# Patient Record
Sex: FEMALE | Race: WHITE | NOT HISPANIC OR LATINO | Employment: UNEMPLOYED | URBAN - METROPOLITAN AREA
[De-identification: names, ages, dates, MRNs, and addresses within clinical notes are randomized per-mention and may not be internally consistent; named-entity substitution may affect disease eponyms.]

---

## 2017-01-03 ENCOUNTER — HOSPITAL ENCOUNTER (OUTPATIENT)
Dept: REHABILITATION | Facility: CLINIC | Age: 57
End: 2017-01-03
Attending: FAMILY MEDICINE
Payer: MEDICAID

## 2017-01-03 PROCEDURE — 40000247 ZZH STATISTIC VISIT ADULT DAY PROGRAM

## 2017-01-03 PROCEDURE — S5102 ADULT DAY CARE PER DIEM: HCPCS

## 2017-01-04 ENCOUNTER — HOSPITAL ENCOUNTER (OUTPATIENT)
Dept: REHABILITATION | Facility: CLINIC | Age: 57
End: 2017-01-04
Attending: FAMILY MEDICINE
Payer: MEDICAID

## 2017-01-04 PROCEDURE — S5102 ADULT DAY CARE PER DIEM: HCPCS

## 2017-01-04 PROCEDURE — 40000247 ZZH STATISTIC VISIT ADULT DAY PROGRAM

## 2017-01-04 NOTE — PROGRESS NOTES
MONTHLY PROGRESS NOTE AND PARTICIPATION REPORT   [facility]    Karolyn Das, 1960  Attendance: Please see Epic for attendance record.    Communication:   Does communicate   Hearing:   No impairment  Vision:   Glasses  Orientation/Cognition:   Partial disorientation  Short term memory loss  Behavior:   No behavioral concerns  Self-Preservation Skills:   Not capable of self-preservation  Eating:   Assist with set up  Ambulation Walking:   Non-ambulatory  Transferring:   Aide of one person/two  Wheelchair:   Independent  occationally needs help  Toileting:   Needs assistance  Maintenance Program:     [facility]  Living Arrangements:   Lives in a group home  Spiritual Needs: Needs are being met through support group  Medication Assistance:   Medication not taken during program hours  Participation Report:   Aerobics/Exercise  Support Group  Cognitive Stimulation  Creative Arts/Crafts  Games  Socialization  Current Events/News  Education/Health Topic  Yoga, Book Club, Coloring, and Stress Managment  Level of Participation:   To the best of their ability

## 2017-01-05 ENCOUNTER — HOSPITAL ENCOUNTER (OUTPATIENT)
Dept: REHABILITATION | Facility: CLINIC | Age: 57
End: 2017-01-05
Attending: FAMILY MEDICINE
Payer: MEDICAID

## 2017-01-05 PROCEDURE — 40000247 ZZH STATISTIC VISIT ADULT DAY PROGRAM

## 2017-01-05 PROCEDURE — S5102 ADULT DAY CARE PER DIEM: HCPCS

## 2017-01-05 NOTE — PROGRESS NOTES
MSAC RN Monthly Progress Note  Previous documentation reviewed.  No concerns reported by [facility] staff or member.

## 2017-01-10 ENCOUNTER — HOSPITAL ENCOUNTER (OUTPATIENT)
Dept: REHABILITATION | Facility: CLINIC | Age: 57
End: 2017-01-10
Attending: FAMILY MEDICINE
Payer: MEDICAID

## 2017-01-10 PROCEDURE — 40000247 ZZH STATISTIC VISIT ADULT DAY PROGRAM

## 2017-01-10 PROCEDURE — S5102 ADULT DAY CARE PER DIEM: HCPCS

## 2017-01-17 ENCOUNTER — HOSPITAL ENCOUNTER (OUTPATIENT)
Dept: REHABILITATION | Facility: CLINIC | Age: 57
End: 2017-01-17
Attending: FAMILY MEDICINE
Payer: MEDICAID

## 2017-01-17 PROCEDURE — S5102 ADULT DAY CARE PER DIEM: HCPCS

## 2017-01-17 PROCEDURE — 40000247 ZZH STATISTIC VISIT ADULT DAY PROGRAM

## 2017-01-18 ENCOUNTER — HOSPITAL ENCOUNTER (OUTPATIENT)
Dept: REHABILITATION | Facility: CLINIC | Age: 57
End: 2017-01-18
Attending: FAMILY MEDICINE
Payer: MEDICAID

## 2017-01-18 PROCEDURE — S5102 ADULT DAY CARE PER DIEM: HCPCS

## 2017-01-18 PROCEDURE — 40000247 ZZH STATISTIC VISIT ADULT DAY PROGRAM

## 2017-01-19 ENCOUNTER — HOSPITAL ENCOUNTER (OUTPATIENT)
Dept: REHABILITATION | Facility: CLINIC | Age: 57
End: 2017-01-19
Attending: FAMILY MEDICINE
Payer: MEDICAID

## 2017-01-19 PROCEDURE — S5102 ADULT DAY CARE PER DIEM: HCPCS

## 2017-01-19 PROCEDURE — 40000247 ZZH STATISTIC VISIT ADULT DAY PROGRAM

## 2017-01-20 ENCOUNTER — COMMUNICATION - HEALTHEAST (OUTPATIENT)
Dept: FAMILY MEDICINE | Facility: CLINIC | Age: 57
End: 2017-01-20

## 2017-01-20 ENCOUNTER — OFFICE VISIT - HEALTHEAST (OUTPATIENT)
Dept: FAMILY MEDICINE | Facility: CLINIC | Age: 57
End: 2017-01-20

## 2017-01-20 DIAGNOSIS — Z79.899 HIGH RISK MEDICATION USE: ICD-10-CM

## 2017-01-20 DIAGNOSIS — E03.9 HYPOTHYROIDISM: ICD-10-CM

## 2017-01-20 DIAGNOSIS — D69.6 THROMBOCYTOPENIA, UNSPECIFIED (H): ICD-10-CM

## 2017-01-20 DIAGNOSIS — J20.9 ACUTE BRONCHITIS: ICD-10-CM

## 2017-01-20 ASSESSMENT — MIFFLIN-ST. JEOR: SCORE: 1250.81

## 2017-01-23 ENCOUNTER — COMMUNICATION - HEALTHEAST (OUTPATIENT)
Dept: FAMILY MEDICINE | Facility: CLINIC | Age: 57
End: 2017-01-23

## 2017-01-25 ENCOUNTER — HOSPITAL ENCOUNTER (OUTPATIENT)
Dept: REHABILITATION | Facility: CLINIC | Age: 57
End: 2017-01-25
Attending: FAMILY MEDICINE
Payer: MEDICAID

## 2017-01-25 PROCEDURE — S5102 ADULT DAY CARE PER DIEM: HCPCS

## 2017-01-25 PROCEDURE — 40000247 ZZH STATISTIC VISIT ADULT DAY PROGRAM

## 2017-01-26 ENCOUNTER — HOSPITAL ENCOUNTER (OUTPATIENT)
Dept: REHABILITATION | Facility: CLINIC | Age: 57
End: 2017-01-26
Attending: FAMILY MEDICINE
Payer: MEDICAID

## 2017-01-26 PROCEDURE — 40000247 ZZH STATISTIC VISIT ADULT DAY PROGRAM

## 2017-01-26 PROCEDURE — S5102 ADULT DAY CARE PER DIEM: HCPCS

## 2017-01-31 ENCOUNTER — HOSPITAL ENCOUNTER (OUTPATIENT)
Dept: REHABILITATION | Facility: CLINIC | Age: 57
End: 2017-01-31
Attending: FAMILY MEDICINE
Payer: MEDICAID

## 2017-01-31 PROCEDURE — S5102 ADULT DAY CARE PER DIEM: HCPCS

## 2017-01-31 PROCEDURE — 40000247 ZZH STATISTIC VISIT ADULT DAY PROGRAM

## 2017-02-01 ENCOUNTER — HOSPITAL ENCOUNTER (OUTPATIENT)
Dept: REHABILITATION | Facility: CLINIC | Age: 57
End: 2017-02-01
Attending: FAMILY MEDICINE
Payer: MEDICAID

## 2017-02-01 PROCEDURE — S5102 ADULT DAY CARE PER DIEM: HCPCS

## 2017-02-01 PROCEDURE — 40000247 ZZH STATISTIC VISIT ADULT DAY PROGRAM

## 2017-02-01 NOTE — PROGRESS NOTES
MONTHLY PROGRESS NOTE AND PARTICIPATION REPORT   [facility]    Karolyn Das, 1960  Attendance: Please see Epic for attendance record.    Communication:   Does communicate   Hearing:   No impairment  Vision:   Glasses  Orientation/Cognition:   Partial disorientation  Short term memory loss  Behavior:   No behavioral concerns  Self-Preservation Skills:   Not capable of self-preservation  Eating:   Assist with set up  Ambulation Walking:   Non-ambulatory  Transferring:   Aide of one person/two  Wheelchair:   Independent  occationally needs help   Toileting:   Needs assistance  Maintenance Program:     [facility]  Living Arrangements:   Lives in a group home  Spiritual Needs: Needs are being met through support group  Medication Assistance:   Medication not taken during program hours  Participation Report:   Aerobics/Exercise  Support Group  Cognitive Stimulation  Creative Arts/Crafts  Games  Socialization  Current Events/News  Education/Health Topic  super bowl activity, chocolate analysis, weird national holiday celebrations, academy awards party  Level of Participation:   To the best of their ability

## 2017-02-02 ENCOUNTER — HOSPITAL ENCOUNTER (OUTPATIENT)
Dept: REHABILITATION | Facility: CLINIC | Age: 57
End: 2017-02-02
Attending: FAMILY MEDICINE
Payer: MEDICAID

## 2017-02-02 PROCEDURE — 40000247 ZZH STATISTIC VISIT ADULT DAY PROGRAM

## 2017-02-02 PROCEDURE — S5102 ADULT DAY CARE PER DIEM: HCPCS

## 2017-02-07 ENCOUNTER — HOSPITAL ENCOUNTER (OUTPATIENT)
Dept: REHABILITATION | Facility: CLINIC | Age: 57
End: 2017-02-07
Attending: FAMILY MEDICINE
Payer: MEDICAID

## 2017-02-07 PROCEDURE — 40000247 ZZH STATISTIC VISIT ADULT DAY PROGRAM

## 2017-02-07 PROCEDURE — S5102 ADULT DAY CARE PER DIEM: HCPCS

## 2017-02-07 NOTE — PROGRESS NOTES
MSAC RN Monthly Progress Note  Previous documentation reviewed.  No concerns reported by [facility] staff or member.

## 2017-02-07 NOTE — PROGRESS NOTES
I called patient's  to confirm that she is moving to [facility]isted [facility] facility in [place] sometime before March 15th.   stated that a staff person from [facility] will call  [facility]  prior to the move to provide assisted living staff direct contact information and arrange transportation.  Patient plans to continue with three days per week of day program attendance.

## 2017-02-08 ENCOUNTER — HOSPITAL ENCOUNTER (OUTPATIENT)
Dept: REHABILITATION | Facility: CLINIC | Age: 57
End: 2017-02-08
Attending: FAMILY MEDICINE
Payer: MEDICAID

## 2017-02-08 ENCOUNTER — COMMUNICATION - HEALTHEAST (OUTPATIENT)
Dept: FAMILY MEDICINE | Facility: CLINIC | Age: 57
End: 2017-02-08

## 2017-02-08 DIAGNOSIS — M81.0 OSTEOPOROSIS: ICD-10-CM

## 2017-02-08 PROCEDURE — 40000247 ZZH STATISTIC VISIT ADULT DAY PROGRAM

## 2017-02-08 PROCEDURE — S5102 ADULT DAY CARE PER DIEM: HCPCS

## 2017-02-09 ENCOUNTER — HOSPITAL ENCOUNTER (OUTPATIENT)
Dept: REHABILITATION | Facility: CLINIC | Age: 57
End: 2017-02-09
Attending: FAMILY MEDICINE
Payer: MEDICAID

## 2017-02-09 PROCEDURE — 40000247 ZZH STATISTIC VISIT ADULT DAY PROGRAM

## 2017-02-09 PROCEDURE — S5102 ADULT DAY CARE PER DIEM: HCPCS

## 2017-02-11 ENCOUNTER — RECORDS - HEALTHEAST (OUTPATIENT)
Dept: ADMINISTRATIVE | Facility: OTHER | Age: 57
End: 2017-02-11

## 2017-02-14 ENCOUNTER — HOSPITAL ENCOUNTER (OUTPATIENT)
Dept: REHABILITATION | Facility: CLINIC | Age: 57
End: 2017-02-14
Attending: FAMILY MEDICINE
Payer: MEDICAID

## 2017-02-14 PROCEDURE — 40000247 ZZH STATISTIC VISIT ADULT DAY PROGRAM

## 2017-02-14 PROCEDURE — S5102 ADULT DAY CARE PER DIEM: HCPCS

## 2017-02-15 ENCOUNTER — HOSPITAL ENCOUNTER (OUTPATIENT)
Dept: REHABILITATION | Facility: CLINIC | Age: 57
End: 2017-02-15
Attending: FAMILY MEDICINE
Payer: MEDICAID

## 2017-02-15 PROCEDURE — 40000247 ZZH STATISTIC VISIT ADULT DAY PROGRAM

## 2017-02-15 PROCEDURE — S5102 ADULT DAY CARE PER DIEM: HCPCS

## 2017-02-16 ENCOUNTER — HOSPITAL ENCOUNTER (OUTPATIENT)
Dept: REHABILITATION | Facility: CLINIC | Age: 57
End: 2017-02-16
Attending: FAMILY MEDICINE
Payer: MEDICAID

## 2017-02-16 PROCEDURE — S5102 ADULT DAY CARE PER DIEM: HCPCS

## 2017-02-16 PROCEDURE — 40000247 ZZH STATISTIC VISIT ADULT DAY PROGRAM

## 2017-02-21 ENCOUNTER — HOSPITAL ENCOUNTER (OUTPATIENT)
Dept: REHABILITATION | Facility: CLINIC | Age: 57
End: 2017-02-21
Attending: FAMILY MEDICINE
Payer: MEDICAID

## 2017-02-21 PROCEDURE — S5102 ADULT DAY CARE PER DIEM: HCPCS

## 2017-02-21 PROCEDURE — 40000247 ZZH STATISTIC VISIT ADULT DAY PROGRAM

## 2017-02-22 ENCOUNTER — RECORDS - HEALTHEAST (OUTPATIENT)
Dept: ADMINISTRATIVE | Facility: OTHER | Age: 57
End: 2017-02-22

## 2017-02-22 ENCOUNTER — HOSPITAL ENCOUNTER (OUTPATIENT)
Dept: REHABILITATION | Facility: CLINIC | Age: 57
End: 2017-02-22
Attending: FAMILY MEDICINE
Payer: MEDICAID

## 2017-02-22 PROCEDURE — 40000247 ZZH STATISTIC VISIT ADULT DAY PROGRAM

## 2017-02-22 PROCEDURE — S5102 ADULT DAY CARE PER DIEM: HCPCS

## 2017-02-23 ENCOUNTER — HOSPITAL ENCOUNTER (OUTPATIENT)
Dept: REHABILITATION | Facility: CLINIC | Age: 57
End: 2017-02-23
Attending: FAMILY MEDICINE
Payer: MEDICAID

## 2017-02-23 PROCEDURE — S5102 ADULT DAY CARE PER DIEM: HCPCS

## 2017-02-23 PROCEDURE — 40000247 ZZH STATISTIC VISIT ADULT DAY PROGRAM

## 2017-02-24 ENCOUNTER — HOSPITAL ENCOUNTER (OUTPATIENT)
Dept: MAMMOGRAPHY | Facility: HOSPITAL | Age: 57
Discharge: HOME | End: 2017-02-24
Attending: FAMILY MEDICINE

## 2017-02-24 DIAGNOSIS — Z12.31 VISIT FOR SCREENING MAMMOGRAM: ICD-10-CM

## 2017-02-28 ENCOUNTER — HOSPITAL ENCOUNTER (OUTPATIENT)
Dept: REHABILITATION | Facility: CLINIC | Age: 57
End: 2017-02-28
Attending: FAMILY MEDICINE
Payer: MEDICAID

## 2017-02-28 PROCEDURE — 40000247 ZZH STATISTIC VISIT ADULT DAY PROGRAM

## 2017-02-28 PROCEDURE — S5102 ADULT DAY CARE PER DIEM: HCPCS

## 2017-03-02 ENCOUNTER — COMMUNICATION - HEALTHEAST (OUTPATIENT)
Dept: FAMILY MEDICINE | Facility: CLINIC | Age: 57
End: 2017-03-02

## 2017-03-02 ENCOUNTER — HOSPITAL ENCOUNTER (OUTPATIENT)
Dept: REHABILITATION | Facility: CLINIC | Age: 57
End: 2017-03-02
Attending: FAMILY MEDICINE
Payer: MEDICAID

## 2017-03-02 PROCEDURE — S5102 ADULT DAY CARE PER DIEM: HCPCS

## 2017-03-02 PROCEDURE — 40000247 ZZH STATISTIC VISIT ADULT DAY PROGRAM

## 2017-03-07 ENCOUNTER — HOSPITAL ENCOUNTER (OUTPATIENT)
Dept: REHABILITATION | Facility: CLINIC | Age: 57
End: 2017-03-07
Attending: FAMILY MEDICINE
Payer: MEDICAID

## 2017-03-07 ENCOUNTER — COMMUNICATION - HEALTHEAST (OUTPATIENT)
Dept: FAMILY MEDICINE | Facility: CLINIC | Age: 57
End: 2017-03-07

## 2017-03-07 DIAGNOSIS — E56.0 DEFICIENCY OF VITAMIN E: ICD-10-CM

## 2017-03-07 DIAGNOSIS — E03.9 HYPOTHYROIDISM: ICD-10-CM

## 2017-03-07 DIAGNOSIS — F06.30 MOOD DISORDER IN CONDITIONS CLASSIFIED ELSEWHERE: ICD-10-CM

## 2017-03-07 PROCEDURE — S5102 ADULT DAY CARE PER DIEM: HCPCS

## 2017-03-07 PROCEDURE — 40000247 ZZH STATISTIC VISIT ADULT DAY PROGRAM

## 2017-03-08 ENCOUNTER — HOSPITAL ENCOUNTER (OUTPATIENT)
Dept: REHABILITATION | Facility: CLINIC | Age: 57
End: 2017-03-08
Attending: FAMILY MEDICINE
Payer: MEDICAID

## 2017-03-08 PROCEDURE — 40000247 ZZH STATISTIC VISIT ADULT DAY PROGRAM

## 2017-03-08 PROCEDURE — S5102 ADULT DAY CARE PER DIEM: HCPCS

## 2017-03-09 ENCOUNTER — HOSPITAL ENCOUNTER (OUTPATIENT)
Dept: REHABILITATION | Facility: CLINIC | Age: 57
End: 2017-03-09
Attending: FAMILY MEDICINE
Payer: MEDICAID

## 2017-03-09 PROCEDURE — S5102 ADULT DAY CARE PER DIEM: HCPCS

## 2017-03-09 PROCEDURE — 40000247 ZZH STATISTIC VISIT ADULT DAY PROGRAM

## 2017-03-10 ENCOUNTER — AMBULATORY - HEALTHEAST (OUTPATIENT)
Dept: FAMILY MEDICINE | Facility: CLINIC | Age: 57
End: 2017-03-10

## 2017-03-10 ENCOUNTER — COMMUNICATION - HEALTHEAST (OUTPATIENT)
Dept: FAMILY MEDICINE | Facility: CLINIC | Age: 57
End: 2017-03-10

## 2017-03-10 DIAGNOSIS — E03.9 HYPOTHYROIDISM: ICD-10-CM

## 2017-03-10 DIAGNOSIS — E56.0 DEFICIENCY OF VITAMIN E: ICD-10-CM

## 2017-03-10 DIAGNOSIS — F32.A DEPRESSION: ICD-10-CM

## 2017-03-13 ENCOUNTER — COMMUNICATION - HEALTHEAST (OUTPATIENT)
Dept: FAMILY MEDICINE | Facility: CLINIC | Age: 57
End: 2017-03-13

## 2017-03-13 DIAGNOSIS — E55.9 VITAMIN D DEFICIENCY: ICD-10-CM

## 2017-03-14 ENCOUNTER — HOSPITAL ENCOUNTER (OUTPATIENT)
Dept: REHABILITATION | Facility: CLINIC | Age: 57
End: 2017-03-14
Attending: FAMILY MEDICINE
Payer: MEDICAID

## 2017-03-14 PROCEDURE — S5102 ADULT DAY CARE PER DIEM: HCPCS

## 2017-03-14 PROCEDURE — 40000247 ZZH STATISTIC VISIT ADULT DAY PROGRAM

## 2017-03-15 ENCOUNTER — HOSPITAL ENCOUNTER (OUTPATIENT)
Dept: REHABILITATION | Facility: CLINIC | Age: 57
End: 2017-03-15
Attending: FAMILY MEDICINE
Payer: MEDICAID

## 2017-03-15 PROCEDURE — 40000247 ZZH STATISTIC VISIT ADULT DAY PROGRAM

## 2017-03-15 PROCEDURE — S5102 ADULT DAY CARE PER DIEM: HCPCS

## 2017-03-16 ENCOUNTER — HOSPITAL ENCOUNTER (OUTPATIENT)
Dept: REHABILITATION | Facility: CLINIC | Age: 57
End: 2017-03-16
Attending: FAMILY MEDICINE
Payer: MEDICAID

## 2017-03-16 PROCEDURE — 40000247 ZZH STATISTIC VISIT ADULT DAY PROGRAM

## 2017-03-16 PROCEDURE — S5102 ADULT DAY CARE PER DIEM: HCPCS

## 2017-03-17 ENCOUNTER — COMMUNICATION - HEALTHEAST (OUTPATIENT)
Dept: SCHEDULING | Facility: CLINIC | Age: 57
End: 2017-03-17

## 2017-03-17 DIAGNOSIS — G40.909 EPILEPSY (H): ICD-10-CM

## 2017-03-17 DIAGNOSIS — G35 GENERALIZED MULTIPLE SCLEROSIS (H): ICD-10-CM

## 2017-03-18 NOTE — PROGRESS NOTES
[facility] Social History    Full Name: Karolyn Das        MRN: [id number]    Date of Birth: [birth year]  Nickname: Mirtha      Sex: F     Home Phone: [phone]      Cell Phone: None  Address: [facility]; [address] [place] [address]//Zip: [place] [address]  County: [place]      E-mail: None        Transportation: [employer]  Language: English         needed? No       Ethnicity: American  Race: White      Country of Origin: [place]       Quaker: Adventism  Marital Status:                   Spouse/Significant Other: Ramone   ______________________________________________________________________________________     [place]? No    Branch of Service: NA      Education Level: Unknown  Job History: [employer]       Organizations/Clubs: None  Whom do you live with? Group Home  Current living arrangement:  Home / Assisted Living / Group Home / Other: Group Home  Number of Children: 2  List: Rohit Serna  Number of Siblings: 3     List:   Other Important People/Pets: Best friend (lives in [place])  What else should we know about you? None    Primary Care Provider: Vicente Elizabeth        Neurologist: Unknown  Emergency Contacts:  1. Kareem Lynnenorberto      Relationship: Son    Phone: [phone]  2. Jo Das         Relationship: Mother-in-Law     Phone: [phone]        Updated on: 1/25/17             By: TAYLOR

## 2017-03-20 ENCOUNTER — RECORDS - HEALTHEAST (OUTPATIENT)
Dept: ADMINISTRATIVE | Facility: OTHER | Age: 57
End: 2017-03-20

## 2017-03-20 ENCOUNTER — AMBULATORY - HEALTHEAST (OUTPATIENT)
Dept: FAMILY MEDICINE | Facility: CLINIC | Age: 57
End: 2017-03-20

## 2017-03-20 DIAGNOSIS — G40.909 SEIZURE DISORDER (H): ICD-10-CM

## 2017-03-21 ENCOUNTER — HOSPITAL ENCOUNTER (OUTPATIENT)
Dept: REHABILITATION | Facility: CLINIC | Age: 57
End: 2017-03-21
Attending: FAMILY MEDICINE
Payer: MEDICAID

## 2017-03-21 PROCEDURE — 40000247 ZZH STATISTIC VISIT ADULT DAY PROGRAM

## 2017-03-21 PROCEDURE — S5102 ADULT DAY CARE PER DIEM: HCPCS

## 2017-03-22 ENCOUNTER — HOSPITAL ENCOUNTER (OUTPATIENT)
Dept: REHABILITATION | Facility: CLINIC | Age: 57
End: 2017-03-22
Attending: FAMILY MEDICINE
Payer: MEDICAID

## 2017-03-22 ENCOUNTER — RECORDS - HEALTHEAST (OUTPATIENT)
Dept: ADMINISTRATIVE | Facility: OTHER | Age: 57
End: 2017-03-22

## 2017-03-22 PROCEDURE — S5102 ADULT DAY CARE PER DIEM: HCPCS

## 2017-03-22 PROCEDURE — 40000247 ZZH STATISTIC VISIT ADULT DAY PROGRAM

## 2017-03-23 ENCOUNTER — HOSPITAL ENCOUNTER (OUTPATIENT)
Dept: REHABILITATION | Facility: CLINIC | Age: 57
End: 2017-03-23
Attending: FAMILY MEDICINE
Payer: MEDICAID

## 2017-03-23 ENCOUNTER — RECORDS - HEALTHEAST (OUTPATIENT)
Dept: ADMINISTRATIVE | Facility: OTHER | Age: 57
End: 2017-03-23

## 2017-03-23 PROCEDURE — S5102 ADULT DAY CARE PER DIEM: HCPCS

## 2017-03-23 PROCEDURE — 40000247 ZZH STATISTIC VISIT ADULT DAY PROGRAM

## 2017-03-23 NOTE — PROGRESS NOTES
MSAC RN Monthly Progress Note  Previous documentation reviewed.  No concerns reported by [facility] staff or member.

## 2017-03-24 ENCOUNTER — RECORDS - HEALTHEAST (OUTPATIENT)
Dept: ADMINISTRATIVE | Facility: OTHER | Age: 57
End: 2017-03-24

## 2017-03-24 ENCOUNTER — COMMUNICATION - HEALTHEAST (OUTPATIENT)
Dept: FAMILY MEDICINE | Facility: CLINIC | Age: 57
End: 2017-03-24

## 2017-03-24 DIAGNOSIS — K59.00 CONSTIPATION: ICD-10-CM

## 2017-03-28 ENCOUNTER — HOSPITAL ENCOUNTER (OUTPATIENT)
Dept: REHABILITATION | Facility: CLINIC | Age: 57
End: 2017-03-28
Attending: FAMILY MEDICINE
Payer: MEDICAID

## 2017-03-28 PROCEDURE — 40000247 ZZH STATISTIC VISIT ADULT DAY PROGRAM

## 2017-03-28 PROCEDURE — S5102 ADULT DAY CARE PER DIEM: HCPCS

## 2017-03-29 ENCOUNTER — COMMUNICATION - HEALTHEAST (OUTPATIENT)
Dept: FAMILY MEDICINE | Facility: CLINIC | Age: 57
End: 2017-03-29

## 2017-03-29 ENCOUNTER — HOSPITAL ENCOUNTER (OUTPATIENT)
Dept: REHABILITATION | Facility: CLINIC | Age: 57
End: 2017-03-29
Attending: FAMILY MEDICINE
Payer: MEDICAID

## 2017-03-29 DIAGNOSIS — K59.00 CONSTIPATION: ICD-10-CM

## 2017-03-29 PROCEDURE — 40000247 ZZH STATISTIC VISIT ADULT DAY PROGRAM

## 2017-03-29 PROCEDURE — S5102 ADULT DAY CARE PER DIEM: HCPCS

## 2017-03-30 ENCOUNTER — COMMUNICATION - HEALTHEAST (OUTPATIENT)
Dept: FAMILY MEDICINE | Facility: CLINIC | Age: 57
End: 2017-03-30

## 2017-03-30 ENCOUNTER — HOSPITAL ENCOUNTER (OUTPATIENT)
Dept: REHABILITATION | Facility: CLINIC | Age: 57
End: 2017-03-30
Attending: FAMILY MEDICINE
Payer: MEDICAID

## 2017-03-30 DIAGNOSIS — M81.0 OSTEOPOROSIS: ICD-10-CM

## 2017-03-30 DIAGNOSIS — K59.00 CONSTIPATION: ICD-10-CM

## 2017-03-30 DIAGNOSIS — Z88.9 MULTIPLE ALLERGIES: ICD-10-CM

## 2017-03-30 PROCEDURE — S5102 ADULT DAY CARE PER DIEM: HCPCS

## 2017-03-30 PROCEDURE — 40000247 ZZH STATISTIC VISIT ADULT DAY PROGRAM

## 2017-03-31 ENCOUNTER — COMMUNICATION - HEALTHEAST (OUTPATIENT)
Dept: FAMILY MEDICINE | Facility: CLINIC | Age: 57
End: 2017-03-31

## 2017-03-31 DIAGNOSIS — Z00.00 HEALTHCARE MAINTENANCE: ICD-10-CM

## 2017-03-31 DIAGNOSIS — K59.00 CONSTIPATION: ICD-10-CM

## 2017-03-31 DIAGNOSIS — E56.0 DEFICIENCY OF VITAMIN E: ICD-10-CM

## 2017-04-03 ENCOUNTER — COMMUNICATION - HEALTHEAST (OUTPATIENT)
Dept: FAMILY MEDICINE | Facility: CLINIC | Age: 57
End: 2017-04-03

## 2017-04-03 RX ORDER — VITAMIN E 268 MG
CAPSULE ORAL
Qty: 100 CAPSULE | Refills: 10 | Status: SHIPPED | OUTPATIENT
Start: 2017-04-03 | End: 2023-07-06

## 2017-04-03 RX ORDER — IBUPROFEN 600 MG/1
TABLET, FILM COATED ORAL
Qty: 30 TABLET | Refills: 10 | Status: SHIPPED | OUTPATIENT
Start: 2017-04-03 | End: 2023-07-06

## 2017-04-05 ENCOUNTER — COMMUNICATION - HEALTHEAST (OUTPATIENT)
Dept: FAMILY MEDICINE | Facility: CLINIC | Age: 57
End: 2017-04-05

## 2017-04-05 ENCOUNTER — HOSPITAL ENCOUNTER (OUTPATIENT)
Dept: REHABILITATION | Facility: CLINIC | Age: 57
End: 2017-04-05
Attending: FAMILY MEDICINE
Payer: MEDICAID

## 2017-04-05 DIAGNOSIS — E55.9 VITAMIN D DEFICIENCY: ICD-10-CM

## 2017-04-05 PROCEDURE — S5102 ADULT DAY CARE PER DIEM: HCPCS

## 2017-04-05 PROCEDURE — 40000247 ZZH STATISTIC VISIT ADULT DAY PROGRAM

## 2017-04-07 ENCOUNTER — COMMUNICATION - HEALTHEAST (OUTPATIENT)
Dept: FAMILY MEDICINE | Facility: CLINIC | Age: 57
End: 2017-04-07

## 2017-04-07 DIAGNOSIS — M81.0 OSTEOPOROSIS: ICD-10-CM

## 2017-04-11 ENCOUNTER — RECORDS - HEALTHEAST (OUTPATIENT)
Dept: LAB | Facility: CLINIC | Age: 57
End: 2017-04-11

## 2017-04-12 ENCOUNTER — HOSPITAL ENCOUNTER (OUTPATIENT)
Dept: REHABILITATION | Facility: CLINIC | Age: 57
End: 2017-04-12
Attending: FAMILY MEDICINE
Payer: MEDICAID

## 2017-04-12 LAB
CHOLEST SERPL-MCNC: 162 MG/DL
FASTING STATUS PATIENT QL REPORTED: ABNORMAL
HDLC SERPL-MCNC: 48 MG/DL
LDLC SERPL CALC-MCNC: 97 MG/DL
TRIGL SERPL-MCNC: 84 MG/DL

## 2017-04-12 PROCEDURE — 40000247 ZZH STATISTIC VISIT ADULT DAY PROGRAM

## 2017-04-12 PROCEDURE — S5102 ADULT DAY CARE PER DIEM: HCPCS

## 2017-04-13 ENCOUNTER — HOSPITAL ENCOUNTER (OUTPATIENT)
Dept: REHABILITATION | Facility: CLINIC | Age: 57
End: 2017-04-13
Attending: FAMILY MEDICINE
Payer: MEDICAID

## 2017-04-13 PROCEDURE — 40000247 ZZH STATISTIC VISIT ADULT DAY PROGRAM

## 2017-04-13 PROCEDURE — S5102 ADULT DAY CARE PER DIEM: HCPCS

## 2017-04-13 NOTE — PROGRESS NOTES
MONTHLY PROGRESS NOTE AND PARTICIPATION REPORT   [facility]    Karolyn Das, 1960  Attendance: Please see Epic for attendance record.    Communication:   Does communicate   Hearing:   No impairment  Vision:   Glasses  Orientation/Cognition:   Partial disorientation  Short term memory loss  Behavior:   occationally requires redirection   Self-Preservation Skills:   Not capable of self-preservation  Eating:   Assist with set up  Ambulation Walking:   Non-ambulatory  Transferring:   Aide of one person/two  Wheelchair:   Independent  occationally asks are assistance  Toileting:   Needs assistance  Maintenance Program:     [facility]  Living Arrangements:   Lives in assisted living facility  Spiritual Needs: Needs are being met through support group  Medication Assistance:   Medication not taken during program hours  Participation Report:   Aerobics/Exercise  Support Group  Cognitive Stimulation  Creative Arts/Crafts  Games  Speakers/Entertainment  Socialization  Current Events/News  Education/Health Topic  yoga/relaxation, easter activity, [employer] activities   Level of Participation:   To the best of their ability

## 2017-04-14 ENCOUNTER — COMMUNICATION - HEALTHEAST (OUTPATIENT)
Dept: FAMILY MEDICINE | Facility: CLINIC | Age: 57
End: 2017-04-14

## 2017-04-14 DIAGNOSIS — E03.9 HYPOTHYROIDISM: ICD-10-CM

## 2017-04-18 ENCOUNTER — HOSPITAL ENCOUNTER (OUTPATIENT)
Dept: REHABILITATION | Facility: CLINIC | Age: 57
End: 2017-04-18
Attending: FAMILY MEDICINE
Payer: MEDICAID

## 2017-04-18 PROCEDURE — 40000247 ZZH STATISTIC VISIT ADULT DAY PROGRAM

## 2017-04-18 PROCEDURE — S5102 ADULT DAY CARE PER DIEM: HCPCS

## 2017-04-19 ENCOUNTER — HOSPITAL ENCOUNTER (OUTPATIENT)
Dept: REHABILITATION | Facility: CLINIC | Age: 57
End: 2017-04-19
Attending: FAMILY MEDICINE
Payer: MEDICAID

## 2017-04-19 PROCEDURE — 40000247 ZZH STATISTIC VISIT ADULT DAY PROGRAM

## 2017-04-19 PROCEDURE — S5102 ADULT DAY CARE PER DIEM: HCPCS

## 2017-04-20 ENCOUNTER — HOSPITAL ENCOUNTER (OUTPATIENT)
Dept: REHABILITATION | Facility: CLINIC | Age: 57
End: 2017-04-20
Attending: FAMILY MEDICINE
Payer: MEDICAID

## 2017-04-20 PROCEDURE — 40000247 ZZH STATISTIC VISIT ADULT DAY PROGRAM

## 2017-04-20 PROCEDURE — S5102 ADULT DAY CARE PER DIEM: HCPCS

## 2017-04-21 ENCOUNTER — RECORDS - HEALTHEAST (OUTPATIENT)
Dept: ADMINISTRATIVE | Facility: OTHER | Age: 57
End: 2017-04-21

## 2017-04-25 ENCOUNTER — HOSPITAL ENCOUNTER (OUTPATIENT)
Dept: REHABILITATION | Facility: CLINIC | Age: 57
End: 2017-04-25
Attending: FAMILY MEDICINE
Payer: MEDICAID

## 2017-04-25 PROCEDURE — 40000247 ZZH STATISTIC VISIT ADULT DAY PROGRAM

## 2017-04-25 PROCEDURE — S5102 ADULT DAY CARE PER DIEM: HCPCS

## 2017-04-26 ENCOUNTER — HOSPITAL ENCOUNTER (OUTPATIENT)
Dept: REHABILITATION | Facility: CLINIC | Age: 57
End: 2017-04-26
Attending: FAMILY MEDICINE
Payer: MEDICAID

## 2017-04-26 PROCEDURE — 40000247 ZZH STATISTIC VISIT ADULT DAY PROGRAM

## 2017-04-26 PROCEDURE — S5102 ADULT DAY CARE PER DIEM: HCPCS

## 2017-04-27 ENCOUNTER — HOSPITAL ENCOUNTER (OUTPATIENT)
Dept: REHABILITATION | Facility: CLINIC | Age: 57
End: 2017-04-27
Attending: FAMILY MEDICINE
Payer: MEDICAID

## 2017-04-27 PROCEDURE — S5102 ADULT DAY CARE PER DIEM: HCPCS

## 2017-04-27 PROCEDURE — 40000247 ZZH STATISTIC VISIT ADULT DAY PROGRAM

## 2017-04-27 NOTE — PROGRESS NOTES
INDIVIDUAL PLAN OF CARE   [facility]    Member Name: Karolyn Das; YOB: 1960  MRN: [id number]  4/27/2017    Goals developed in collaboration with: member  Staff responsible for plan: [facility] staff  1. Long Term Goal (concrete, measurable, and time specific outcomes): Member will improve or maintain cognitive functioning while maintaining dignity and respect.  Target Date: ***  Bi-Annual Review:  Goal reviewed on *** by ***  Goal status: {Goal status:899793}  Bi-Annual Review:  Goal reviewed on *** by ***  Goal status: {Goal status:273712}  Bi-Annual Review:  Goal reviewed on *** by ***  Goal status: {Goal status:213898}  Bi-Annual Review:  Goal reviewed on *** by ***  Goal status: {Goal status:553507}    2. Short Term Goal: (concrete, measurable, and time specific outcomes): Member will participate in prescribed maintenance program 1x/week.  Member will actively participate in aerobic exercises each day of program attendance.  Target Date: ***  Bi-Annual Review:  Goal reviewed on *** by ***  Goal status: {Goal status:483175}  Bi-Annual Review:  Goal reviewed on *** by ***  Goal status: {Goal status:049399}  Bi-Annual Review:  Goal reviewed on *** by ***  Goal status: {Goal status:432045}  Bi-Annual Review:  Goal reviewed on *** by ***  Goal status: {Goal status:054645}    3. Short Term Goal: (concrete, measurable, and time specific outcomes): Member will process issues of loss and change related to MS function and explores new opportunities for community engagement.    Target Date: ***  Bi-Annual Review:  Goal reviewed on *** by ***  Goal status: {Goal status:808268}  Bi-Annual Review:  Goal reviewed on *** by ***  Goal status: {Goal status:043325}  Bi-Annual Review:  Goal reviewed on *** by ***  Goal status: {Goal status:450685}  Bi-Annual Review:  Goal reviewed on *** by ***  Goal status: {Goal status:385261}    VULNERABLE ADULT ASSESSMENT  [place]  [facility]     Assessment of Susceptibility to Abuse, Including Self Abuse, Neglect (Identification of characteristics, which make the individual susceptible to abuse, and how these characteristics cause the individual to be susceptible to abuse.)   Based on completed member needs assessment and interview, cognitive and/or physical limitations impair ability to meet basic needs and/or protect self from maltreatment.  INDIVIDUAL ABUSE PREVENTION PLAN-MEASURES TAKEN TO MINIMIZE RISK OF ABUSE   ADL:   Assist with toileting  Ambulation/Transfers/Wheelchairs:  Assist with all transfers and/or ambulation   Behavior:   Monitor client's agitation level and redirect when appropriate  Communication:  Encourage verbalization of needs/concerns  Cognitive Issues:   Give simple step-by-step direction  Diet:   Monitor fluid intake  Exercise:   Encourage participation in wheelchair aerobics  Hearing:   {HEARIN}  Isolation:   Encourage socialization due to isolation in home environment  Medical Monitoring:   Monitor physical and emotional comfort  Mental Health:   Motivate client to join in activities that are beneficial to client  Encourage client to express feelings  Sensory:   Provide and encourage participation in activities for stimulation  Provide variety of sensory groups to maintain current cognitive level  Vision:   Ensure client is wearing glasses and clean prn  Other: ***  Referral Indicated: {REFERRAL:808402}  Developed/Reviewed with Member: Yes  The Program Abuse Prevention Plan identifies the specific actions that minimize abuse to the [facility] participant.  Yes  Plan Reviewed:   Date VULNERABLE ADULT ASSESSMENT/INDIVIDUAL ABUSE PREVENTTION PLAN Remains Appropriate (Y=Yes, N=No)  If NO, see revised VULNERABLE ADULT ASSESSMENT/INDIVIDUAL ABUSE PREVENTTION PLAN document  Staff Name

## 2017-05-03 ENCOUNTER — HOSPITAL ENCOUNTER (OUTPATIENT)
Dept: REHABILITATION | Facility: CLINIC | Age: 57
End: 2017-05-03
Attending: FAMILY MEDICINE
Payer: MEDICAID

## 2017-05-03 PROCEDURE — S5102 ADULT DAY CARE PER DIEM: HCPCS

## 2017-05-03 PROCEDURE — 40000247 ZZH STATISTIC VISIT ADULT DAY PROGRAM

## 2017-05-03 NOTE — PROGRESS NOTES
MONTHLY PROGRESS NOTE AND PARTICIPATION REPORT   [facility]    Karolyn Das, 1960  Attendance: Please see Epic for attendance record.    Communication:   Does communicate   Hearing:   No impairment  Vision:   Glasses  Orientation/Cognition:   Partial disorientation  Short term memory loss  Behavior:   occationally requires redirection  Self-Preservation Skills:   Not capable of self-preservation  Eating:   Assist with set up  Ambulation Walking:   Non-ambulatory  Transferring:   Aide of one person/two  Wheelchair:   Independent  occationally requires assistance   Toileting:   Needs assistance  Maintenance Program:     [facility]  Living Arrangements:   Lives in assisted living facility  Spiritual Needs: Needs are being met through support group  Medication Assistance:   Medication not taken during program hours  Participation Report:   Aerobics/Exercise  Support Group  Cognitive Stimulation  Creative Arts/Crafts  Games  Gardening  Speakers/Entertainment  Socialization  Current Events/News  Education/Health Topic  Kids Movie activities, [employer] opener activities, book club, painting, garden chill, relaxation with Lucero, meditation, prayer time, communion with Amy  Level of Participation:   To the best of their ability

## 2017-05-04 ENCOUNTER — HOSPITAL ENCOUNTER (OUTPATIENT)
Dept: REHABILITATION | Facility: CLINIC | Age: 57
End: 2017-05-04
Attending: FAMILY MEDICINE
Payer: MEDICAID

## 2017-05-04 PROCEDURE — 40000247 ZZH STATISTIC VISIT ADULT DAY PROGRAM

## 2017-05-04 PROCEDURE — S5102 ADULT DAY CARE PER DIEM: HCPCS

## 2017-05-04 NOTE — PROGRESS NOTES
[facility] Note: Patient arrived yesterday with a rash on her neck and chest.  Her checks also appeared a little swollen.  Called the nurse from her group home and they reported that they did not see the rash when she left the house yesterday morning.  They were going to have her looked at.  She arrived again today with the rash still present, but not as prominent as yesterday.  Called the group home nurse again and they stated that she was not seen by their nurse practitioner, but was given some medication to address the rash. Will continue to monitor.

## 2017-05-09 ENCOUNTER — HOSPITAL ENCOUNTER (OUTPATIENT)
Dept: REHABILITATION | Facility: CLINIC | Age: 57
End: 2017-05-09
Attending: FAMILY MEDICINE
Payer: MEDICAID

## 2017-05-09 PROCEDURE — S5102 ADULT DAY CARE PER DIEM: HCPCS

## 2017-05-09 PROCEDURE — 40000247 ZZH STATISTIC VISIT ADULT DAY PROGRAM

## 2017-05-10 ENCOUNTER — HOSPITAL ENCOUNTER (OUTPATIENT)
Dept: REHABILITATION | Facility: CLINIC | Age: 57
End: 2017-05-10
Attending: FAMILY MEDICINE
Payer: MEDICAID

## 2017-05-10 PROCEDURE — 40000247 ZZH STATISTIC VISIT ADULT DAY PROGRAM

## 2017-05-10 PROCEDURE — S5102 ADULT DAY CARE PER DIEM: HCPCS

## 2017-05-11 ENCOUNTER — HOSPITAL ENCOUNTER (OUTPATIENT)
Dept: REHABILITATION | Facility: CLINIC | Age: 57
End: 2017-05-11
Attending: FAMILY MEDICINE
Payer: MEDICAID

## 2017-05-11 ENCOUNTER — COMMUNICATION - HEALTHEAST (OUTPATIENT)
Dept: FAMILY MEDICINE | Facility: CLINIC | Age: 57
End: 2017-05-11

## 2017-05-11 DIAGNOSIS — F32.A DEPRESSION: ICD-10-CM

## 2017-05-11 PROCEDURE — S5102 ADULT DAY CARE PER DIEM: HCPCS

## 2017-05-11 PROCEDURE — 40000247 ZZH STATISTIC VISIT ADULT DAY PROGRAM

## 2017-05-16 ENCOUNTER — HOSPITAL ENCOUNTER (OUTPATIENT)
Dept: REHABILITATION | Facility: CLINIC | Age: 57
End: 2017-05-16
Attending: FAMILY MEDICINE
Payer: MEDICAID

## 2017-05-16 PROCEDURE — S5102 ADULT DAY CARE PER DIEM: HCPCS

## 2017-05-16 PROCEDURE — 40000247 ZZH STATISTIC VISIT ADULT DAY PROGRAM

## 2017-05-16 NOTE — PROGRESS NOTES
AC staff reported rash continues now to members inner bend of bilateral elbows. Writer spoke with member who states the rash she had last week on chest/face has cleared with use of prescription cream. When asked if it was applied this AM member states that she is currently out and a new tube has been ordered. She is unclear what the cream is. Rash is bright red, raised and itchy. Writer suspects a yeast-type infection and encouraged good hygiene to include thorough rinsing/drying. Unsure if this rash is related to last weeks rash which member thought was caused by something she ate. Member verbalized understanding and asked writer to call group home to relay instructions as well. Writer called group home and left message asking for call back to verify that member is being monitored/treated by medical professional for this rash and that a course of treatment is being followed.

## 2017-05-16 NOTE — PROGRESS NOTES
Dionisio, from [facility] returned writers call and states MD is aware of members rash and a new prescription has been filled today for a topical cream to help with irritation and itching. Dionisio states it is a suspected reaction to a new/different laundry soap. [facility] is now using members own soap which is more mild. They will continue to monitor.

## 2017-05-17 NOTE — PROGRESS NOTES
[facility] Note: Patient has expressed concern regarding her finances.  She stated her son no longer wants to be responsible for her finances.  Left message with  to see if they could assist her in finding someone to help her with this task.

## 2017-05-22 ENCOUNTER — COMMUNICATION - HEALTHEAST (OUTPATIENT)
Dept: FAMILY MEDICINE | Facility: CLINIC | Age: 57
End: 2017-05-22

## 2017-05-22 RX ORDER — CLONAZEPAM 0.5 MG/1
TABLET ORAL
Qty: 56 TABLET | Refills: 2 | Status: ON HOLD | OUTPATIENT
Start: 2017-05-22 | End: 2023-07-08

## 2017-05-23 ENCOUNTER — HOSPITAL ENCOUNTER (OUTPATIENT)
Dept: REHABILITATION | Facility: CLINIC | Age: 57
End: 2017-05-23
Attending: FAMILY MEDICINE
Payer: MEDICAID

## 2017-05-23 PROCEDURE — S5102 ADULT DAY CARE PER DIEM: HCPCS

## 2017-05-23 PROCEDURE — 40000247 ZZH STATISTIC VISIT ADULT DAY PROGRAM

## 2017-05-24 ENCOUNTER — HOSPITAL ENCOUNTER (OUTPATIENT)
Dept: REHABILITATION | Facility: CLINIC | Age: 57
End: 2017-05-24
Attending: FAMILY MEDICINE
Payer: MEDICAID

## 2017-05-24 PROCEDURE — S5102 ADULT DAY CARE PER DIEM: HCPCS

## 2017-05-24 PROCEDURE — 40000247 ZZH STATISTIC VISIT ADULT DAY PROGRAM

## 2017-05-25 ENCOUNTER — COMMUNICATION - HEALTHEAST (OUTPATIENT)
Dept: FAMILY MEDICINE | Facility: CLINIC | Age: 57
End: 2017-05-25

## 2017-05-25 ENCOUNTER — HOSPITAL ENCOUNTER (OUTPATIENT)
Dept: REHABILITATION | Facility: CLINIC | Age: 57
End: 2017-05-25
Attending: FAMILY MEDICINE
Payer: MEDICAID

## 2017-05-25 PROCEDURE — 40000247 ZZH STATISTIC VISIT ADULT DAY PROGRAM

## 2017-05-25 PROCEDURE — S5102 ADULT DAY CARE PER DIEM: HCPCS

## 2017-05-30 ENCOUNTER — HOSPITAL ENCOUNTER (OUTPATIENT)
Dept: REHABILITATION | Facility: CLINIC | Age: 57
End: 2017-05-30
Attending: FAMILY MEDICINE
Payer: MEDICAID

## 2017-05-30 PROCEDURE — S5102 ADULT DAY CARE PER DIEM: HCPCS

## 2017-05-30 PROCEDURE — 40000247 ZZH STATISTIC VISIT ADULT DAY PROGRAM

## 2017-05-31 ENCOUNTER — HOSPITAL ENCOUNTER (OUTPATIENT)
Dept: REHABILITATION | Facility: CLINIC | Age: 57
End: 2017-05-31
Attending: FAMILY MEDICINE
Payer: MEDICAID

## 2017-05-31 PROCEDURE — 40000247 ZZH STATISTIC VISIT ADULT DAY PROGRAM

## 2017-05-31 PROCEDURE — S5102 ADULT DAY CARE PER DIEM: HCPCS

## 2017-06-01 ENCOUNTER — HOSPITAL ENCOUNTER (OUTPATIENT)
Dept: REHABILITATION | Facility: CLINIC | Age: 57
End: 2017-06-01
Attending: FAMILY MEDICINE
Payer: MEDICAID

## 2017-06-01 PROCEDURE — 40000247 ZZH STATISTIC VISIT ADULT DAY PROGRAM

## 2017-06-01 PROCEDURE — S5102 ADULT DAY CARE PER DIEM: HCPCS

## 2017-06-06 ENCOUNTER — HOSPITAL ENCOUNTER (OUTPATIENT)
Dept: REHABILITATION | Facility: CLINIC | Age: 57
End: 2017-06-06
Attending: FAMILY MEDICINE
Payer: MEDICAID

## 2017-06-06 PROCEDURE — 40000247 ZZH STATISTIC VISIT ADULT DAY PROGRAM

## 2017-06-06 PROCEDURE — S5102 ADULT DAY CARE PER DIEM: HCPCS

## 2017-06-07 ENCOUNTER — HOSPITAL ENCOUNTER (OUTPATIENT)
Dept: REHABILITATION | Facility: CLINIC | Age: 57
End: 2017-06-07
Attending: FAMILY MEDICINE
Payer: MEDICAID

## 2017-06-07 PROCEDURE — S5102 ADULT DAY CARE PER DIEM: HCPCS

## 2017-06-07 PROCEDURE — 40000247 ZZH STATISTIC VISIT ADULT DAY PROGRAM

## 2017-06-07 NOTE — PROGRESS NOTES
MSAC RN Monthly Progress Note  Previous documentation reviewed.  No concerns reported by [facility] staff or member.

## 2017-06-08 ENCOUNTER — HOSPITAL ENCOUNTER (OUTPATIENT)
Dept: REHABILITATION | Facility: CLINIC | Age: 57
End: 2017-06-08
Attending: FAMILY MEDICINE
Payer: MEDICAID

## 2017-06-08 PROCEDURE — S5102 ADULT DAY CARE PER DIEM: HCPCS

## 2017-06-08 PROCEDURE — 40000247 ZZH STATISTIC VISIT ADULT DAY PROGRAM

## 2017-06-12 NOTE — ADDENDUM NOTE
Encounter addended by: Kristan Etienne on: 6/12/2017  2:56 PM<BR>     Actions taken: Charge Capture section accepted

## 2017-06-14 ENCOUNTER — HOSPITAL ENCOUNTER (OUTPATIENT)
Dept: REHABILITATION | Facility: CLINIC | Age: 57
End: 2017-06-14
Attending: FAMILY MEDICINE
Payer: MEDICAID

## 2017-06-14 PROCEDURE — S5102 ADULT DAY CARE PER DIEM: HCPCS

## 2017-06-14 PROCEDURE — 40000247 ZZH STATISTIC VISIT ADULT DAY PROGRAM

## 2017-06-15 ENCOUNTER — HOSPITAL ENCOUNTER (OUTPATIENT)
Dept: REHABILITATION | Facility: CLINIC | Age: 57
End: 2017-06-15
Attending: FAMILY MEDICINE
Payer: MEDICAID

## 2017-06-15 PROCEDURE — 40000247 ZZH STATISTIC VISIT ADULT DAY PROGRAM

## 2017-06-15 PROCEDURE — S5102 ADULT DAY CARE PER DIEM: HCPCS

## 2017-06-20 ENCOUNTER — HOSPITAL ENCOUNTER (OUTPATIENT)
Dept: REHABILITATION | Facility: CLINIC | Age: 57
End: 2017-06-20
Attending: FAMILY MEDICINE
Payer: MEDICAID

## 2017-06-20 PROCEDURE — S5102 ADULT DAY CARE PER DIEM: HCPCS

## 2017-06-20 PROCEDURE — 40000247 ZZH STATISTIC VISIT ADULT DAY PROGRAM

## 2017-06-21 ENCOUNTER — HOSPITAL ENCOUNTER (OUTPATIENT)
Dept: REHABILITATION | Facility: CLINIC | Age: 57
End: 2017-06-21
Attending: FAMILY MEDICINE
Payer: MEDICAID

## 2017-06-21 PROCEDURE — 40000247 ZZH STATISTIC VISIT ADULT DAY PROGRAM

## 2017-06-21 PROCEDURE — S5102 ADULT DAY CARE PER DIEM: HCPCS

## 2017-06-22 ENCOUNTER — HOSPITAL ENCOUNTER (OUTPATIENT)
Dept: REHABILITATION | Facility: CLINIC | Age: 57
End: 2017-06-22
Attending: FAMILY MEDICINE
Payer: MEDICAID

## 2017-06-22 PROCEDURE — S5102 ADULT DAY CARE PER DIEM: HCPCS

## 2017-06-22 PROCEDURE — 40000247 ZZH STATISTIC VISIT ADULT DAY PROGRAM

## 2017-06-28 ENCOUNTER — HOSPITAL ENCOUNTER (OUTPATIENT)
Dept: REHABILITATION | Facility: CLINIC | Age: 57
End: 2017-06-28
Attending: FAMILY MEDICINE
Payer: MEDICAID

## 2017-06-28 PROCEDURE — 40000247 ZZH STATISTIC VISIT ADULT DAY PROGRAM

## 2017-06-28 PROCEDURE — S5102 ADULT DAY CARE PER DIEM: HCPCS

## 2017-06-29 ENCOUNTER — HOSPITAL ENCOUNTER (OUTPATIENT)
Dept: REHABILITATION | Facility: CLINIC | Age: 57
End: 2017-06-29
Attending: FAMILY MEDICINE
Payer: MEDICAID

## 2017-06-29 PROCEDURE — 40000247 ZZH STATISTIC VISIT ADULT DAY PROGRAM

## 2017-06-29 PROCEDURE — S5102 ADULT DAY CARE PER DIEM: HCPCS

## 2017-07-03 ENCOUNTER — HOSPITAL ENCOUNTER (OUTPATIENT)
Dept: REHABILITATION | Facility: CLINIC | Age: 57
End: 2017-07-03
Attending: FAMILY MEDICINE
Payer: MEDICAID

## 2017-07-03 PROCEDURE — S5102 ADULT DAY CARE PER DIEM: HCPCS

## 2017-07-03 PROCEDURE — 40000247 ZZH STATISTIC VISIT ADULT DAY PROGRAM

## 2017-07-05 ENCOUNTER — RECORDS - HEALTHEAST (OUTPATIENT)
Dept: ADMINISTRATIVE | Facility: OTHER | Age: 57
End: 2017-07-05

## 2017-07-05 PROCEDURE — 40000247 ZZH STATISTIC VISIT ADULT DAY PROGRAM

## 2017-07-05 PROCEDURE — S5102 ADULT DAY CARE PER DIEM: HCPCS

## 2017-07-06 ENCOUNTER — HOSPITAL ENCOUNTER (OUTPATIENT)
Dept: REHABILITATION | Facility: CLINIC | Age: 57
End: 2017-07-06
Attending: FAMILY MEDICINE
Payer: MEDICAID

## 2017-07-06 PROCEDURE — S5102 ADULT DAY CARE PER DIEM: HCPCS

## 2017-07-06 PROCEDURE — 40000247 ZZH STATISTIC VISIT ADULT DAY PROGRAM

## 2017-07-06 NOTE — PROGRESS NOTES
MSAC RN Monthly Progress Note  Previous documentation reviewed.  No concerns reported by [facility] staff or member.

## 2017-07-07 ENCOUNTER — COMMUNICATION - HEALTHEAST (OUTPATIENT)
Dept: FAMILY MEDICINE | Facility: CLINIC | Age: 57
End: 2017-07-07

## 2017-07-07 DIAGNOSIS — K59.00 CONSTIPATION: ICD-10-CM

## 2017-07-10 ENCOUNTER — HOSPITAL ENCOUNTER (OUTPATIENT)
Dept: REHABILITATION | Facility: CLINIC | Age: 57
End: 2017-07-10
Attending: FAMILY MEDICINE
Payer: MEDICAID

## 2017-07-10 ENCOUNTER — RECORDS - HEALTHEAST (OUTPATIENT)
Dept: ADMINISTRATIVE | Facility: OTHER | Age: 57
End: 2017-07-10

## 2017-07-10 PROCEDURE — 40000247 ZZH STATISTIC VISIT ADULT DAY PROGRAM

## 2017-07-10 PROCEDURE — S5102 ADULT DAY CARE PER DIEM: HCPCS

## 2017-07-11 ENCOUNTER — HOSPITAL ENCOUNTER (OUTPATIENT)
Dept: REHABILITATION | Facility: CLINIC | Age: 57
End: 2017-07-11
Attending: FAMILY MEDICINE
Payer: MEDICAID

## 2017-07-11 PROCEDURE — S5102 ADULT DAY CARE PER DIEM: HCPCS

## 2017-07-11 PROCEDURE — 40000247 ZZH STATISTIC VISIT ADULT DAY PROGRAM

## 2017-07-13 ENCOUNTER — HOSPITAL ENCOUNTER (OUTPATIENT)
Dept: REHABILITATION | Facility: CLINIC | Age: 57
End: 2017-07-13
Attending: FAMILY MEDICINE
Payer: MEDICAID

## 2017-07-13 PROCEDURE — S5102 ADULT DAY CARE PER DIEM: HCPCS

## 2017-07-13 PROCEDURE — 40000247 ZZH STATISTIC VISIT ADULT DAY PROGRAM

## 2017-07-17 ENCOUNTER — HOSPITAL ENCOUNTER (OUTPATIENT)
Dept: REHABILITATION | Facility: CLINIC | Age: 57
End: 2017-07-17
Attending: FAMILY MEDICINE
Payer: MEDICAID

## 2017-07-17 PROCEDURE — S5102 ADULT DAY CARE PER DIEM: HCPCS

## 2017-07-17 PROCEDURE — 40000247 ZZH STATISTIC VISIT ADULT DAY PROGRAM

## 2017-07-18 ENCOUNTER — HOSPITAL ENCOUNTER (OUTPATIENT)
Dept: REHABILITATION | Facility: CLINIC | Age: 57
End: 2017-07-18
Attending: FAMILY MEDICINE
Payer: MEDICAID

## 2017-07-18 PROCEDURE — 40000247 ZZH STATISTIC VISIT ADULT DAY PROGRAM

## 2017-07-18 PROCEDURE — S5102 ADULT DAY CARE PER DIEM: HCPCS

## 2017-07-24 ENCOUNTER — HOSPITAL ENCOUNTER (OUTPATIENT)
Dept: REHABILITATION | Facility: CLINIC | Age: 57
End: 2017-07-24
Attending: FAMILY MEDICINE
Payer: MEDICAID

## 2017-07-24 PROCEDURE — S5102 ADULT DAY CARE PER DIEM: HCPCS

## 2017-07-24 PROCEDURE — 40000247 ZZH STATISTIC VISIT ADULT DAY PROGRAM

## 2017-07-25 ENCOUNTER — HOSPITAL ENCOUNTER (OUTPATIENT)
Dept: REHABILITATION | Facility: CLINIC | Age: 57
End: 2017-07-25
Attending: FAMILY MEDICINE
Payer: MEDICAID

## 2017-07-25 PROCEDURE — 40000247 ZZH STATISTIC VISIT ADULT DAY PROGRAM

## 2017-07-25 PROCEDURE — S5102 ADULT DAY CARE PER DIEM: HCPCS

## 2017-07-26 NOTE — PROGRESS NOTES
MONTHLY PROGRESS NOTE AND PARTICIPATION REPORT   [facility]    Karolyn Das, 1960  Attendance: Please see Epic for attendance record.    Communication:   Does communicate   Hearing:   No impairment  Vision:   Glasses  Orientation/Cognition:   Partial disorientation  Short term memory loss  Behavior:   occationally requires redirection   Self-Preservation Skills:   Not capable of self-preservation  Eating:   Assist with set up  Ambulation Walking:   Non-ambulatory  Transferring:   Aide of one person/two  Wheelchair:   Independent  occationally requires assistance  Toileting:   Needs assistance  Maintenance Program:     [facility]  Living Arrangements:   Lives in assisted living facility  Spiritual Needs: Needs are being met through support group  Medication Assistance:   Medication not taken during program hours  Participation Report:   Aerobics/Exercise  Support Group  Cognitive Stimulation  Creative Arts/Crafts  Games  Gardening  Speakers/Entertainment  Socialization  Current Events/News  Education/Health Topic  Meditation, Prayer Time, Communion, relaxation with Lucero, Pathways Laughter Yoga and Drumming, 4th of July activities, hawaiian party  Level of Participation:   To the best of their ability

## 2017-07-27 ENCOUNTER — COMMUNICATION - HEALTHEAST (OUTPATIENT)
Dept: FAMILY MEDICINE | Facility: CLINIC | Age: 57
End: 2017-07-27

## 2017-07-27 ENCOUNTER — HOSPITAL ENCOUNTER (OUTPATIENT)
Dept: REHABILITATION | Facility: CLINIC | Age: 57
End: 2017-07-27
Attending: FAMILY MEDICINE
Payer: MEDICAID

## 2017-07-27 DIAGNOSIS — K59.00 CONSTIPATION: ICD-10-CM

## 2017-07-27 PROCEDURE — S5102 ADULT DAY CARE PER DIEM: HCPCS

## 2017-07-27 PROCEDURE — 40000247 ZZH STATISTIC VISIT ADULT DAY PROGRAM

## 2017-07-28 ENCOUNTER — COMMUNICATION - HEALTHEAST (OUTPATIENT)
Dept: FAMILY MEDICINE | Facility: CLINIC | Age: 57
End: 2017-07-28

## 2017-07-28 DIAGNOSIS — Z00.00 HEALTH CARE MAINTENANCE: ICD-10-CM

## 2017-07-28 DIAGNOSIS — K59.00 CONSTIPATION: ICD-10-CM

## 2017-07-31 ENCOUNTER — HOSPITAL ENCOUNTER (OUTPATIENT)
Dept: REHABILITATION | Facility: CLINIC | Age: 57
End: 2017-07-31
Attending: FAMILY MEDICINE
Payer: MEDICAID

## 2017-07-31 PROCEDURE — 40000247 ZZH STATISTIC VISIT ADULT DAY PROGRAM

## 2017-07-31 PROCEDURE — S5102 ADULT DAY CARE PER DIEM: HCPCS

## 2017-08-31 NOTE — PROGRESS NOTES
MONTHLY PROGRESS NOTE AND PARTICIPATION REPORT   [facility]    Karolyn Das, 1960  Attendance: Member has not attended the Day Program within the past month

## 2017-08-31 NOTE — ADDENDUM NOTE
Encounter addended by: Dayan Hanley on: 8/31/2017  9:07 AM<BR>     Actions taken: Episode edited, Sign clinical note

## 2017-09-06 ENCOUNTER — RECORDS - HEALTHEAST (OUTPATIENT)
Dept: LAB | Facility: CLINIC | Age: 57
End: 2017-09-06

## 2017-09-06 LAB
CHOLEST SERPL-MCNC: 157 MG/DL
FASTING STATUS PATIENT QL REPORTED: ABNORMAL
HDLC SERPL-MCNC: 47 MG/DL
LDLC SERPL CALC-MCNC: 100 MG/DL
TRIGL SERPL-MCNC: 50 MG/DL

## 2017-09-14 ENCOUNTER — HOSPITAL ENCOUNTER (OUTPATIENT)
Dept: REHABILITATION | Facility: CLINIC | Age: 57
End: 2017-09-14
Attending: FAMILY MEDICINE
Payer: MEDICAID

## 2017-09-14 PROCEDURE — S5102 ADULT DAY CARE PER DIEM: HCPCS

## 2017-09-14 PROCEDURE — 40000247 ZZH STATISTIC VISIT ADULT DAY PROGRAM

## 2017-09-18 ENCOUNTER — HOSPITAL ENCOUNTER (OUTPATIENT)
Dept: REHABILITATION | Facility: CLINIC | Age: 57
End: 2017-09-18
Attending: FAMILY MEDICINE
Payer: MEDICAID

## 2017-09-18 PROCEDURE — 40000247 ZZH STATISTIC VISIT ADULT DAY PROGRAM

## 2017-09-18 PROCEDURE — S5102 ADULT DAY CARE PER DIEM: HCPCS

## 2017-09-19 ENCOUNTER — HOSPITAL ENCOUNTER (OUTPATIENT)
Dept: REHABILITATION | Facility: CLINIC | Age: 57
End: 2017-09-19
Attending: FAMILY MEDICINE
Payer: MEDICAID

## 2017-09-19 PROCEDURE — 40000247 ZZH STATISTIC VISIT ADULT DAY PROGRAM

## 2017-09-19 PROCEDURE — S5102 ADULT DAY CARE PER DIEM: HCPCS

## 2017-09-21 ENCOUNTER — HOSPITAL ENCOUNTER (OUTPATIENT)
Dept: REHABILITATION | Facility: CLINIC | Age: 57
End: 2017-09-21
Attending: FAMILY MEDICINE
Payer: MEDICAID

## 2017-09-21 PROCEDURE — S5102 ADULT DAY CARE PER DIEM: HCPCS

## 2017-09-21 PROCEDURE — 40000247 ZZH STATISTIC VISIT ADULT DAY PROGRAM

## 2017-09-25 ENCOUNTER — HOSPITAL ENCOUNTER (OUTPATIENT)
Dept: REHABILITATION | Facility: CLINIC | Age: 57
End: 2017-09-25
Attending: FAMILY MEDICINE
Payer: MEDICAID

## 2017-09-25 PROCEDURE — 40000247 ZZH STATISTIC VISIT ADULT DAY PROGRAM

## 2017-09-25 PROCEDURE — S5102 ADULT DAY CARE PER DIEM: HCPCS

## 2017-09-25 NOTE — ADDENDUM NOTE
Encounter addended by: Dayan Hanley on: 9/25/2017  8:36 AM<BR>     Actions taken: Episode edited, Sign clinical note

## 2017-09-25 NOTE — PROGRESS NOTES
MONTHLY PROGRESS NOTE AND PARTICIPATION REPORT   [facility]    Karolyn Das, 1960  Attendance: Please see Epic for attendance record.    Communication:   Does communicate   Hearing:   No impairment  Vision:   Glasses  Orientation/Cognition:   Partial disorientation  Short term memory loss  Behavior:   occationally requires redirection  Self-Preservation Skills:   Not capable of self-preservation  Eating:   Assist with set up  Ambulation Walking:   Non-ambulatory  Transferring:   Aide of one person/two  Wheelchair:   Independent  occationally requires assistance   Toileting:   Needs assistance  Maintenance Program:     [facility]  Living Arrangements:   Lives in assisted living facility  Spiritual Needs: Needs are being met through support group  Medication Assistance:   Medication not taken during program hours  Participation Report:   Aerobics/Exercise  Support Group  Cognitive Stimulation  Creative Arts/Crafts  Games  Gardening  Speakers/Entertainment  Socialization  Current Events/News  Education/Health Topic  Meditation, Prayer Time, Communion, Relaxation Yoga with Lucero, Pathways: Gratitude in the Moment, Back to the 50's Party, Book Club, Rock Painting, Apple Week Activities  Level of Participation:   To the best of their ability

## 2017-09-26 ENCOUNTER — HOSPITAL ENCOUNTER (OUTPATIENT)
Dept: REHABILITATION | Facility: CLINIC | Age: 57
End: 2017-09-26
Attending: FAMILY MEDICINE
Payer: MEDICAID

## 2017-09-26 PROCEDURE — 40000247 ZZH STATISTIC VISIT ADULT DAY PROGRAM

## 2017-09-26 PROCEDURE — S5102 ADULT DAY CARE PER DIEM: HCPCS

## 2017-09-28 ENCOUNTER — HOSPITAL ENCOUNTER (OUTPATIENT)
Dept: REHABILITATION | Facility: CLINIC | Age: 57
End: 2017-09-28
Attending: FAMILY MEDICINE
Payer: MEDICAID

## 2017-09-28 PROCEDURE — S5102 ADULT DAY CARE PER DIEM: HCPCS

## 2017-09-28 PROCEDURE — 40000247 ZZH STATISTIC VISIT ADULT DAY PROGRAM

## 2017-09-28 NOTE — PROGRESS NOTES
MSAC RN Monthly Progress Note  Previous documentation reviewed.  No concerns reported by [facility] staff or member.

## 2017-10-02 ENCOUNTER — HOSPITAL ENCOUNTER (OUTPATIENT)
Dept: REHABILITATION | Facility: CLINIC | Age: 57
End: 2017-10-02
Attending: FAMILY MEDICINE
Payer: MEDICAID

## 2017-10-02 PROCEDURE — 40000247 ZZH STATISTIC VISIT ADULT DAY PROGRAM

## 2017-10-02 PROCEDURE — S5102 ADULT DAY CARE PER DIEM: HCPCS

## 2017-10-02 NOTE — PROGRESS NOTES
MSAC RN Monthly Progress Note  Previous documentation reviewed.  No concerns reported by [facility] staff or member.

## 2017-10-03 ENCOUNTER — HOSPITAL ENCOUNTER (OUTPATIENT)
Dept: REHABILITATION | Facility: CLINIC | Age: 57
End: 2017-10-03
Attending: FAMILY MEDICINE
Payer: MEDICAID

## 2017-10-03 PROCEDURE — 40000247 ZZH STATISTIC VISIT ADULT DAY PROGRAM

## 2017-10-03 PROCEDURE — S5102 ADULT DAY CARE PER DIEM: HCPCS

## 2017-10-05 ENCOUNTER — HOSPITAL ENCOUNTER (OUTPATIENT)
Dept: REHABILITATION | Facility: CLINIC | Age: 57
End: 2017-10-05
Attending: FAMILY MEDICINE
Payer: MEDICAID

## 2017-10-05 PROCEDURE — S5102 ADULT DAY CARE PER DIEM: HCPCS

## 2017-10-05 PROCEDURE — 40000247 ZZH STATISTIC VISIT ADULT DAY PROGRAM

## 2017-10-09 ENCOUNTER — HOSPITAL ENCOUNTER (OUTPATIENT)
Dept: REHABILITATION | Facility: CLINIC | Age: 57
End: 2017-10-09
Attending: FAMILY MEDICINE
Payer: MEDICAID

## 2017-10-09 PROCEDURE — 40000247 ZZH STATISTIC VISIT ADULT DAY PROGRAM

## 2017-10-09 PROCEDURE — S5102 ADULT DAY CARE PER DIEM: HCPCS

## 2017-10-09 NOTE — PROGRESS NOTES
MONTHLY PROGRESS NOTE AND PARTICIPATION REPORT   [facility]    Karolyn Das, 1960  Attendance: Please see Epic for attendance record.    Communication:   Does communicate   Hearing:   No impairment  Vision:   Glasses  Orientation/Cognition:   Partial disorientation  Short term memory loss  Behavior:   occasionally requires redirection   Self-Preservation Skills:   Not capable of self-preservation  Eating:   Assist with set up  Ambulation Walking:   Non-ambulatory  Transferring:   Aide of one person/two  Wheelchair:   Independent  occasionally requires assistance  Toileting:   Needs assistance  Maintenance Program:     [facility]  Living Arrangements:   Lives in assisted living facility  Spiritual Needs: Needs are being met through support group  Medication Assistance:   Medication not taken during program hours  Participation Report:   Aerobics/Exercise  Support Group  Cognitive Stimulation  Fire Drill  Creative Arts/Crafts  Games  Gardening  Speakers/Entertainment  Socialization  Current Events/News  Education/Health Topic  Meditation, Prayer Time, Communion, Relaxation with Lucero, Pathways: Gratitude in the Moment and Musical Meditation, Octoberfest Activities, World Series Activities  Level of Participation:   To the best of their ability

## 2017-10-09 NOTE — ADDENDUM NOTE
Encounter addended by: Dayan Hanley on: 10/9/2017 10:04 AM<BR>     Actions taken: Episode edited, Sign clinical note

## 2017-10-10 ENCOUNTER — COMMUNICATION - HEALTHEAST (OUTPATIENT)
Dept: FAMILY MEDICINE | Facility: CLINIC | Age: 57
End: 2017-10-10

## 2017-10-10 ENCOUNTER — HOSPITAL ENCOUNTER (OUTPATIENT)
Dept: REHABILITATION | Facility: CLINIC | Age: 57
End: 2017-10-10
Attending: FAMILY MEDICINE
Payer: MEDICAID

## 2017-10-10 DIAGNOSIS — F32.A DEPRESSION: ICD-10-CM

## 2017-10-10 PROCEDURE — 40000247 ZZH STATISTIC VISIT ADULT DAY PROGRAM

## 2017-10-10 PROCEDURE — S5102 ADULT DAY CARE PER DIEM: HCPCS

## 2017-10-12 ENCOUNTER — HOSPITAL ENCOUNTER (OUTPATIENT)
Dept: REHABILITATION | Facility: CLINIC | Age: 57
End: 2017-10-12
Attending: FAMILY MEDICINE
Payer: MEDICAID

## 2017-10-12 PROCEDURE — 40000247 ZZH STATISTIC VISIT ADULT DAY PROGRAM

## 2017-10-12 PROCEDURE — S5102 ADULT DAY CARE PER DIEM: HCPCS

## 2017-10-16 ENCOUNTER — HOSPITAL ENCOUNTER (OUTPATIENT)
Dept: REHABILITATION | Facility: CLINIC | Age: 57
End: 2017-10-16
Attending: FAMILY MEDICINE
Payer: MEDICAID

## 2017-10-16 PROCEDURE — 40000247 ZZH STATISTIC VISIT ADULT DAY PROGRAM

## 2017-10-16 PROCEDURE — S5102 ADULT DAY CARE PER DIEM: HCPCS

## 2017-10-17 ENCOUNTER — HOSPITAL ENCOUNTER (OUTPATIENT)
Dept: REHABILITATION | Facility: CLINIC | Age: 57
End: 2017-10-17
Attending: FAMILY MEDICINE
Payer: MEDICAID

## 2017-10-17 PROCEDURE — S5102 ADULT DAY CARE PER DIEM: HCPCS

## 2017-10-17 PROCEDURE — 40000247 ZZH STATISTIC VISIT ADULT DAY PROGRAM

## 2017-10-19 ENCOUNTER — HOSPITAL ENCOUNTER (OUTPATIENT)
Dept: REHABILITATION | Facility: CLINIC | Age: 57
End: 2017-10-19
Attending: FAMILY MEDICINE
Payer: MEDICAID

## 2017-10-19 PROCEDURE — S5102 ADULT DAY CARE PER DIEM: HCPCS

## 2017-10-19 PROCEDURE — 40000247 ZZH STATISTIC VISIT ADULT DAY PROGRAM

## 2017-10-23 ENCOUNTER — HOSPITAL ENCOUNTER (OUTPATIENT)
Dept: REHABILITATION | Facility: CLINIC | Age: 57
End: 2017-10-23
Attending: FAMILY MEDICINE
Payer: MEDICAID

## 2017-10-23 PROCEDURE — S5102 ADULT DAY CARE PER DIEM: HCPCS

## 2017-10-23 PROCEDURE — 40000247 ZZH STATISTIC VISIT ADULT DAY PROGRAM

## 2017-10-24 ENCOUNTER — HOSPITAL ENCOUNTER (OUTPATIENT)
Dept: REHABILITATION | Facility: CLINIC | Age: 57
End: 2017-10-24
Attending: FAMILY MEDICINE
Payer: MEDICAID

## 2017-10-24 PROCEDURE — S5102 ADULT DAY CARE PER DIEM: HCPCS

## 2017-10-24 PROCEDURE — 40000247 ZZH STATISTIC VISIT ADULT DAY PROGRAM

## 2017-10-26 ENCOUNTER — HOSPITAL ENCOUNTER (OUTPATIENT)
Dept: REHABILITATION | Facility: CLINIC | Age: 57
End: 2017-10-26
Attending: FAMILY MEDICINE
Payer: MEDICAID

## 2017-10-26 PROCEDURE — S5102 ADULT DAY CARE PER DIEM: HCPCS

## 2017-10-26 PROCEDURE — 40000247 ZZH STATISTIC VISIT ADULT DAY PROGRAM

## 2017-10-30 ENCOUNTER — HOSPITAL ENCOUNTER (OUTPATIENT)
Dept: REHABILITATION | Facility: CLINIC | Age: 57
End: 2017-10-30
Attending: FAMILY MEDICINE
Payer: MEDICAID

## 2017-10-30 PROCEDURE — 40000247 ZZH STATISTIC VISIT ADULT DAY PROGRAM

## 2017-10-30 PROCEDURE — S5102 ADULT DAY CARE PER DIEM: HCPCS

## 2017-10-31 ENCOUNTER — HOSPITAL ENCOUNTER (OUTPATIENT)
Dept: REHABILITATION | Facility: CLINIC | Age: 57
End: 2017-10-31
Attending: FAMILY MEDICINE
Payer: MEDICAID

## 2017-10-31 PROCEDURE — 40000247 ZZH STATISTIC VISIT ADULT DAY PROGRAM

## 2017-10-31 PROCEDURE — S5102 ADULT DAY CARE PER DIEM: HCPCS

## 2017-11-02 ENCOUNTER — HOSPITAL ENCOUNTER (OUTPATIENT)
Dept: REHABILITATION | Facility: CLINIC | Age: 57
End: 2017-11-02
Attending: FAMILY MEDICINE
Payer: MEDICAID

## 2017-11-02 PROCEDURE — 40000247 ZZH STATISTIC VISIT ADULT DAY PROGRAM

## 2017-11-02 PROCEDURE — S5102 ADULT DAY CARE PER DIEM: HCPCS

## 2017-11-06 ENCOUNTER — HOSPITAL ENCOUNTER (OUTPATIENT)
Dept: REHABILITATION | Facility: CLINIC | Age: 57
End: 2017-11-06
Attending: FAMILY MEDICINE
Payer: MEDICAID

## 2017-11-06 PROCEDURE — 40000247 ZZH STATISTIC VISIT ADULT DAY PROGRAM

## 2017-11-06 PROCEDURE — S5102 ADULT DAY CARE PER DIEM: HCPCS

## 2017-11-06 NOTE — PROGRESS NOTES
INDIVIDUAL PLAN OF CARE   [facility]    Member Name: Karolyn Das; YOB: 1960  MRN: [id number]  11/6/2017    Goals developed in collaboration with: member  Staff responsible for plan: [facility] staff  1. Long Term Goal (concrete, measurable, and time specific outcomes): Member will participate in peer interactions.  Target Date: ***  Bi-Annual Review:  Goal reviewed on *** by ***  Goal status: {Goal status:855666}  Bi-Annual Review:  Goal reviewed on *** by ***  Goal status: {Goal status:940656}  Bi-Annual Review:  Goal reviewed on *** by ***  Goal status: {Goal status:257741}  Bi-Annual Review:  Goal reviewed on *** by ***  Goal status: {Goal status:896613}    2. Short Term Goal: (concrete, measurable, and time specific outcomes): Member will utilize [facility] support group to explore new avenues for initiating new relationships.   Target Date: ***  Bi-Annual Review:  Goal reviewed on *** by ***  Goal status: {Goal status:480337}  Bi-Annual Review:  Goal reviewed on *** by ***  Goal status: {Goal status:491187}  Bi-Annual Review:  Goal reviewed on *** by ***  Goal status: {Goal status:727891}  Bi-Annual Review:  Goal reviewed on *** by ***  Goal status: {Goal status:872359}    3. Short Term Goal: (concrete, measurable, and time specific outcomes): Member will explore community resources related to primary relationships in support group.  Target Date: ***  Bi-Annual Review:  Goal reviewed on *** by ***  Goal status: {Goal status:602319}  Bi-Annual Review:  Goal reviewed on *** by ***  Goal status: {Goal status:262938}  Bi-Annual Review:  Goal reviewed on *** by ***  Goal status: {Goal status:982874}  Bi-Annual Review:  Goal reviewed on *** by ***  Goal status: {Goal status:576503}    VULNERABLE ADULT ASSESSMENT  [facility]     Assessment of Susceptibility to Abuse, Including Self Abuse, Neglect (Identification of characteristics, which  make the individual susceptible to abuse, and how these characteristics cause the individual to be susceptible to abuse.)   Based on completed member needs assessment and interview, cognitive and/or physical limitations impair ability to meet basic needs and/or protect self from maltreatment.  [facility] staff has no previous knowledge of abuse to self or others.   INDIVIDUAL ABUSE PREVENTION PLAN-MEASURES TAKEN TO MINIMIZE RISK OF ABUSE   ADL:   Assist with toileting  Ambulation/Transfers/Wheelchairs:  Provide assistance prn for propelling wheelchair   Behavior:   Redirect when patient becomes anxious due to cognitive loss and confusion.  Communication:  Encourage verbalization of needs/concerns  Cognitive Issues:   Give simple step-by-step direction  Diet:   No concerns at this time.  Exercise:   Encourage participation in wheelchair aerobics  Hearing:   No concerns at this time.  Isolation:   Encourage socialization due to isolation in home environment  Medical Monitoring:   Monitor physical and emotional comfort  Mental Health:   Encourage client to express feelings  Sensory:   Provide and encourage participation in activities for stimulation  Vision:   Ensure client is wearing glasses and clean prn  Other: ***  Referral Indicated: None at this time.  Developed/Reviewed with Member: Yes  The Program Abuse Prevention Plan identifies the specific actions that minimize abuse to the [facility] participant. Yes.  Plan Reviewed:   Date VULNERABLE ADULT ASSESSMENT/INDIVIDUAL ABUSE PREVENTTION PLAN Remains Appropriate (Y=Yes, N=No)  If NO, see revised VULNERABLE ADULT ASSESSMENT/INDIVIDUAL ABUSE PREVENTTION PLAN document  Staff Name                            VULNERABLE ADULT ASSESSMENT  [facility]     Assessment of Susceptibility to Abuse, Including Self Abuse, Neglect (Identification of characteristics, which make the individual susceptible to abuse, and how these  "characteristics cause the individual to be susceptible to abuse.)     INDIVIDUAL ABUSE PREVENTION PLAN-MEASURES TAKEN TO MINIMIZE RISK OF ABUSE   ADL:     Ambulation/Transfers/Wheelchairs:     Behavior:   {BEHAVIOR:171152}  Communication:  {COMMUNICATION:244277}  Cognitive Issues:   {COGNITION:361441}  Diet:   {DIET:551627}  Exercise:   {EXERCISE:802008}  Hearing:   {HEARIN}  Isolation:   {ISOLATION:092093}  Medical Monitoring:   {MEDICAL MONITORIN}  Mental Health:   {MENTAL HEALTH:441805}  Sensory:   {SENSORY:970945}  Vision:   {VISION:482488}  Other: ***  Referral Indicated: {REFERRAL:243834}  Developed/Reviewed with Member: {Yes/No:639591::\"Yes\"}  The Program Abuse Prevention Plan identifies the specific actions that minimize abuse to the [facility] participant.  {Yes/No:407607::\"Yes\"}  Plan Reviewed:   Date VULNERABLE ADULT ASSESSMENT/INDIVIDUAL ABUSE PREVENTTION PLAN Remains Appropriate (Y=Yes, N=No)  If NO, see revised VULNERABLE ADULT ASSESSMENT/INDIVIDUAL ABUSE PREVENTTION PLAN document  Staff Name                              "

## 2017-11-07 ENCOUNTER — HOSPITAL ENCOUNTER (OUTPATIENT)
Dept: REHABILITATION | Facility: CLINIC | Age: 57
End: 2017-11-07
Attending: FAMILY MEDICINE
Payer: MEDICAID

## 2017-11-07 PROCEDURE — S5102 ADULT DAY CARE PER DIEM: HCPCS

## 2017-11-07 PROCEDURE — 40000247 ZZH STATISTIC VISIT ADULT DAY PROGRAM

## 2017-11-09 ENCOUNTER — HOSPITAL ENCOUNTER (OUTPATIENT)
Dept: REHABILITATION | Facility: CLINIC | Age: 57
End: 2017-11-09
Attending: FAMILY MEDICINE
Payer: MEDICAID

## 2017-11-09 PROCEDURE — 40000247 ZZH STATISTIC VISIT ADULT DAY PROGRAM

## 2017-11-09 PROCEDURE — S5102 ADULT DAY CARE PER DIEM: HCPCS

## 2017-11-13 ENCOUNTER — HOSPITAL ENCOUNTER (OUTPATIENT)
Dept: REHABILITATION | Facility: CLINIC | Age: 57
End: 2017-11-13
Attending: FAMILY MEDICINE
Payer: MEDICAID

## 2017-11-13 PROCEDURE — 40000247 ZZH STATISTIC VISIT ADULT DAY PROGRAM

## 2017-11-13 PROCEDURE — S5102 ADULT DAY CARE PER DIEM: HCPCS

## 2017-11-13 NOTE — PROGRESS NOTES
MSAC RN Monthly Progress Note  Previous documentation reviewed. No concerns reported by [facility] staff or member.

## 2017-11-14 ENCOUNTER — HOSPITAL ENCOUNTER (OUTPATIENT)
Dept: REHABILITATION | Facility: CLINIC | Age: 57
End: 2017-11-14
Attending: FAMILY MEDICINE
Payer: MEDICAID

## 2017-11-14 PROCEDURE — 40000247 ZZH STATISTIC VISIT ADULT DAY PROGRAM

## 2017-11-14 PROCEDURE — S5102 ADULT DAY CARE PER DIEM: HCPCS

## 2017-11-16 ENCOUNTER — HOSPITAL ENCOUNTER (OUTPATIENT)
Dept: REHABILITATION | Facility: CLINIC | Age: 57
End: 2017-11-16
Attending: FAMILY MEDICINE
Payer: MEDICAID

## 2017-11-16 PROCEDURE — 40000247 ZZH STATISTIC VISIT ADULT DAY PROGRAM

## 2017-11-16 PROCEDURE — S5102 ADULT DAY CARE PER DIEM: HCPCS

## 2017-11-20 ENCOUNTER — HOSPITAL ENCOUNTER (OUTPATIENT)
Dept: REHABILITATION | Facility: CLINIC | Age: 57
End: 2017-11-20
Attending: FAMILY MEDICINE
Payer: MEDICAID

## 2017-11-20 PROCEDURE — S5102 ADULT DAY CARE PER DIEM: HCPCS

## 2017-11-20 PROCEDURE — 40000247 ZZH STATISTIC VISIT ADULT DAY PROGRAM

## 2017-11-21 ENCOUNTER — HOSPITAL ENCOUNTER (OUTPATIENT)
Dept: REHABILITATION | Facility: CLINIC | Age: 57
End: 2017-11-21
Attending: FAMILY MEDICINE
Payer: MEDICAID

## 2017-11-21 PROCEDURE — S5102 ADULT DAY CARE PER DIEM: HCPCS

## 2017-11-21 PROCEDURE — 40000247 ZZH STATISTIC VISIT ADULT DAY PROGRAM

## 2017-11-21 NOTE — PROGRESS NOTES
MONTHLY PROGRESS NOTE AND PARTICIPATION REPORT   [facility]    Karolyn Das, 1960  Attendance: Please see Epic for attendance record.    Communication:   Does communicate   Hearing:   No impairment  Vision:   Glasses  Orientation/Cognition:   Partial disorientation  Short term memory loss  Behavior:   Occasionally requires redirections  Self-Preservation Skills:   Not capable of self-preservation  Eating:   Assist with set up  Ambulation Walking:   Non-ambulatory  Transferring:   Aide of one person/two  Wheelchair:   Independent  Toileting:   Needs assistance  Maintenance Program:     [facility] [place]  Living Arrangements:   Lives in assisted living facility  Spiritual Needs: Needs are being met through support group  Medication Assistance:   Medication not taken during program hours  Participation Report:   Aerobics/Exercise  Support Group  Cognitive Stimulation  Fire Drill  Creative Arts/Crafts  Games  Speakers/Entertainment  Socialization  Current Events/News  Education/Health Topic  Meditation, Prayer Time, Communion, Relaxation with Lucero, Gratitude in the Monement, Music Meditation, Animal Ambassadors, Thanksgiving Pies, Exercise week  Level of Participation:   To the best of their ability

## 2017-11-27 ENCOUNTER — HOSPITAL ENCOUNTER (OUTPATIENT)
Dept: REHABILITATION | Facility: CLINIC | Age: 57
End: 2017-11-27
Attending: FAMILY MEDICINE
Payer: MEDICAID

## 2017-11-27 PROCEDURE — 40000247 ZZH STATISTIC VISIT ADULT DAY PROGRAM

## 2017-11-27 PROCEDURE — S5102 ADULT DAY CARE PER DIEM: HCPCS

## 2017-11-28 ENCOUNTER — HOSPITAL ENCOUNTER (OUTPATIENT)
Dept: REHABILITATION | Facility: CLINIC | Age: 57
End: 2017-11-28
Attending: FAMILY MEDICINE
Payer: MEDICAID

## 2017-11-28 PROCEDURE — S5102 ADULT DAY CARE PER DIEM: HCPCS

## 2017-11-28 PROCEDURE — 40000247 ZZH STATISTIC VISIT ADULT DAY PROGRAM

## 2017-11-29 NOTE — PROGRESS NOTES
[facility] Note:  Patient had a care conference on this date.  In attendance was her , Miri, her friends, Jane and Rani, her sister, Christiana - via phone call and staff at the [facility] (staff  and myself).  Patient had concerns regarding guardianship.  Jane, her family friend would be interested in becoming her legal guardian.  In order to have a legal guardian appointed, she will need to have a neuro-psyche evaluation completed.   Miri is going to schedule this.  Patient would also like to follow-up with a neurologist.  Rani and Karolyn are going to schedule the neurology appointment.  Discussion was also had regarding her current relationship with her .  Various options were discussed for Karolyn which include staying , becoming legally  or get a divorce.  We discussed her husbands role as next of kin and responsibility with finances and paperwork. Per those present, Karolyn has discussed getting a divorce in the past.  She has decided to proceed with a divorce at this time. Miri and Rani are going to assist Karolyn in this process.  We also briefly discussed Karolyn getting a Health Care Directive.

## 2017-11-30 ENCOUNTER — HOSPITAL ENCOUNTER (OUTPATIENT)
Dept: REHABILITATION | Facility: CLINIC | Age: 57
End: 2017-11-30
Attending: FAMILY MEDICINE
Payer: MEDICAID

## 2017-11-30 PROCEDURE — 40000247 ZZH STATISTIC VISIT ADULT DAY PROGRAM

## 2017-11-30 PROCEDURE — S5102 ADULT DAY CARE PER DIEM: HCPCS

## 2017-12-04 ENCOUNTER — HOSPITAL ENCOUNTER (OUTPATIENT)
Dept: REHABILITATION | Facility: CLINIC | Age: 57
End: 2017-12-04
Attending: FAMILY MEDICINE
Payer: MEDICAID

## 2017-12-04 PROCEDURE — 40000247 ZZH STATISTIC VISIT ADULT DAY PROGRAM

## 2017-12-04 PROCEDURE — S5102 ADULT DAY CARE PER DIEM: HCPCS

## 2017-12-04 NOTE — PROGRESS NOTES
MSAC RN Monthly Progress Note  Previous documentation reviewed.  No concerns reported by [facility] staff or member.

## 2017-12-07 ENCOUNTER — HOSPITAL ENCOUNTER (OUTPATIENT)
Dept: REHABILITATION | Facility: CLINIC | Age: 57
End: 2017-12-07
Attending: FAMILY MEDICINE
Payer: MEDICAID

## 2017-12-07 PROCEDURE — S5102 ADULT DAY CARE PER DIEM: HCPCS

## 2017-12-07 PROCEDURE — 40000247 ZZH STATISTIC VISIT ADULT DAY PROGRAM

## 2017-12-08 ENCOUNTER — COMMUNICATION - HEALTHEAST (OUTPATIENT)
Dept: FAMILY MEDICINE | Facility: CLINIC | Age: 57
End: 2017-12-08

## 2017-12-08 DIAGNOSIS — K59.00 CONSTIPATION: ICD-10-CM

## 2017-12-11 ENCOUNTER — HOSPITAL ENCOUNTER (OUTPATIENT)
Dept: REHABILITATION | Facility: CLINIC | Age: 57
End: 2017-12-11
Attending: FAMILY MEDICINE
Payer: MEDICAID

## 2017-12-11 PROCEDURE — 40000247 ZZH STATISTIC VISIT ADULT DAY PROGRAM

## 2017-12-11 PROCEDURE — S5102 ADULT DAY CARE PER DIEM: HCPCS

## 2017-12-12 ENCOUNTER — HOSPITAL ENCOUNTER (OUTPATIENT)
Dept: REHABILITATION | Facility: CLINIC | Age: 57
End: 2017-12-12
Attending: FAMILY MEDICINE
Payer: MEDICAID

## 2017-12-12 PROCEDURE — S5102 ADULT DAY CARE PER DIEM: HCPCS

## 2017-12-12 PROCEDURE — 40000247 ZZH STATISTIC VISIT ADULT DAY PROGRAM

## 2017-12-14 ENCOUNTER — HOSPITAL ENCOUNTER (OUTPATIENT)
Dept: REHABILITATION | Facility: CLINIC | Age: 57
End: 2017-12-14
Attending: FAMILY MEDICINE
Payer: MEDICAID

## 2017-12-14 PROCEDURE — 40000247 ZZH STATISTIC VISIT ADULT DAY PROGRAM

## 2017-12-14 PROCEDURE — S5102 ADULT DAY CARE PER DIEM: HCPCS

## 2017-12-18 ENCOUNTER — HOSPITAL ENCOUNTER (OUTPATIENT)
Dept: REHABILITATION | Facility: CLINIC | Age: 57
End: 2017-12-18
Attending: FAMILY MEDICINE
Payer: MEDICAID

## 2017-12-18 PROCEDURE — 40000247 ZZH STATISTIC VISIT ADULT DAY PROGRAM

## 2017-12-18 PROCEDURE — S5102 ADULT DAY CARE PER DIEM: HCPCS

## 2017-12-19 ENCOUNTER — HOSPITAL ENCOUNTER (OUTPATIENT)
Dept: REHABILITATION | Facility: CLINIC | Age: 57
End: 2017-12-19
Attending: FAMILY MEDICINE
Payer: MEDICAID

## 2017-12-19 PROCEDURE — 40000247 ZZH STATISTIC VISIT ADULT DAY PROGRAM

## 2017-12-19 PROCEDURE — S5102 ADULT DAY CARE PER DIEM: HCPCS

## 2017-12-20 ENCOUNTER — RECORDS - HEALTHEAST (OUTPATIENT)
Dept: ADMINISTRATIVE | Facility: OTHER | Age: 57
End: 2017-12-20

## 2017-12-27 NOTE — PROGRESS NOTES
MONTHLY PROGRESS NOTE AND PARTICIPATION REPORT   [facility]    Karolyn Das, 1960  Attendance: Please see Epic for attendance record.    Communication:   Does communicate   Hearing:   No impairment  Vision:   Glasses  Orientation/Cognition:   Partial disorientation  Short term memory loss  Behavior:   Occasionally requires redirections  Self-Preservation Skills:   Not capable of self-preservation  Eating:   Assist with set up  Ambulation Walking:   Non-ambulatory  Transferring:   Aide of one person/two  Wheelchair:   Independent  Toileting:   Needs assistance  Maintenance Program:     [facility]  Living Arrangements:   Lives in assisted living facility  Spiritual Needs: Needs are being met through support group  Medication Assistance:   Medication not taken during program hours  Participation Report:   Aerobics/Exercise  Support Group  Cognitive Stimulation  Creative Arts/Crafts  Games  Speakers/Entertainment  Socialization  Current Events/News  Education/Health Topic  Hol[employer] Boutique, [employer], Ugly Sweater Week, White Elephant Game, Meditation with Lucero, Communion,   Level of Participation:   To the best of their ability

## 2017-12-28 ENCOUNTER — HOSPITAL ENCOUNTER (OUTPATIENT)
Dept: REHABILITATION | Facility: CLINIC | Age: 57
End: 2017-12-28
Attending: FAMILY MEDICINE
Payer: MEDICAID

## 2017-12-28 PROCEDURE — 40000247 ZZH STATISTIC VISIT ADULT DAY PROGRAM

## 2017-12-28 PROCEDURE — S5102 ADULT DAY CARE PER DIEM: HCPCS

## 2018-01-02 ENCOUNTER — HOSPITAL ENCOUNTER (OUTPATIENT)
Dept: REHABILITATION | Facility: CLINIC | Age: 58
End: 2018-01-02
Attending: FAMILY MEDICINE
Payer: MEDICAID

## 2018-01-02 PROCEDURE — 40000247 ZZH STATISTIC VISIT ADULT DAY PROGRAM

## 2018-01-02 PROCEDURE — S5102 ADULT DAY CARE PER DIEM: HCPCS

## 2018-01-02 NOTE — PROGRESS NOTES
MSAC RN Monthly Progress Note  Previous documentation reviewed.  No concerns reported by [facility] staff or member.

## 2018-01-04 ENCOUNTER — HOSPITAL ENCOUNTER (OUTPATIENT)
Dept: REHABILITATION | Facility: CLINIC | Age: 58
End: 2018-01-04
Attending: FAMILY MEDICINE
Payer: MEDICAID

## 2018-01-04 PROCEDURE — 40000247 ZZH STATISTIC VISIT ADULT DAY PROGRAM

## 2018-01-04 PROCEDURE — S5102 ADULT DAY CARE PER DIEM: HCPCS

## 2018-01-09 ENCOUNTER — HOSPITAL ENCOUNTER (OUTPATIENT)
Dept: REHABILITATION | Facility: CLINIC | Age: 58
End: 2018-01-09
Attending: FAMILY MEDICINE
Payer: MEDICAID

## 2018-01-09 PROCEDURE — S5102 ADULT DAY CARE PER DIEM: HCPCS

## 2018-01-09 PROCEDURE — 40000247 ZZH STATISTIC VISIT ADULT DAY PROGRAM

## 2018-01-09 NOTE — PROGRESS NOTES
Patient and I met with patient's best friend Jane for a care conference, including sister Tamiko on speaker phone from [place].  The purpose of the meeting was to move forward with guardianship paperwork in light of neurologist Dr. Gongora's recommendation that patient obtain a guardian due to significant MS-related cognitive decline in the last year.  Additionally, the soon-to-be guardian, Jane, wanted to include me in a discussion relating to patient's new advance directive and new healthcare agent and alternate. We all talked through the [employer] long form.  Final signatures will be obtained today and copies will be dispersed to persons identified by patient and Jane.  We will receive a copy soon.  A copy will also be on file with her primary care physician, Dr. Vicente Elizabeth.

## 2018-01-11 ENCOUNTER — HOSPITAL ENCOUNTER (OUTPATIENT)
Dept: REHABILITATION | Facility: CLINIC | Age: 58
End: 2018-01-11
Attending: FAMILY MEDICINE
Payer: MEDICAID

## 2018-01-11 PROCEDURE — S5102 ADULT DAY CARE PER DIEM: HCPCS

## 2018-01-11 PROCEDURE — 40000247 ZZH STATISTIC VISIT ADULT DAY PROGRAM

## 2018-01-15 ENCOUNTER — HOSPITAL ENCOUNTER (OUTPATIENT)
Dept: REHABILITATION | Facility: CLINIC | Age: 58
End: 2018-01-15
Attending: FAMILY MEDICINE
Payer: MEDICAID

## 2018-01-15 PROCEDURE — 40000247 ZZH STATISTIC VISIT ADULT DAY PROGRAM

## 2018-01-15 PROCEDURE — S5102 ADULT DAY CARE PER DIEM: HCPCS

## 2018-01-15 NOTE — PROGRESS NOTES
MONTHLY PROGRESS NOTE AND PARTICIPATION REPORT   [facility]    Karolyn Das, 1960  Attendance: Please see Epic for attendance record.    Communication:   Does communicate   Hearing:   No impairment  Vision:   Glasses  Orientation/Cognition:   Partial disorientation  Short term memory loss  Behavior:   Occasionally requires redirection  Self-Preservation Skills:   Not capable of self-preservation  Eating:   Assist with set up  Ambulation Walking:   Non-ambulatory  Transferring:   Aide of one person/two  Wheelchair:   Independent  Toileting:   Needs assistance  Maintenance Program:     [facility]  Living Arrangements:   Lives in assisted living facility  Spiritual Needs: Needs are being met through support group  Medication Assistance:   Medication not taken during program hours  Participation Report:   Aerobics/Exercise  Support Group  Cognitive Stimulation  Creative Arts/Crafts  Games  Speakers/Entertainment  Socialization  Current Events/News  Education/Health Topic  Pathways classes: yoga, cello, guided meditation. Prayer time with Amy, Communion with Amy, Explore the [place] week, 2018 goals week, superbowl week  Level of Participation:   To the best of their ability

## 2018-01-16 ENCOUNTER — HOSPITAL ENCOUNTER (OUTPATIENT)
Dept: REHABILITATION | Facility: CLINIC | Age: 58
End: 2018-01-16
Attending: FAMILY MEDICINE
Payer: MEDICAID

## 2018-01-16 PROCEDURE — 40000247 ZZH STATISTIC VISIT ADULT DAY PROGRAM

## 2018-01-16 PROCEDURE — S5102 ADULT DAY CARE PER DIEM: HCPCS

## 2018-01-18 ENCOUNTER — HOSPITAL ENCOUNTER (OUTPATIENT)
Dept: REHABILITATION | Facility: CLINIC | Age: 58
End: 2018-01-18
Attending: FAMILY MEDICINE
Payer: MEDICAID

## 2018-01-18 PROCEDURE — 40000247 ZZH STATISTIC VISIT ADULT DAY PROGRAM

## 2018-01-18 PROCEDURE — S5102 ADULT DAY CARE PER DIEM: HCPCS

## 2018-01-22 ENCOUNTER — HOSPITAL ENCOUNTER (OUTPATIENT)
Dept: REHABILITATION | Facility: CLINIC | Age: 58
End: 2018-01-22
Attending: FAMILY MEDICINE
Payer: MEDICAID

## 2018-01-22 PROCEDURE — 40000247 ZZH STATISTIC VISIT ADULT DAY PROGRAM

## 2018-01-22 PROCEDURE — S5102 ADULT DAY CARE PER DIEM: HCPCS

## 2018-01-25 ENCOUNTER — HOSPITAL ENCOUNTER (OUTPATIENT)
Dept: REHABILITATION | Facility: CLINIC | Age: 58
End: 2018-01-25
Attending: FAMILY MEDICINE
Payer: MEDICAID

## 2018-01-25 PROCEDURE — 40000247 ZZH STATISTIC VISIT ADULT DAY PROGRAM

## 2018-01-25 PROCEDURE — S5102 ADULT DAY CARE PER DIEM: HCPCS

## 2018-01-29 ENCOUNTER — HOSPITAL ENCOUNTER (OUTPATIENT)
Dept: REHABILITATION | Facility: CLINIC | Age: 58
End: 2018-01-29
Attending: FAMILY MEDICINE
Payer: MEDICAID

## 2018-01-29 ENCOUNTER — COMMUNICATION - HEALTHEAST (OUTPATIENT)
Dept: FAMILY MEDICINE | Facility: CLINIC | Age: 58
End: 2018-01-29

## 2018-01-29 ENCOUNTER — DOCUMENTATION ONLY (OUTPATIENT)
Dept: OTHER | Facility: CLINIC | Age: 58
End: 2018-01-29

## 2018-01-29 DIAGNOSIS — Z71.89 ADVANCED DIRECTIVES, COUNSELING/DISCUSSION: Chronic | ICD-10-CM

## 2018-01-29 DIAGNOSIS — E03.9 HYPOTHYROIDISM: ICD-10-CM

## 2018-01-29 PROCEDURE — 40000247 ZZH STATISTIC VISIT ADULT DAY PROGRAM

## 2018-01-29 PROCEDURE — S5102 ADULT DAY CARE PER DIEM: HCPCS

## 2018-01-30 ENCOUNTER — HOSPITAL ENCOUNTER (OUTPATIENT)
Dept: REHABILITATION | Facility: CLINIC | Age: 58
End: 2018-01-30
Attending: FAMILY MEDICINE
Payer: MEDICAID

## 2018-01-30 PROCEDURE — S5102 ADULT DAY CARE PER DIEM: HCPCS

## 2018-01-30 PROCEDURE — 40000247 ZZH STATISTIC VISIT ADULT DAY PROGRAM

## 2018-01-31 ENCOUNTER — HOSPITAL ENCOUNTER (OUTPATIENT)
Dept: REHABILITATION | Facility: CLINIC | Age: 58
End: 2018-01-31
Attending: FAMILY MEDICINE
Payer: MEDICAID

## 2018-01-31 PROCEDURE — S5102 ADULT DAY CARE PER DIEM: HCPCS

## 2018-01-31 PROCEDURE — 40000247 ZZH STATISTIC VISIT ADULT DAY PROGRAM

## 2018-02-01 RX ORDER — LEVOTHYROXINE SODIUM 100 UG/1
TABLET ORAL
Qty: 30 TABLET | Refills: 10 | Status: SHIPPED | OUTPATIENT
Start: 2018-02-01 | End: 2023-07-06 | Stop reason: DRUGHIGH

## 2018-02-05 ENCOUNTER — HOSPITAL ENCOUNTER (OUTPATIENT)
Dept: REHABILITATION | Facility: CLINIC | Age: 58
End: 2018-02-05
Attending: FAMILY MEDICINE
Payer: MEDICAID

## 2018-02-05 PROCEDURE — S5102 ADULT DAY CARE PER DIEM: HCPCS

## 2018-02-05 PROCEDURE — 40000247 ZZH STATISTIC VISIT ADULT DAY PROGRAM

## 2018-02-06 ENCOUNTER — HOSPITAL ENCOUNTER (OUTPATIENT)
Dept: REHABILITATION | Facility: CLINIC | Age: 58
End: 2018-02-06
Attending: FAMILY MEDICINE
Payer: MEDICAID

## 2018-02-06 PROCEDURE — S5102 ADULT DAY CARE PER DIEM: HCPCS

## 2018-02-06 PROCEDURE — 40000247 ZZH STATISTIC VISIT ADULT DAY PROGRAM

## 2018-02-06 NOTE — PROGRESS NOTES
MSAC RN Monthly Progress Note  Previous documentation reviewed.  No concerns reported by [facility] staff or member.

## 2018-02-08 ENCOUNTER — HOSPITAL ENCOUNTER (OUTPATIENT)
Dept: REHABILITATION | Facility: CLINIC | Age: 58
End: 2018-02-08
Attending: FAMILY MEDICINE
Payer: MEDICAID

## 2018-02-08 PROCEDURE — 40000247 ZZH STATISTIC VISIT ADULT DAY PROGRAM

## 2018-02-08 PROCEDURE — S5102 ADULT DAY CARE PER DIEM: HCPCS

## 2018-02-12 ENCOUNTER — HOSPITAL ENCOUNTER (OUTPATIENT)
Dept: REHABILITATION | Facility: CLINIC | Age: 58
End: 2018-02-12
Attending: FAMILY MEDICINE
Payer: MEDICAID

## 2018-02-12 PROCEDURE — S5102 ADULT DAY CARE PER DIEM: HCPCS

## 2018-02-12 PROCEDURE — 40000247 ZZH STATISTIC VISIT ADULT DAY PROGRAM

## 2018-02-13 ENCOUNTER — HOSPITAL ENCOUNTER (OUTPATIENT)
Dept: REHABILITATION | Facility: CLINIC | Age: 58
End: 2018-02-13
Attending: FAMILY MEDICINE
Payer: MEDICAID

## 2018-02-13 PROCEDURE — S5102 ADULT DAY CARE PER DIEM: HCPCS

## 2018-02-13 PROCEDURE — 40000247 ZZH STATISTIC VISIT ADULT DAY PROGRAM

## 2018-02-15 ENCOUNTER — HOSPITAL ENCOUNTER (OUTPATIENT)
Dept: REHABILITATION | Facility: CLINIC | Age: 58
End: 2018-02-15
Attending: FAMILY MEDICINE
Payer: MEDICAID

## 2018-02-15 PROCEDURE — S5102 ADULT DAY CARE PER DIEM: HCPCS

## 2018-02-15 PROCEDURE — 40000247 ZZH STATISTIC VISIT ADULT DAY PROGRAM

## 2018-02-19 ENCOUNTER — HOSPITAL ENCOUNTER (OUTPATIENT)
Dept: REHABILITATION | Facility: CLINIC | Age: 58
End: 2018-02-19
Attending: FAMILY MEDICINE
Payer: MEDICAID

## 2018-02-19 PROCEDURE — 40000247 ZZH STATISTIC VISIT ADULT DAY PROGRAM

## 2018-02-19 PROCEDURE — S5102 ADULT DAY CARE PER DIEM: HCPCS

## 2018-02-20 ENCOUNTER — HOSPITAL ENCOUNTER (OUTPATIENT)
Dept: REHABILITATION | Facility: CLINIC | Age: 58
End: 2018-02-20
Attending: FAMILY MEDICINE
Payer: MEDICAID

## 2018-02-20 PROCEDURE — 40000247 ZZH STATISTIC VISIT ADULT DAY PROGRAM

## 2018-02-20 PROCEDURE — S5102 ADULT DAY CARE PER DIEM: HCPCS

## 2018-02-20 NOTE — PROGRESS NOTES
MONTHLY PROGRESS NOTE AND PARTICIPATION REPORT   [facility]    Karolyn Das, 1960  Attendance: Please see Epic for attendance record.    Communication:   Does communicate   Hearing:   No impairment  Vision:   Glasses  Orientation/Cognition:   Partial disorientation  Short term memory loss  Behavior:   Requires redirection  Self-Preservation Skills:   Not capable of self-preservation  Eating:   Assist with set up  Ambulation Walking:   Non-ambulatory  Transferring:   Aide of one person/two  Wheelchair:   Independent  Toileting:   Needs assistance  Maintenance Program:     [facility]  Living Arrangements:   Lives in assisted living facility  Spiritual Needs: Needs are being met through support group  Medication Assistance:   Medication not taken during program hours  Participation Report:   Aerobics/Exercise  Support Group  Cognitive Stimulation  Creative Arts/Crafts  Games  Speakers/Entertainment  Socialization  Current Events/News  Education/Health Topic  Pathways classes:guided meditation, seated yoga, cello. Town haley discussion, olympic education, Cazares's day bingo, Spelling Bee, Meditation with Amy, Prayer time with Amy, communion wiith Amy, yoga relaxation with Lucero, assistive technology with Lucero.  Level of Participation:   To the best of their ability

## 2018-02-22 ENCOUNTER — HOSPITAL ENCOUNTER (OUTPATIENT)
Dept: REHABILITATION | Facility: CLINIC | Age: 58
End: 2018-02-22
Attending: FAMILY MEDICINE
Payer: MEDICAID

## 2018-02-22 PROCEDURE — 40000247 ZZH STATISTIC VISIT ADULT DAY PROGRAM

## 2018-02-22 PROCEDURE — S5102 ADULT DAY CARE PER DIEM: HCPCS

## 2018-02-26 ENCOUNTER — HOSPITAL ENCOUNTER (OUTPATIENT)
Dept: REHABILITATION | Facility: CLINIC | Age: 58
End: 2018-02-26
Attending: FAMILY MEDICINE
Payer: MEDICAID

## 2018-02-26 PROCEDURE — 40000247 ZZH STATISTIC VISIT ADULT DAY PROGRAM

## 2018-02-26 PROCEDURE — S5102 ADULT DAY CARE PER DIEM: HCPCS

## 2018-02-27 ENCOUNTER — HOSPITAL ENCOUNTER (OUTPATIENT)
Dept: REHABILITATION | Facility: CLINIC | Age: 58
End: 2018-02-27
Attending: FAMILY MEDICINE
Payer: MEDICAID

## 2018-02-27 PROCEDURE — S5102 ADULT DAY CARE PER DIEM: HCPCS

## 2018-02-27 PROCEDURE — 40000247 ZZH STATISTIC VISIT ADULT DAY PROGRAM

## 2018-03-01 ENCOUNTER — HOSPITAL ENCOUNTER (OUTPATIENT)
Dept: REHABILITATION | Facility: CLINIC | Age: 58
End: 2018-03-01
Attending: FAMILY MEDICINE
Payer: MEDICAID

## 2018-03-01 PROCEDURE — 40000247 ZZH STATISTIC VISIT ADULT DAY PROGRAM

## 2018-03-01 PROCEDURE — S5102 ADULT DAY CARE PER DIEM: HCPCS

## 2018-03-06 ENCOUNTER — RECORDS - HEALTHEAST (OUTPATIENT)
Dept: LAB | Facility: CLINIC | Age: 58
End: 2018-03-06

## 2018-03-06 ENCOUNTER — HOSPITAL ENCOUNTER (OUTPATIENT)
Dept: REHABILITATION | Facility: CLINIC | Age: 58
End: 2018-03-06
Attending: FAMILY MEDICINE
Payer: MEDICAID

## 2018-03-06 ENCOUNTER — COMMUNICATION - HEALTHEAST (OUTPATIENT)
Dept: FAMILY MEDICINE | Facility: CLINIC | Age: 58
End: 2018-03-06

## 2018-03-06 PROCEDURE — S5102 ADULT DAY CARE PER DIEM: HCPCS

## 2018-03-06 PROCEDURE — 40000247 ZZH STATISTIC VISIT ADULT DAY PROGRAM

## 2018-03-06 NOTE — PROGRESS NOTES
MSAC RN Monthly Progress Note  Previous documentation reviewed.  No concerns reported by [facility] staff or member.

## 2018-03-07 LAB
ANION GAP SERPL CALCULATED.3IONS-SCNC: 8 MMOL/L (ref 5–18)
BASOPHILS # BLD AUTO: 0.1 THOU/UL (ref 0–0.2)
BASOPHILS NFR BLD AUTO: 1 % (ref 0–2)
BUN SERPL-MCNC: 12 MG/DL (ref 8–22)
CALCIUM SERPL-MCNC: 9.2 MG/DL (ref 8.5–10.5)
CHLORIDE BLD-SCNC: 105 MMOL/L (ref 98–107)
CO2 SERPL-SCNC: 28 MMOL/L (ref 22–31)
CREAT SERPL-MCNC: 0.73 MG/DL (ref 0.6–1.1)
EOSINOPHIL # BLD AUTO: 0.2 THOU/UL (ref 0–0.4)
EOSINOPHIL NFR BLD AUTO: 2 % (ref 0–6)
ERYTHROCYTE [DISTWIDTH] IN BLOOD BY AUTOMATED COUNT: 13.5 % (ref 11–14.5)
GFR SERPL CREATININE-BSD FRML MDRD: >60 ML/MIN/1.73M2
GLUCOSE BLD-MCNC: 63 MG/DL (ref 70–125)
HCT VFR BLD AUTO: 42.9 % (ref 35–47)
HGB BLD-MCNC: 13.7 G/DL (ref 12–16)
LYMPHOCYTES # BLD AUTO: 1.5 THOU/UL (ref 0.8–4.4)
LYMPHOCYTES NFR BLD AUTO: 14 % (ref 20–40)
MCH RBC QN AUTO: 30.1 PG (ref 27–34)
MCHC RBC AUTO-ENTMCNC: 31.9 G/DL (ref 32–36)
MCV RBC AUTO: 94 FL (ref 80–100)
MONOCYTES # BLD AUTO: 0.7 THOU/UL (ref 0–0.9)
MONOCYTES NFR BLD AUTO: 7 % (ref 2–10)
NEUTROPHILS # BLD AUTO: 7.9 THOU/UL (ref 2–7.7)
NEUTROPHILS NFR BLD AUTO: 77 % (ref 50–70)
PLATELET # BLD AUTO: 180 THOU/UL (ref 140–440)
PMV BLD AUTO: 12.4 FL (ref 8.5–12.5)
POTASSIUM BLD-SCNC: 4.3 MMOL/L (ref 3.5–5)
RBC # BLD AUTO: 4.55 MILL/UL (ref 3.8–5.4)
SODIUM SERPL-SCNC: 141 MMOL/L (ref 136–145)
WBC: 10.4 THOU/UL (ref 4–11)

## 2018-03-08 ENCOUNTER — HOSPITAL ENCOUNTER (OUTPATIENT)
Dept: REHABILITATION | Facility: CLINIC | Age: 58
End: 2018-03-08
Attending: FAMILY MEDICINE
Payer: MEDICAID

## 2018-03-08 PROCEDURE — 40000247 ZZH STATISTIC VISIT ADULT DAY PROGRAM

## 2018-03-08 PROCEDURE — S5102 ADULT DAY CARE PER DIEM: HCPCS

## 2018-03-12 ENCOUNTER — HOSPITAL ENCOUNTER (OUTPATIENT)
Dept: REHABILITATION | Facility: CLINIC | Age: 58
End: 2018-03-12
Attending: FAMILY MEDICINE
Payer: MEDICAID

## 2018-03-12 PROCEDURE — 40000247 ZZH STATISTIC VISIT ADULT DAY PROGRAM

## 2018-03-12 PROCEDURE — S5102 ADULT DAY CARE PER DIEM: HCPCS

## 2018-03-13 ENCOUNTER — HOSPITAL ENCOUNTER (OUTPATIENT)
Dept: REHABILITATION | Facility: CLINIC | Age: 58
End: 2018-03-13
Attending: FAMILY MEDICINE
Payer: MEDICAID

## 2018-03-13 PROCEDURE — 40000247 ZZH STATISTIC VISIT ADULT DAY PROGRAM

## 2018-03-13 PROCEDURE — S5102 ADULT DAY CARE PER DIEM: HCPCS

## 2018-03-15 ENCOUNTER — HOSPITAL ENCOUNTER (OUTPATIENT)
Dept: REHABILITATION | Facility: CLINIC | Age: 58
End: 2018-03-15
Attending: FAMILY MEDICINE
Payer: MEDICAID

## 2018-03-15 ENCOUNTER — COMMUNICATION - HEALTHEAST (OUTPATIENT)
Dept: FAMILY MEDICINE | Facility: CLINIC | Age: 58
End: 2018-03-15

## 2018-03-15 DIAGNOSIS — M81.0 OSTEOPOROSIS: ICD-10-CM

## 2018-03-15 PROCEDURE — S5102 ADULT DAY CARE PER DIEM: HCPCS

## 2018-03-15 PROCEDURE — 40000247 ZZH STATISTIC VISIT ADULT DAY PROGRAM

## 2018-03-19 ENCOUNTER — HOSPITAL ENCOUNTER (OUTPATIENT)
Dept: REHABILITATION | Facility: CLINIC | Age: 58
End: 2018-03-19
Attending: FAMILY MEDICINE
Payer: MEDICAID

## 2018-03-19 PROCEDURE — 40000247 ZZH STATISTIC VISIT ADULT DAY PROGRAM

## 2018-03-19 PROCEDURE — S5102 ADULT DAY CARE PER DIEM: HCPCS

## 2018-03-20 ENCOUNTER — HOSPITAL ENCOUNTER (OUTPATIENT)
Dept: REHABILITATION | Facility: CLINIC | Age: 58
End: 2018-03-20
Attending: FAMILY MEDICINE
Payer: MEDICAID

## 2018-03-20 PROCEDURE — S5102 ADULT DAY CARE PER DIEM: HCPCS

## 2018-03-20 PROCEDURE — 40000247 ZZH STATISTIC VISIT ADULT DAY PROGRAM

## 2018-03-22 NOTE — PROGRESS NOTES
Patient was absent this week due to severely cracked molar requiring a dental visit.  She cannot get follow-up care for several weeks.  In the meantime, she will bring over-the-counter oral pain medication with her as a PRN med reminder.      It is also recommended by her dentist that she eat soft foods during this time.

## 2018-03-26 ENCOUNTER — HOSPITAL ENCOUNTER (OUTPATIENT)
Dept: REHABILITATION | Facility: CLINIC | Age: 58
End: 2018-03-26
Attending: FAMILY MEDICINE
Payer: MEDICAID

## 2018-03-26 PROCEDURE — S5102 ADULT DAY CARE PER DIEM: HCPCS

## 2018-03-26 PROCEDURE — 40000247 ZZH STATISTIC VISIT ADULT DAY PROGRAM

## 2018-03-26 NOTE — PROGRESS NOTES
MONTHLY PROGRESS NOTE AND PARTICIPATION REPORT   [facility]    Karolyn Das, 1960  Attendance: Please see Epic for attendance record.    Communication:   Does communicate   Hearing:   No impairment  Vision:   Glasses  Orientation/Cognition:   Partial disorientation  Short term memory loss  Behavior:   Requires redirection  Self-Preservation Skills:   Not capable of self-preservation  Eating:   Assist with set up  Ambulation Walking:   Non-ambulatory  Transferring:   Aide of one person/two  Wheelchair:   Needs help  Toileting:   Needs assistance  Maintenance Program:     [facility]  Living Arrangements:   Lives in assisted living facility  Spiritual Needs: Needs are being met through support group  Medication Assistance:   Medication not taken during program hours  Participation Report:   Aerobics/Exercise  Support Group  Cognitive Stimulation  Fire Drill  Creative Arts/Crafts  Games  Speakers/Entertainment  Socialization  Current Events/News  Education/Health Topic  Meditation, prayer, and communion with Amy. Yoga relaxation with Lucero geoSt. Ade andrewsrick's day trivia, class week (poetry, book club, noodle balloon game), EGGstravaganza.  Level of Participation:   To the best of their ability

## 2018-03-27 ENCOUNTER — HOSPITAL ENCOUNTER (OUTPATIENT)
Dept: REHABILITATION | Facility: CLINIC | Age: 58
End: 2018-03-27
Attending: FAMILY MEDICINE
Payer: MEDICAID

## 2018-03-27 ENCOUNTER — COMMUNICATION - HEALTHEAST (OUTPATIENT)
Dept: FAMILY MEDICINE | Facility: CLINIC | Age: 58
End: 2018-03-27

## 2018-03-27 PROCEDURE — 40000247 ZZH STATISTIC VISIT ADULT DAY PROGRAM

## 2018-03-27 PROCEDURE — S5102 ADULT DAY CARE PER DIEM: HCPCS

## 2018-03-28 ENCOUNTER — HOSPITAL ENCOUNTER (OUTPATIENT)
Dept: MAMMOGRAPHY | Facility: CLINIC | Age: 58
Discharge: HOME | End: 2018-03-28

## 2018-03-28 DIAGNOSIS — Z12.31 VISIT FOR SCREENING MAMMOGRAM: ICD-10-CM

## 2018-03-29 ENCOUNTER — HOSPITAL ENCOUNTER (OUTPATIENT)
Dept: REHABILITATION | Facility: CLINIC | Age: 58
End: 2018-03-29
Attending: FAMILY MEDICINE
Payer: MEDICAID

## 2018-03-29 PROCEDURE — S5102 ADULT DAY CARE PER DIEM: HCPCS

## 2018-03-29 PROCEDURE — 40000247 ZZH STATISTIC VISIT ADULT DAY PROGRAM

## 2018-04-02 ENCOUNTER — HOSPITAL ENCOUNTER (OUTPATIENT)
Dept: REHABILITATION | Facility: CLINIC | Age: 58
End: 2018-04-02
Attending: FAMILY MEDICINE
Payer: MEDICAID

## 2018-04-02 PROCEDURE — 40000247 ZZH STATISTIC VISIT ADULT DAY PROGRAM

## 2018-04-02 PROCEDURE — S5102 ADULT DAY CARE PER DIEM: HCPCS

## 2018-04-03 ENCOUNTER — HOSPITAL ENCOUNTER (OUTPATIENT)
Dept: REHABILITATION | Facility: CLINIC | Age: 58
End: 2018-04-03
Attending: FAMILY MEDICINE
Payer: MEDICAID

## 2018-04-03 PROCEDURE — 40000247 ZZH STATISTIC VISIT ADULT DAY PROGRAM

## 2018-04-03 PROCEDURE — S5102 ADULT DAY CARE PER DIEM: HCPCS

## 2018-04-05 ENCOUNTER — HOSPITAL ENCOUNTER (OUTPATIENT)
Dept: REHABILITATION | Facility: CLINIC | Age: 58
End: 2018-04-05
Attending: FAMILY MEDICINE
Payer: MEDICAID

## 2018-04-05 PROCEDURE — 40000247 ZZH STATISTIC VISIT ADULT DAY PROGRAM

## 2018-04-05 PROCEDURE — S5102 ADULT DAY CARE PER DIEM: HCPCS

## 2018-04-09 ENCOUNTER — HOSPITAL ENCOUNTER (OUTPATIENT)
Dept: REHABILITATION | Facility: CLINIC | Age: 58
End: 2018-04-09
Attending: FAMILY MEDICINE
Payer: MEDICAID

## 2018-04-09 PROCEDURE — S5102 ADULT DAY CARE PER DIEM: HCPCS

## 2018-04-09 PROCEDURE — 40000247 ZZH STATISTIC VISIT ADULT DAY PROGRAM

## 2018-04-10 ENCOUNTER — HOSPITAL ENCOUNTER (OUTPATIENT)
Dept: REHABILITATION | Facility: CLINIC | Age: 58
End: 2018-04-10
Attending: FAMILY MEDICINE
Payer: MEDICAID

## 2018-04-10 PROCEDURE — S5102 ADULT DAY CARE PER DIEM: HCPCS

## 2018-04-10 PROCEDURE — 40000247 ZZH STATISTIC VISIT ADULT DAY PROGRAM

## 2018-04-17 ENCOUNTER — HOSPITAL ENCOUNTER (OUTPATIENT)
Dept: REHABILITATION | Facility: CLINIC | Age: 58
End: 2018-04-17
Attending: FAMILY MEDICINE
Payer: MEDICAID

## 2018-04-17 PROCEDURE — 40000247 ZZH STATISTIC VISIT ADULT DAY PROGRAM

## 2018-04-17 PROCEDURE — S5102 ADULT DAY CARE PER DIEM: HCPCS

## 2018-04-19 ENCOUNTER — HOSPITAL ENCOUNTER (OUTPATIENT)
Dept: REHABILITATION | Facility: CLINIC | Age: 58
End: 2018-04-19
Attending: FAMILY MEDICINE
Payer: MEDICAID

## 2018-04-19 ENCOUNTER — COMMUNICATION - HEALTHEAST (OUTPATIENT)
Dept: FAMILY MEDICINE | Facility: CLINIC | Age: 58
End: 2018-04-19

## 2018-04-19 DIAGNOSIS — M81.0 OSTEOPOROSIS: ICD-10-CM

## 2018-04-19 PROCEDURE — S5102 ADULT DAY CARE PER DIEM: HCPCS

## 2018-04-19 PROCEDURE — 40000247 ZZH STATISTIC VISIT ADULT DAY PROGRAM

## 2018-04-19 NOTE — PROGRESS NOTES
AC RN Monthly Progress Note  Previous documentation reviewed. No concerns reported by AC staff or member.

## 2018-04-24 NOTE — PROGRESS NOTES
MONTHLY PROGRESS NOTE AND PARTICIPATION REPORT   [facility]    Karolyn Das, 1960  Attendance: Please see Epic for attendance record.    Communication:   Does communicate   Hearing:   No impairment  Vision:   Glasses  Orientation/Cognition:   Partial disorientation  Short term memory loss  Behavior:   Requires redirection  Self-Preservation Skills:   Not capable of self-preservation  Eating:   Assist with set up  Ambulation Walking:   Non-ambulatory  Transferring:   Aide of one person/two  Wheelchair:   Needs help  Can propel indepentdently, but only for short distances  Toileting:   Needs assistance  Maintenance Program:     [facility]  Living Arrangements:   Lives in assisted living facility  Spiritual Needs: Needs are being met through support group  Medication Assistance:   Medication not taken during program hours  Participation Report:   Aerobics/Exercise  Support Group  Cognitive Stimulation  Creative Arts/Crafts  Games  Speakers/Entertainment  Socialization  Current Events/News  Education/Health Topic  Meditation, prayer time, and communion with Amy. Yoga relaxation wtih Lucero. Baseball week, flower power week, OT appreciation month.   Level of Participation:   To the best of their ability

## 2018-04-26 ENCOUNTER — HOSPITAL ENCOUNTER (OUTPATIENT)
Dept: REHABILITATION | Facility: CLINIC | Age: 58
End: 2018-04-26
Attending: FAMILY MEDICINE
Payer: MEDICAID

## 2018-04-26 PROCEDURE — 40000247 ZZH STATISTIC VISIT ADULT DAY PROGRAM

## 2018-04-26 PROCEDURE — S5102 ADULT DAY CARE PER DIEM: HCPCS

## 2018-05-01 ENCOUNTER — HOSPITAL ENCOUNTER (OUTPATIENT)
Dept: REHABILITATION | Facility: CLINIC | Age: 58
End: 2018-05-01
Attending: FAMILY MEDICINE
Payer: MEDICAID

## 2018-05-01 PROCEDURE — S5102 ADULT DAY CARE PER DIEM: HCPCS

## 2018-05-01 PROCEDURE — 40000247 ZZH STATISTIC VISIT ADULT DAY PROGRAM

## 2018-05-03 ENCOUNTER — HOSPITAL ENCOUNTER (OUTPATIENT)
Dept: REHABILITATION | Facility: CLINIC | Age: 58
End: 2018-05-03
Attending: FAMILY MEDICINE
Payer: MEDICAID

## 2018-05-03 PROCEDURE — S5102 ADULT DAY CARE PER DIEM: HCPCS

## 2018-05-03 PROCEDURE — 40000247 ZZH STATISTIC VISIT ADULT DAY PROGRAM

## 2018-05-07 ENCOUNTER — HOSPITAL ENCOUNTER (OUTPATIENT)
Dept: REHABILITATION | Facility: CLINIC | Age: 58
End: 2018-05-07
Attending: FAMILY MEDICINE
Payer: MEDICAID

## 2018-05-07 PROCEDURE — S5102 ADULT DAY CARE PER DIEM: HCPCS

## 2018-05-07 PROCEDURE — 40000247 ZZH STATISTIC VISIT ADULT DAY PROGRAM

## 2018-05-09 ENCOUNTER — HOSPITAL ENCOUNTER (OUTPATIENT)
Dept: REHABILITATION | Facility: CLINIC | Age: 58
End: 2018-05-09
Attending: FAMILY MEDICINE
Payer: MEDICAID

## 2018-05-09 PROCEDURE — S5102 ADULT DAY CARE PER DIEM: HCPCS

## 2018-05-09 PROCEDURE — 40000247 ZZH STATISTIC VISIT ADULT DAY PROGRAM

## 2018-05-10 ENCOUNTER — HOSPITAL ENCOUNTER (OUTPATIENT)
Dept: REHABILITATION | Facility: CLINIC | Age: 58
End: 2018-05-10
Attending: FAMILY MEDICINE
Payer: MEDICAID

## 2018-05-10 PROCEDURE — S5102 ADULT DAY CARE PER DIEM: HCPCS

## 2018-05-10 PROCEDURE — 40000247 ZZH STATISTIC VISIT ADULT DAY PROGRAM

## 2018-05-14 ENCOUNTER — HOSPITAL ENCOUNTER (OUTPATIENT)
Dept: REHABILITATION | Facility: CLINIC | Age: 58
End: 2018-05-14
Attending: FAMILY MEDICINE
Payer: MEDICAID

## 2018-05-14 PROCEDURE — 40000247 ZZH STATISTIC VISIT ADULT DAY PROGRAM

## 2018-05-14 PROCEDURE — S5102 ADULT DAY CARE PER DIEM: HCPCS

## 2018-05-15 ENCOUNTER — HOSPITAL ENCOUNTER (OUTPATIENT)
Dept: REHABILITATION | Facility: CLINIC | Age: 58
End: 2018-05-15
Attending: FAMILY MEDICINE
Payer: MEDICAID

## 2018-05-15 PROCEDURE — 40000247 ZZH STATISTIC VISIT ADULT DAY PROGRAM

## 2018-05-15 PROCEDURE — S5102 ADULT DAY CARE PER DIEM: HCPCS

## 2018-05-16 ENCOUNTER — COMMUNICATION - HEALTHEAST (OUTPATIENT)
Dept: FAMILY MEDICINE | Facility: CLINIC | Age: 58
End: 2018-05-16

## 2018-05-16 ENCOUNTER — HOSPITAL ENCOUNTER (OUTPATIENT)
Dept: REHABILITATION | Facility: CLINIC | Age: 58
End: 2018-05-16
Attending: FAMILY MEDICINE
Payer: MEDICAID

## 2018-05-16 DIAGNOSIS — Z00.00 HEALTH CARE MAINTENANCE: ICD-10-CM

## 2018-05-16 DIAGNOSIS — K59.00 CONSTIPATION: ICD-10-CM

## 2018-05-16 PROCEDURE — 40000247 ZZH STATISTIC VISIT ADULT DAY PROGRAM

## 2018-05-16 PROCEDURE — S5102 ADULT DAY CARE PER DIEM: HCPCS

## 2018-05-17 RX ORDER — CALCIUM POLYCARBOPHIL 625 MG
1 TABLET ORAL DAILY
Qty: 90 TABLET | Refills: 9 | Status: SHIPPED | OUTPATIENT
Start: 2018-05-17

## 2018-05-21 ENCOUNTER — HOSPITAL ENCOUNTER (OUTPATIENT)
Dept: REHABILITATION | Facility: CLINIC | Age: 58
End: 2018-05-21
Attending: FAMILY MEDICINE
Payer: MEDICAID

## 2018-05-21 PROCEDURE — 40000247 ZZH STATISTIC VISIT ADULT DAY PROGRAM

## 2018-05-21 PROCEDURE — S5102 ADULT DAY CARE PER DIEM: HCPCS

## 2018-05-23 ENCOUNTER — HOSPITAL ENCOUNTER (OUTPATIENT)
Dept: REHABILITATION | Facility: CLINIC | Age: 58
End: 2018-05-23
Attending: FAMILY MEDICINE
Payer: MEDICAID

## 2018-05-23 PROCEDURE — 40000247 ZZH STATISTIC VISIT ADULT DAY PROGRAM

## 2018-05-23 PROCEDURE — S5102 ADULT DAY CARE PER DIEM: HCPCS

## 2018-05-23 NOTE — PROGRESS NOTES
MONTHLY PROGRESS NOTE AND PARTICIPATION REPORT   [facility]    Karolyn Das, 1960  Attendance: Please see Epic for attendance record.    Communication:   Does communicate   Hearing:   No impairment  Vision:   Glasses  Orientation/Cognition:   Partial disorientation  Short term memory loss  Behavior:   Requires redirection  Self-Preservation Skills:   Not capable of self-preservation  Eating:   Assist with set up  Ambulation Walking:   Non-ambulatory  Transferring:   Aide of one person/two  Wheelchair:   Needs help  Can propel chair independently for short distances  Toileting:   Needs assistance  Maintenance Program:     [facility]  Living Arrangements:   Lives in assisted living facility  Spiritual Needs: Needs are being met through support group  Medication Assistance:   Medication not taken during program hours  Participation Report:   Aerobics/Exercise  Support Group  Cognitive Stimulation  Creative Arts/Crafts  Games  Gardening  Speakers/Entertainment  Socialization  Current Events/News  Education/Health Topic  Meditation, prayer, and communion with [employer], [employer], Outdoor fun week, town haley meetings, tabel top games, track and field week, yoga relaxation with Lucero.  Level of Participation:   To the best of their ability

## 2018-05-24 ENCOUNTER — HOSPITAL ENCOUNTER (OUTPATIENT)
Dept: REHABILITATION | Facility: CLINIC | Age: 58
End: 2018-05-24
Attending: FAMILY MEDICINE
Payer: MEDICAID

## 2018-05-24 PROCEDURE — S5102 ADULT DAY CARE PER DIEM: HCPCS

## 2018-05-24 PROCEDURE — 40000247 ZZH STATISTIC VISIT ADULT DAY PROGRAM

## 2018-05-29 ENCOUNTER — HOSPITAL ENCOUNTER (OUTPATIENT)
Dept: REHABILITATION | Facility: CLINIC | Age: 58
End: 2018-05-29
Attending: FAMILY MEDICINE
Payer: MEDICAID

## 2018-05-29 PROCEDURE — S5102 ADULT DAY CARE PER DIEM: HCPCS

## 2018-05-29 PROCEDURE — 40000247 ZZH STATISTIC VISIT ADULT DAY PROGRAM

## 2018-05-31 ENCOUNTER — HOSPITAL ENCOUNTER (OUTPATIENT)
Dept: REHABILITATION | Facility: CLINIC | Age: 58
End: 2018-05-31
Attending: FAMILY MEDICINE
Payer: MEDICAID

## 2018-05-31 PROCEDURE — S5102 ADULT DAY CARE PER DIEM: HCPCS

## 2018-05-31 PROCEDURE — 40000247 ZZH STATISTIC VISIT ADULT DAY PROGRAM

## 2018-05-31 NOTE — PROGRESS NOTES
VULNERABLE ADULT ASSESSMENT  [facility]     Assessment of Susceptibility to Abuse, Including Self Abuse, Neglect (Identification of characteristics, which make the individual susceptible to abuse, and how these characteristics cause the individual to be susceptible to abuse.)   Based on completed member needs assessment and interview, cognitive and/or physical limitations impair ability to meet basic needs and/or protect self from maltreatment.  [facility] staff has no previous knowledge of abuse to self or others.   INDIVIDUAL ABUSE PREVENTION PLAN-MEASURES TAKEN TO MINIMIZE RISK OF ABUSE   ADL:   Assist with toileting  Ambulation/Transfers/Wheelchairs:  Provide transfer belt and assist with transfers and ambulation   Behavior:   Monitor client's agitation level and redirect when appropriate  Communication:  Encourage verbalization of needs/concerns  Cognitive Issues:   Monitor clients agitation level and redirect when appropriate  Provider reminders due to confusion, forgetfulness  Give simple step-by-step direction  Diet:   No concerns at this time.  Exercise:   Encourage participation in wheelchair aerobics  Hearing:   No concerns at this time.  Isolation:   Encourage socialization due to isolation in home environment  Medical Monitoring:   Monitor physical and emotional comfort  Mental Health:   Encourage client to express feelings  Sensory:   Provide and encourage participation in activities for stimulation  Vision:   Ensure client is wearing glasses and clean prn  Other: ***  Referral Indicated: None at this time.  Developed/Reviewed with Member: Yes  The Program Abuse Prevention Plan identifies the specific actions that minimize abuse to the [facility] participant.  Yes

## 2018-05-31 NOTE — PROGRESS NOTES
"INDIVIDUAL PLAN OF CARE   [facility]    Member Name: Karolyn Das; YOB: 1960  MRN: [id number]  5/31/2018    Goals developed in collaboration with: member  Staff responsible for plan: Amy Richards  1. Long Term Goal (concrete, measurable, and time specific outcomes): Member will improve or maintain cognitive functioning while maintaining dignity and respect.  Target Date: 10/31/18  Bi-Annual Review:    2. Short Term Goal: (concrete, measurable, and time specific outcomes): Member will identify present emotional state and develop strategies to shift focus to a more positive outlook.   Target Date: 10/31/18  Bi-Annual Review:    3. Short Term Goal: (concrete, measurable, and time specific outcomes): Member will actively participate in aerobic exercises each day of program attendance.  Target Date: 10/31/18  Bi-Annual Review:    SELF-PRESERVATION FORM  [facility]    Member Name: Karolyn Das; YOB: 1960  MRN: [id number]  5/31/2018    A. The person is ambulatory or mobile. No  B. The person has the combined physical and mental capacity to:  1. Recognize a danger, signal or alarm requiring evacuation from the center: Yes  2. Initiate and complete the evacuation without requiring more than sporadic assistance from another person, such as help in opening a door or getting into a wheelchair: No  3. Select an alternative means of escape or take another appropriate action if the primary escape route is blocked: No  4. Remain at a designated location outside the center until further instruction is given: No    \"Capable of taking appropriate action for self-preservation under emergency conditions\" is the designation applied in parts 0712.8268 to 2986.9603 to an adult who meets the criteria in items A and B.    FAC Self-Preservation Status: Not capable of self-preservation    "

## 2018-06-04 ENCOUNTER — HOSPITAL ENCOUNTER (OUTPATIENT)
Dept: REHABILITATION | Facility: CLINIC | Age: 58
End: 2018-06-04
Attending: FAMILY MEDICINE
Payer: MEDICAID

## 2018-06-04 PROCEDURE — S5102 ADULT DAY CARE PER DIEM: HCPCS

## 2018-06-04 PROCEDURE — 40000247 ZZH STATISTIC VISIT ADULT DAY PROGRAM

## 2018-06-05 ENCOUNTER — HOSPITAL ENCOUNTER (OUTPATIENT)
Dept: REHABILITATION | Facility: CLINIC | Age: 58
End: 2018-06-05
Attending: FAMILY MEDICINE
Payer: MEDICAID

## 2018-06-05 PROCEDURE — 40000247 ZZH STATISTIC VISIT ADULT DAY PROGRAM

## 2018-06-05 PROCEDURE — S5102 ADULT DAY CARE PER DIEM: HCPCS

## 2018-06-06 ENCOUNTER — HOSPITAL ENCOUNTER (OUTPATIENT)
Dept: REHABILITATION | Facility: CLINIC | Age: 58
End: 2018-06-06
Attending: FAMILY MEDICINE
Payer: MEDICAID

## 2018-06-06 PROCEDURE — 40000247 ZZH STATISTIC VISIT ADULT DAY PROGRAM

## 2018-06-06 PROCEDURE — S5102 ADULT DAY CARE PER DIEM: HCPCS

## 2018-06-07 ENCOUNTER — HOSPITAL ENCOUNTER (OUTPATIENT)
Dept: REHABILITATION | Facility: CLINIC | Age: 58
End: 2018-06-07
Attending: FAMILY MEDICINE
Payer: MEDICAID

## 2018-06-07 PROCEDURE — 40000247 ZZH STATISTIC VISIT ADULT DAY PROGRAM

## 2018-06-07 PROCEDURE — S5102 ADULT DAY CARE PER DIEM: HCPCS

## 2018-06-11 ENCOUNTER — HOSPITAL ENCOUNTER (OUTPATIENT)
Dept: REHABILITATION | Facility: CLINIC | Age: 58
End: 2018-06-11
Attending: FAMILY MEDICINE
Payer: MEDICAID

## 2018-06-11 PROCEDURE — S5102 ADULT DAY CARE PER DIEM: HCPCS

## 2018-06-11 PROCEDURE — 40000247 ZZH STATISTIC VISIT ADULT DAY PROGRAM

## 2018-06-12 ENCOUNTER — HOSPITAL ENCOUNTER (OUTPATIENT)
Dept: REHABILITATION | Facility: CLINIC | Age: 58
End: 2018-06-12
Attending: FAMILY MEDICINE
Payer: MEDICAID

## 2018-06-12 PROCEDURE — 40000247 ZZH STATISTIC VISIT ADULT DAY PROGRAM

## 2018-06-12 PROCEDURE — S5102 ADULT DAY CARE PER DIEM: HCPCS

## 2018-06-13 ENCOUNTER — COMMUNICATION - HEALTHEAST (OUTPATIENT)
Dept: FAMILY MEDICINE | Facility: CLINIC | Age: 58
End: 2018-06-13

## 2018-06-13 DIAGNOSIS — M81.0 OSTEOPOROSIS: ICD-10-CM

## 2018-06-21 ENCOUNTER — HOSPITAL ENCOUNTER (OUTPATIENT)
Dept: REHABILITATION | Facility: CLINIC | Age: 58
End: 2018-06-21
Attending: FAMILY MEDICINE
Payer: MEDICAID

## 2018-06-21 PROCEDURE — 40000247 ZZH STATISTIC VISIT ADULT DAY PROGRAM

## 2018-06-21 PROCEDURE — S5102 ADULT DAY CARE PER DIEM: HCPCS

## 2018-06-25 ENCOUNTER — HOSPITAL ENCOUNTER (OUTPATIENT)
Dept: REHABILITATION | Facility: CLINIC | Age: 58
End: 2018-06-25
Attending: FAMILY MEDICINE
Payer: MEDICAID

## 2018-06-25 PROCEDURE — S5102 ADULT DAY CARE PER DIEM: HCPCS

## 2018-06-25 PROCEDURE — 40000247 ZZH STATISTIC VISIT ADULT DAY PROGRAM

## 2018-06-26 ENCOUNTER — HOSPITAL ENCOUNTER (OUTPATIENT)
Dept: REHABILITATION | Facility: CLINIC | Age: 58
End: 2018-06-26
Attending: FAMILY MEDICINE
Payer: MEDICAID

## 2018-06-26 PROCEDURE — S5102 ADULT DAY CARE PER DIEM: HCPCS

## 2018-06-26 PROCEDURE — 40000247 ZZH STATISTIC VISIT ADULT DAY PROGRAM

## 2018-06-26 NOTE — PROGRESS NOTES
MONTHLY PROGRESS NOTE AND PARTICIPATION REPORT   [facility]    Karolyn Das, 1960  Attendance: Please see Epic for attendance record.    Communication:   Does communicate   Hearing:   No impairment  Vision:   Glasses  Orientation/Cognition:   Partial disorientation  Short term memory loss  Behavior:   Requires redirection  Self-Preservation Skills:   Not capable of self-preservation  Eating:   Assist with set up  Ambulation Walking:   Non-ambulatory  Transferring:   Aide of one person/two  Wheelchair:   Needs help  Can propel chair independently for short distances  Toileting:   Needs assistance  Maintenance Program:     [facility]  Living Arrangements:   Lives in assisted living facility  Spiritual Needs: Needs are being met through support group  Medication Assistance:   Medication not taken during program hours  Participation Report:   Aerobics/Exercise  Support Group  Cognitive Stimulation  Creative Arts/Crafts  Games  Gardening  Speakers/Entertainment  Socialization  Current Events/News  Education/Health Topic  Meditation, prayer, and communion with Amy. Back to the 70's week, water conservation week, bowling, book club, leather stamping, garden party, MS minor visit.   Level of Participation:   To the best of their ability

## 2018-06-28 ENCOUNTER — HOSPITAL ENCOUNTER (OUTPATIENT)
Dept: REHABILITATION | Facility: CLINIC | Age: 58
End: 2018-06-28
Attending: FAMILY MEDICINE
Payer: MEDICAID

## 2018-06-28 PROCEDURE — 40000247 ZZH STATISTIC VISIT ADULT DAY PROGRAM

## 2018-06-28 PROCEDURE — S5102 ADULT DAY CARE PER DIEM: HCPCS

## 2018-07-02 ENCOUNTER — HOSPITAL ENCOUNTER (OUTPATIENT)
Dept: REHABILITATION | Facility: CLINIC | Age: 58
End: 2018-07-02
Attending: FAMILY MEDICINE
Payer: MEDICAID

## 2018-07-02 PROCEDURE — S5102 ADULT DAY CARE PER DIEM: HCPCS

## 2018-07-02 PROCEDURE — 40000247 ZZH STATISTIC VISIT ADULT DAY PROGRAM

## 2018-07-03 ENCOUNTER — HOSPITAL ENCOUNTER (OUTPATIENT)
Dept: REHABILITATION | Facility: CLINIC | Age: 58
End: 2018-07-03
Attending: FAMILY MEDICINE
Payer: MEDICAID

## 2018-07-03 PROCEDURE — 40000247 ZZH STATISTIC VISIT ADULT DAY PROGRAM

## 2018-07-03 PROCEDURE — S5102 ADULT DAY CARE PER DIEM: HCPCS

## 2018-07-05 ENCOUNTER — HOSPITAL ENCOUNTER (OUTPATIENT)
Dept: REHABILITATION | Facility: CLINIC | Age: 58
End: 2018-07-05
Attending: FAMILY MEDICINE
Payer: MEDICAID

## 2018-07-05 ENCOUNTER — RECORDS - HEALTHEAST (OUTPATIENT)
Dept: ADMINISTRATIVE | Facility: OTHER | Age: 58
End: 2018-07-05

## 2018-07-05 PROCEDURE — 40000247 ZZH STATISTIC VISIT ADULT DAY PROGRAM

## 2018-07-05 PROCEDURE — S5102 ADULT DAY CARE PER DIEM: HCPCS

## 2018-07-09 ENCOUNTER — HOSPITAL ENCOUNTER (OUTPATIENT)
Dept: REHABILITATION | Facility: CLINIC | Age: 58
End: 2018-07-09
Attending: FAMILY MEDICINE
Payer: MEDICAID

## 2018-07-09 PROCEDURE — S5102 ADULT DAY CARE PER DIEM: HCPCS

## 2018-07-09 PROCEDURE — 40000247 ZZH STATISTIC VISIT ADULT DAY PROGRAM

## 2018-07-10 ENCOUNTER — HOSPITAL ENCOUNTER (OUTPATIENT)
Dept: REHABILITATION | Facility: CLINIC | Age: 58
End: 2018-07-10
Attending: FAMILY MEDICINE
Payer: MEDICAID

## 2018-07-10 PROCEDURE — 40000247 ZZH STATISTIC VISIT ADULT DAY PROGRAM

## 2018-07-10 PROCEDURE — S5102 ADULT DAY CARE PER DIEM: HCPCS

## 2018-07-11 ENCOUNTER — COMMUNICATION - HEALTHEAST (OUTPATIENT)
Dept: FAMILY MEDICINE | Facility: CLINIC | Age: 58
End: 2018-07-11

## 2018-07-11 DIAGNOSIS — K59.00 CONSTIPATION: ICD-10-CM

## 2018-07-11 DIAGNOSIS — M81.0 OSTEOPOROSIS: ICD-10-CM

## 2018-07-12 ENCOUNTER — HOSPITAL ENCOUNTER (OUTPATIENT)
Dept: REHABILITATION | Facility: CLINIC | Age: 58
End: 2018-07-12
Attending: FAMILY MEDICINE
Payer: MEDICAID

## 2018-07-12 PROCEDURE — 40000247 ZZH STATISTIC VISIT ADULT DAY PROGRAM

## 2018-07-12 PROCEDURE — S5102 ADULT DAY CARE PER DIEM: HCPCS

## 2018-07-16 ENCOUNTER — HOSPITAL ENCOUNTER (OUTPATIENT)
Dept: REHABILITATION | Facility: CLINIC | Age: 58
End: 2018-07-16
Attending: FAMILY MEDICINE
Payer: MEDICAID

## 2018-07-16 PROCEDURE — S5102 ADULT DAY CARE PER DIEM: HCPCS

## 2018-07-16 PROCEDURE — 40000247 ZZH STATISTIC VISIT ADULT DAY PROGRAM

## 2018-07-17 ENCOUNTER — HOSPITAL ENCOUNTER (OUTPATIENT)
Dept: REHABILITATION | Facility: CLINIC | Age: 58
End: 2018-07-17
Attending: FAMILY MEDICINE
Payer: MEDICAID

## 2018-07-17 PROCEDURE — S5102 ADULT DAY CARE PER DIEM: HCPCS

## 2018-07-17 PROCEDURE — 40000247 ZZH STATISTIC VISIT ADULT DAY PROGRAM

## 2018-07-19 ENCOUNTER — HOSPITAL ENCOUNTER (OUTPATIENT)
Dept: REHABILITATION | Facility: CLINIC | Age: 58
End: 2018-07-19
Attending: FAMILY MEDICINE
Payer: MEDICAID

## 2018-07-19 PROCEDURE — 40000247 ZZH STATISTIC VISIT ADULT DAY PROGRAM

## 2018-07-19 PROCEDURE — S5102 ADULT DAY CARE PER DIEM: HCPCS

## 2018-07-23 ENCOUNTER — HOSPITAL ENCOUNTER (OUTPATIENT)
Dept: REHABILITATION | Facility: CLINIC | Age: 58
End: 2018-07-23
Attending: FAMILY MEDICINE
Payer: MEDICAID

## 2018-07-23 PROCEDURE — 40000247 ZZH STATISTIC VISIT ADULT DAY PROGRAM

## 2018-07-23 PROCEDURE — S5102 ADULT DAY CARE PER DIEM: HCPCS

## 2018-07-24 ENCOUNTER — HOSPITAL ENCOUNTER (OUTPATIENT)
Dept: REHABILITATION | Facility: CLINIC | Age: 58
End: 2018-07-24
Attending: FAMILY MEDICINE
Payer: MEDICAID

## 2018-07-24 PROCEDURE — 40000247 ZZH STATISTIC VISIT ADULT DAY PROGRAM

## 2018-07-24 PROCEDURE — S5102 ADULT DAY CARE PER DIEM: HCPCS

## 2018-07-24 NOTE — PROGRESS NOTES
MONTHLY PROGRESS NOTE AND PARTICIPATION REPORT   [facility]    Karolyn Das, 1960  Attendance: Please see Epic for attendance record.    Communication:   Does communicate   Hearing:   No impairment  Vision:   Glasses  Orientation/Cognition:   Partial disorientation  Short term memory loss  Behavior:   Requires redirection  Self-Preservation Skills:   Not capable of self-preservation  Eating:   Assist with set up  Ambulation Walking:   Non-ambulatory  Transferring:   Aide of one person/two  Wheelchair:   Needs help  can propel chair independently for short distances  Toileting:   Needs assistance  Maintenance Program:     [facility]  Living Arrangements:   Lives in assisted living facility  Spiritual Needs: Needs are being met through support group  Medication Assistance:   Medication not taken during program hours  Participation Report:   Aerobics/Exercise  Support Group  Cognitive Stimulation  Fire Drill  Creative Arts/Crafts  Games  Gardening  Speakers/Entertainment  Socialization  Current Events/News  Education/Health Topic  Meditation, prayer, and communion with Amy. Shell the beautiful week, special olympics week, virtual vacation week, roadside attractions week, [employer] project, grateful class, yoga relaxation with Lucero.  Level of Participation:   To the best of their ability

## 2018-07-25 ENCOUNTER — COMMUNICATION - HEALTHEAST (OUTPATIENT)
Dept: FAMILY MEDICINE | Facility: CLINIC | Age: 58
End: 2018-07-25

## 2018-07-25 DIAGNOSIS — Z00.00 HEALTHCARE MAINTENANCE: ICD-10-CM

## 2018-07-26 ENCOUNTER — HOSPITAL ENCOUNTER (OUTPATIENT)
Dept: REHABILITATION | Facility: CLINIC | Age: 58
End: 2018-07-26
Attending: FAMILY MEDICINE
Payer: MEDICAID

## 2018-07-26 PROCEDURE — 40000247 ZZH STATISTIC VISIT ADULT DAY PROGRAM

## 2018-07-26 PROCEDURE — S5102 ADULT DAY CARE PER DIEM: HCPCS

## 2018-07-30 ENCOUNTER — HOSPITAL ENCOUNTER (OUTPATIENT)
Dept: REHABILITATION | Facility: CLINIC | Age: 58
End: 2018-07-30
Attending: FAMILY MEDICINE
Payer: MEDICAID

## 2018-07-30 PROCEDURE — 40000247 ZZH STATISTIC VISIT ADULT DAY PROGRAM

## 2018-07-30 PROCEDURE — S5102 ADULT DAY CARE PER DIEM: HCPCS

## 2018-07-30 NOTE — PROGRESS NOTES
[facility] Social History    Full Name: Karolyn Das      MRN: [id number]    Date of Birth: [birth year]  Nickname: Mirtha      Sex: F     Home Phone: [phone]/ [phone]      Cell Phone: None  Address: [address]//Zip: [place] [address]  County: [place]      E-mail: None        Transportation: [place]  Language: English         needed? No       Ethnicity: American  Race: White      Country of Origin: [place]       Synagogue: Pentecostal  Marital Status:                   Spouse/Significant Other: POA:  Jane Juan  ______________________________________________________________________________________     ? No    Branch of Service: NA      Education Level: Unknown  Job History: [employer]       Organizations/Clubs: None  Whom do you live with? Assisted Living  Current living arrangement:   Assisted Living  Number of Children: 2  List: Rohit Serna  Number of Siblings: 3     List:   Other Important People/Pets: Best friend (lives in [place])  What else should we know about you? None    Primary Care Provider: Vicente Elizabeth        Neurologist: Dr. Kebede  Emergency Contacts:  1. Kareem Lynnenorberto      Relationship: Son    Phone: [phone]  2. Jo Das         Relationship: Mother-in-Law     Phone: [phone]        Updated on: 1/25/17             By: TAYLOR  Updated on: 7/30/18             By: DHS

## 2018-07-31 ENCOUNTER — HOSPITAL ENCOUNTER (OUTPATIENT)
Dept: REHABILITATION | Facility: CLINIC | Age: 58
End: 2018-07-31
Attending: FAMILY MEDICINE
Payer: MEDICAID

## 2018-07-31 PROCEDURE — 40000247 ZZH STATISTIC VISIT ADULT DAY PROGRAM

## 2018-07-31 PROCEDURE — S5102 ADULT DAY CARE PER DIEM: HCPCS

## 2018-08-02 ENCOUNTER — HOSPITAL ENCOUNTER (OUTPATIENT)
Dept: REHABILITATION | Facility: CLINIC | Age: 58
End: 2018-08-02
Attending: FAMILY MEDICINE
Payer: MEDICAID

## 2018-08-02 PROCEDURE — S5102 ADULT DAY CARE PER DIEM: HCPCS

## 2018-08-02 PROCEDURE — 40000247 ZZH STATISTIC VISIT ADULT DAY PROGRAM

## 2018-08-06 ENCOUNTER — HOSPITAL ENCOUNTER (OUTPATIENT)
Dept: REHABILITATION | Facility: CLINIC | Age: 58
End: 2018-08-06
Attending: FAMILY MEDICINE
Payer: MEDICAID

## 2018-08-06 PROCEDURE — 40000247 ZZH STATISTIC VISIT ADULT DAY PROGRAM

## 2018-08-06 PROCEDURE — S5102 ADULT DAY CARE PER DIEM: HCPCS

## 2018-08-07 ENCOUNTER — HOSPITAL ENCOUNTER (OUTPATIENT)
Dept: REHABILITATION | Facility: CLINIC | Age: 58
End: 2018-08-07
Attending: FAMILY MEDICINE
Payer: MEDICAID

## 2018-08-07 PROCEDURE — 40000247 ZZH STATISTIC VISIT ADULT DAY PROGRAM

## 2018-08-07 PROCEDURE — S5102 ADULT DAY CARE PER DIEM: HCPCS

## 2018-08-08 ENCOUNTER — RECORDS - HEALTHEAST (OUTPATIENT)
Dept: ADMINISTRATIVE | Facility: OTHER | Age: 58
End: 2018-08-08

## 2018-08-09 ENCOUNTER — COMMUNICATION - HEALTHEAST (OUTPATIENT)
Dept: FAMILY MEDICINE | Facility: CLINIC | Age: 58
End: 2018-08-09

## 2018-08-09 ENCOUNTER — HOSPITAL ENCOUNTER (OUTPATIENT)
Dept: REHABILITATION | Facility: CLINIC | Age: 58
End: 2018-08-09
Attending: FAMILY MEDICINE
Payer: MEDICAID

## 2018-08-09 DIAGNOSIS — Z00.00 HEALTHCARE MAINTENANCE: ICD-10-CM

## 2018-08-09 DIAGNOSIS — K59.00 CONSTIPATION: ICD-10-CM

## 2018-08-09 PROCEDURE — 40000247 ZZH STATISTIC VISIT ADULT DAY PROGRAM

## 2018-08-09 PROCEDURE — S5102 ADULT DAY CARE PER DIEM: HCPCS

## 2018-08-09 RX ORDER — MULTIVITAMIN WITH FOLIC ACID 400 MCG
TABLET ORAL
Qty: 100 TABLET | Refills: 10 | Status: SHIPPED | OUTPATIENT
Start: 2018-08-09 | End: 2023-07-06

## 2018-08-13 ENCOUNTER — HOSPITAL ENCOUNTER (OUTPATIENT)
Dept: REHABILITATION | Facility: CLINIC | Age: 58
End: 2018-08-13
Attending: FAMILY MEDICINE
Payer: MEDICAID

## 2018-08-13 PROCEDURE — S5102 ADULT DAY CARE PER DIEM: HCPCS

## 2018-08-13 PROCEDURE — 40000247 ZZH STATISTIC VISIT ADULT DAY PROGRAM

## 2018-08-14 ENCOUNTER — COMMUNICATION - HEALTHEAST (OUTPATIENT)
Dept: FAMILY MEDICINE | Facility: CLINIC | Age: 58
End: 2018-08-14

## 2018-08-14 ENCOUNTER — HOSPITAL ENCOUNTER (OUTPATIENT)
Dept: REHABILITATION | Facility: CLINIC | Age: 58
End: 2018-08-14
Attending: FAMILY MEDICINE
Payer: MEDICAID

## 2018-08-14 PROCEDURE — 40000247 ZZH STATISTIC VISIT ADULT DAY PROGRAM

## 2018-08-14 PROCEDURE — S5102 ADULT DAY CARE PER DIEM: HCPCS

## 2018-08-16 ENCOUNTER — HOSPITAL ENCOUNTER (OUTPATIENT)
Dept: REHABILITATION | Facility: CLINIC | Age: 58
End: 2018-08-16
Attending: FAMILY MEDICINE
Payer: MEDICAID

## 2018-08-16 PROCEDURE — S5102 ADULT DAY CARE PER DIEM: HCPCS

## 2018-08-16 PROCEDURE — 40000247 ZZH STATISTIC VISIT ADULT DAY PROGRAM

## 2018-08-20 ENCOUNTER — HOSPITAL ENCOUNTER (OUTPATIENT)
Dept: REHABILITATION | Facility: CLINIC | Age: 58
End: 2018-08-20
Attending: FAMILY MEDICINE
Payer: MEDICAID

## 2018-08-20 PROCEDURE — S5102 ADULT DAY CARE PER DIEM: HCPCS

## 2018-08-20 PROCEDURE — 40000247 ZZH STATISTIC VISIT ADULT DAY PROGRAM

## 2018-08-21 ENCOUNTER — HOSPITAL ENCOUNTER (OUTPATIENT)
Dept: OCCUPATIONAL THERAPY | Facility: CLINIC | Age: 58
Setting detail: THERAPIES SERIES
End: 2018-08-21
Attending: INTERNAL MEDICINE
Payer: MEDICARE

## 2018-08-21 PROCEDURE — G8987 SELF CARE CURRENT STATUS: HCPCS | Mod: GO,CK | Performed by: OCCUPATIONAL THERAPIST

## 2018-08-21 PROCEDURE — G8988 SELF CARE GOAL STATUS: HCPCS | Mod: GO,CK | Performed by: OCCUPATIONAL THERAPIST

## 2018-08-21 PROCEDURE — 97166 OT EVAL MOD COMPLEX 45 MIN: CPT | Mod: GO | Performed by: OCCUPATIONAL THERAPIST

## 2018-08-21 PROCEDURE — 40000125 ZZHC STATISTIC OT OUTPT VISIT: Performed by: OCCUPATIONAL THERAPIST

## 2018-08-21 ASSESSMENT — ACTIVITIES OF DAILY LIVING (ADL)
IADL_QUICK_ADDS: MEAL PLANNING/PREPARATION;HOME/FINANCIAL/MANAGEMENT;COMMUNICATION/COMPUTER USE;COMMUNITY MOBILITY;CARE OF OTHERS

## 2018-08-22 NOTE — PROGRESS NOTES
[facility]  OUTPATIENT OCCUPATIONAL THERAPY  EVALUATION  PLAN OF TREATMENT FOR OUTPATIENT REHABILITATION  (COMPLETE FOR INITIAL CLAIMS ONLY)  Patient's Last Name, First Name, M.I.  YOB: 1960  Karolyn Das                        Provider's Name  [facility] Medical Record No.  [id number]                               Onset Date:     07/31/18 (order)   Start of Care Date:     08/21/18   Type:     ___PT   _X_OT   ___SLP Medical Diagnosis:     MS                          OT Diagnosis:     impaired participation in IADLs, adls and MRADLS Visits from SOC:  1   _________________________________________________________________________________  Plan of Treatment/Functional Goals:  ADL training, IADL training, Cognitive skills, Cognitive performance testing, ROM, Self care/Home management, Strengthening, Therapeutic activities, Wheelchair management    Goals  Goal Identifier: Wheelchair  Goal Description: Patient will participate in W/c assesment and trials of power mobility in order to best determine most appropriate piece of equipment for independce within new living environment.  Target Date: 11/19/18     Goal Identifier: Camera use  Goal Description: Patient will demo independent use of dignital camera features in order to take pictures of friends and family as deisred.  Target Date: 11/19/18     Goal Identifier: Writing  Goal Description: Patient will legibly write name and phone numbers with Ae as needed.  Target Date: 11/19/18     Goal Identifier: Dressing  Goal Description: Patient will demo independence with eating and dressing with use of ae as needed.  Target Date: 11/09/18    Therapy Frequency: up to 10 sessions     Predicted Duration of Therapy Intervention (days/wks): in 90 days  SANTO Mendez CERTIFY THE NEED FOR THESE SERVICES FURNISHED UNDER         THIS PLAN OF TREATMENT AND WHILE UNDER MY CARE .    Physician Signature               Date    X_____________________________________________________    Certification date from: 08/22/18, Certification date to: 11/20/18    Referring Physician: Florencia Sibley     Initial Assessment        See Epic Evaluation      Start Of Care Date: 08/21/18

## 2018-08-28 ENCOUNTER — HOSPITAL ENCOUNTER (OUTPATIENT)
Dept: OCCUPATIONAL THERAPY | Facility: CLINIC | Age: 58
Setting detail: THERAPIES SERIES
End: 2018-08-28
Attending: INTERNAL MEDICINE
Payer: MEDICARE

## 2018-08-28 ENCOUNTER — HOSPITAL ENCOUNTER (OUTPATIENT)
Dept: REHABILITATION | Facility: CLINIC | Age: 58
End: 2018-08-28
Attending: FAMILY MEDICINE
Payer: MEDICAID

## 2018-08-28 PROCEDURE — 97535 SELF CARE MNGMENT TRAINING: CPT | Mod: GO | Performed by: OCCUPATIONAL THERAPIST

## 2018-08-28 PROCEDURE — S5102 ADULT DAY CARE PER DIEM: HCPCS

## 2018-08-28 PROCEDURE — 40000125 ZZHC STATISTIC OT OUTPT VISIT: Performed by: OCCUPATIONAL THERAPIST

## 2018-08-28 PROCEDURE — 40000247 ZZH STATISTIC VISIT ADULT DAY PROGRAM

## 2018-08-28 NOTE — DISCHARGE INSTRUCTIONS
Mirtha, to help you write more clearly:    Sit close to the table with you arm supported    Write big    Put your left hand over your right wrist to support it      For picture taking:    Hold tripod in right hand with wrist brace on and push button with left hand      For eating:    Use wrist brace on right hand which reduces the shaking (tremor)  Ask Rani to help you get one at the drug store        From Occupational therapy,    Chavez Delaney

## 2018-08-30 ENCOUNTER — HOSPITAL ENCOUNTER (OUTPATIENT)
Dept: REHABILITATION | Facility: CLINIC | Age: 58
End: 2018-08-30
Attending: FAMILY MEDICINE
Payer: MEDICAID

## 2018-08-30 PROCEDURE — S5102 ADULT DAY CARE PER DIEM: HCPCS

## 2018-08-30 PROCEDURE — 40000247 ZZH STATISTIC VISIT ADULT DAY PROGRAM

## 2018-09-05 ENCOUNTER — OFFICE VISIT - HEALTHEAST (OUTPATIENT)
Dept: FAMILY MEDICINE | Facility: CLINIC | Age: 58
End: 2018-09-05

## 2018-09-05 DIAGNOSIS — E03.9 HYPOTHYROIDISM: ICD-10-CM

## 2018-09-05 DIAGNOSIS — N39.0 RECURRENT UTI: ICD-10-CM

## 2018-09-05 DIAGNOSIS — N31.9 NEUROGENIC BLADDER: ICD-10-CM

## 2018-09-05 DIAGNOSIS — Z00.00 HEALTH CARE MAINTENANCE: ICD-10-CM

## 2018-09-05 DIAGNOSIS — D12.6 BENIGN NEOPLASM OF COLON: ICD-10-CM

## 2018-09-05 DIAGNOSIS — R41.89 COGNITIVE IMPAIRMENT: ICD-10-CM

## 2018-09-05 DIAGNOSIS — G40.909 EPILEPSY (H): ICD-10-CM

## 2018-09-05 DIAGNOSIS — K59.00 CONSTIPATION: ICD-10-CM

## 2018-09-05 DIAGNOSIS — Z00.00 ROUTINE GENERAL MEDICAL EXAMINATION AT A HEALTH CARE FACILITY: ICD-10-CM

## 2018-09-05 DIAGNOSIS — M81.0 OSTEOPOROSIS: ICD-10-CM

## 2018-09-05 LAB
ALT SERPL W P-5'-P-CCNC: 13 U/L (ref 0–45)
ANION GAP SERPL CALCULATED.3IONS-SCNC: 11 MMOL/L (ref 5–18)
BUN SERPL-MCNC: 15 MG/DL (ref 8–22)
CALCIUM SERPL-MCNC: 9.4 MG/DL (ref 8.5–10.5)
CHLORIDE BLD-SCNC: 104 MMOL/L (ref 98–107)
CO2 SERPL-SCNC: 28 MMOL/L (ref 22–31)
CREAT SERPL-MCNC: 0.72 MG/DL (ref 0.6–1.1)
ERYTHROCYTE [DISTWIDTH] IN BLOOD BY AUTOMATED COUNT: 11.9 % (ref 11–14.5)
GFR SERPL CREATININE-BSD FRML MDRD: >60 ML/MIN/1.73M2
GLUCOSE BLD-MCNC: 75 MG/DL (ref 70–125)
HCT VFR BLD AUTO: 41.9 % (ref 35–47)
HGB BLD-MCNC: 13.7 G/DL (ref 12–16)
MCH RBC QN AUTO: 29 PG (ref 27–34)
MCHC RBC AUTO-ENTMCNC: 32.7 G/DL (ref 32–36)
MCV RBC AUTO: 89 FL (ref 80–100)
PLATELET # BLD AUTO: 162 THOU/UL (ref 140–440)
PMV BLD AUTO: 9.2 FL (ref 7–10)
POTASSIUM BLD-SCNC: 4 MMOL/L (ref 3.5–5)
RBC # BLD AUTO: 4.72 MILL/UL (ref 3.8–5.4)
SODIUM SERPL-SCNC: 143 MMOL/L (ref 136–145)
TSH SERPL DL<=0.005 MIU/L-ACNC: 0.98 UIU/ML (ref 0.3–5)
VALPROATE SERPL-MCNC: 39.4 UG/ML (ref 50–150)
WBC: 7.4 THOU/UL (ref 4–11)

## 2018-09-05 ASSESSMENT — MIFFLIN-ST. JEOR: SCORE: 1200.92

## 2018-09-06 ENCOUNTER — COMMUNICATION - HEALTHEAST (OUTPATIENT)
Dept: FAMILY MEDICINE | Facility: CLINIC | Age: 58
End: 2018-09-06

## 2018-09-06 ENCOUNTER — HOSPITAL ENCOUNTER (OUTPATIENT)
Dept: OCCUPATIONAL THERAPY | Facility: CLINIC | Age: 58
Setting detail: THERAPIES SERIES
End: 2018-09-06
Attending: INTERNAL MEDICINE
Payer: MEDICARE

## 2018-09-06 ENCOUNTER — HOSPITAL ENCOUNTER (OUTPATIENT)
Dept: REHABILITATION | Facility: CLINIC | Age: 58
End: 2018-09-06
Attending: FAMILY MEDICINE
Payer: MEDICAID

## 2018-09-06 LAB — 25(OH)D3 SERPL-MCNC: 34.4 NG/ML (ref 30–80)

## 2018-09-06 PROCEDURE — 40000125 ZZHC STATISTIC OT OUTPT VISIT: Performed by: OCCUPATIONAL THERAPIST

## 2018-09-06 PROCEDURE — S5102 ADULT DAY CARE PER DIEM: HCPCS

## 2018-09-06 PROCEDURE — 40000247 ZZH STATISTIC VISIT ADULT DAY PROGRAM

## 2018-09-10 ENCOUNTER — HOSPITAL ENCOUNTER (OUTPATIENT)
Dept: REHABILITATION | Facility: CLINIC | Age: 58
End: 2018-09-10
Attending: FAMILY MEDICINE
Payer: MEDICAID

## 2018-09-10 PROCEDURE — 40000247 ZZH STATISTIC VISIT ADULT DAY PROGRAM

## 2018-09-10 PROCEDURE — S5102 ADULT DAY CARE PER DIEM: HCPCS

## 2018-09-10 NOTE — PROGRESS NOTES
AC RN Monthly Progress Note  Previous documentation reviewed. AC staff concerned about her increase in urine frequency. Member does not appear to be a [facility] patient so nothing in Epic regarding any appointments or follow up. Will follow up with pts home for updated.     Called pts group home, no answer, left VM asking for call back to follow up.

## 2018-09-11 ENCOUNTER — COMMUNICATION - HEALTHEAST (OUTPATIENT)
Dept: FAMILY MEDICINE | Facility: CLINIC | Age: 58
End: 2018-09-11

## 2018-09-11 ENCOUNTER — HOSPITAL ENCOUNTER (OUTPATIENT)
Dept: REHABILITATION | Facility: CLINIC | Age: 58
End: 2018-09-11
Attending: FAMILY MEDICINE
Payer: MEDICAID

## 2018-09-11 PROCEDURE — S5102 ADULT DAY CARE PER DIEM: HCPCS

## 2018-09-11 PROCEDURE — 40000247 ZZH STATISTIC VISIT ADULT DAY PROGRAM

## 2018-09-13 ENCOUNTER — HOSPITAL ENCOUNTER (OUTPATIENT)
Dept: OCCUPATIONAL THERAPY | Facility: CLINIC | Age: 58
Setting detail: THERAPIES SERIES
End: 2018-09-13
Attending: INTERNAL MEDICINE
Payer: MEDICARE

## 2018-09-13 ENCOUNTER — HOSPITAL ENCOUNTER (OUTPATIENT)
Dept: REHABILITATION | Facility: CLINIC | Age: 58
End: 2018-09-13
Attending: FAMILY MEDICINE
Payer: MEDICAID

## 2018-09-13 PROCEDURE — 40000247 ZZH STATISTIC VISIT ADULT DAY PROGRAM

## 2018-09-13 PROCEDURE — 97535 SELF CARE MNGMENT TRAINING: CPT | Mod: GO | Performed by: OCCUPATIONAL THERAPIST

## 2018-09-13 PROCEDURE — 40000125 ZZHC STATISTIC OT OUTPT VISIT: Performed by: OCCUPATIONAL THERAPIST

## 2018-09-13 PROCEDURE — S5102 ADULT DAY CARE PER DIEM: HCPCS

## 2018-09-17 ENCOUNTER — HOSPITAL ENCOUNTER (OUTPATIENT)
Dept: REHABILITATION | Facility: CLINIC | Age: 58
End: 2018-09-17
Attending: FAMILY MEDICINE
Payer: MEDICAID

## 2018-09-17 PROCEDURE — 40000247 ZZH STATISTIC VISIT ADULT DAY PROGRAM

## 2018-09-17 PROCEDURE — S5102 ADULT DAY CARE PER DIEM: HCPCS

## 2018-09-18 ENCOUNTER — HOSPITAL ENCOUNTER (OUTPATIENT)
Dept: REHABILITATION | Facility: CLINIC | Age: 58
End: 2018-09-18
Attending: FAMILY MEDICINE
Payer: MEDICAID

## 2018-09-18 ENCOUNTER — HOSPITAL ENCOUNTER (OUTPATIENT)
Dept: OCCUPATIONAL THERAPY | Facility: CLINIC | Age: 58
Setting detail: THERAPIES SERIES
End: 2018-09-18
Attending: INTERNAL MEDICINE
Payer: MEDICARE

## 2018-09-18 PROCEDURE — S5102 ADULT DAY CARE PER DIEM: HCPCS

## 2018-09-18 PROCEDURE — 97535 SELF CARE MNGMENT TRAINING: CPT | Mod: GO | Performed by: OCCUPATIONAL THERAPIST

## 2018-09-18 PROCEDURE — 40000247 ZZH STATISTIC VISIT ADULT DAY PROGRAM

## 2018-09-18 PROCEDURE — 40000125 ZZHC STATISTIC OT OUTPT VISIT: Performed by: OCCUPATIONAL THERAPIST

## 2018-09-19 NOTE — PROGRESS NOTES
MONTHLY PROGRESS NOTE AND PARTICIPATION REPORT   [facility]    Karolyn Das, 1960  Attendance: Please see Epic for attendance record.    Communication:   Does communicate   Hearing:   No impairment  Vision:   Glasses  Orientation/Cognition:   Partial disorientation  Behavior:   No behavioral concerns  Self-Preservation Skills:   Not capable of self-preservation  Eating:   Assist with set up  Ambulation Walking:   Non-ambulatory  Transferring:   Aide of one person/two  Wheelchair:   Needs help  Toileting:   Needs assistance  Maintenance Program:     [facility]  Living Arrangements:   Lives in assisted living facility  Spiritual Needs: Needs are being met through support group  Medication Assistance:   Medication not taken during program hours  Participation Report:   Aerobics/Exercise  Support Group  Cognitive Stimulation  Fire Drill  Creative Arts/Crafts  Games  Gardening  Speakers/Entertainment  Socialization  Current Events/News  Education/Health Topic  Prayer time, meditation, and communion with Olaf Reyes mindfullness meditation and yoga, football week, renaissance week, Oktoberfest week.  Level of Participation:   To the best of their ability

## 2018-09-20 ENCOUNTER — HOSPITAL ENCOUNTER (OUTPATIENT)
Dept: REHABILITATION | Facility: CLINIC | Age: 58
End: 2018-09-20
Attending: FAMILY MEDICINE
Payer: MEDICAID

## 2018-09-20 PROCEDURE — 40000247 ZZH STATISTIC VISIT ADULT DAY PROGRAM

## 2018-09-20 PROCEDURE — S5102 ADULT DAY CARE PER DIEM: HCPCS

## 2018-09-25 ENCOUNTER — RECORDS - HEALTHEAST (OUTPATIENT)
Dept: LAB | Facility: CLINIC | Age: 58
End: 2018-09-25

## 2018-09-25 ENCOUNTER — HOSPITAL ENCOUNTER (OUTPATIENT)
Dept: OCCUPATIONAL THERAPY | Facility: CLINIC | Age: 58
Setting detail: THERAPIES SERIES
End: 2018-09-25
Attending: INTERNAL MEDICINE
Payer: MEDICARE

## 2018-09-25 ENCOUNTER — HOSPITAL ENCOUNTER (OUTPATIENT)
Dept: REHABILITATION | Facility: CLINIC | Age: 58
End: 2018-09-25
Attending: FAMILY MEDICINE
Payer: MEDICAID

## 2018-09-25 PROCEDURE — 97535 SELF CARE MNGMENT TRAINING: CPT | Mod: GO | Performed by: OCCUPATIONAL THERAPIST

## 2018-09-25 PROCEDURE — 40000247 ZZH STATISTIC VISIT ADULT DAY PROGRAM

## 2018-09-25 PROCEDURE — 97542 WHEELCHAIR MNGMENT TRAINING: CPT | Mod: GO | Performed by: OCCUPATIONAL THERAPIST

## 2018-09-25 PROCEDURE — 40000125 ZZHC STATISTIC OT OUTPT VISIT: Performed by: OCCUPATIONAL THERAPIST

## 2018-09-25 PROCEDURE — S5102 ADULT DAY CARE PER DIEM: HCPCS

## 2018-09-26 LAB
ALBUMIN SERPL-MCNC: 3.7 G/DL (ref 3.5–5)
ALP SERPL-CCNC: 74 U/L (ref 45–120)
ALT SERPL W P-5'-P-CCNC: 12 U/L (ref 0–45)
ANION GAP SERPL CALCULATED.3IONS-SCNC: 8 MMOL/L (ref 5–18)
AST SERPL W P-5'-P-CCNC: 21 U/L (ref 0–40)
BASOPHILS # BLD AUTO: 0.1 THOU/UL (ref 0–0.2)
BASOPHILS NFR BLD AUTO: 1 % (ref 0–2)
BILIRUB DIRECT SERPL-MCNC: 0.1 MG/DL
BILIRUB SERPL-MCNC: 0.5 MG/DL (ref 0–1)
BUN SERPL-MCNC: 14 MG/DL (ref 8–22)
CALCIUM SERPL-MCNC: 9.2 MG/DL (ref 8.5–10.5)
CHLORIDE BLD-SCNC: 107 MMOL/L (ref 98–107)
CO2 SERPL-SCNC: 28 MMOL/L (ref 22–31)
CREAT SERPL-MCNC: 0.7 MG/DL (ref 0.6–1.1)
EOSINOPHIL # BLD AUTO: 0.2 THOU/UL (ref 0–0.4)
EOSINOPHIL NFR BLD AUTO: 2 % (ref 0–6)
ERYTHROCYTE [DISTWIDTH] IN BLOOD BY AUTOMATED COUNT: 13.2 % (ref 11–14.5)
GFR SERPL CREATININE-BSD FRML MDRD: >60 ML/MIN/1.73M2
GLUCOSE BLD-MCNC: 78 MG/DL (ref 70–125)
HCT VFR BLD AUTO: 41.9 % (ref 35–47)
HGB BLD-MCNC: 13.4 G/DL (ref 12–16)
LYMPHOCYTES # BLD AUTO: 1.8 THOU/UL (ref 0.8–4.4)
LYMPHOCYTES NFR BLD AUTO: 26 % (ref 20–40)
MCH RBC QN AUTO: 30 PG (ref 27–34)
MCHC RBC AUTO-ENTMCNC: 32 G/DL (ref 32–36)
MCV RBC AUTO: 94 FL (ref 80–100)
MONOCYTES # BLD AUTO: 0.6 THOU/UL (ref 0–0.9)
MONOCYTES NFR BLD AUTO: 9 % (ref 2–10)
NEUTROPHILS # BLD AUTO: 4.1 THOU/UL (ref 2–7.7)
NEUTROPHILS NFR BLD AUTO: 61 % (ref 50–70)
PLATELET # BLD AUTO: 174 THOU/UL (ref 140–440)
PMV BLD AUTO: 12.2 FL (ref 8.5–12.5)
POTASSIUM BLD-SCNC: 4.6 MMOL/L (ref 3.5–5)
PROT SERPL-MCNC: 6.9 G/DL (ref 6–8)
RBC # BLD AUTO: 4.47 MILL/UL (ref 3.8–5.4)
SODIUM SERPL-SCNC: 143 MMOL/L (ref 136–145)
VALPROATE SERPL-MCNC: 32.7 UG/ML (ref 50–150)
WBC: 6.8 THOU/UL (ref 4–11)

## 2018-10-01 ENCOUNTER — HOSPITAL ENCOUNTER (OUTPATIENT)
Dept: REHABILITATION | Facility: CLINIC | Age: 58
End: 2018-10-01
Attending: FAMILY MEDICINE
Payer: MEDICAID

## 2018-10-01 PROCEDURE — S5102 ADULT DAY CARE PER DIEM: HCPCS

## 2018-10-01 PROCEDURE — 40000247 ZZH STATISTIC VISIT ADULT DAY PROGRAM

## 2018-10-02 ENCOUNTER — HOSPITAL ENCOUNTER (OUTPATIENT)
Dept: REHABILITATION | Facility: CLINIC | Age: 58
End: 2018-10-02
Attending: FAMILY MEDICINE
Payer: MEDICAID

## 2018-10-02 ENCOUNTER — HOSPITAL ENCOUNTER (OUTPATIENT)
Dept: OCCUPATIONAL THERAPY | Facility: CLINIC | Age: 58
Setting detail: THERAPIES SERIES
End: 2018-10-02
Attending: INTERNAL MEDICINE
Payer: MEDICARE

## 2018-10-02 PROCEDURE — 40000125 ZZHC STATISTIC OT OUTPT VISIT: Performed by: OCCUPATIONAL THERAPIST

## 2018-10-02 PROCEDURE — 40000247 ZZH STATISTIC VISIT ADULT DAY PROGRAM

## 2018-10-02 PROCEDURE — 97535 SELF CARE MNGMENT TRAINING: CPT | Mod: GO | Performed by: OCCUPATIONAL THERAPIST

## 2018-10-02 PROCEDURE — S5102 ADULT DAY CARE PER DIEM: HCPCS

## 2018-10-02 PROCEDURE — 97542 WHEELCHAIR MNGMENT TRAINING: CPT | Mod: GO | Performed by: OCCUPATIONAL THERAPIST

## 2018-10-04 ENCOUNTER — HOSPITAL ENCOUNTER (OUTPATIENT)
Dept: REHABILITATION | Facility: CLINIC | Age: 58
End: 2018-10-04
Attending: FAMILY MEDICINE
Payer: MEDICAID

## 2018-10-04 PROCEDURE — 40000247 ZZH STATISTIC VISIT ADULT DAY PROGRAM

## 2018-10-04 PROCEDURE — S5102 ADULT DAY CARE PER DIEM: HCPCS

## 2018-10-08 ENCOUNTER — HOSPITAL ENCOUNTER (OUTPATIENT)
Dept: REHABILITATION | Facility: CLINIC | Age: 58
End: 2018-10-08
Attending: FAMILY MEDICINE
Payer: MEDICAID

## 2018-10-08 PROCEDURE — S5102 ADULT DAY CARE PER DIEM: HCPCS

## 2018-10-08 PROCEDURE — 40000247 ZZH STATISTIC VISIT ADULT DAY PROGRAM

## 2018-10-08 NOTE — PROGRESS NOTES
MONTHLY PROGRESS NOTE AND PARTICIPATION REPORT   [facility]    Karolyn Das, 1960  Attendance: Please see Epic for attendance record.    Communication:   Does communicate   Hearing:   No impairment  Vision:   Glasses  Orientation/Cognition:   Partial disorientation  Behavior:   No behavioral concerns  Self-Preservation Skills:   Not capable of self-preservation  Eating:   Assist with set up  Ambulation Walking:   Non-ambulatory  Transferring:   Aide of one person/two  Wheelchair:   Needs help  Toileting:   Needs assistance  Maintenance Program:     [facility]  Living Arrangements:   Lives in assisted living facility  Spiritual Needs: Needs are being met through support group  Medication Assistance:   Medication not taken during program hours  Participation Report:   Aerobics/Exercise  Support Group  Cognitive Stimulation  Fire Drill  Creative Arts/Crafts  Games  Gardening  Speakers/Entertainment  Socialization  Current Events/News  Education/Health Topic  Meditation, communion, prayer time, and healthy eating. Voting lesson, world space week, free fall community events, chemistry, halloween, fall craft, book club, and noodle volleyball.   Level of Participation:   To the best of their ability

## 2018-10-09 ENCOUNTER — HOSPITAL ENCOUNTER (OUTPATIENT)
Dept: REHABILITATION | Facility: CLINIC | Age: 58
End: 2018-10-09
Attending: FAMILY MEDICINE
Payer: MEDICAID

## 2018-10-09 PROCEDURE — 40000247 ZZH STATISTIC VISIT ADULT DAY PROGRAM

## 2018-10-09 PROCEDURE — S5102 ADULT DAY CARE PER DIEM: HCPCS

## 2018-10-09 NOTE — PROGRESS NOTES
A volunteer member of patient's healthcare team stated that she went to see her Internal Medicine Physician to discuss the possibility of getting a catheter.  The physician is decidedly against it at this time.  Patient may need follow-up with neurologist regarding treatment for frequency and urgency of urination due to MS.  Staff states that during day program attendance, patient is toiletted once per hour at her request; more frequently if she exercises.

## 2018-10-11 ENCOUNTER — HOSPITAL ENCOUNTER (OUTPATIENT)
Dept: REHABILITATION | Facility: CLINIC | Age: 58
End: 2018-10-11
Attending: FAMILY MEDICINE
Payer: MEDICAID

## 2018-10-11 ENCOUNTER — HOSPITAL ENCOUNTER (OUTPATIENT)
Dept: OCCUPATIONAL THERAPY | Facility: CLINIC | Age: 58
Setting detail: THERAPIES SERIES
End: 2018-10-11
Attending: INTERNAL MEDICINE
Payer: MEDICARE

## 2018-10-11 PROCEDURE — 96125 COGNITIVE TEST BY HC PRO: CPT | Mod: GO | Performed by: OCCUPATIONAL THERAPIST

## 2018-10-11 PROCEDURE — S5102 ADULT DAY CARE PER DIEM: HCPCS

## 2018-10-11 PROCEDURE — 40000125 ZZHC STATISTIC OT OUTPT VISIT: Performed by: OCCUPATIONAL THERAPIST

## 2018-10-11 PROCEDURE — 40000247 ZZH STATISTIC VISIT ADULT DAY PROGRAM

## 2018-10-11 PROCEDURE — 97535 SELF CARE MNGMENT TRAINING: CPT | Mod: GO | Performed by: OCCUPATIONAL THERAPIST

## 2018-10-11 NOTE — PROGRESS NOTES
"AC RN Monthly Progress Note  Previous documentation reviewed.     Urine frequency remains an issue per AC staff, noted at about every hour to the bathroom. RN called and spoke with pts friend/caregiver Rani. Rani takes Mirtha to her appointments and describes her self as the \"boots on the ground\" person meaning she is physically involved with her cares when needed, takes her to appointments and advocates for her as needed. RN discussed sometimes there is a need for catheter use in pts with MS and neurogenic bladder as she stated she is fearful of catheters and reiterated that her PMD is not on board with this idea either. Discussed importance to f/u with urologist to discuss this further so they can r/o what should and should not be done, possible medications, plan of care etc. Rani states there is a urology appointment made in the next couple of weeks and agrees to this plan for further discussion.     Rani also mentions that two other friends and family are listed as her health care co-agents, Jane and Antonia. Jane for any/all decisions and Antonia for bigger decisions. AC staff updated.     "

## 2018-10-15 ENCOUNTER — HOSPITAL ENCOUNTER (OUTPATIENT)
Dept: REHABILITATION | Facility: CLINIC | Age: 58
End: 2018-10-15
Attending: FAMILY MEDICINE
Payer: MEDICAID

## 2018-10-15 PROCEDURE — S5102 ADULT DAY CARE PER DIEM: HCPCS

## 2018-10-15 PROCEDURE — 40000247 ZZH STATISTIC VISIT ADULT DAY PROGRAM

## 2018-10-16 ENCOUNTER — HOSPITAL ENCOUNTER (OUTPATIENT)
Dept: REHABILITATION | Facility: CLINIC | Age: 58
End: 2018-10-16
Attending: FAMILY MEDICINE
Payer: MEDICAID

## 2018-10-16 PROCEDURE — 40000247 ZZH STATISTIC VISIT ADULT DAY PROGRAM

## 2018-10-16 PROCEDURE — S5102 ADULT DAY CARE PER DIEM: HCPCS

## 2018-10-17 ENCOUNTER — HOSPITAL ENCOUNTER (OUTPATIENT)
Dept: REHABILITATION | Facility: CLINIC | Age: 58
End: 2018-10-17
Attending: FAMILY MEDICINE
Payer: MEDICAID

## 2018-10-17 PROCEDURE — S5102 ADULT DAY CARE PER DIEM: HCPCS

## 2018-10-17 PROCEDURE — 40000247 ZZH STATISTIC VISIT ADULT DAY PROGRAM

## 2018-10-18 ENCOUNTER — HOSPITAL ENCOUNTER (OUTPATIENT)
Dept: REHABILITATION | Facility: CLINIC | Age: 58
End: 2018-10-18
Attending: FAMILY MEDICINE
Payer: MEDICAID

## 2018-10-18 PROCEDURE — S5102 ADULT DAY CARE PER DIEM: HCPCS

## 2018-10-18 PROCEDURE — 40000247 ZZH STATISTIC VISIT ADULT DAY PROGRAM

## 2018-10-22 ENCOUNTER — HOSPITAL ENCOUNTER (OUTPATIENT)
Dept: REHABILITATION | Facility: CLINIC | Age: 58
End: 2018-10-22
Attending: FAMILY MEDICINE
Payer: MEDICAID

## 2018-10-22 ENCOUNTER — COMMUNICATION - HEALTHEAST (OUTPATIENT)
Dept: FAMILY MEDICINE | Facility: CLINIC | Age: 58
End: 2018-10-22

## 2018-10-22 DIAGNOSIS — F32.A DEPRESSION: ICD-10-CM

## 2018-10-22 PROCEDURE — S5102 ADULT DAY CARE PER DIEM: HCPCS

## 2018-10-22 PROCEDURE — 40000247 ZZH STATISTIC VISIT ADULT DAY PROGRAM

## 2018-10-22 RX ORDER — PAROXETINE 20 MG/1
TABLET, FILM COATED ORAL
Qty: 270 TABLET | Refills: 3 | Status: SHIPPED | OUTPATIENT
Start: 2018-10-22

## 2018-10-22 NOTE — PROGRESS NOTES
Patient's healthcare agent Jane called to speak with me today about upcoming urology appointment in November.  At this time, patient is against getting a super-pubic catheter.    However, frequency and urgency of need to urinate due to neurogenic bladder currently have patient toileting with staff assist once per hour or more during days of day program attendance.The current situation is very frustrating to the patient, as she feels that she is not able to meaningfully participate in many day program activities.    Jane states that patient transfers and toilets independently at her assisted living facility.  I said that this would not be possible at the day program due to the following concerns:  1) significant cognitive decline in the past twelve months  2) MS - related fatigue  3) neurogenic bladder, resulting in anxiety/ extreme urgency to toilet immediately when sensation occurs  4) patient has been labeled as a fall risk    Jane had not put the above concerns into a greater context, and now questions the patient's toileting plan at her assisted living facility.  Jane will follow up with assisted living staff.    She will also update the [facility] center as to the care plan recommendations from the urologist, when they become available.

## 2018-10-23 ENCOUNTER — HOSPITAL ENCOUNTER (OUTPATIENT)
Dept: REHABILITATION | Facility: CLINIC | Age: 58
End: 2018-10-23
Attending: FAMILY MEDICINE
Payer: MEDICAID

## 2018-10-23 PROCEDURE — S5102 ADULT DAY CARE PER DIEM: HCPCS

## 2018-10-23 PROCEDURE — 40000247 ZZH STATISTIC VISIT ADULT DAY PROGRAM

## 2018-10-25 ENCOUNTER — HOSPITAL ENCOUNTER (OUTPATIENT)
Dept: REHABILITATION | Facility: CLINIC | Age: 58
End: 2018-10-25
Attending: FAMILY MEDICINE
Payer: MEDICAID

## 2018-10-25 PROCEDURE — 40000247 ZZH STATISTIC VISIT ADULT DAY PROGRAM

## 2018-10-25 PROCEDURE — S5102 ADULT DAY CARE PER DIEM: HCPCS

## 2018-10-29 ENCOUNTER — HOSPITAL ENCOUNTER (OUTPATIENT)
Dept: REHABILITATION | Facility: CLINIC | Age: 58
End: 2018-10-29
Attending: FAMILY MEDICINE
Payer: MEDICAID

## 2018-10-29 PROCEDURE — 40000247 ZZH STATISTIC VISIT ADULT DAY PROGRAM

## 2018-10-29 PROCEDURE — S5102 ADULT DAY CARE PER DIEM: HCPCS

## 2018-10-30 ENCOUNTER — HOSPITAL ENCOUNTER (OUTPATIENT)
Dept: REHABILITATION | Facility: CLINIC | Age: 58
End: 2018-10-30
Attending: FAMILY MEDICINE
Payer: MEDICAID

## 2018-10-30 ENCOUNTER — HOSPITAL ENCOUNTER (OUTPATIENT)
Dept: OCCUPATIONAL THERAPY | Facility: CLINIC | Age: 58
Setting detail: THERAPIES SERIES
End: 2018-10-30
Attending: INTERNAL MEDICINE
Payer: MEDICARE

## 2018-10-30 ENCOUNTER — RECORDS - HEALTHEAST (OUTPATIENT)
Dept: LAB | Facility: CLINIC | Age: 58
End: 2018-10-30

## 2018-10-30 PROCEDURE — 96125 COGNITIVE TEST BY HC PRO: CPT | Mod: GO | Performed by: OCCUPATIONAL THERAPIST

## 2018-10-30 PROCEDURE — G9168 MEMORY CURRENT STATUS: HCPCS | Mod: GO,CK | Performed by: OCCUPATIONAL THERAPIST

## 2018-10-30 PROCEDURE — 40000247 ZZH STATISTIC VISIT ADULT DAY PROGRAM

## 2018-10-30 PROCEDURE — G9170 MEMORY D/C STATUS: HCPCS | Mod: GO,CK | Performed by: OCCUPATIONAL THERAPIST

## 2018-10-30 PROCEDURE — 40000125 ZZHC STATISTIC OT OUTPT VISIT: Performed by: OCCUPATIONAL THERAPIST

## 2018-10-30 PROCEDURE — S5102 ADULT DAY CARE PER DIEM: HCPCS

## 2018-10-30 PROCEDURE — G9169 MEMORY GOAL STATUS: HCPCS | Mod: GO,CK | Performed by: OCCUPATIONAL THERAPIST

## 2018-10-31 LAB — TSH SERPL DL<=0.005 MIU/L-ACNC: 3.63 UIU/ML (ref 0.3–5)

## 2018-10-31 NOTE — PROGRESS NOTES
Cognitive Performance Test    SUMMARY OF TEST:    The Cognitive Performance Test (CPT) is a standardized performance-based assessment to measure working memory/executive function processing capacities that underlie functional performance. Subtasks include common basic and instrumental activities of daily living (ADL/IADL) which are rated based on the manner in which patients respond to task demands of varying complexity. The total CPT score describes a level of functioning that indicates how information is processed, implications for functional activities, potential safety risks and a recommended level of supervision or assist based on cognitive function. The highest total score on this test is in the range of 5.6 to 5.8.    DATE OF TESTING: 10/30/2018    RESULTS OF TESTING:                                                                                         CPT Subtest Results    MEDBOX: SHOP/GLOVES:  PHONE:    WASH:  4./ TRAVEL:  TOAST:    DRESS:    TOTAL CPT SCORE:  31.5/39     Average CPT Score  4.5/*5.6    INTERPRETATION OF TEST RESULTS:    Based on the Cognitive Performance Test, this patient scored at CPT Level 4.5.  See CPT Levels reference below.    Summary of functional cognitive status:   Overall score on CPT was 4.5. Pt had difficulty with med box test and needed step by step directions to follow directions.She had mild difficulty with wash test with needing repetition of directions. She had difficulty with toast test as she needed confirmation of directions and she did not initially check for outlet before proceeding with task. She had difficulty with phone task as she did not initiate task and she needed specific directions for this task to provide phone number. She needed reminder cue on dress test to make sure she took additional items for task.     Factors affecting performance:  Impaired mobility     Recommendations:    Supervision in living settin hour                   "                                     TIME ADMINISTERING TEST: 60    TIME FOR INTERPRETATION AND PREPARATION OF REPORT: 10    TOTAL TIME: 70    CPT Levels Reference:    Patient's Average CPT Score:  4.5                                                                                                                                                  Individual scores range along a continuum as outlined below.  In addition to cognitive status, other factors may affect safety in a home environment.  Please refer to specific recommendations for this patient.    ___5.6-5.8  Normal functioning (absence of cognitive-functional disability).  Independent in managing personal affairs, monitors and directs own behavior.  Uses complex information to carry out daily activities with safety and accuracy.    Proficient with instrumental activities of daily living (IADL) and learning new activity.  Problems are anticipated, errors are avoided, and consequences of actions are considered.      ___5.0   Mild cognitive-functional disability; deficits in working memory and executive thought processes. Difficulty using complex information. Problems may be observed with recent memory, judgment, reasoning and planning ahead. May be impulsive or have difficulty anticipating consequences.  Safety:  May require assistance to plan ahead; or to manage complex medication schedules, appointments or finances.  Hazardous activities may need to be monitored or limited.  ADL:  Mild functional decline.  Able to complete basic self-care and routine household tasks.  May have difficulty with complex daily tasks such as reading, writing, meal preparation, shopping or driving.   Learns through hands on teaching. Self-centered behavior or difficulty considering the needs of others may be seen related to trouble seeing the  whole picture\". Can appear disorganized or uninhibited.    _X__4.5  Mild to moderate cognitive-functional disability. Significant deficits " in working memory and executive thought processes. Judgment, reasoning and planning show obvious impairment.  Distractible with inability to shift attention/actions given competing stimuli.  Difficulty with problem solving and managing details. Complex daily tasks performed with inconsistency, difficulty, or error.     Safety:  Medications should be monitored, stove use may require supervision, and driving ability may be affected.  Impaired safety awareness with inability to anticipate potential problems.  May not recognize or respond to emergent situations. Requires frequent check-in support.   ADL:  Mild difficulty with simple everyday self-care tasks. Benefits from structured, routine activity.  Will likely need reminders to complete tasks outside of the routine. Requires assistance with planning and IADL tasks like shopping and finances. Learns concrete tasks through repetition, but performance may not generalize. Tends to be impulsive with poor insight. Self centered behavior or inability to consider the needs of others is common.    ___4.0  Moderate cognitive-functional disability; abstract to concrete thought processes. Working memory and executive function impairments are obvious. Difficulty with planning and problem solving.  Behavior is goal-directed, but unable to follow multi-step directions, is easily distracted, and may not recognize mistakes.  Inability to anticipate hazards or understand precautions.  Safety:  Recommend 24-hour supervision for safety. Supervision needed for medication management and for hazardous activities. May not be able to follow a restricted diet. Can get lost in unfamiliar surroundings. Generally, persons functioning at level 4 should not be driving.   ADL:  Some decline in quality or frequency of ADL.  Toyah enhanced by use of a routine, simple concrete directions, and caregiver set-up of needed items. Complex tasks such as money or home management typically requires  assistance.  Relies heavily on vision to guide behavior; will ignore objects/hazards not in plain sight and can be distracted by irrelevant objects. Often has poor insight.  Able to carry out social conversation and may verbally  cover  for deficits leading caregivers to believe they are capable of functioning independently.       ___3.5  Moderate cognitive-functional disability; increased cues needed for task completion. Aware of concrete task steps but needs prompting or cues to initiate and complete simple tasks. Attention span is limited, simple directions may need to be repeated, and re-focus to a topic or task may be required.  Safety:  24-hour supervision required for safety and for assistance with daily tasks. Assistance required with medications, and access to medication should be limited. Meals, nutrition and dietary restrictions need to be monitored.  All hazardous activities should be restricted or supervised. Should not drive. Prone to wandering and can become lost.  ADL:  Moderate functional decline. Familiar tasks usually requires set-up of supplies and directions to complete steps. May need objects handed to them for task initiation. Function best with a set schedule in familiar surroundings with familiar people. All complex tasks must be done by others. Vocabulary is diminished and speech often unfocused.

## 2018-11-01 ENCOUNTER — HOSPITAL ENCOUNTER (OUTPATIENT)
Dept: REHABILITATION | Facility: CLINIC | Age: 58
End: 2018-11-01
Attending: FAMILY MEDICINE
Payer: MEDICAID

## 2018-11-01 PROCEDURE — S5102 ADULT DAY CARE PER DIEM: HCPCS

## 2018-11-01 PROCEDURE — 40000247 ZZH STATISTIC VISIT ADULT DAY PROGRAM

## 2018-11-01 NOTE — PROGRESS NOTES
AC RN Monthly Progress Note  Previous documentation reviewed. No concerns reported by AC staff or member.   No further updates regarding urology appointment.

## 2018-11-05 ENCOUNTER — HOSPITAL ENCOUNTER (OUTPATIENT)
Dept: REHABILITATION | Facility: CLINIC | Age: 58
End: 2018-11-05
Attending: FAMILY MEDICINE
Payer: MEDICAID

## 2018-11-05 PROCEDURE — S5102 ADULT DAY CARE PER DIEM: HCPCS

## 2018-11-05 PROCEDURE — 40000247 ZZH STATISTIC VISIT ADULT DAY PROGRAM

## 2018-11-06 ENCOUNTER — HOSPITAL ENCOUNTER (OUTPATIENT)
Dept: REHABILITATION | Facility: CLINIC | Age: 58
End: 2018-11-06
Attending: FAMILY MEDICINE
Payer: MEDICAID

## 2018-11-06 PROCEDURE — 40000247 ZZH STATISTIC VISIT ADULT DAY PROGRAM

## 2018-11-06 PROCEDURE — S5102 ADULT DAY CARE PER DIEM: HCPCS

## 2018-11-08 ENCOUNTER — HOSPITAL ENCOUNTER (OUTPATIENT)
Dept: REHABILITATION | Facility: CLINIC | Age: 58
End: 2018-11-08
Attending: FAMILY MEDICINE
Payer: MEDICAID

## 2018-11-08 PROCEDURE — 40000247 ZZH STATISTIC VISIT ADULT DAY PROGRAM

## 2018-11-08 PROCEDURE — S5102 ADULT DAY CARE PER DIEM: HCPCS

## 2018-11-12 ENCOUNTER — HOSPITAL ENCOUNTER (OUTPATIENT)
Dept: REHABILITATION | Facility: CLINIC | Age: 58
End: 2018-11-12
Attending: FAMILY MEDICINE
Payer: MEDICAID

## 2018-11-12 PROCEDURE — 40000247 ZZH STATISTIC VISIT ADULT DAY PROGRAM

## 2018-11-12 PROCEDURE — S5102 ADULT DAY CARE PER DIEM: HCPCS

## 2018-11-13 ENCOUNTER — HOSPITAL ENCOUNTER (OUTPATIENT)
Dept: REHABILITATION | Facility: CLINIC | Age: 58
End: 2018-11-13
Attending: FAMILY MEDICINE
Payer: MEDICAID

## 2018-11-13 PROCEDURE — 40000247 ZZH STATISTIC VISIT ADULT DAY PROGRAM

## 2018-11-13 PROCEDURE — S5102 ADULT DAY CARE PER DIEM: HCPCS

## 2018-11-14 NOTE — PROGRESS NOTES
Individual abuse prevention plan (IAPP)  [facility]     Assessment of Susceptibility to Abuse, Including Self Abuse, Neglect (Identification of characteristics, which make the individual susceptible to abuse, and how these characteristics cause the individual to be susceptible to abuse.)     Is the person susceptible to abuse in each area?  Sexual Abuse:   No  Referrals made when the person is susceptible to abuse outside the scope or control of this program (Identify the referral and date it occurred): No additional risk reduction means needed at this time    Physical Abuse:   No  Referrals made when the person is susceptible to abuse outside the scope or control of this program (Identify the referral and date it occurred): No additional risk reduction means needed at this time    Self-Abuse:   No  Referrals made when the person is susceptible to abuse outside the scope or control of this program (Identify the referral and date it occurred): No additional risk reduction means needed at this time    Financial  Exploitation  No  Referrals made when the person is susceptible to abuse outside the scope or control of this program (Identify the referral and date it occurred): No additional risk reduction means needed at this time    Is the program aware of this person committing a violent crime or act of physical aggression towards others?  No  Referrals made when the person is susceptible to abuse outside the scope or control of this program (Identify the referral and date it occurred): No additional risk reduction means needed at this time    INDIVIDUAL ABUSE PREVENTION PLAN-MEASURES TAKEN TO MINIMIZE RISK OF ABUSE   ADL:   Assist with toileting  Ambulation/Transfers/Wheelchairs:  Provide assistance prn for propelling wheelchair   Behavior:   redirect client when she wanders due to confusion  Communication:  Encourage verbalization of needs/concerns  Cognitive Issues:   Give simple step-by-step  direction  Diet:   no concerns at this time.  Exercise:   Encourage participation in maintenance program  Encourage participation in wheelchair aerobics  Hearing: no concerns at this time  Isolation:   Encourage socialization due to isolation in home environment  Medical Monitoring:   Monitor physical and emotional comfort  Mental Health:   Encourage client to express feelings  Monitor anxiety level and intervene when appropriate  Sensory:   Provide and encourage participation in activities for stimulation  Vision:   No concerns at his time  Other:     Developed in consultation with:  [facility] Staff  The Program Abuse Prevention Plan/IAPP identifies the specific actions that minimize abuse to the [facility] participant.  Yes

## 2018-11-15 ENCOUNTER — HOSPITAL ENCOUNTER (OUTPATIENT)
Dept: REHABILITATION | Facility: CLINIC | Age: 58
End: 2018-11-15
Attending: FAMILY MEDICINE
Payer: MEDICAID

## 2018-11-15 PROCEDURE — 40000247 ZZH STATISTIC VISIT ADULT DAY PROGRAM

## 2018-11-15 PROCEDURE — S5102 ADULT DAY CARE PER DIEM: HCPCS

## 2018-11-15 NOTE — PROGRESS NOTES
MONTHLY PROGRESS NOTE AND PARTICIPATION REPORT   [facility]    Karolyn Das, 1960  Attendance: Please see Epic for attendance record.    Communication:   Does communicate   Hearing:   No impairment  Vision:   Glasses  Orientation/Cognition:   Partial disorientation  Behavior:   No behavioral concerns  Self-Preservation Skills:   Not capable of self-preservation  Eating:   Assist with set up  Ambulation Walking:   Non-ambulatory  Transferring:   Aide of one person/two  Wheelchair:   Needs help  Toileting:   Needs assistance  Maintenance Program:     [facility]  Living Arrangements:   Lives in assisted living facility  Spiritual Needs: Needs are being met through support group  Medication Assistance:   Medication not taken during program hours  Participation Report:   Aerobics/Exercise  Support Group  Cognitive Stimulation  Fire Drill  Creative Arts/Crafts  Games  Speakers/Entertainment  Socialization  Current Events/News  Education/Health Topic  Healthy eating with Lucero, communion, prayer, meditation with Amy, Pathways cello meditation, themed education on games and puzzles, Thanksgiving, geography bee, and animal shelter and adoption.  Level of Participation:   To the best of their ability

## 2018-11-19 NOTE — PROGRESS NOTES
[facility] Note:       I wanted to follow up with you since Mirtha had her urology appointment yesterday.  The urologist was very clear that she would not recommend a catheter for Mirtha at this time.     We will look to set up a conference call to see how we can manage her bathroom routine.

## 2018-11-19 NOTE — ADDENDUM NOTE
Encounter addended by: Mulu Mccollum OT on: 11/19/2018  5:09 PM<BR>     Actions taken: Sign clinical note

## 2018-11-26 ENCOUNTER — HOSPITAL ENCOUNTER (OUTPATIENT)
Dept: REHABILITATION | Facility: CLINIC | Age: 58
End: 2018-11-26
Attending: FAMILY MEDICINE
Payer: MEDICAID

## 2018-11-26 PROCEDURE — 40000247 ZZH STATISTIC VISIT ADULT DAY PROGRAM

## 2018-11-26 PROCEDURE — S5102 ADULT DAY CARE PER DIEM: HCPCS

## 2018-11-27 ENCOUNTER — HOSPITAL ENCOUNTER (OUTPATIENT)
Dept: REHABILITATION | Facility: CLINIC | Age: 58
End: 2018-11-27
Attending: FAMILY MEDICINE
Payer: MEDICAID

## 2018-11-27 PROCEDURE — 40000247 ZZH STATISTIC VISIT ADULT DAY PROGRAM

## 2018-11-27 PROCEDURE — S5102 ADULT DAY CARE PER DIEM: HCPCS

## 2018-11-29 ENCOUNTER — HOSPITAL ENCOUNTER (OUTPATIENT)
Dept: REHABILITATION | Facility: CLINIC | Age: 58
End: 2018-11-29
Attending: FAMILY MEDICINE
Payer: MEDICAID

## 2018-11-29 PROCEDURE — S5102 ADULT DAY CARE PER DIEM: HCPCS

## 2018-11-29 PROCEDURE — 40000247 ZZH STATISTIC VISIT ADULT DAY PROGRAM

## 2018-11-29 NOTE — PROGRESS NOTES
INDIVIDUAL PLAN OF CARE   [facility]    Member Name: Karolyn Das; YOB: 1960  MRN: [id number]  11/29/2018    Goals developed in collaboration with: member  Staff responsible for plan: Mahin Talbert  1. Long Term Goal (concrete, measurable, and time specific outcomes):  Member will maintain physical and cognitive functioning through programming and activities during [facility] attendance, with set up and supervision provided, while maintaining dignity and respect.  Target Date: 5/31/19  Bi-Annual Review:    2. Short Term Goal: (concrete, measurable, and time specific outcomes):  Member will actively participate in prescribed Nu Step maintenance program, with staff set up and supervision provided for safety, 3x/week while attending [facility].  Target Date: 5/31/19  Bi-Annual Review:    3. Short Term Goal: (concrete, measurable, and time specific outcomes):  Member will actively participate in one special cognitive stimulation activity, with staff set up provided, 1x/week while attending [facility] program.  Target Date: 5/31/19  Bi-Annual Review:

## 2018-12-03 ENCOUNTER — HOSPITAL ENCOUNTER (OUTPATIENT)
Dept: REHABILITATION | Facility: CLINIC | Age: 58
End: 2018-12-03
Attending: FAMILY MEDICINE
Payer: MEDICAID

## 2018-12-03 PROCEDURE — 40000247 ZZH STATISTIC VISIT ADULT DAY PROGRAM

## 2018-12-03 PROCEDURE — S5102 ADULT DAY CARE PER DIEM: HCPCS

## 2018-12-04 ENCOUNTER — HOSPITAL ENCOUNTER (OUTPATIENT)
Dept: REHABILITATION | Facility: CLINIC | Age: 58
End: 2018-12-04
Attending: FAMILY MEDICINE
Payer: MEDICAID

## 2018-12-04 PROCEDURE — S5102 ADULT DAY CARE PER DIEM: HCPCS

## 2018-12-04 PROCEDURE — 40000247 ZZH STATISTIC VISIT ADULT DAY PROGRAM

## 2018-12-06 ENCOUNTER — HOSPITAL ENCOUNTER (OUTPATIENT)
Dept: REHABILITATION | Facility: CLINIC | Age: 58
End: 2018-12-06
Attending: FAMILY MEDICINE
Payer: MEDICAID

## 2018-12-06 PROCEDURE — 40000247 ZZH STATISTIC VISIT ADULT DAY PROGRAM

## 2018-12-06 PROCEDURE — S5102 ADULT DAY CARE PER DIEM: HCPCS

## 2018-12-10 ENCOUNTER — HOSPITAL ENCOUNTER (OUTPATIENT)
Dept: REHABILITATION | Facility: CLINIC | Age: 58
End: 2018-12-10
Attending: FAMILY MEDICINE
Payer: MEDICAID

## 2018-12-10 PROCEDURE — S5102 ADULT DAY CARE PER DIEM: HCPCS | Performed by: OCCUPATIONAL THERAPIST

## 2018-12-11 ENCOUNTER — HOSPITAL ENCOUNTER (OUTPATIENT)
Dept: REHABILITATION | Facility: CLINIC | Age: 58
End: 2018-12-11
Attending: FAMILY MEDICINE
Payer: MEDICAID

## 2018-12-11 ENCOUNTER — HOSPITAL ENCOUNTER (OUTPATIENT)
Dept: OCCUPATIONAL THERAPY | Facility: CLINIC | Age: 58
Setting detail: THERAPIES SERIES
End: 2018-12-11
Attending: INTERNAL MEDICINE
Payer: MEDICARE

## 2018-12-11 PROCEDURE — 40000125 ZZHC STATISTIC OT OUTPT VISIT: Performed by: OCCUPATIONAL THERAPIST

## 2018-12-11 PROCEDURE — G8989 SELF CARE D/C STATUS: HCPCS | Mod: GO,CK | Performed by: OCCUPATIONAL THERAPIST

## 2018-12-11 PROCEDURE — 97535 SELF CARE MNGMENT TRAINING: CPT | Mod: GO | Performed by: OCCUPATIONAL THERAPIST

## 2018-12-11 PROCEDURE — G8988 SELF CARE GOAL STATUS: HCPCS | Mod: GO,CK | Performed by: OCCUPATIONAL THERAPIST

## 2018-12-11 PROCEDURE — S5102 ADULT DAY CARE PER DIEM: HCPCS | Performed by: OCCUPATIONAL THERAPIST

## 2018-12-11 NOTE — PROGRESS NOTES
"   12/11/18 1100   Signing Clinician's Name / Credentials   Signing clinician's name / credentials Chavez Delaney OTR/LAUREN, MSCS   Session Number   Session Number 9/10; Occupational Therapy Outpatient Discharge Summary   Authorization status Medicare   Progress/Recertification   Recertification Due 12/31/18   Recertification Completed 11/19/18   OT G-codes   Self Care Goal Status () CK   Self Care Discharge Status () CK   Self Care Discharge Status Modifier Rationale Clinical observation    OT Goal 2   Goal Description Patient will demo independent use of dignital camera features in order to take pictures of friends and family as desired.   Target Date 12/31/18   Date Met 12/11/18  (Able to take picture from written directions with cues)   OT Goal 3   Goal Description Patient will legibly write name and phone numbers with Ae as needed.   Target Date 12/31/18   Date Met 12/11/18   OT Goal 4   Goal Description Patient will demo independence with eating and dressing with use of ae as needed.   Target Date 12/31/18   Date Met 12/11/18  (built up utensils; cups w/ lid and straw; shoehorn; assist)   Objective Measure 1   Objective Measure 9 Hole Peg Test   Details R 72.5\" and L 40.7\"; somewhat slower than evaluation   Objective Measure 2   Objective Measure Handwriting   Details 44.4\" 90% legible; norm is 10\" or less   Self Care/Home Management   Treatment Detail See coordination and handwriting measures above. Reassessed follow falguni with ability to manage camera. Pt reports she mainly hands to others to take pictures now. She was able to take it out of purse today, read paper instructions she has with diagrams with physical cues to look to paper and verbal cues for repetition and then with 3 attempts to take photos that were somewhat blurred due to her tremor,, better on second attempt by self-seeking stabalization of table an dthi swas reinforced for her success. Pt reports she is feeding herself wtih SBA and " "pt demonstrates today taking cup with lid and straw off her cup wright on her manual w/c today to drink Ind. She reports she has been dressing herself due to \"staff turnover\" at her AL but reports this is changning which is she is happy aobut as she prefers assistance for safety due to her fatigue.   Self-Care/Home Mgmt/ADL, Compensatory, Meal Prep Minutes (35628) 35 Minutes   Skilled Intervention Reassessment of UE coordination, handwriting, ADL, leisure.   Patient Response Able to write name in cursive and a phone number   Progress goals met   Education   Learner Patient   Readiness Acceptance   Method Explanation   Response Verbalizes understanding   Plan   Updates to plan of care Pt has been seen in outpatient OT x9 sessions since and includingher OT evaluation on 8/21/18 due to her MS  and having recently movedout of her memory care unit to a more Ind suite giovanni a AL facility to allow for more active social engagement. She was seen to address cognitive assessment with the Cognitive Performance Test-see separate results with pt having scored a 4.5 indicating recommendation for 24 hour supervision based on her mobility needs and memory. Powered mobility was trialed but given her reduced recall of directions and poor carry over,, it was deemed that pt would not be safe to independently operate a powered wheelchair. She did meet other goals with improvement made in in handwriting and in her lesiure goal of pisture taking but due to cognitive functioning she does still need cues with this. Cueing is needed to remain on task and physical assistance for safety with transfers due to her fall reisk. DC OT at this time. She continues to attend the [facility] 2 days per week and receives maintenance exercises of Nu-step exer here, along with other day programming for social and cognitive stimulation.   Plan for next session DC OT today-goals met as able   Total Session Time   Timed Code Treatment " Minutes 35   Total Treatment Time (sum of timed and untimed services) 35

## 2018-12-13 ENCOUNTER — HOSPITAL ENCOUNTER (OUTPATIENT)
Dept: REHABILITATION | Facility: CLINIC | Age: 58
End: 2018-12-13
Attending: FAMILY MEDICINE
Payer: MEDICAID

## 2018-12-13 PROCEDURE — S5102 ADULT DAY CARE PER DIEM: HCPCS | Performed by: OCCUPATIONAL THERAPIST

## 2018-12-17 ENCOUNTER — HOSPITAL ENCOUNTER (OUTPATIENT)
Dept: REHABILITATION | Facility: CLINIC | Age: 58
End: 2018-12-17
Attending: FAMILY MEDICINE
Payer: MEDICAID

## 2018-12-17 PROCEDURE — S5102 ADULT DAY CARE PER DIEM: HCPCS | Performed by: OCCUPATIONAL THERAPIST

## 2018-12-17 NOTE — PROGRESS NOTES
MONTHLY PROGRESS NOTE AND PARTICIPATION REPORT   [facility]    Karolyn Das, 1960  Attendance: Please see Epic for attendance record.    Communication:   Does communicate   Hearing:   No impairment  Vision:   Glasses  Orientation/Cognition:   Partial disorientation  Behavior:   No behavioral concerns  Self-Preservation Skills:   Not capable of self-preservation  Eating:   Assist with set up  Ambulation Walking:   Non-ambulatory  Transferring:   Aide of one person/two  Wheelchair:   Needs help  Toileting:   Needs assistance  Maintenance Program:     [facility]  Living Arrangements:   Lives in assisted living facility  Spiritual Needs: Needs are being met through support group  Medication Assistance:   Medication not taken during program hours  Participation Report:   Aerobics/Exercise  Support Group  Cognitive Stimulation  Fire Drill  Creative Arts/Crafts  Games  Speakers/Entertainment  Socialization  Current Events/News  Education/Health Topic  Meditation, communion, and prayer with Amy; Healthy eating with Lucero; Themed education on the arctic, DIY ornaments, global holidays; Educational entertainment from the [employer], [employer] band, and holiday guitar party; Holiday [facility].  Level of Participation:   To the best of their ability

## 2018-12-18 ENCOUNTER — HOSPITAL ENCOUNTER (OUTPATIENT)
Dept: REHABILITATION | Facility: CLINIC | Age: 58
End: 2018-12-18
Attending: FAMILY MEDICINE
Payer: MEDICAID

## 2018-12-18 PROCEDURE — S5102 ADULT DAY CARE PER DIEM: HCPCS | Performed by: OCCUPATIONAL THERAPIST

## 2018-12-20 ENCOUNTER — HOSPITAL ENCOUNTER (OUTPATIENT)
Dept: REHABILITATION | Facility: CLINIC | Age: 58
End: 2018-12-20
Attending: FAMILY MEDICINE
Payer: MEDICAID

## 2018-12-20 PROCEDURE — S5102 ADULT DAY CARE PER DIEM: HCPCS | Performed by: OCCUPATIONAL THERAPIST

## 2018-12-27 ENCOUNTER — HOSPITAL ENCOUNTER (OUTPATIENT)
Dept: REHABILITATION | Facility: CLINIC | Age: 58
End: 2018-12-27
Attending: FAMILY MEDICINE
Payer: MEDICAID

## 2018-12-27 PROCEDURE — S5102 ADULT DAY CARE PER DIEM: HCPCS | Performed by: OCCUPATIONAL THERAPIST

## 2018-12-28 NOTE — PROGRESS NOTES
[facility]      OUTPATIENT OCCUPATIONAL THERAPY  PLAN OF TREATMENT FOR OUTPATIENT REHABILITATION    Patient's Last Name, First Name, M.I.                YOB: 1960  Karolyn Das                        Provider's Name  [facility] Medical Record No.  [id number]                               Onset Date: 7/31/18   Start of Care Date: 8/21/18   Type:     ___PT   _X_OT   ___SLP Medical Diagnosis: MS                       OT Diagnosis: impaired participation in IADLs, adls and MRADLS      _________________________________________________________________________________  Plan of Treatment:    Frequency/Duration: up to more 1 more  visit     Goals:    Goal Identifier Wheelchair   Goal Description Patient will participate in W/c assesment and trials of power mobility in order to best determine most appropriate piece of equipment for independence within new living environment.   Target Date 11/19/18   Date Met   11/19/18   Progress: Given cognitive assessment and score of 4.5 with degree of memory impairment affecting new learning with powered mobility it was determined pt not appropriate to pursue powered mobility/wheelchair due to safety risks in operating.     Goal Identifier Camera use   Goal Description Patient will demo independent use of dignital camera features in order to take pictures of friends and family as desired.   Target Date 11/19/18; extend to 12/31/18   Date Met      Progress:progressing, not yet met     Goal Identifier Writing   Goal Description Patient will legibly write name and phone numbers with Ae as needed.   Target Date 11/19/18   Date Met  10/02/18   Progress:     Goal Identifier Dressing   Goal Description Patient will demo independence with eating and dressing with use of ae as needed.   Target Date 11/19/18; extend to 12/31/18   Date Met      Progress:  progressing, not yet met       Progress Toward Goals:   Progress this reporting period:  Pt has made progress and goals are progressing and plan is to see patient for up to one more visit to reassess status and work toward final 2 goals and then discharge.    Certification date from 11/20/18 to 12/31/18.    Chavez Delaney OTR/LAUREN, MSCS 11/19/18         I CERTIFY THE NEED FOR THESE SERVICES FURNISHED UNDER        THIS PLAN OF TREATMENT AND WHILE UNDER MY CARE     (Physician co-signature of this document indicates review and certification of the therapy plan).                Referring Provider: Florencia Sibley

## 2019-01-02 NOTE — ADDENDUM NOTE
Encounter addended by: Loida Paris RN on: 1/2/2019 11:38 AM   Actions taken: Episode edited, Sign clinical note

## 2019-01-02 NOTE — PROGRESS NOTES
AC RN Monthly Progress Note  Previous documentation reviewed. No concerns reported by AC staff or member.   December and January review complete.

## 2019-01-03 ENCOUNTER — HOSPITAL ENCOUNTER (OUTPATIENT)
Dept: REHABILITATION | Facility: CLINIC | Age: 59
End: 2019-01-03
Attending: FAMILY MEDICINE
Payer: MEDICAID

## 2019-01-03 PROCEDURE — S5102 ADULT DAY CARE PER DIEM: HCPCS

## 2019-01-07 ENCOUNTER — HOSPITAL ENCOUNTER (OUTPATIENT)
Dept: REHABILITATION | Facility: CLINIC | Age: 59
End: 2019-01-07
Attending: FAMILY MEDICINE
Payer: MEDICAID

## 2019-01-07 PROCEDURE — S5102 ADULT DAY CARE PER DIEM: HCPCS

## 2019-01-08 ENCOUNTER — HOSPITAL ENCOUNTER (OUTPATIENT)
Dept: REHABILITATION | Facility: CLINIC | Age: 59
End: 2019-01-08
Attending: FAMILY MEDICINE
Payer: MEDICAID

## 2019-01-08 PROCEDURE — S5102 ADULT DAY CARE PER DIEM: HCPCS

## 2019-01-10 ENCOUNTER — HOSPITAL ENCOUNTER (OUTPATIENT)
Dept: REHABILITATION | Facility: CLINIC | Age: 59
End: 2019-01-10
Attending: FAMILY MEDICINE
Payer: MEDICAID

## 2019-01-10 PROCEDURE — S5102 ADULT DAY CARE PER DIEM: HCPCS

## 2019-01-14 ENCOUNTER — HOSPITAL ENCOUNTER (OUTPATIENT)
Dept: REHABILITATION | Facility: CLINIC | Age: 59
End: 2019-01-14
Attending: FAMILY MEDICINE
Payer: MEDICAID

## 2019-01-14 PROCEDURE — S5102 ADULT DAY CARE PER DIEM: HCPCS

## 2019-01-15 ENCOUNTER — HOSPITAL ENCOUNTER (OUTPATIENT)
Dept: REHABILITATION | Facility: CLINIC | Age: 59
End: 2019-01-15
Attending: FAMILY MEDICINE
Payer: MEDICAID

## 2019-01-15 PROCEDURE — S5102 ADULT DAY CARE PER DIEM: HCPCS

## 2019-01-17 ENCOUNTER — HOSPITAL ENCOUNTER (OUTPATIENT)
Dept: REHABILITATION | Facility: CLINIC | Age: 59
End: 2019-01-17
Attending: FAMILY MEDICINE
Payer: MEDICAID

## 2019-01-17 ENCOUNTER — COMMUNICATION - HEALTHEAST (OUTPATIENT)
Dept: FAMILY MEDICINE | Facility: CLINIC | Age: 59
End: 2019-01-17

## 2019-01-17 DIAGNOSIS — K59.00 CONSTIPATION: ICD-10-CM

## 2019-01-17 PROCEDURE — S5102 ADULT DAY CARE PER DIEM: HCPCS

## 2019-01-18 NOTE — PROGRESS NOTES
MONTHLY PROGRESS NOTE AND PARTICIPATION REPORT   [facility]    Karolyn Das, 1960  Attendance: Please see Epic for attendance record.    Communication:   Does communicate   Hearing:   No impairment  Vision:   Glasses  Orientation/Cognition:   Partial disorientation  Behavior:   No behavioral concerns  Self-Preservation Skills:   Not capable of self-preservation  Eating:   Assist with set up  Ambulation Walking:   Non-ambulatory  Transferring:   Aide of one person/two  Wheelchair:   Needs help  Toileting:   Needs assistance  Maintenance Program:     [facility]  Living Arrangements:   Lives in assisted living facility  Spiritual Needs: Needs are being met through support group  Medication Assistance:   Medication not taken during program hours  Participation Report:   Aerobics/Exercise  Support Group  Cognitive Stimulation  Fire Drill  Creative Arts/Crafts  Games  Speakers/Entertainment  Socialization  Current Events/News  Education/Health Topic  Meditation, prayer, communion with Amy; Healthy eating with Lucero; Themed education on indoor fun, 2018 year in review, fencing, spa week, and winter carnival.  Level of Participation:   To the best of their ability

## 2019-01-21 ENCOUNTER — HOSPITAL ENCOUNTER (OUTPATIENT)
Dept: REHABILITATION | Facility: CLINIC | Age: 59
End: 2019-01-21
Attending: FAMILY MEDICINE
Payer: MEDICAID

## 2019-01-21 PROCEDURE — S5102 ADULT DAY CARE PER DIEM: HCPCS

## 2019-01-22 ENCOUNTER — HOSPITAL ENCOUNTER (OUTPATIENT)
Dept: REHABILITATION | Facility: CLINIC | Age: 59
End: 2019-01-22
Attending: FAMILY MEDICINE
Payer: MEDICAID

## 2019-01-22 PROCEDURE — S5102 ADULT DAY CARE PER DIEM: HCPCS

## 2019-01-24 ENCOUNTER — HOSPITAL ENCOUNTER (OUTPATIENT)
Dept: REHABILITATION | Facility: CLINIC | Age: 59
End: 2019-01-24
Attending: FAMILY MEDICINE
Payer: MEDICAID

## 2019-01-24 PROCEDURE — S5102 ADULT DAY CARE PER DIEM: HCPCS

## 2019-02-04 ENCOUNTER — HOSPITAL ENCOUNTER (OUTPATIENT)
Dept: REHABILITATION | Facility: CLINIC | Age: 59
End: 2019-02-04
Attending: FAMILY MEDICINE
Payer: MEDICAID

## 2019-02-04 PROCEDURE — S5102 ADULT DAY CARE PER DIEM: HCPCS

## 2019-02-05 ENCOUNTER — HOSPITAL ENCOUNTER (OUTPATIENT)
Dept: REHABILITATION | Facility: CLINIC | Age: 59
End: 2019-02-05
Attending: FAMILY MEDICINE
Payer: MEDICAID

## 2019-02-05 PROCEDURE — S5102 ADULT DAY CARE PER DIEM: HCPCS

## 2019-02-07 ENCOUNTER — HOSPITAL ENCOUNTER (OUTPATIENT)
Dept: REHABILITATION | Facility: CLINIC | Age: 59
End: 2019-02-07
Attending: FAMILY MEDICINE
Payer: MEDICAID

## 2019-02-07 PROCEDURE — S5102 ADULT DAY CARE PER DIEM: HCPCS

## 2019-02-11 ENCOUNTER — HOSPITAL ENCOUNTER (OUTPATIENT)
Dept: REHABILITATION | Facility: CLINIC | Age: 59
End: 2019-02-11
Attending: FAMILY MEDICINE
Payer: MEDICAID

## 2019-02-11 PROCEDURE — S5102 ADULT DAY CARE PER DIEM: HCPCS

## 2019-02-12 ENCOUNTER — HOSPITAL ENCOUNTER (OUTPATIENT)
Dept: REHABILITATION | Facility: CLINIC | Age: 59
End: 2019-02-12
Attending: FAMILY MEDICINE
Payer: MEDICAID

## 2019-02-12 ENCOUNTER — COMMUNICATION - HEALTHEAST (OUTPATIENT)
Dept: FAMILY MEDICINE | Facility: CLINIC | Age: 59
End: 2019-02-12

## 2019-02-12 DIAGNOSIS — E03.9 HYPOTHYROIDISM: ICD-10-CM

## 2019-02-12 PROCEDURE — S5102 ADULT DAY CARE PER DIEM: HCPCS

## 2019-02-14 ENCOUNTER — HOSPITAL ENCOUNTER (OUTPATIENT)
Dept: REHABILITATION | Facility: CLINIC | Age: 59
End: 2019-02-14
Attending: FAMILY MEDICINE
Payer: MEDICAID

## 2019-02-14 PROCEDURE — S5102 ADULT DAY CARE PER DIEM: HCPCS

## 2019-02-14 NOTE — ADDENDUM NOTE
Encounter addended by: Loida Paris RN on: 2/14/2019 12:05 PM   Actions taken: Episode edited, Sign clinical note

## 2019-02-18 ENCOUNTER — HOSPITAL ENCOUNTER (OUTPATIENT)
Dept: REHABILITATION | Facility: CLINIC | Age: 59
End: 2019-02-18
Attending: FAMILY MEDICINE
Payer: MEDICAID

## 2019-02-18 PROCEDURE — S5102 ADULT DAY CARE PER DIEM: HCPCS

## 2019-02-25 ENCOUNTER — HOSPITAL ENCOUNTER (OUTPATIENT)
Dept: REHABILITATION | Facility: CLINIC | Age: 59
End: 2019-02-25
Attending: FAMILY MEDICINE
Payer: MEDICAID

## 2019-02-25 PROCEDURE — S5102 ADULT DAY CARE PER DIEM: HCPCS

## 2019-02-25 NOTE — PROGRESS NOTES
MONTHLY PROGRESS NOTE AND PARTICIPATION REPORT   [facility]    Karolyn Das, 1960  Attendance: Please see Epic for attendance record.    Communication:   Does communicate   Hearing:   No impairment  Vision:   Glasses  Orientation/Cognition:   Partial disorientation  Behavior:   No behavioral concerns  Self-Preservation Skills:   Not capable of self-preservation  Eating:   Assist with set up  Ambulation Walking:   Non-ambulatory  Transferring:   Aide of one person/two  Wheelchair:   Needs help  Toileting:   Needs assistance  Maintenance Program:     [facility]  Living Arrangements:   Lives in assisted living facility  Spiritual Needs: Needs are being met through support group  Medication Assistance:   Medication not taken during program hours  Participation Report:   Aerobics/Exercise  Support Group  Cognitive Stimulation  Fire Drill  Creative Arts/Crafts  Games  Speakers/Entertainment  Socialization  Current Events/News  Education/Health Topic  Meditation and Prayer with Amy; Healthy Eating with Lucero; Class week activity; [employer] book club and laughter yoga; Themed education on black history, Drybar arts, the Academy Awards. and virtual spring break.  Level of Participation:   Active  1. Long Term Goal (concrete, measurable, and time specific outcomes):  Member will maintain physical and cognitive functioning through programming and activities during [facility] attendance, with set up and supervision provided, while maintaining dignity and respect.Short Term Goal: 1.) Member will actively participate in prescribed Nu Step maintenance program, with staff set up and supervision provided for safety, 3x/week while attending [facility]. 2.)Member will actively participate in one special cognitive stimulation activity, with staff set up provided, 1x/week while attending [facility] program.

## 2019-02-27 NOTE — PROGRESS NOTES
INDIVIDUAL PLAN OF CARE   [facility]    Member Name: Karolyn Das; YOB: 1960  MRN: [id number]  02/27/2018    Goals developed in collaboration with: member  Staff responsible for plan: Mahin Talbert  1. Long Term Goal (concrete, measurable, and time specific outcomes):  Member will maintain physical and cognitive functioning through programming and activities during [facility] attendance, with set up and supervision provided, while maintaining dignity and respect.  Target Date: 8/31/19  Bi-Annual Review:     2. Short Term Goal: (concrete, measurable, and time specific outcomes):  To maintain current physical strength and endurance, member will actively participate in prescribed Nu Step maintenance program, with staff set up and supervision provided for safety, 3x/week while attending [facility].  Target Date: 5/31/19  Bi-Annual Review:    3. Short Term Goal: (concrete, measurable, and time specific outcomes):  To maintain current cognitive function, member will actively participate in one special cognitive stimulation activity such as lunch bin sorting or Ipad games, with staff set up provided, 1x/week while attending [facility] program.  Target Date: 5/31/19  Bi-Annual Review:

## 2019-03-04 ENCOUNTER — HOSPITAL ENCOUNTER (OUTPATIENT)
Dept: REHABILITATION | Facility: CLINIC | Age: 59
End: 2019-03-04
Attending: FAMILY MEDICINE
Payer: MEDICAID

## 2019-03-04 PROCEDURE — S5102 ADULT DAY CARE PER DIEM: HCPCS
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
## 2019-03-06 ENCOUNTER — RECORDS - HEALTHEAST (OUTPATIENT)
Dept: LAB | Facility: CLINIC | Age: 59
End: 2019-03-06

## 2019-03-06 LAB
ALBUMIN SERPL-MCNC: 3.8 G/DL (ref 3.5–5)
ALP SERPL-CCNC: 63 U/L (ref 45–120)
ALT SERPL W P-5'-P-CCNC: 15 U/L (ref 0–45)
ANION GAP SERPL CALCULATED.3IONS-SCNC: 12 MMOL/L (ref 5–18)
AST SERPL W P-5'-P-CCNC: 19 U/L (ref 0–40)
BASOPHILS # BLD AUTO: 0 THOU/UL (ref 0–0.2)
BASOPHILS NFR BLD AUTO: 1 % (ref 0–2)
BILIRUB DIRECT SERPL-MCNC: 0.1 MG/DL
BILIRUB SERPL-MCNC: 0.4 MG/DL (ref 0–1)
BUN SERPL-MCNC: 14 MG/DL (ref 8–22)
CALCIUM SERPL-MCNC: 9.2 MG/DL (ref 8.5–10.5)
CHLORIDE BLD-SCNC: 107 MMOL/L (ref 98–107)
CO2 SERPL-SCNC: 25 MMOL/L (ref 22–31)
CREAT SERPL-MCNC: 0.78 MG/DL (ref 0.6–1.1)
EOSINOPHIL # BLD AUTO: 0.1 THOU/UL (ref 0–0.4)
EOSINOPHIL NFR BLD AUTO: 2 % (ref 0–6)
ERYTHROCYTE [DISTWIDTH] IN BLOOD BY AUTOMATED COUNT: 13.1 % (ref 11–14.5)
GFR SERPL CREATININE-BSD FRML MDRD: >60 ML/MIN/1.73M2
GLUCOSE BLD-MCNC: 78 MG/DL (ref 70–125)
HCT VFR BLD AUTO: 42.9 % (ref 35–47)
HGB BLD-MCNC: 13.6 G/DL (ref 12–16)
LYMPHOCYTES # BLD AUTO: 1.4 THOU/UL (ref 0.8–4.4)
LYMPHOCYTES NFR BLD AUTO: 24 % (ref 20–40)
MCH RBC QN AUTO: 29.7 PG (ref 27–34)
MCHC RBC AUTO-ENTMCNC: 31.7 G/DL (ref 32–36)
MCV RBC AUTO: 94 FL (ref 80–100)
MONOCYTES # BLD AUTO: 0.5 THOU/UL (ref 0–0.9)
MONOCYTES NFR BLD AUTO: 9 % (ref 2–10)
NEUTROPHILS # BLD AUTO: 3.8 THOU/UL (ref 2–7.7)
NEUTROPHILS NFR BLD AUTO: 65 % (ref 50–70)
PLATELET # BLD AUTO: 182 THOU/UL (ref 140–440)
PMV BLD AUTO: 12.1 FL (ref 8.5–12.5)
POTASSIUM BLD-SCNC: 5 MMOL/L (ref 3.5–5)
PROT SERPL-MCNC: 6.6 G/DL (ref 6–8)
RBC # BLD AUTO: 4.58 MILL/UL (ref 3.8–5.4)
SODIUM SERPL-SCNC: 144 MMOL/L (ref 136–145)
VALPROATE SERPL-MCNC: 31.3 UG/ML (ref 50–150)
WBC: 6 THOU/UL (ref 4–11)

## 2019-03-06 NOTE — PROGRESS NOTES
"SELF-PRESERVATION FORM  [facility]    Member Name: Karolyn Das; YOB: 1960  MRN: [id number]  3/6/2019    A. The person is ambulatory or mobile. No  B. The person has the combined physical and mental capacity to:  1. Recognize a danger, signal or alarm requiring evacuation from the center: No  2. Initiate and complete the evacuation without requiring more than sporadic assistance from another person, such as help in opening a door or getting into a wheelchair: No  3. Select an alternative means of escape or take another appropriate action if the primary escape route is blocked: No  4. Remain at a designated location outside the center until further instruction is given: No    \"Capable of taking appropriate action for self-preservation under emergency conditions\" is the designation applied in parts 7940.2097 to 7774.6272 to an adult who meets the criteria in items A and B.    FAC Self-Preservation Status: Not capable of self-preservation    "
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
## 2019-03-07 ENCOUNTER — HOSPITAL ENCOUNTER (OUTPATIENT)
Dept: REHABILITATION | Facility: CLINIC | Age: 59
End: 2019-03-07
Attending: FAMILY MEDICINE
Payer: MEDICAID

## 2019-03-07 PROCEDURE — S5102 ADULT DAY CARE PER DIEM: HCPCS

## 2019-03-11 ENCOUNTER — HOSPITAL ENCOUNTER (OUTPATIENT)
Dept: REHABILITATION | Facility: CLINIC | Age: 59
End: 2019-03-11
Attending: FAMILY MEDICINE
Payer: MEDICAID

## 2019-03-11 PROCEDURE — S5102 ADULT DAY CARE PER DIEM: HCPCS

## 2019-03-12 ENCOUNTER — HOSPITAL ENCOUNTER (OUTPATIENT)
Dept: REHABILITATION | Facility: CLINIC | Age: 59
End: 2019-03-12
Attending: FAMILY MEDICINE
Payer: MEDICAID

## 2019-03-12 PROCEDURE — S5102 ADULT DAY CARE PER DIEM: HCPCS

## 2019-03-13 ENCOUNTER — COMMUNICATION - HEALTHEAST (OUTPATIENT)
Dept: FAMILY MEDICINE | Facility: CLINIC | Age: 59
End: 2019-03-13

## 2019-03-14 ENCOUNTER — HOSPITAL ENCOUNTER (OUTPATIENT)
Dept: REHABILITATION | Facility: CLINIC | Age: 59
End: 2019-03-14
Attending: FAMILY MEDICINE
Payer: MEDICAID

## 2019-03-14 PROCEDURE — S5102 ADULT DAY CARE PER DIEM: HCPCS

## 2019-03-18 ENCOUNTER — HOSPITAL ENCOUNTER (OUTPATIENT)
Dept: REHABILITATION | Facility: CLINIC | Age: 59
End: 2019-03-18
Attending: FAMILY MEDICINE
Payer: MEDICAID

## 2019-03-18 PROCEDURE — S5102 ADULT DAY CARE PER DIEM: HCPCS

## 2019-03-19 ENCOUNTER — HOSPITAL ENCOUNTER (OUTPATIENT)
Dept: REHABILITATION | Facility: CLINIC | Age: 59
End: 2019-03-19
Attending: FAMILY MEDICINE
Payer: MEDICAID

## 2019-03-19 PROCEDURE — S5102 ADULT DAY CARE PER DIEM: HCPCS

## 2019-03-19 NOTE — PROGRESS NOTES
MONTHLY PROGRESS NOTE AND PARTICIPATION REPORT   [facility]    Karolyn Das, 1960  Attendance: Please see Epic for attendance record.    Communication:   Does communicate   Hearing:   No impairment  Vision:   Glasses  Orientation/Cognition:   Partial disorientation  Behavior:   No behavioral concerns  Self-Preservation Skills:   Not capable of self-preservation  Eating:   Assist with set up  Ambulation Walking:   Non-ambulatory  Transferring:   Aide of one person/two  Wheelchair:   Needs help  Toileting:   Needs assistance  Maintenance Program:     [facility]  Living Arrangements:   Lives in assisted living facility  Spiritual Needs: Needs are being met through support group  Medication Assistance:   Medication not taken during program hours  Participation Report:   Aerobics/Exercise  Support Group  Cognitive Stimulation  Fire Drill  Creative Arts/Crafts  Games  Speakers/Entertainment  Socialization  Current Events/News  Education/Health Topic  Meditation with Amy, Prayer with Amy, Communion with Amy, Healthy Eating with Lucero; Themed education on Mikey Gras, [employer] [place]'s Day, Welcome Spring, and MS Awareness.   Level of Participation:   Active  1. Long Term Goal:  Member will maintain physical and cognitive functioning through programming and activities during [facility] attendance, with set up and supervision provided, while maintaining dignity and respect. 2. Short Term Goals: 1.) To maintain current physical strength and endurance, member will actively participate in prescribed Nu Step maintenance program, with staff set up and supervision provided for safety, 3x/week while attending [facility]. 2.)To maintain current cognitive function, member will actively participate in one special cognitive stimulation activity such as lunch bin sorting or Ipad games, with staff set up provided, 1x/week while attending [facility] program.

## 2019-03-21 ENCOUNTER — HOSPITAL ENCOUNTER (OUTPATIENT)
Dept: REHABILITATION | Facility: CLINIC | Age: 59
End: 2019-03-21
Attending: FAMILY MEDICINE
Payer: MEDICAID

## 2019-03-21 PROCEDURE — S5102 ADULT DAY CARE PER DIEM: HCPCS

## 2019-03-26 ENCOUNTER — HOSPITAL ENCOUNTER (OUTPATIENT)
Dept: REHABILITATION | Facility: CLINIC | Age: 59
End: 2019-03-26
Attending: FAMILY MEDICINE
Payer: MEDICAID

## 2019-03-26 PROCEDURE — S5102 ADULT DAY CARE PER DIEM: HCPCS

## 2019-03-28 ENCOUNTER — HOSPITAL ENCOUNTER (OUTPATIENT)
Dept: REHABILITATION | Facility: CLINIC | Age: 59
End: 2019-03-28
Attending: FAMILY MEDICINE
Payer: MEDICAID

## 2019-03-28 PROCEDURE — S5102 ADULT DAY CARE PER DIEM: HCPCS

## 2019-04-01 ENCOUNTER — HOSPITAL ENCOUNTER (OUTPATIENT)
Dept: REHABILITATION | Facility: CLINIC | Age: 59
End: 2019-04-01
Attending: FAMILY MEDICINE
Payer: MEDICAID

## 2019-04-01 PROCEDURE — S5102 ADULT DAY CARE PER DIEM: HCPCS

## 2019-04-02 ENCOUNTER — HOSPITAL ENCOUNTER (OUTPATIENT)
Dept: REHABILITATION | Facility: CLINIC | Age: 59
End: 2019-04-02
Attending: FAMILY MEDICINE
Payer: MEDICAID

## 2019-04-02 PROCEDURE — S5102 ADULT DAY CARE PER DIEM: HCPCS

## 2019-04-03 ENCOUNTER — HOSPITAL ENCOUNTER (OUTPATIENT)
Dept: REHABILITATION | Facility: CLINIC | Age: 59
End: 2019-04-03
Attending: FAMILY MEDICINE
Payer: MEDICAID

## 2019-04-03 PROCEDURE — S5102 ADULT DAY CARE PER DIEM: HCPCS

## 2019-04-04 ENCOUNTER — HOSPITAL ENCOUNTER (OUTPATIENT)
Dept: REHABILITATION | Facility: CLINIC | Age: 59
End: 2019-04-04
Attending: FAMILY MEDICINE
Payer: MEDICAID

## 2019-04-04 PROCEDURE — S5102 ADULT DAY CARE PER DIEM: HCPCS

## 2019-04-08 ENCOUNTER — HOSPITAL ENCOUNTER (OUTPATIENT)
Dept: REHABILITATION | Facility: CLINIC | Age: 59
End: 2019-04-08
Attending: FAMILY MEDICINE
Payer: MEDICAID

## 2019-04-08 PROCEDURE — S5102 ADULT DAY CARE PER DIEM: HCPCS

## 2019-04-09 ENCOUNTER — HOSPITAL ENCOUNTER (OUTPATIENT)
Dept: REHABILITATION | Facility: CLINIC | Age: 59
End: 2019-04-09
Attending: FAMILY MEDICINE
Payer: MEDICAID

## 2019-04-09 PROCEDURE — S5102 ADULT DAY CARE PER DIEM: HCPCS

## 2019-04-12 ENCOUNTER — COMMUNICATION - HEALTHEAST (OUTPATIENT)
Dept: FAMILY MEDICINE | Facility: CLINIC | Age: 59
End: 2019-04-12

## 2019-04-12 DIAGNOSIS — M81.0 OSTEOPOROSIS: ICD-10-CM

## 2019-04-15 ENCOUNTER — HOSPITAL ENCOUNTER (OUTPATIENT)
Dept: REHABILITATION | Facility: CLINIC | Age: 59
End: 2019-04-15
Attending: FAMILY MEDICINE
Payer: MEDICAID

## 2019-04-15 PROCEDURE — S5102 ADULT DAY CARE PER DIEM: HCPCS

## 2019-04-16 ENCOUNTER — HOSPITAL ENCOUNTER (OUTPATIENT)
Dept: REHABILITATION | Facility: CLINIC | Age: 59
End: 2019-04-16
Attending: FAMILY MEDICINE
Payer: MEDICAID

## 2019-04-16 PROCEDURE — S5102 ADULT DAY CARE PER DIEM: HCPCS

## 2019-04-18 ENCOUNTER — HOSPITAL ENCOUNTER (OUTPATIENT)
Dept: REHABILITATION | Facility: CLINIC | Age: 59
End: 2019-04-18
Attending: FAMILY MEDICINE
Payer: MEDICAID

## 2019-04-18 PROCEDURE — S5102 ADULT DAY CARE PER DIEM: HCPCS

## 2019-04-22 ENCOUNTER — HOSPITAL ENCOUNTER (OUTPATIENT)
Dept: REHABILITATION | Facility: CLINIC | Age: 59
End: 2019-04-22
Attending: FAMILY MEDICINE
Payer: MEDICAID

## 2019-04-22 PROCEDURE — S5102 ADULT DAY CARE PER DIEM: HCPCS

## 2019-04-23 ENCOUNTER — HOSPITAL ENCOUNTER (OUTPATIENT)
Dept: REHABILITATION | Facility: CLINIC | Age: 59
End: 2019-04-23
Attending: FAMILY MEDICINE
Payer: MEDICAID

## 2019-04-23 PROCEDURE — S5102 ADULT DAY CARE PER DIEM: HCPCS

## 2019-04-23 NOTE — PROGRESS NOTES
MONTHLY PROGRESS NOTE AND PARTICIPATION REPORT   [facility]    Karolyn Das, 1960  Attendance: Please see Epic for attendance record.    Communication:   Does communicate   Hearing:   No impairment  Vision:   Glasses  Orientation/Cognition:   Partial disorientation  Behavior:   No behavioral concerns  Self-Preservation Skills:   Not capable of self-preservation  Eating:   Assist with set up  Ambulation Walking:   Non-ambulatory  Transferring:   Aide of one person/two  Wheelchair:   Needs help  Toileting:   Needs assistance  Maintenance Program:     [facility]  Living Arrangements:   Lives in assisted living facility  Spiritual Needs: Needs are being met through support group  Medication Assistance:   Medication not taken during program hours  Participation Report:   Aerobics/Exercise  Support Group  Cognitive Stimulation  Fire Drill  Creative Arts/Crafts  Games  Gardening  Speakers/Entertainment  Socialization  Current Events/News  Education/Health Topic  Meditation with Amy, Prayer with Amy, Communion with Amy, Healthy Eating with Lucero; Themed education on planes, trains & automobiles, baseball, poetry, recycling, and May day; [employer] book club & special class week.  Level of Participation:   Active  1. Long Term Goal:  Member will maintain physical and cognitive functioning through programming and activities during [facility] attendance, with set up and supervision provided, while maintaining dignity and respect. 2. Short Term Goals: 1.) To maintain current physical strength and endurance, member will actively participate in prescribed Nu Step maintenance program, with staff set up and supervision provided for safety, 3x/week while attending [facility]. 2.)To maintain current cognitive function, member will actively participate in one special cognitive stimulation activity such as lunch bin sorting or Ipad games, with staff set up provided, 1x/week while attending  [facility] program.

## 2019-04-25 ENCOUNTER — HOSPITAL ENCOUNTER (OUTPATIENT)
Dept: REHABILITATION | Facility: CLINIC | Age: 59
End: 2019-04-25
Attending: FAMILY MEDICINE
Payer: MEDICAID

## 2019-04-25 PROCEDURE — S5102 ADULT DAY CARE PER DIEM: HCPCS

## 2019-04-29 ENCOUNTER — HOSPITAL ENCOUNTER (OUTPATIENT)
Dept: REHABILITATION | Facility: CLINIC | Age: 59
End: 2019-04-29
Attending: FAMILY MEDICINE
Payer: MEDICAID

## 2019-04-29 PROCEDURE — S5102 ADULT DAY CARE PER DIEM: HCPCS

## 2019-04-30 ENCOUNTER — HOSPITAL ENCOUNTER (OUTPATIENT)
Dept: REHABILITATION | Facility: CLINIC | Age: 59
End: 2019-04-30
Attending: FAMILY MEDICINE
Payer: MEDICAID

## 2019-04-30 PROCEDURE — S5102 ADULT DAY CARE PER DIEM: HCPCS

## 2019-05-02 ENCOUNTER — HOSPITAL ENCOUNTER (OUTPATIENT)
Dept: REHABILITATION | Facility: CLINIC | Age: 59
End: 2019-05-02
Attending: FAMILY MEDICINE
Payer: MEDICAID

## 2019-05-02 PROCEDURE — S5102 ADULT DAY CARE PER DIEM: HCPCS

## 2019-05-03 ENCOUNTER — OFFICE VISIT - HEALTHEAST (OUTPATIENT)
Dept: FAMILY MEDICINE | Facility: CLINIC | Age: 59
End: 2019-05-03

## 2019-05-03 DIAGNOSIS — L57.0 KERATOSIS: ICD-10-CM

## 2019-05-03 RX ORDER — ACETAMINOPHEN 500 MG
1000 TABLET ORAL EVERY 8 HOURS
Status: SHIPPED | COMMUNITY
Start: 2019-05-03

## 2019-05-06 ENCOUNTER — HOSPITAL ENCOUNTER (OUTPATIENT)
Dept: REHABILITATION | Facility: CLINIC | Age: 59
End: 2019-05-06
Attending: FAMILY MEDICINE
Payer: MEDICAID

## 2019-05-06 PROCEDURE — S5102 ADULT DAY CARE PER DIEM: HCPCS

## 2019-05-07 ENCOUNTER — HOSPITAL ENCOUNTER (OUTPATIENT)
Dept: REHABILITATION | Facility: CLINIC | Age: 59
End: 2019-05-07
Attending: FAMILY MEDICINE
Payer: MEDICAID

## 2019-05-07 PROCEDURE — S5102 ADULT DAY CARE PER DIEM: HCPCS

## 2019-05-09 ENCOUNTER — HOSPITAL ENCOUNTER (OUTPATIENT)
Dept: REHABILITATION | Facility: CLINIC | Age: 59
End: 2019-05-09
Attending: FAMILY MEDICINE
Payer: MEDICAID

## 2019-05-09 PROCEDURE — S5102 ADULT DAY CARE PER DIEM: HCPCS

## 2019-05-13 ENCOUNTER — HOSPITAL ENCOUNTER (OUTPATIENT)
Dept: REHABILITATION | Facility: CLINIC | Age: 59
End: 2019-05-13
Attending: FAMILY MEDICINE
Payer: MEDICAID

## 2019-05-13 PROCEDURE — S5100 ADULT DAYCARE SERVICES 15MIN: HCPCS

## 2019-05-13 PROCEDURE — S5102 ADULT DAY CARE PER DIEM: HCPCS

## 2019-05-13 NOTE — PROGRESS NOTES
INDIVIDUAL PLAN OF CARE   [facility]    Member Name: Karolyn Das; YOB: 1960  MRN: [id number]  05/13/2019    Goals developed in collaboration with: member  Staff responsible for plan: Mahin Talbert  1. Long Term Goal (concrete, measurable, and time specific outcomes):  Member will maintain physical and cognitive functioning through structured programming and specialized activities during [facility] attendance, with set up, demonstration, and supervision provided, each day of [facility] attendance.  Target Date: 11/30/19  Quarterly Review:   Goal remains appropriate.  When questioned about wheeled mobility IND, Mirtha reports that she self propels her WC about 50% of opportunities while at her home, and that she would be ok with self propelling her WC while attending [facility] about 50% of opportunities.  She likes to have staff push her WC as a form of energy conservation.  In the future, self propelled wheeled mobility could be a good goal for Mirtha.     2. Short Term Goal: (concrete, measurable, and time specific outcomes):  To maintain current physical strength and endurance, member will actively participate in prescribed Nu Step maintenance program, with staff set up and supervision provided for safety, 3x/week while attending [facility].  Target Date: 8/31/19  Quarterly Review:  Goal remains appropriate.  Mirtha enjoys her time on the nu step and would like to continue.    3. Short Term Goal: (concrete, measurable, and time specific outcomes):  To maintain current cognitive function, member will actively participate in one special cognitive stimulation activity such as lunch bin sorting or Ipad games, with staff set up, demonstration, and VC provided when necessary, 1x/week while attending [facility] program.  Target Date: 8/31/19  Quarterly Review:  Goal remains appropriate.

## 2019-05-14 ENCOUNTER — HOSPITAL ENCOUNTER (OUTPATIENT)
Dept: REHABILITATION | Facility: CLINIC | Age: 59
End: 2019-05-14
Attending: FAMILY MEDICINE
Payer: MEDICAID

## 2019-05-14 PROCEDURE — S5102 ADULT DAY CARE PER DIEM: HCPCS

## 2019-05-16 ENCOUNTER — HOSPITAL ENCOUNTER (OUTPATIENT)
Dept: REHABILITATION | Facility: CLINIC | Age: 59
End: 2019-05-16
Attending: FAMILY MEDICINE
Payer: MEDICAID

## 2019-05-16 PROCEDURE — S5102 ADULT DAY CARE PER DIEM: HCPCS

## 2019-05-20 ENCOUNTER — HOSPITAL ENCOUNTER (OUTPATIENT)
Dept: REHABILITATION | Facility: CLINIC | Age: 59
End: 2019-05-20
Attending: FAMILY MEDICINE
Payer: MEDICAID

## 2019-05-20 PROCEDURE — S5102 ADULT DAY CARE PER DIEM: HCPCS

## 2019-05-21 ENCOUNTER — HOSPITAL ENCOUNTER (OUTPATIENT)
Dept: REHABILITATION | Facility: CLINIC | Age: 59
End: 2019-05-21
Attending: FAMILY MEDICINE
Payer: MEDICAID

## 2019-05-21 PROCEDURE — S5102 ADULT DAY CARE PER DIEM: HCPCS

## 2019-05-21 NOTE — PROGRESS NOTES
MONTHLY PROGRESS NOTE AND PARTICIPATION REPORT   [facility]    Karolyn Das, 1960  Attendance: Please see Epic for attendance record.    Communication:   Does communicate   Hearing:   No impairment  Vision:   Glasses  Orientation/Cognition:   Partial disorientation  Behavior:   No behavioral concerns  Self-Preservation Skills:   Not capable of self-preservation  Eating:   Assist with set up  Ambulation Walking:   Non-ambulatory  Transferring:   Aide of one person/two  Wheelchair:   Needs help  Toileting:   Needs assistance  Maintenance Program:     [facility]  Living Arrangements:   Lives in assisted living facility  Spiritual Needs: Needs are being met through support group  Medication Assistance:   Medication not taken during program hours  Participation Report:   Aerobics/Exercise  Support Group  Cognitive Stimulation  Fire Drill  Creative Arts/Crafts  Games  Gardening  Speakers/Entertainment  Socialization  Current Events/News  Education/Health Topic  Meditation with Amy, Prayer with Amy, Communion with Amy, Healthy Eating with Lucero; Themed education on birds & fish, meteorology, casino week, and D-day history.   Level of Participation:   Active  1. Long Term Goal:  Member will maintain physical and cognitive functioning through structured programming and specialized activities during [facility] attendance, with set up, demonstration, and supervision provided, each day of [facility] attendance. 2. Short Term Goals: 1.) To maintain current physical strength and endurance, member will actively participate in prescribed Nu Step maintenance program, with staff set up and supervision provided for safety, 3x/week while attending [facility]. 2.) To maintain current cognitive function, member will actively participate in one special cognitive stimulation activity such as lunch bin sorting or Ipad games, with staff set up, demonstration, and VC provided when necessary, 1x/week while  attending [facility] program.

## 2019-05-23 ENCOUNTER — HOSPITAL ENCOUNTER (OUTPATIENT)
Dept: REHABILITATION | Facility: CLINIC | Age: 59
End: 2019-05-23
Attending: FAMILY MEDICINE
Payer: MEDICAID

## 2019-05-23 PROCEDURE — S5102 ADULT DAY CARE PER DIEM: HCPCS

## 2019-05-28 ENCOUNTER — HOSPITAL ENCOUNTER (OUTPATIENT)
Dept: REHABILITATION | Facility: CLINIC | Age: 59
End: 2019-05-28
Attending: FAMILY MEDICINE
Payer: MEDICAID

## 2019-05-28 PROCEDURE — S5102 ADULT DAY CARE PER DIEM: HCPCS
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
## 2019-05-30 ENCOUNTER — HOSPITAL ENCOUNTER (OUTPATIENT)
Dept: REHABILITATION | Facility: CLINIC | Age: 59
End: 2019-05-30
Attending: FAMILY MEDICINE
Payer: MEDICAID

## 2019-05-30 PROCEDURE — S5102 ADULT DAY CARE PER DIEM: HCPCS
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
## 2019-05-31 ENCOUNTER — COMMUNICATION - HEALTHEAST (OUTPATIENT)
Dept: FAMILY MEDICINE | Facility: CLINIC | Age: 59
End: 2019-05-31

## 2019-05-31 DIAGNOSIS — Z00.00 HEALTH CARE MAINTENANCE: ICD-10-CM

## 2019-06-03 ENCOUNTER — HOSPITAL ENCOUNTER (OUTPATIENT)
Dept: REHABILITATION | Facility: CLINIC | Age: 59
End: 2019-06-03
Attending: FAMILY MEDICINE
Payer: MEDICAID

## 2019-06-03 ENCOUNTER — COMMUNICATION - HEALTHEAST (OUTPATIENT)
Dept: FAMILY MEDICINE | Facility: CLINIC | Age: 59
End: 2019-06-03

## 2019-06-03 DIAGNOSIS — M81.0 OSTEOPOROSIS: ICD-10-CM

## 2019-06-03 PROCEDURE — S5102 ADULT DAY CARE PER DIEM: HCPCS

## 2019-06-04 ENCOUNTER — HOSPITAL ENCOUNTER (OUTPATIENT)
Dept: REHABILITATION | Facility: CLINIC | Age: 59
End: 2019-06-04
Attending: FAMILY MEDICINE
Payer: MEDICAID

## 2019-06-04 ENCOUNTER — COMMUNICATION - HEALTHEAST (OUTPATIENT)
Dept: FAMILY MEDICINE | Facility: CLINIC | Age: 59
End: 2019-06-04

## 2019-06-04 DIAGNOSIS — Z00.00 HEALTH CARE MAINTENANCE: ICD-10-CM

## 2019-06-04 PROCEDURE — S5102 ADULT DAY CARE PER DIEM: HCPCS

## 2019-06-04 RX ORDER — ASCORBIC ACID 500 MG
500 TABLET ORAL DAILY
Qty: 100 TABLET | Refills: 10 | Status: SHIPPED | OUTPATIENT
Start: 2019-06-04 | End: 2023-07-06

## 2019-06-04 RX ORDER — ALENDRONATE SODIUM 70 MG/1
TABLET ORAL
Qty: 12 TABLET | Refills: 2 | Status: SHIPPED | OUTPATIENT
Start: 2019-06-04 | End: 2023-07-06

## 2019-06-06 ENCOUNTER — HOSPITAL ENCOUNTER (OUTPATIENT)
Dept: REHABILITATION | Facility: CLINIC | Age: 59
End: 2019-06-06
Attending: FAMILY MEDICINE
Payer: MEDICAID

## 2019-06-06 PROCEDURE — S5102 ADULT DAY CARE PER DIEM: HCPCS

## 2019-06-06 NOTE — PROGRESS NOTES
Patient's healthcare agent emailed me; asking to connect with me by phone.  We spoke at approximately 10:30am today regarding  Patient's living situation in preparation for CADI  meeting with patient Friday, 6/7.      We discussed falls prevention and appropriate supervision within her care center.  We also triaged options if patient's cognition declines further.      I invited her to contact me going forward if she wishes to discuss other matters related to the patient in the future.

## 2019-06-10 ENCOUNTER — HOSPITAL ENCOUNTER (OUTPATIENT)
Dept: REHABILITATION | Facility: CLINIC | Age: 59
End: 2019-06-10
Attending: FAMILY MEDICINE
Payer: MEDICAID

## 2019-06-10 PROCEDURE — S5102 ADULT DAY CARE PER DIEM: HCPCS

## 2019-06-11 ENCOUNTER — HOSPITAL ENCOUNTER (OUTPATIENT)
Dept: REHABILITATION | Facility: CLINIC | Age: 59
End: 2019-06-11
Attending: FAMILY MEDICINE
Payer: MEDICAID

## 2019-06-11 PROCEDURE — S5102 ADULT DAY CARE PER DIEM: HCPCS

## 2019-06-13 ENCOUNTER — HOSPITAL ENCOUNTER (OUTPATIENT)
Dept: REHABILITATION | Facility: CLINIC | Age: 59
End: 2019-06-13
Attending: FAMILY MEDICINE
Payer: MEDICAID

## 2019-06-13 PROCEDURE — S5102 ADULT DAY CARE PER DIEM: HCPCS

## 2019-06-17 ENCOUNTER — HOSPITAL ENCOUNTER (OUTPATIENT)
Dept: REHABILITATION | Facility: CLINIC | Age: 59
End: 2019-06-17
Attending: FAMILY MEDICINE
Payer: MEDICAID

## 2019-06-17 PROCEDURE — S5102 ADULT DAY CARE PER DIEM: HCPCS

## 2019-06-18 ENCOUNTER — HOSPITAL ENCOUNTER (OUTPATIENT)
Dept: REHABILITATION | Facility: CLINIC | Age: 59
End: 2019-06-18
Attending: FAMILY MEDICINE
Payer: MEDICAID

## 2019-06-18 ENCOUNTER — COMMUNICATION - HEALTHEAST (OUTPATIENT)
Dept: FAMILY MEDICINE | Facility: CLINIC | Age: 59
End: 2019-06-18

## 2019-06-18 DIAGNOSIS — G40.909 EPILEPSY (H): ICD-10-CM

## 2019-06-18 DIAGNOSIS — G40.909 RECURRENT SEIZURES (H): ICD-10-CM

## 2019-06-18 PROCEDURE — S5102 ADULT DAY CARE PER DIEM: HCPCS

## 2019-06-19 RX ORDER — DIVALPROEX SODIUM 125 MG/1
CAPSULE, COATED PELLETS ORAL
Qty: 60 CAPSULE | Refills: 11 | Status: SHIPPED | OUTPATIENT
Start: 2019-06-19

## 2019-06-20 ENCOUNTER — HOSPITAL ENCOUNTER (OUTPATIENT)
Dept: REHABILITATION | Facility: CLINIC | Age: 59
End: 2019-06-20
Attending: FAMILY MEDICINE
Payer: MEDICAID

## 2019-06-20 PROCEDURE — S5102 ADULT DAY CARE PER DIEM: HCPCS

## 2019-06-21 NOTE — PROGRESS NOTES
MONTHLY PROGRESS NOTE AND PARTICIPATION REPORT   [facility]    Karolyn Das, 1960  Attendance: Please see Epic for attendance record.    Communication:   Does communicate   Hearing:   No impairment  Vision:   Glasses  Orientation/Cognition:   Partial disorientation  Behavior:   No behavioral concerns  Self-Preservation Skills:   Not capable of self-preservation  Eating:   Assist with set up  Ambulation Walking:   Non-ambulatory  Transferring:   Aide of one person/two  Wheelchair:   Needs help  Toileting:   Needs assistance  Maintenance Program:     NuStep  Living Arrangements:   Lives in assisted living facility  Spiritual Needs: Needs are being met through support group  Medication Assistance:   Medication not taken during program hours  Participation Report:   Aerobics/Exercise  Support Group  Cognitive Stimulation  Fire Drill  Creative Arts/Crafts  Games  Gardening  Speakers/Entertainment  Socialization  Current Events/News  Education/Health Topic  Themed education on Clarity[employer], classic cars, [place] taylor, Bike for MS; Meditation, prayer, communion, and concepción chi; special class week: book club, craft project, and game; Garden party and Bike the  MS party; [employer]ways Gratitude in the Moment course  Level of Participation:   Active  1. Long Term Goal:  Member will maintain physical and cognitive functioning through structured programming and specialized activities during [facility] attendance, with set up, demonstration, and supervision provided, each day of [facility] attendance. 2. Short Term Goals: 1.) To maintain current physical strength and endurance, member will actively participate in prescribed Nu Step maintenance program, with staff set up and supervision provided for safety, 3x/week while attending [facility]. 2.) To maintain current cognitive function, member will actively participate in one special cognitive stimulation activity such as lunch bin sorting or Ipad games,  with staff set up, demonstration, and VC provided when necessary, 1x/week while attending [facility] program.

## 2019-06-24 ENCOUNTER — HOSPITAL ENCOUNTER (OUTPATIENT)
Dept: REHABILITATION | Facility: CLINIC | Age: 59
End: 2019-06-24
Attending: FAMILY MEDICINE
Payer: MEDICAID

## 2019-06-24 PROCEDURE — S5102 ADULT DAY CARE PER DIEM: HCPCS

## 2019-06-25 ENCOUNTER — HOSPITAL ENCOUNTER (OUTPATIENT)
Dept: REHABILITATION | Facility: CLINIC | Age: 59
End: 2019-06-25
Attending: FAMILY MEDICINE
Payer: MEDICAID

## 2019-06-25 PROCEDURE — S5102 ADULT DAY CARE PER DIEM: HCPCS

## 2019-06-27 ENCOUNTER — HOSPITAL ENCOUNTER (OUTPATIENT)
Dept: REHABILITATION | Facility: CLINIC | Age: 59
End: 2019-06-27
Attending: FAMILY MEDICINE
Payer: MEDICAID

## 2019-06-27 PROCEDURE — S5102 ADULT DAY CARE PER DIEM: HCPCS

## 2019-07-01 ENCOUNTER — HOSPITAL ENCOUNTER (OUTPATIENT)
Dept: REHABILITATION | Facility: CLINIC | Age: 59
End: 2019-07-01
Attending: FAMILY MEDICINE
Payer: MEDICAID

## 2019-07-01 PROCEDURE — S5102 ADULT DAY CARE PER DIEM: HCPCS

## 2019-07-02 ENCOUNTER — HOSPITAL ENCOUNTER (OUTPATIENT)
Dept: REHABILITATION | Facility: CLINIC | Age: 59
End: 2019-07-02
Attending: FAMILY MEDICINE
Payer: MEDICAID

## 2019-07-02 PROCEDURE — S5102 ADULT DAY CARE PER DIEM: HCPCS

## 2019-07-08 ENCOUNTER — HOSPITAL ENCOUNTER (OUTPATIENT)
Dept: REHABILITATION | Facility: CLINIC | Age: 59
End: 2019-07-08
Attending: FAMILY MEDICINE
Payer: MEDICAID

## 2019-07-08 PROCEDURE — S5102 ADULT DAY CARE PER DIEM: HCPCS

## 2019-07-09 ENCOUNTER — HOSPITAL ENCOUNTER (OUTPATIENT)
Dept: REHABILITATION | Facility: CLINIC | Age: 59
End: 2019-07-09
Attending: FAMILY MEDICINE
Payer: MEDICAID

## 2019-07-09 PROCEDURE — S5102 ADULT DAY CARE PER DIEM: HCPCS

## 2019-07-11 ENCOUNTER — HOSPITAL ENCOUNTER (OUTPATIENT)
Dept: REHABILITATION | Facility: CLINIC | Age: 59
End: 2019-07-11
Attending: FAMILY MEDICINE
Payer: MEDICAID

## 2019-07-11 PROCEDURE — S5102 ADULT DAY CARE PER DIEM: HCPCS

## 2019-07-15 ENCOUNTER — HOSPITAL ENCOUNTER (OUTPATIENT)
Dept: REHABILITATION | Facility: CLINIC | Age: 59
End: 2019-07-15
Attending: FAMILY MEDICINE
Payer: MEDICAID

## 2019-07-15 PROCEDURE — S5102 ADULT DAY CARE PER DIEM: HCPCS

## 2019-07-16 ENCOUNTER — HOSPITAL ENCOUNTER (OUTPATIENT)
Dept: REHABILITATION | Facility: CLINIC | Age: 59
End: 2019-07-16
Attending: FAMILY MEDICINE
Payer: MEDICAID

## 2019-07-16 ENCOUNTER — COMMUNICATION - HEALTHEAST (OUTPATIENT)
Dept: FAMILY MEDICINE | Facility: CLINIC | Age: 59
End: 2019-07-16

## 2019-07-16 DIAGNOSIS — E03.9 HYPOTHYROIDISM: ICD-10-CM

## 2019-07-16 PROCEDURE — S5102 ADULT DAY CARE PER DIEM: HCPCS

## 2019-07-18 ENCOUNTER — HOSPITAL ENCOUNTER (OUTPATIENT)
Dept: REHABILITATION | Facility: CLINIC | Age: 59
End: 2019-07-18
Attending: FAMILY MEDICINE
Payer: MEDICAID

## 2019-07-18 PROCEDURE — S5102 ADULT DAY CARE PER DIEM: HCPCS
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
## 2019-07-22 ENCOUNTER — HOSPITAL ENCOUNTER (OUTPATIENT)
Dept: REHABILITATION | Facility: CLINIC | Age: 59
End: 2019-07-22
Attending: FAMILY MEDICINE
Payer: MEDICAID

## 2019-07-22 PROCEDURE — S5102 ADULT DAY CARE PER DIEM: HCPCS

## 2019-07-23 ENCOUNTER — HOSPITAL ENCOUNTER (OUTPATIENT)
Dept: REHABILITATION | Facility: CLINIC | Age: 59
End: 2019-07-23
Attending: FAMILY MEDICINE
Payer: MEDICAID

## 2019-07-23 PROCEDURE — S5102 ADULT DAY CARE PER DIEM: HCPCS

## 2019-07-25 ENCOUNTER — HOSPITAL ENCOUNTER (OUTPATIENT)
Dept: REHABILITATION | Facility: CLINIC | Age: 59
End: 2019-07-25
Attending: FAMILY MEDICINE
Payer: MEDICAID

## 2019-07-25 PROCEDURE — S5102 ADULT DAY CARE PER DIEM: HCPCS

## 2019-07-29 ENCOUNTER — HOSPITAL ENCOUNTER (OUTPATIENT)
Dept: REHABILITATION | Facility: CLINIC | Age: 59
End: 2019-07-29
Attending: FAMILY MEDICINE
Payer: MEDICAID

## 2019-07-29 PROCEDURE — S5102 ADULT DAY CARE PER DIEM: HCPCS

## 2019-07-30 ENCOUNTER — HOSPITAL ENCOUNTER (OUTPATIENT)
Dept: REHABILITATION | Facility: CLINIC | Age: 59
End: 2019-07-30
Attending: FAMILY MEDICINE
Payer: MEDICAID

## 2019-07-30 PROCEDURE — S5102 ADULT DAY CARE PER DIEM: HCPCS

## 2019-07-31 ENCOUNTER — HOSPITAL ENCOUNTER (OUTPATIENT)
Dept: REHABILITATION | Facility: CLINIC | Age: 59
End: 2019-07-31
Attending: FAMILY MEDICINE
Payer: MEDICAID

## 2019-07-31 PROCEDURE — S5102 ADULT DAY CARE PER DIEM: HCPCS

## 2019-07-31 NOTE — PROGRESS NOTES
MONTHLY PROGRESS NOTE AND PARTICIPATION REPORT   [facility]    Karolyn Das, 1960  Attendance: Please see Epic for attendance record.    Communication:   Does communicate   Hearing:   No impairment  Vision:   Glasses  Orientation/Cognition:   Partial disorientation  Behavior:   No behavioral concerns  Self-Preservation Skills:   Not capable of self-preservation  Eating:   Assist with set up  Ambulation Walking:   Non-ambulatory  Transferring:   Aide of one person/two  Wheelchair:   Needs help  Toileting:   Needs assistance  Maintenance Program:     [facility]  Living Arrangements:   Lives in assisted living facility  Spiritual Needs: Needs are being met through support group  Medication Assistance:   Medication not taken during program hours  Participation Report:   Aerobics/Exercise  Support Group  Cognitive Stimulation  Fire Drill  Creative Arts/Crafts  Games  Gardening  Speakers/Entertainment  Socialization  Current Events/News  Education/Health Topic  Meditation, prayer, communion, and concepción chi; themed education on Shell the Beautiful, all-starts/baseball,  safari, aquatennial, and Rick festival; Improv class and hand massage.  Level of Participation:   Active  1. Long Term Goal:  Member will maintain physical and cognitive functioning through structured programming and specialized activities during [facility] attendance, with set up, demonstration, and supervision provided, each day of [facility] attendance. 2. Short Term Goals: 1.) To maintain current physical strength and endurance, member will actively participate in prescribed Nu Step maintenance program, with staff set up and supervision provided for safety, 3x/week while attending [facility]. 2.) To maintain current cognitive function, member will actively participate in one special cognitive stimulation activity such as lunch bin sorting or Ipad games, with staff set up, demonstration, and VC provided when  necessary, 1x/week while attending [facility] program.

## 2019-08-01 ENCOUNTER — HOSPITAL ENCOUNTER (OUTPATIENT)
Dept: REHABILITATION | Facility: CLINIC | Age: 59
End: 2019-08-01
Attending: FAMILY MEDICINE
Payer: MEDICAID

## 2019-08-01 PROCEDURE — S5102 ADULT DAY CARE PER DIEM: HCPCS

## 2019-08-05 ENCOUNTER — HOSPITAL ENCOUNTER (OUTPATIENT)
Dept: REHABILITATION | Facility: CLINIC | Age: 59
End: 2019-08-05
Attending: FAMILY MEDICINE
Payer: MEDICAID

## 2019-08-05 PROCEDURE — S5102 ADULT DAY CARE PER DIEM: HCPCS

## 2019-08-06 ENCOUNTER — HOSPITAL ENCOUNTER (OUTPATIENT)
Dept: REHABILITATION | Facility: CLINIC | Age: 59
End: 2019-08-06
Attending: FAMILY MEDICINE
Payer: MEDICAID

## 2019-08-06 PROCEDURE — S5102 ADULT DAY CARE PER DIEM: HCPCS

## 2019-08-08 ENCOUNTER — HOSPITAL ENCOUNTER (OUTPATIENT)
Dept: REHABILITATION | Facility: CLINIC | Age: 59
End: 2019-08-08
Attending: FAMILY MEDICINE
Payer: MEDICAID

## 2019-08-08 PROCEDURE — S5102 ADULT DAY CARE PER DIEM: HCPCS

## 2019-08-12 ENCOUNTER — HOSPITAL ENCOUNTER (OUTPATIENT)
Dept: REHABILITATION | Facility: CLINIC | Age: 59
End: 2019-08-12
Attending: FAMILY MEDICINE
Payer: MEDICAID

## 2019-08-12 PROCEDURE — S5102 ADULT DAY CARE PER DIEM: HCPCS

## 2019-08-13 ENCOUNTER — HOSPITAL ENCOUNTER (OUTPATIENT)
Dept: REHABILITATION | Facility: CLINIC | Age: 59
End: 2019-08-13
Attending: FAMILY MEDICINE
Payer: MEDICAID

## 2019-08-13 PROCEDURE — S5102 ADULT DAY CARE PER DIEM: HCPCS

## 2019-08-15 ENCOUNTER — HOSPITAL ENCOUNTER (OUTPATIENT)
Dept: REHABILITATION | Facility: CLINIC | Age: 59
End: 2019-08-15
Attending: FAMILY MEDICINE
Payer: MEDICAID

## 2019-08-15 PROCEDURE — S5102 ADULT DAY CARE PER DIEM: HCPCS
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
## 2019-08-16 NOTE — PROGRESS NOTES
MONTHLY PROGRESS NOTE AND PARTICIPATION REPORT   [facility]    Karolyn Das, 1960  Attendance: Please see Epic for attendance record.    Communication:   Does communicate   Hearing:   No impairment  Vision:   Glasses  Orientation/Cognition:   Partial disorientation  Behavior:   No behavioral concerns  Self-Preservation Skills:   Not capable of self-preservation  Eating:   Assist with set up  Ambulation Walking:   Non-ambulatory  Transferring:   Aide of one person/two  Wheelchair:   Needs help  Toileting:   Needs assistance  Maintenance Program:     [facility]  Living Arrangements:   Lives in assisted living facility  Spiritual Needs: Needs are being met through support group  Medication Assistance:   Medication not taken during program hours  Participation Report:   Aerobics/Exercise  Support Group  Cognitive Stimulation  Fire Drill  Creative Arts/Crafts  Games  Gardening  Speakers/Entertainment  Socialization  Current Events/News  Education/Health Topic  Meditation, prayer, communion, and concepción chi; Improv with [employer] Theatre; Themed education on [place], the Wild West, [place] State Fair, and Skin Care.  Level of Participation:   Active  1. Long Term Goal:  Member will maintain physical and cognitive functioning through structured programming and specialized activities during [facility] attendance, with set up, demonstration, and supervision provided, each day of [facility] attendance. 2. Short Term Goals: 1.) To maintain current physical strength and endurance, member will actively participate in prescribed Nu Step maintenance program, with staff set up and supervision provided for safety, 3x/week while attending [facility]. 2.) To maintain current cognitive function, member will actively participate in one special cognitive stimulation activity such as lunch bin sorting or Ipad games, with staff set up, demonstration, and VC provided when necessary, 1x/week while attending [facility]  [facility] program.

## 2019-08-19 ENCOUNTER — HOSPITAL ENCOUNTER (OUTPATIENT)
Dept: REHABILITATION | Facility: CLINIC | Age: 59
End: 2019-08-19
Attending: FAMILY MEDICINE
Payer: MEDICAID

## 2019-08-19 PROCEDURE — S5102 ADULT DAY CARE PER DIEM: HCPCS

## 2019-08-19 NOTE — PROGRESS NOTES
INDIVIDUAL PLAN OF CARE   [facility]    Member Name: Karolyn Das; YOB: 1960  MRN: [id number]  08/19/2019    Goals developed in collaboration with: member  Staff responsible for plan: Mahin Talbert  1. Long Term Goal (concrete, measurable, and time specific outcomes):  Member will maintain physical and cognitive functioning through structured programming and specialized activities during [facility] attendance, with set up, demonstration, and supervision provided, each day of [facility] attendance.  Target Date: 02/28/20  Quarterly Review:   Goal remains appropriate.  When questioned about wheeled mobility IND, Mirtha reports that she self propels her WC about 50% of opportunities while at her home, and that she would be ok with self propelling her WC while attending [facility] about 50% of opportunities.  She likes to have staff push her WC as a form of energy conservation.  In the future, self propelled wheeled mobility could be a good goal for Mirtha.     2. Short Term Goal: (concrete, measurable, and time specific outcomes):  To maintain current physical strength and endurance, member will actively participate in prescribed Nu Step maintenance program, with staff set up and supervision provided for safety, 3x/week while attending [facility].  Target Date: 11/30/19  Quarterly Review:  Goal remains appropriate.  Mirtha enjoys her time on the nu step and would like to continue.    3. Short Term Goal: (concrete, measurable, and time specific outcomes):  To maintain current cognitive function, member will actively participate in one special cognitive stimulation activity such as lunch bin sorting or Ipad games, with staff set up, demonstration, and VC provided when necessary, 1x/week while attending [facility] program.  Target Date: 11/30/19  Quarterly Review:  Goal remains appropriate.

## 2019-08-20 ENCOUNTER — HOSPITAL ENCOUNTER (OUTPATIENT)
Dept: REHABILITATION | Facility: CLINIC | Age: 59
End: 2019-08-20
Attending: FAMILY MEDICINE
Payer: MEDICAID

## 2019-08-20 PROCEDURE — S5102 ADULT DAY CARE PER DIEM: HCPCS

## 2019-08-22 ENCOUNTER — HOSPITAL ENCOUNTER (OUTPATIENT)
Dept: REHABILITATION | Facility: CLINIC | Age: 59
End: 2019-08-22
Attending: FAMILY MEDICINE
Payer: MEDICAID

## 2019-08-22 PROCEDURE — S5102 ADULT DAY CARE PER DIEM: HCPCS

## 2019-08-26 ENCOUNTER — HOSPITAL ENCOUNTER (OUTPATIENT)
Dept: REHABILITATION | Facility: CLINIC | Age: 59
End: 2019-08-26
Attending: FAMILY MEDICINE
Payer: MEDICAID

## 2019-08-26 PROCEDURE — S5102 ADULT DAY CARE PER DIEM: HCPCS

## 2019-08-27 ENCOUNTER — HOSPITAL ENCOUNTER (OUTPATIENT)
Dept: REHABILITATION | Facility: CLINIC | Age: 59
End: 2019-08-27
Attending: FAMILY MEDICINE
Payer: MEDICAID

## 2019-08-27 PROCEDURE — S5102 ADULT DAY CARE PER DIEM: HCPCS

## 2019-09-03 ENCOUNTER — HOSPITAL ENCOUNTER (OUTPATIENT)
Dept: REHABILITATION | Facility: CLINIC | Age: 59
End: 2019-09-03
Attending: FAMILY MEDICINE
Payer: MEDICAID

## 2019-09-03 PROCEDURE — S5102 ADULT DAY CARE PER DIEM: HCPCS

## 2019-09-05 ENCOUNTER — HOSPITAL ENCOUNTER (OUTPATIENT)
Dept: REHABILITATION | Facility: CLINIC | Age: 59
End: 2019-09-05
Attending: FAMILY MEDICINE
Payer: MEDICAID

## 2019-09-05 PROCEDURE — S5102 ADULT DAY CARE PER DIEM: HCPCS

## 2019-09-06 ENCOUNTER — MEDICAL CORRESPONDENCE (OUTPATIENT)
Dept: HEALTH INFORMATION MANAGEMENT | Facility: CLINIC | Age: 59
End: 2019-09-06

## 2019-09-09 ENCOUNTER — HOSPITAL ENCOUNTER (OUTPATIENT)
Dept: REHABILITATION | Facility: CLINIC | Age: 59
End: 2019-09-09
Attending: FAMILY MEDICINE
Payer: MEDICAID

## 2019-09-09 PROCEDURE — S5102 ADULT DAY CARE PER DIEM: HCPCS

## 2019-09-10 ENCOUNTER — HOSPITAL ENCOUNTER (OUTPATIENT)
Dept: REHABILITATION | Facility: CLINIC | Age: 59
End: 2019-09-10
Attending: FAMILY MEDICINE
Payer: MEDICAID

## 2019-09-10 PROCEDURE — S5102 ADULT DAY CARE PER DIEM: HCPCS

## 2019-09-12 ENCOUNTER — HOSPITAL ENCOUNTER (OUTPATIENT)
Dept: REHABILITATION | Facility: CLINIC | Age: 59
End: 2019-09-12
Attending: FAMILY MEDICINE
Payer: MEDICAID

## 2019-09-12 PROCEDURE — S5102 ADULT DAY CARE PER DIEM: HCPCS

## 2019-09-16 ENCOUNTER — HOSPITAL ENCOUNTER (OUTPATIENT)
Dept: REHABILITATION | Facility: CLINIC | Age: 59
End: 2019-09-16
Attending: FAMILY MEDICINE
Payer: MEDICAID

## 2019-09-16 PROCEDURE — S5102 ADULT DAY CARE PER DIEM: HCPCS

## 2019-09-17 ENCOUNTER — HOSPITAL ENCOUNTER (OUTPATIENT)
Dept: REHABILITATION | Facility: CLINIC | Age: 59
End: 2019-09-17
Attending: FAMILY MEDICINE
Payer: MEDICAID

## 2019-09-17 PROCEDURE — S5102 ADULT DAY CARE PER DIEM: HCPCS

## 2019-09-24 ENCOUNTER — HOSPITAL ENCOUNTER (OUTPATIENT)
Dept: REHABILITATION | Facility: CLINIC | Age: 59
End: 2019-09-24
Attending: FAMILY MEDICINE
Payer: MEDICAID

## 2019-09-24 PROCEDURE — S5102 ADULT DAY CARE PER DIEM: HCPCS

## 2019-09-30 ENCOUNTER — HOSPITAL ENCOUNTER (OUTPATIENT)
Dept: REHABILITATION | Facility: CLINIC | Age: 59
End: 2019-09-30
Attending: FAMILY MEDICINE
Payer: MEDICAID

## 2019-09-30 PROCEDURE — S5102 ADULT DAY CARE PER DIEM: HCPCS

## 2019-09-30 NOTE — PROGRESS NOTES
MONTHLY PROGRESS NOTE AND PARTICIPATION REPORT   [facility]    Karolyn Das, 1960  Attendance: Please see Epic for attendance record.    Communication:   Does communicate   Hearing:   No impairment  Vision:   Glasses  Orientation/Cognition:   Partial disorientation  Behavior:   No behavioral concerns  Self-Preservation Skills:   Not capable of self-preservation  Eating:   Assist with set up  Ambulation Walking:   Non-ambulatory  Transferring:   Aide of one person/two  Wheelchair:   Needs help  Toileting:   Needs assistance  Maintenance Program:     [facility]  Living Arrangements:   Lives in assisted living facility  Spiritual Needs: Needs are being met through support group  Medication Assistance:   Medication not taken during program hours  Participation Report:   Aerobics/Exercise  Support Group  Cognitive Stimulation  Fire Drill  Creative Arts/Crafts  Games  Gardening  Speakers/Entertainment  Socialization  Current Events/News  Education/Health Topic  Meditation, prayer, communion, and concepción chi; Improv with [employer] Theater, Laughter Yoga, Befriending Your Body, hand massage, town haley; Themed education on Vikings football, Renaissance, Farm Appreciation, Apple Tasting  Level of Participation:   Active  Recently, Karolyn moved and is reporting that she loves her new house

## 2019-10-01 ENCOUNTER — HOSPITAL ENCOUNTER (OUTPATIENT)
Dept: REHABILITATION | Facility: CLINIC | Age: 59
End: 2019-10-01
Attending: FAMILY MEDICINE
Payer: MEDICAID

## 2019-10-01 PROCEDURE — S5102 ADULT DAY CARE PER DIEM: HCPCS

## 2019-10-03 ENCOUNTER — HOSPITAL ENCOUNTER (OUTPATIENT)
Dept: REHABILITATION | Facility: CLINIC | Age: 59
End: 2019-10-03
Attending: FAMILY MEDICINE
Payer: MEDICAID

## 2019-10-03 PROCEDURE — S5102 ADULT DAY CARE PER DIEM: HCPCS

## 2019-10-07 ENCOUNTER — HOSPITAL ENCOUNTER (OUTPATIENT)
Dept: REHABILITATION | Facility: CLINIC | Age: 59
End: 2019-10-07
Attending: FAMILY MEDICINE
Payer: MEDICAID

## 2019-10-07 PROCEDURE — S5102 ADULT DAY CARE PER DIEM: HCPCS

## 2019-10-08 ENCOUNTER — HOSPITAL ENCOUNTER (OUTPATIENT)
Dept: REHABILITATION | Facility: CLINIC | Age: 59
End: 2019-10-08
Attending: FAMILY MEDICINE
Payer: MEDICAID

## 2019-10-08 PROCEDURE — S5102 ADULT DAY CARE PER DIEM: HCPCS

## 2019-10-10 ENCOUNTER — HOSPITAL ENCOUNTER (OUTPATIENT)
Dept: REHABILITATION | Facility: CLINIC | Age: 59
End: 2019-10-10
Attending: FAMILY MEDICINE
Payer: MEDICAID

## 2019-10-10 PROCEDURE — S5102 ADULT DAY CARE PER DIEM: HCPCS

## 2019-10-14 ENCOUNTER — HOSPITAL ENCOUNTER (OUTPATIENT)
Dept: REHABILITATION | Facility: CLINIC | Age: 59
End: 2019-10-14
Attending: FAMILY MEDICINE
Payer: MEDICAID

## 2019-10-14 PROCEDURE — S5102 ADULT DAY CARE PER DIEM: HCPCS

## 2019-10-15 ENCOUNTER — HOSPITAL ENCOUNTER (OUTPATIENT)
Dept: REHABILITATION | Facility: CLINIC | Age: 59
End: 2019-10-15
Attending: FAMILY MEDICINE
Payer: MEDICAID

## 2019-10-15 PROCEDURE — S5102 ADULT DAY CARE PER DIEM: HCPCS

## 2019-10-17 ENCOUNTER — HOSPITAL ENCOUNTER (OUTPATIENT)
Dept: REHABILITATION | Facility: CLINIC | Age: 59
End: 2019-10-17
Attending: FAMILY MEDICINE
Payer: MEDICAID

## 2019-10-17 PROCEDURE — S5102 ADULT DAY CARE PER DIEM: HCPCS

## 2019-10-21 ENCOUNTER — HOSPITAL ENCOUNTER (OUTPATIENT)
Dept: REHABILITATION | Facility: CLINIC | Age: 59
End: 2019-10-21
Attending: FAMILY MEDICINE
Payer: MEDICAID

## 2019-10-21 PROCEDURE — S5102 ADULT DAY CARE PER DIEM: HCPCS

## 2019-10-21 NOTE — PROGRESS NOTES
MONTHLY PROGRESS NOTE AND PARTICIPATION REPORT   [facility]    Karolyn Das, 1960  Attendance: Please see Epic for attendance record.    Communication:   Does communicate   Hearing:   No impairment  Vision:   Glasses  Orientation/Cognition:   Partial disorientation  Behavior:   No behavioral concerns  Self-Preservation Skills:   Not capable of self-preservation  Eating:   Assist with set up  Ambulation Walking:   Non-ambulatory  Transferring:   Aide of one person/two  Wheelchair:   Needs help  Toileting:   Needs assistance  Maintenance Program:     [facility]  Living Arrangements:   Lives in assisted living facility  Spiritual Needs: Needs are being met through support group  Medication Assistance:   Medication not taken during program hours  Participation Report:   Aerobics/Exercise  Support Group  Cognitive Stimulation  Fire Drill  Creative Arts/Crafts  Games  Gardening  Speakers/Entertainment  Socialization  Current Events/News  Education/Health Topic  Prayer, meditation, communion, and concepción chi; Improv with [employer] Theater, Piano concert, Befriending Your Body speaker, [employer] art, special classes; Themed education on mysteries, fall fun, [place] native animals, chemistry, and Halloween  Level of Participation:   Active

## 2019-10-22 ENCOUNTER — HOSPITAL ENCOUNTER (OUTPATIENT)
Dept: REHABILITATION | Facility: CLINIC | Age: 59
End: 2019-10-22
Attending: FAMILY MEDICINE
Payer: MEDICAID

## 2019-10-22 PROCEDURE — S5102 ADULT DAY CARE PER DIEM: HCPCS

## 2019-10-24 ENCOUNTER — HOSPITAL ENCOUNTER (OUTPATIENT)
Dept: REHABILITATION | Facility: CLINIC | Age: 59
End: 2019-10-24
Attending: FAMILY MEDICINE
Payer: MEDICAID

## 2019-10-24 PROCEDURE — S5102 ADULT DAY CARE PER DIEM: HCPCS

## 2019-10-28 ENCOUNTER — HOSPITAL ENCOUNTER (OUTPATIENT)
Dept: REHABILITATION | Facility: CLINIC | Age: 59
End: 2019-10-28
Attending: FAMILY MEDICINE
Payer: MEDICAID

## 2019-10-28 PROCEDURE — S5102 ADULT DAY CARE PER DIEM: HCPCS

## 2019-10-29 ENCOUNTER — HOSPITAL ENCOUNTER (OUTPATIENT)
Dept: REHABILITATION | Facility: CLINIC | Age: 59
End: 2019-10-29
Attending: FAMILY MEDICINE
Payer: MEDICAID

## 2019-10-29 PROCEDURE — S5102 ADULT DAY CARE PER DIEM: HCPCS

## 2019-10-31 ENCOUNTER — HOSPITAL ENCOUNTER (OUTPATIENT)
Dept: REHABILITATION | Facility: CLINIC | Age: 59
End: 2019-10-31
Attending: FAMILY MEDICINE
Payer: MEDICAID

## 2019-10-31 PROCEDURE — S5102 ADULT DAY CARE PER DIEM: HCPCS

## 2019-11-04 ENCOUNTER — HOSPITAL ENCOUNTER (OUTPATIENT)
Dept: REHABILITATION | Facility: CLINIC | Age: 59
End: 2019-11-04
Attending: FAMILY MEDICINE
Payer: MEDICAID

## 2019-11-04 PROCEDURE — S5102 ADULT DAY CARE PER DIEM: HCPCS

## 2019-11-05 ENCOUNTER — HOSPITAL ENCOUNTER (OUTPATIENT)
Dept: REHABILITATION | Facility: CLINIC | Age: 59
End: 2019-11-05
Attending: FAMILY MEDICINE
Payer: MEDICAID

## 2019-11-05 PROCEDURE — S5102 ADULT DAY CARE PER DIEM: HCPCS

## 2019-11-07 ENCOUNTER — HOSPITAL ENCOUNTER (OUTPATIENT)
Dept: REHABILITATION | Facility: CLINIC | Age: 59
End: 2019-11-07
Attending: FAMILY MEDICINE
Payer: MEDICAID

## 2019-11-07 PROCEDURE — S5102 ADULT DAY CARE PER DIEM: HCPCS

## 2019-11-11 ENCOUNTER — HOSPITAL ENCOUNTER (OUTPATIENT)
Dept: REHABILITATION | Facility: CLINIC | Age: 59
End: 2019-11-11
Attending: FAMILY MEDICINE
Payer: MEDICAID

## 2019-11-11 PROCEDURE — S5102 ADULT DAY CARE PER DIEM: HCPCS

## 2019-11-12 ENCOUNTER — HOSPITAL ENCOUNTER (OUTPATIENT)
Dept: REHABILITATION | Facility: CLINIC | Age: 59
End: 2019-11-12
Attending: FAMILY MEDICINE
Payer: MEDICAID

## 2019-11-12 PROCEDURE — S5102 ADULT DAY CARE PER DIEM: HCPCS

## 2019-11-13 ENCOUNTER — RECORDS - HEALTHEAST (OUTPATIENT)
Dept: LAB | Facility: CLINIC | Age: 59
End: 2019-11-13

## 2019-11-13 LAB
ANION GAP SERPL CALCULATED.3IONS-SCNC: 7 MMOL/L (ref 5–18)
BASOPHILS # BLD AUTO: 0.1 THOU/UL (ref 0–0.2)
BASOPHILS NFR BLD AUTO: 1 % (ref 0–2)
BUN SERPL-MCNC: 16 MG/DL (ref 8–22)
CALCIUM SERPL-MCNC: 9 MG/DL (ref 8.5–10.5)
CHLORIDE BLD-SCNC: 102 MMOL/L (ref 98–107)
CO2 SERPL-SCNC: 31 MMOL/L (ref 22–31)
CREAT SERPL-MCNC: 0.76 MG/DL (ref 0.6–1.1)
EOSINOPHIL # BLD AUTO: 0.1 THOU/UL (ref 0–0.4)
EOSINOPHIL NFR BLD AUTO: 1 % (ref 0–6)
ERYTHROCYTE [DISTWIDTH] IN BLOOD BY AUTOMATED COUNT: 13.1 % (ref 11–14.5)
GFR SERPL CREATININE-BSD FRML MDRD: >60 ML/MIN/1.73M2
GLUCOSE BLD-MCNC: 83 MG/DL (ref 70–125)
HCT VFR BLD AUTO: 43.6 % (ref 35–47)
HGB BLD-MCNC: 13.5 G/DL (ref 12–16)
LYMPHOCYTES # BLD AUTO: 1.8 THOU/UL (ref 0.8–4.4)
LYMPHOCYTES NFR BLD AUTO: 23 % (ref 20–40)
MCH RBC QN AUTO: 29.7 PG (ref 27–34)
MCHC RBC AUTO-ENTMCNC: 31 G/DL (ref 32–36)
MCV RBC AUTO: 96 FL (ref 80–100)
MONOCYTES # BLD AUTO: 0.6 THOU/UL (ref 0–0.9)
MONOCYTES NFR BLD AUTO: 8 % (ref 2–10)
NEUTROPHILS # BLD AUTO: 5.4 THOU/UL (ref 2–7.7)
NEUTROPHILS NFR BLD AUTO: 67 % (ref 50–70)
PLATELET # BLD AUTO: 183 THOU/UL (ref 140–440)
PMV BLD AUTO: 12.6 FL (ref 8.5–12.5)
POTASSIUM BLD-SCNC: 4 MMOL/L (ref 3.5–5)
RBC # BLD AUTO: 4.54 MILL/UL (ref 3.8–5.4)
SODIUM SERPL-SCNC: 140 MMOL/L (ref 136–145)
TSH SERPL DL<=0.005 MIU/L-ACNC: 1.63 UIU/ML (ref 0.3–5)
VALPROATE SERPL-MCNC: 28.8 UG/ML (ref 50–150)
WBC: 8 THOU/UL (ref 4–11)

## 2019-11-14 ENCOUNTER — HOSPITAL ENCOUNTER (OUTPATIENT)
Dept: REHABILITATION | Facility: CLINIC | Age: 59
End: 2019-11-14
Attending: FAMILY MEDICINE
Payer: MEDICAID

## 2019-11-14 PROCEDURE — S5102 ADULT DAY CARE PER DIEM: HCPCS

## 2019-11-19 ENCOUNTER — HOSPITAL ENCOUNTER (OUTPATIENT)
Dept: REHABILITATION | Facility: CLINIC | Age: 59
End: 2019-11-19
Attending: FAMILY MEDICINE
Payer: MEDICAID

## 2019-11-19 PROCEDURE — S5102 ADULT DAY CARE PER DIEM: HCPCS

## 2019-11-19 NOTE — PROGRESS NOTES
INDIVIDUAL PLAN OF CARE   [facility]    Member Name: Karolyn Dsa; YOB: 1960  MRN: [id number]  11/19/2019    Goals developed in collaboration with: member  Staff responsible for plan: Mahin Talbert  1. Long Term Goal (concrete, measurable, and time specific outcomes):  Member will maintain physical and cognitive functioning through structured programming and specialized activities during [facility] attendance, with set up, demonstration, and supervision provided, each day of [facility] attendance.  Target Date: 5/31/20  Quarterly Review:   Goal remains appropriate.  When questioned about wheeled mobility IND, Mirtha reports that she self propels her WC about 50% of opportunities while at her home, and that she would be ok with self propelling her WC while attending [facility] about 50% of opportunities.  She likes to have staff push her WC as a form of energy conservation.  In the future, self propelled wheeled mobility could be a good goal for Mirtha.     2. Short Term Goal: (concrete, measurable, and time specific outcomes):  To maintain current physical strength and endurance, member will actively participate in prescribed Nu Step maintenance program, with staff set up and supervision provided for safety, 3x/week while attending [facility].  Target Date: 02/31/20  Quarterly Review:  Goal remains appropriate.  Mirtha enjoys her time on the nu step and would like to continue.    3. Short Term Goal: (concrete, measurable, and time specific outcomes):  To maintain current cognitive function, member will actively participate in one special cognitive stimulation activity such as lunch bin sorting or Ipad games, with staff set up, demonstration, and VC provided when necessary, 1x/week while attending [facility] program.  Target Date: 02/31/20  Quarterly Review:  Goal remains appropriate.

## 2019-11-21 ENCOUNTER — HOSPITAL ENCOUNTER (OUTPATIENT)
Dept: REHABILITATION | Facility: CLINIC | Age: 59
End: 2019-11-21
Attending: FAMILY MEDICINE
Payer: MEDICAID

## 2019-11-21 PROCEDURE — S5102 ADULT DAY CARE PER DIEM: HCPCS

## 2019-11-25 ENCOUNTER — HOSPITAL ENCOUNTER (OUTPATIENT)
Dept: REHABILITATION | Facility: CLINIC | Age: 59
End: 2019-11-25
Attending: FAMILY MEDICINE
Payer: MEDICAID

## 2019-11-25 PROCEDURE — S5102 ADULT DAY CARE PER DIEM: HCPCS

## 2019-11-26 ENCOUNTER — HOSPITAL ENCOUNTER (OUTPATIENT)
Dept: REHABILITATION | Facility: CLINIC | Age: 59
End: 2019-11-26
Attending: FAMILY MEDICINE
Payer: MEDICAID

## 2019-11-26 PROCEDURE — S5102 ADULT DAY CARE PER DIEM: HCPCS

## 2019-11-26 NOTE — PROGRESS NOTES
MONTHLY PROGRESS NOTE AND PARTICIPATION REPORT   [facility]    Karolyn Das, 1960  Attendance: Please see Epic for attendance record.    Communication:   Does communicate   Hearing:   No impairment  Vision:   Glasses  Orientation/Cognition:   Partial disorientation  Behavior:   No behavioral concerns  Self-Preservation Skills:   Not capable of self-preservation  Eating:   Assist with set up  Ambulation Walking:   Non-ambulatory  Transferring:   Aide of one person/two  Wheelchair:   Needs help  Toileting:   Needs assistance  Maintenance Program:     [facility]  Living Arrangements:   Lives in assisted living facility  Spiritual Needs: Needs are being met through support group  Medication Assistance:   Medication not taken during program hours  Participation Report:   Aerobics/Exercise  Support Group  Cognitive Stimulation  Fire Drill  Creative Arts/Crafts  Games  Gardening  Speakers/Entertainment  Socialization  Current Events/News  Education/Health Topic  Yoga, meditation, communion, prayer, Befriending your Life (speaker), Cello Meditation, Can Do Canines, Photo Garsia,  s celebration, Piano Concert, Improv class; Themed education in Autumn Fun, [place] s Week, Caregiver Gratitude, and Thanksgiving Spelling Bee.  Level of Participation:   Karla Parr attends regularly and enjoys sticking to her routine at the day program

## 2019-12-02 ENCOUNTER — HOSPITAL ENCOUNTER (OUTPATIENT)
Dept: REHABILITATION | Facility: CLINIC | Age: 59
End: 2019-12-02
Attending: FAMILY MEDICINE
Payer: MEDICAID

## 2019-12-02 PROCEDURE — S5102 ADULT DAY CARE PER DIEM: HCPCS

## 2019-12-03 ENCOUNTER — HOSPITAL ENCOUNTER (OUTPATIENT)
Dept: REHABILITATION | Facility: CLINIC | Age: 59
End: 2019-12-03
Attending: FAMILY MEDICINE
Payer: MEDICAID

## 2019-12-03 PROCEDURE — S5102 ADULT DAY CARE PER DIEM: HCPCS
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
## 2019-12-05 ENCOUNTER — HOSPITAL ENCOUNTER (OUTPATIENT)
Dept: REHABILITATION | Facility: CLINIC | Age: 59
End: 2019-12-05
Attending: FAMILY MEDICINE
Payer: MEDICAID

## 2019-12-05 PROCEDURE — S5102 ADULT DAY CARE PER DIEM: HCPCS

## 2019-12-09 ENCOUNTER — HOSPITAL ENCOUNTER (OUTPATIENT)
Dept: REHABILITATION | Facility: CLINIC | Age: 59
End: 2019-12-09
Attending: FAMILY MEDICINE
Payer: MEDICAID

## 2019-12-09 PROCEDURE — S5102 ADULT DAY CARE PER DIEM: HCPCS

## 2019-12-10 ENCOUNTER — RECORDS - HEALTHEAST (OUTPATIENT)
Dept: ADMINISTRATIVE | Facility: OTHER | Age: 59
End: 2019-12-10

## 2019-12-13 ENCOUNTER — RECORDS - HEALTHEAST (OUTPATIENT)
Dept: ADMINISTRATIVE | Facility: OTHER | Age: 59
End: 2019-12-13

## 2019-12-16 ENCOUNTER — HOSPITAL ENCOUNTER (OUTPATIENT)
Dept: REHABILITATION | Facility: CLINIC | Age: 59
End: 2019-12-16
Attending: FAMILY MEDICINE
Payer: MEDICAID

## 2019-12-16 PROCEDURE — S5102 ADULT DAY CARE PER DIEM: HCPCS

## 2019-12-16 NOTE — PROGRESS NOTES
MONTHLY PROGRESS NOTE AND PARTICIPATION REPORT   [facility]    Karolyn Das, 1960  Attendance: Please see Epic for attendance record.    Communication:   Does communicate   Hearing:   No impairment  Vision:   Glasses  Orientation/Cognition:   Partial disorientation  Behavior:   No behavioral concerns  Self-Preservation Skills:   Not capable of self-preservation  Eating:   Assist with set up  Ambulation Walking:   Non-ambulatory  Transferring:   Aide of one person/two  Wheelchair:   Needs help  Toileting:   Needs assistance  Maintenance Program:     [facility]  Living Arrangements:   Lives in assisted living facility  Spiritual Needs: Needs are being met through support group  Medication Assistance:   Medication not taken during program hours  Participation Report:   Aerobics/Exercise  Support Group  Cognitive Stimulation  Fire Drill  Creative Arts/Crafts  Games  Gardening  Speakers/Entertainment  Socialization  Current Events/News  Education/Health Topic  Yoga, meditation, communion, prayer, concepción chi, improv class, piano, cello meditation, [employer] marching band; Themed education on holiday ornaments, spa week, holiday party, and new year's party.  Level of Participation:   Active

## 2019-12-17 ENCOUNTER — HOSPITAL ENCOUNTER (OUTPATIENT)
Dept: REHABILITATION | Facility: CLINIC | Age: 59
End: 2019-12-17
Attending: FAMILY MEDICINE
Payer: MEDICAID

## 2019-12-17 PROCEDURE — S5102 ADULT DAY CARE PER DIEM: HCPCS

## 2019-12-18 ENCOUNTER — RECORDS - HEALTHEAST (OUTPATIENT)
Dept: LAB | Facility: CLINIC | Age: 59
End: 2019-12-18

## 2019-12-18 LAB
ALBUMIN SERPL-MCNC: 3.8 G/DL (ref 3.5–5)
ALP SERPL-CCNC: 67 U/L (ref 45–120)
ALT SERPL W P-5'-P-CCNC: 14 U/L (ref 0–45)
ANION GAP SERPL CALCULATED.3IONS-SCNC: 8 MMOL/L (ref 5–18)
AST SERPL W P-5'-P-CCNC: 22 U/L (ref 0–40)
BILIRUB DIRECT SERPL-MCNC: 0.2 MG/DL
BILIRUB SERPL-MCNC: 0.4 MG/DL (ref 0–1)
BUN SERPL-MCNC: 15 MG/DL (ref 8–22)
CALCIUM SERPL-MCNC: 9 MG/DL (ref 8.5–10.5)
CHLORIDE BLD-SCNC: 109 MMOL/L (ref 98–107)
CO2 SERPL-SCNC: 25 MMOL/L (ref 22–31)
CREAT SERPL-MCNC: 0.7 MG/DL (ref 0.6–1.1)
ERYTHROCYTE [DISTWIDTH] IN BLOOD BY AUTOMATED COUNT: 12.8 % (ref 11–14.5)
GFR SERPL CREATININE-BSD FRML MDRD: >60 ML/MIN/1.73M2
GLUCOSE BLD-MCNC: 69 MG/DL (ref 70–125)
HCT VFR BLD AUTO: 42.5 % (ref 35–47)
HGB BLD-MCNC: 13.5 G/DL (ref 12–16)
MCH RBC QN AUTO: 29.5 PG (ref 27–34)
MCHC RBC AUTO-ENTMCNC: 31.8 G/DL (ref 32–36)
MCV RBC AUTO: 93 FL (ref 80–100)
PLATELET # BLD AUTO: 170 THOU/UL (ref 140–440)
PMV BLD AUTO: 11.9 FL (ref 8.5–12.5)
POTASSIUM BLD-SCNC: 4.6 MMOL/L (ref 3.5–5)
PROT SERPL-MCNC: 6.9 G/DL (ref 6–8)
RBC # BLD AUTO: 4.57 MILL/UL (ref 3.8–5.4)
SODIUM SERPL-SCNC: 142 MMOL/L (ref 136–145)
TSH SERPL DL<=0.005 MIU/L-ACNC: 1.09 UIU/ML (ref 0.3–5)
WBC: 7.8 THOU/UL (ref 4–11)

## 2019-12-23 ENCOUNTER — HOSPITAL ENCOUNTER (OUTPATIENT)
Dept: REHABILITATION | Facility: CLINIC | Age: 59
End: 2019-12-23
Attending: FAMILY MEDICINE
Payer: MEDICAID

## 2019-12-23 PROCEDURE — S5102 ADULT DAY CARE PER DIEM: HCPCS

## 2019-12-26 ENCOUNTER — HOSPITAL ENCOUNTER (OUTPATIENT)
Dept: REHABILITATION | Facility: CLINIC | Age: 59
End: 2019-12-26
Attending: FAMILY MEDICINE
Payer: MEDICAID

## 2019-12-26 PROCEDURE — S5102 ADULT DAY CARE PER DIEM: HCPCS

## 2019-12-30 ENCOUNTER — HOSPITAL ENCOUNTER (OUTPATIENT)
Dept: REHABILITATION | Facility: CLINIC | Age: 59
End: 2019-12-30
Attending: FAMILY MEDICINE
Payer: MEDICAID

## 2019-12-30 PROCEDURE — S5102 ADULT DAY CARE PER DIEM: HCPCS

## 2020-01-01 NOTE — PROGRESS NOTES
MONTHLY PROGRESS NOTE AND PARTICIPATION REPORT   [facility]    Karolyn Das, 1960  Attendance: Please see Epic for attendance record.    Communication:   Does communicate   Hearing:   No impairment  Vision:   Glasses  Orientation/Cognition:   Partial disorientation  Short term memory loss  Behavior:   occationally requires redirection   Self-Preservation Skills:   Not capable of self-preservation  Eating:   Assist with set up  Ambulation Walking:   Non-ambulatory  Transferring:   Aide of one person/two  Wheelchair:   Independent  occationally requires assistance  Toileting:   Needs assistance  Maintenance Program:     [facility]  Living Arrangements:   Lives in assisted living facility  Spiritual Needs: Needs are being met through support group  Medication Assistance:   Medication not taken during program hours  Participation Report:   Aerobics/Exercise  Support Group  Cognitive Stimulation  Fire Drill  Creative Arts/Crafts  Games  Gardening  Speakers/Entertainment  Socialization  Current Events/News  Education/Health Topic  Meditation, Prayer Time, Communion, relaxation with [place], [employer] Yoga and Drumming, Garden Party, Flag Day activities, Bike the  for MS Party  Level of Participation:   To the best of their ability       Statement Selected

## 2020-01-02 ENCOUNTER — HOSPITAL ENCOUNTER (OUTPATIENT)
Dept: REHABILITATION | Facility: CLINIC | Age: 60
End: 2020-01-02
Attending: FAMILY MEDICINE
Payer: MEDICAID

## 2020-01-02 PROCEDURE — S5102 ADULT DAY CARE PER DIEM: HCPCS

## 2020-01-06 ENCOUNTER — HOSPITAL ENCOUNTER (OUTPATIENT)
Dept: REHABILITATION | Facility: CLINIC | Age: 60
End: 2020-01-06
Attending: FAMILY MEDICINE
Payer: MEDICAID

## 2020-01-06 PROCEDURE — S5102 ADULT DAY CARE PER DIEM: HCPCS

## 2020-01-07 ENCOUNTER — HOSPITAL ENCOUNTER (OUTPATIENT)
Dept: REHABILITATION | Facility: CLINIC | Age: 60
End: 2020-01-07
Attending: FAMILY MEDICINE
Payer: MEDICAID

## 2020-01-07 PROCEDURE — S5102 ADULT DAY CARE PER DIEM: HCPCS

## 2020-01-09 ENCOUNTER — HOSPITAL ENCOUNTER (OUTPATIENT)
Dept: REHABILITATION | Facility: CLINIC | Age: 60
End: 2020-01-09
Attending: FAMILY MEDICINE
Payer: MEDICAID

## 2020-01-09 PROCEDURE — S5102 ADULT DAY CARE PER DIEM: HCPCS

## 2020-01-13 ENCOUNTER — HOSPITAL ENCOUNTER (OUTPATIENT)
Dept: REHABILITATION | Facility: CLINIC | Age: 60
End: 2020-01-13
Attending: FAMILY MEDICINE
Payer: MEDICAID

## 2020-01-13 PROCEDURE — S5102 ADULT DAY CARE PER DIEM: HCPCS

## 2020-01-15 ENCOUNTER — HOSPITAL ENCOUNTER (OUTPATIENT)
Dept: REHABILITATION | Facility: CLINIC | Age: 60
End: 2020-01-15
Attending: FAMILY MEDICINE
Payer: MEDICAID

## 2020-01-15 PROCEDURE — S5102 ADULT DAY CARE PER DIEM: HCPCS

## 2020-01-16 ENCOUNTER — HOSPITAL ENCOUNTER (OUTPATIENT)
Dept: REHABILITATION | Facility: CLINIC | Age: 60
End: 2020-01-16
Attending: FAMILY MEDICINE
Payer: MEDICAID

## 2020-01-16 PROCEDURE — S5102 ADULT DAY CARE PER DIEM: HCPCS

## 2020-01-20 ENCOUNTER — HOSPITAL ENCOUNTER (OUTPATIENT)
Dept: REHABILITATION | Facility: CLINIC | Age: 60
End: 2020-01-20
Attending: FAMILY MEDICINE
Payer: MEDICAID

## 2020-01-20 PROCEDURE — S5102 ADULT DAY CARE PER DIEM: HCPCS

## 2020-01-21 ENCOUNTER — HOSPITAL ENCOUNTER (OUTPATIENT)
Dept: REHABILITATION | Facility: CLINIC | Age: 60
End: 2020-01-21
Attending: FAMILY MEDICINE
Payer: MEDICAID

## 2020-01-21 PROCEDURE — S5102 ADULT DAY CARE PER DIEM: HCPCS

## 2020-01-21 NOTE — PROGRESS NOTES
MONTHLY PROGRESS NOTE AND PARTICIPATION REPORT   [facility]    Karolyn Das, 1960  Attendance: Please see Epic for attendance record.    Communication:   Does communicate   Hearing:   No impairment  Vision:   Glasses  Orientation/Cognition:   Partial disorientation  Behavior:   No behavioral concerns  Self-Preservation Skills:   Not capable of self-preservation  Eating:   Assist with set up  Ambulation Walking:   Non-ambulatory  Transferring:   Aide of one person/two  Wheelchair:   Needs help  Toileting:   Needs assistance  Maintenance Program:     [facility]  Living Arrangements:   Lives in assisted living facility  Spiritual Needs: Needs are being met through support group  Medication Assistance:   Medication not taken during program hours  Participation Report:   Aerobics/Exercise  Support Group  Cognitive Stimulation  Fire Drill  Creative Arts/Crafts  Games  Gardening  Speakers/Entertainment  Socialization  Current Events/News  Education/Health Topic  Yoga, meditation, communion, prayer, hand massage, Piano Concert, Improv class, fitness challenge; Themed education in local dog sled racing, Harris Gamboa Jr., [facility], and the super bowl  Level of Participation:   Active    AC staff note that Karolyn requires more frequent reassurance throughout the day when she becomes anxious, particularly when she is unsure of the next step in an activity or if she experiences changes to her routine.

## 2020-01-22 ENCOUNTER — HOSPITAL ENCOUNTER (OUTPATIENT)
Dept: REHABILITATION | Facility: CLINIC | Age: 60
End: 2020-01-22
Attending: FAMILY MEDICINE
Payer: MEDICAID

## 2020-01-22 PROCEDURE — S5102 ADULT DAY CARE PER DIEM: HCPCS

## 2020-01-27 ENCOUNTER — HOSPITAL ENCOUNTER (OUTPATIENT)
Dept: REHABILITATION | Facility: CLINIC | Age: 60
End: 2020-01-27
Attending: FAMILY MEDICINE
Payer: MEDICAID

## 2020-01-27 PROCEDURE — S5102 ADULT DAY CARE PER DIEM: HCPCS

## 2020-01-29 ENCOUNTER — HOSPITAL ENCOUNTER (OUTPATIENT)
Dept: REHABILITATION | Facility: CLINIC | Age: 60
End: 2020-01-29
Attending: FAMILY MEDICINE
Payer: MEDICAID

## 2020-01-29 PROCEDURE — S5102 ADULT DAY CARE PER DIEM: HCPCS

## 2020-01-31 ENCOUNTER — OFFICE VISIT - HEALTHEAST (OUTPATIENT)
Dept: FAMILY MEDICINE | Facility: CLINIC | Age: 60
End: 2020-01-31

## 2020-01-31 DIAGNOSIS — R15.9 INCONTINENCE OF FECES, UNSPECIFIED FECAL INCONTINENCE TYPE: ICD-10-CM

## 2020-01-31 DIAGNOSIS — N31.9 NEUROGENIC BLADDER: ICD-10-CM

## 2020-01-31 DIAGNOSIS — K59.00 CONSTIPATION: ICD-10-CM

## 2020-01-31 DIAGNOSIS — D12.6 BENIGN NEOPLASM OF COLON, UNSPECIFIED PART OF COLON: ICD-10-CM

## 2020-01-31 DIAGNOSIS — Z00.00 HEALTHCARE MAINTENANCE: ICD-10-CM

## 2020-01-31 DIAGNOSIS — G40.909 NONINTRACTABLE EPILEPSY WITHOUT STATUS EPILEPTICUS, UNSPECIFIED EPILEPSY TYPE (H): ICD-10-CM

## 2020-01-31 DIAGNOSIS — F06.30 MOOD DISORDER IN CONDITIONS CLASSIFIED ELSEWHERE: ICD-10-CM

## 2020-01-31 DIAGNOSIS — E03.9 HYPOTHYROIDISM, UNSPECIFIED TYPE: ICD-10-CM

## 2020-01-31 DIAGNOSIS — M81.0 OSTEOPOROSIS WITHOUT CURRENT PATHOLOGICAL FRACTURE, UNSPECIFIED OSTEOPOROSIS TYPE: ICD-10-CM

## 2020-01-31 DIAGNOSIS — G35 MULTIPLE SCLEROSIS (H): ICD-10-CM

## 2020-01-31 DIAGNOSIS — R41.89 COGNITIVE IMPAIRMENT: ICD-10-CM

## 2020-01-31 LAB
ALBUMIN UR-MCNC: NEGATIVE MG/DL
APPEARANCE UR: CLEAR
BILIRUB UR QL STRIP: NEGATIVE
COLOR UR AUTO: YELLOW
GLUCOSE UR STRIP-MCNC: NEGATIVE MG/DL
HGB UR QL STRIP: NEGATIVE
KETONES UR STRIP-MCNC: NEGATIVE MG/DL
LEUKOCYTE ESTERASE UR QL STRIP: NEGATIVE
NITRATE UR QL: NEGATIVE
PH UR STRIP: 7 [PH] (ref 5–8)
SP GR UR STRIP: 1.02 (ref 1–1.03)
UROBILINOGEN UR STRIP-ACNC: NORMAL

## 2020-01-31 RX ORDER — DOCUSATE SODIUM 100 MG/1
CAPSULE, LIQUID FILLED ORAL
Qty: 100 CAPSULE | Refills: 10 | Status: SHIPPED
Start: 2020-01-31 | End: 2023-07-06

## 2020-01-31 RX ORDER — LACTULOSE 10 G/15ML
10 SOLUTION ORAL DAILY
Qty: 473 ML | Refills: 10 | Status: SHIPPED
Start: 2020-01-31

## 2020-01-31 ASSESSMENT — PATIENT HEALTH QUESTIONNAIRE - PHQ9: SUM OF ALL RESPONSES TO PHQ QUESTIONS 1-9: 5

## 2020-01-31 ASSESSMENT — MIFFLIN-ST. JEOR: SCORE: 1253.07

## 2020-02-03 ENCOUNTER — HOSPITAL ENCOUNTER (OUTPATIENT)
Dept: REHABILITATION | Facility: CLINIC | Age: 60
End: 2020-02-03
Attending: FAMILY MEDICINE
Payer: MEDICAID

## 2020-02-03 PROCEDURE — S5102 ADULT DAY CARE PER DIEM: HCPCS

## 2020-02-04 ENCOUNTER — HOSPITAL ENCOUNTER (OUTPATIENT)
Dept: REHABILITATION | Facility: CLINIC | Age: 60
End: 2020-02-04
Attending: FAMILY MEDICINE
Payer: MEDICAID

## 2020-02-04 PROCEDURE — S5102 ADULT DAY CARE PER DIEM: HCPCS

## 2020-02-05 ENCOUNTER — HOSPITAL ENCOUNTER (OUTPATIENT)
Dept: REHABILITATION | Facility: CLINIC | Age: 60
End: 2020-02-05
Attending: FAMILY MEDICINE
Payer: MEDICAID

## 2020-02-05 PROCEDURE — S5102 ADULT DAY CARE PER DIEM: HCPCS

## 2020-02-10 ENCOUNTER — HOSPITAL ENCOUNTER (OUTPATIENT)
Dept: REHABILITATION | Facility: CLINIC | Age: 60
End: 2020-02-10
Attending: FAMILY MEDICINE
Payer: MEDICAID

## 2020-02-10 PROCEDURE — S5102 ADULT DAY CARE PER DIEM: HCPCS

## 2020-02-11 ENCOUNTER — HOSPITAL ENCOUNTER (OUTPATIENT)
Dept: REHABILITATION | Facility: CLINIC | Age: 60
End: 2020-02-11
Attending: FAMILY MEDICINE
Payer: MEDICAID

## 2020-02-11 PROCEDURE — S5102 ADULT DAY CARE PER DIEM: HCPCS

## 2020-02-12 ENCOUNTER — HOSPITAL ENCOUNTER (OUTPATIENT)
Dept: REHABILITATION | Facility: CLINIC | Age: 60
End: 2020-02-12
Attending: FAMILY MEDICINE
Payer: MEDICAID

## 2020-02-12 PROCEDURE — S5102 ADULT DAY CARE PER DIEM: HCPCS

## 2020-02-13 ENCOUNTER — RECORDS - HEALTHEAST (OUTPATIENT)
Dept: LAB | Facility: CLINIC | Age: 60
End: 2020-02-13

## 2020-02-13 LAB — VALPROATE SERPL-MCNC: 22.4 UG/ML (ref 50–150)

## 2020-02-17 ENCOUNTER — HOSPITAL ENCOUNTER (OUTPATIENT)
Dept: REHABILITATION | Facility: CLINIC | Age: 60
End: 2020-02-17
Attending: FAMILY MEDICINE
Payer: MEDICAID

## 2020-02-17 PROCEDURE — S5102 ADULT DAY CARE PER DIEM: HCPCS

## 2020-02-18 ENCOUNTER — HOSPITAL ENCOUNTER (OUTPATIENT)
Dept: REHABILITATION | Facility: CLINIC | Age: 60
End: 2020-02-18
Attending: FAMILY MEDICINE
Payer: MEDICAID

## 2020-02-18 PROCEDURE — S5102 ADULT DAY CARE PER DIEM: HCPCS

## 2020-02-19 ENCOUNTER — HOSPITAL ENCOUNTER (OUTPATIENT)
Dept: REHABILITATION | Facility: CLINIC | Age: 60
End: 2020-02-19
Attending: FAMILY MEDICINE
Payer: MEDICAID

## 2020-02-19 PROCEDURE — S5102 ADULT DAY CARE PER DIEM: HCPCS

## 2020-02-19 NOTE — PROGRESS NOTES
MONTHLY PROGRESS NOTE AND PARTICIPATION REPORT   [facility]    Karolyn Das, 1960  Attendance: Please see Epic for attendance record.    Communication:   Does communicate   Hearing:   No impairment  Vision:   Glasses  Orientation/Cognition:   Partial disorientation  Behavior:   Requires redirection  Self-Preservation Skills:   Not capable of self-preservation  Eating:   Assist with set up  Ambulation Walking:   Non-ambulatory  Transferring:   Aide of one person/two  Wheelchair:   Needs help  Toileting:   Needs assistance  Maintenance Program:     [facility]  Living Arrangements:   Lives in assisted living facility  Spiritual Needs: Needs are being met through support group  Medication Assistance:   Medication not taken during program hours  Participation Report:   Aerobics/Exercise  Support Group  Cognitive Stimulation  Fire Drill  Creative Arts/Crafts  Games  Gardening  Speakers/Entertainment  Socialization  Current Events/News  Education/Health Topic  Yoga, meditation, communion, prayer, hand massage, Piano Concert, Improv class; Themed education on the Academy Awards, Cazares's day, Black History Month, Mikey Fisher, and Special Classes (book club, art activity, physical game)  Level of Participation:   Active

## 2020-02-24 ENCOUNTER — HOSPITAL ENCOUNTER (OUTPATIENT)
Dept: REHABILITATION | Facility: CLINIC | Age: 60
End: 2020-02-24
Attending: FAMILY MEDICINE
Payer: MEDICAID

## 2020-02-24 PROCEDURE — S5102 ADULT DAY CARE PER DIEM: HCPCS

## 2020-02-24 NOTE — PROGRESS NOTES
INDIVIDUAL PLAN OF CARE   [facility]    Member Name: Karolyn Das; YOB: 1960  MRN: [id number]  2/19/2020    Goals developed in collaboration with: member  Staff responsible for plan: Mahin Talbert and Suzy LEE/S  1. Long Term Goal (concrete, measurable, and time specific outcomes):  Member will maintain physical and cognitive function through participation of program activities every day of program attendance with minimum assistance from staff for setup and safe transfers every day of program attendance.  Target Date: 8/31/20  Quarterly Review:  Goal remains appropriate. Mirtha would like to continue with this current goal.    2. Short Term Goal: (concrete, measurable, and time specific outcomes):Member will participate in prescribed physical maintenance program of NuStep with staff set up and supervision for safety 3X per week to maintain physical function.   Target Date: 05/31/20  Quarterly Review:  Goal remains appropriate.  Mirtha enjoys her time on the nu step and would like to continue.    3. Short Term Goal: (concrete, measurable, and time specific outcomes): Member will participate in one cognitive stimulation activity such as iPad with staff setup, demonstration, and visual cues as necessary 1X per week.  Target Date: 05/31/20  Quarterly Review:  Goal remains appropriate.

## 2020-02-25 ENCOUNTER — HOSPITAL ENCOUNTER (OUTPATIENT)
Dept: REHABILITATION | Facility: CLINIC | Age: 60
End: 2020-02-25
Attending: FAMILY MEDICINE
Payer: MEDICAID

## 2020-02-25 PROCEDURE — S5102 ADULT DAY CARE PER DIEM: HCPCS

## 2020-03-03 ENCOUNTER — HOSPITAL ENCOUNTER (OUTPATIENT)
Dept: REHABILITATION | Facility: CLINIC | Age: 60
End: 2020-03-03
Attending: FAMILY MEDICINE
Payer: MEDICAID

## 2020-03-03 PROCEDURE — S5102 ADULT DAY CARE PER DIEM: HCPCS

## 2020-03-04 ENCOUNTER — HOSPITAL ENCOUNTER (OUTPATIENT)
Dept: REHABILITATION | Facility: CLINIC | Age: 60
End: 2020-03-04
Attending: FAMILY MEDICINE
Payer: MEDICAID

## 2020-03-04 PROCEDURE — S5102 ADULT DAY CARE PER DIEM: HCPCS

## 2020-03-09 ENCOUNTER — HOSPITAL ENCOUNTER (OUTPATIENT)
Dept: REHABILITATION | Facility: CLINIC | Age: 60
End: 2020-03-09
Attending: FAMILY MEDICINE
Payer: MEDICAID

## 2020-03-09 PROCEDURE — S5102 ADULT DAY CARE PER DIEM: HCPCS

## 2020-03-10 ENCOUNTER — HOSPITAL ENCOUNTER (OUTPATIENT)
Dept: REHABILITATION | Facility: CLINIC | Age: 60
End: 2020-03-10
Attending: FAMILY MEDICINE
Payer: MEDICAID

## 2020-03-10 PROCEDURE — S5102 ADULT DAY CARE PER DIEM: HCPCS

## 2020-03-11 ENCOUNTER — HOSPITAL ENCOUNTER (OUTPATIENT)
Dept: REHABILITATION | Facility: CLINIC | Age: 60
End: 2020-03-11
Attending: FAMILY MEDICINE
Payer: MEDICAID

## 2020-03-11 PROCEDURE — S5102 ADULT DAY CARE PER DIEM: HCPCS

## 2020-03-12 NOTE — PROGRESS NOTES
MONTHLY PROGRESS NOTE AND PARTICIPATION REPORT   [facility]    Karolyn Das, 1960  Attendance: Please see Epic for attendance record.    Communication:   Does communicate   Hearing:   No impairment  Vision:   Glasses  Orientation/Cognition:   Partial disorientation  Behavior:   Requires redirection  Self-Preservation Skills:   Not capable of self-preservation  Eating:   Assist with set up  Ambulation Walking:   Non-ambulatory  Transferring:   Aide of one person/two  Wheelchair:   Needs help  Toileting:   Needs assistance  Maintenance Program:     Nu[employer]  Living Arrangements:   Lives in assisted living facility  Spiritual Needs: Needs are being met through support group  Medication Assistance:   Medication not taken during program hours  Participation Report:   Aerobics/Exercise  Support Group  Cognitive Stimulation  Fire Drill  Creative Arts/Crafts  Games  Gardening  Speakers/Entertainment  Socialization  Current Events/News  Education/Health Topic  Meditation, Prayer, Communion, Jesus Chi, Hand Massage, Improv class, Cello Meditation, Piano Concert; Themed education on World Wildlife Week, Women's History Month, Happiness, and [employer] Fencing.  Level of Participation:   Karla Parr responds well to routine and prefers to use the NuStep right away when she arrives at the center.

## 2020-04-03 ENCOUNTER — RECORDS - HEALTHEAST (OUTPATIENT)
Dept: LAB | Facility: CLINIC | Age: 60
End: 2020-04-03

## 2020-04-03 LAB
ALBUMIN UR-MCNC: NEGATIVE MG/DL
APPEARANCE UR: CLEAR
BILIRUB UR QL STRIP: NEGATIVE
COLOR UR AUTO: YELLOW
GLUCOSE UR STRIP-MCNC: NEGATIVE MG/DL
HGB UR QL STRIP: NEGATIVE
KETONES UR STRIP-MCNC: NEGATIVE MG/DL
LEUKOCYTE ESTERASE UR QL STRIP: NEGATIVE
NITRATE UR QL: NEGATIVE
PH UR STRIP: 6.5 [PH] (ref 4.5–8)
SP GR UR STRIP: 1.01 (ref 1–1.03)
UROBILINOGEN UR STRIP-ACNC: NORMAL

## 2020-04-04 LAB — BACTERIA SPEC CULT: NORMAL

## 2020-04-14 NOTE — PROGRESS NOTES
I spoke with patient's emergency contact, Rani today.  She gave me patient's direct # at her assisted living facility - [phone].  I called the patient, who is doing well and has everything she needs.  She is listening to audio books, watching movies, and riding her exercise bike.
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
## 2020-04-28 NOTE — PROGRESS NOTES
I talked with patient today.  She is doing well at her assisted living facility; passing the time riding her stationary bike, reading books and doing pencil sketches.
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
## 2020-06-18 NOTE — PROGRESS NOTES
MONTHLY PROGRESS NOTE AND PARTICIPATION REPORT   [facility]    Karolyn Das, 1960  Attendance: Please see Epic for attendance record.    Unable to complete review at this time due to the COVID-19 pandemic.  Will continue to complete reviews once the day program has resumed services.  At this time, the member is not participating in alternative adult day services

## 2020-06-18 NOTE — PROGRESS NOTES
Unable to complete review at this time due to the COVID-19 pandemic.  Will continue to complete reviews once the day program has resumed services.  At this time, the member is not participating in alternative adult day services        INDIVIDUAL PLAN OF CARE   [facility]    Member Name: Karolyn Das; YOB: 1960  MRN: [id number]  2/19/2020    Goals developed in collaboration with: member  Staff responsible for plan: Mahin Talbert and Suzy LEE/S  1. Long Term Goal (concrete, measurable, and time specific outcomes):  Member will maintain physical and cognitive function through participation of program activities every day of program attendance with minimum assistance from staff for setup and safe transfers every day of program attendance.  Target Date: 8/31/20  Quarterly Review:  Goal remains appropriate. Mirtha would like to continue with this current goal.    2. Short Term Goal: (concrete, measurable, and time specific outcomes):Member will participate in prescribed physical maintenance program of NuStep with staff set up and supervision for safety 3X per week to maintain physical function.   Target Date: 05/31/20  Quarterly Review:  Goal remains appropriate.  Mirtha enjoys her time on the nu step and would like to continue.    3. Short Term Goal: (concrete, measurable, and time specific outcomes): Member will participate in one cognitive stimulation activity such as iPad with staff setup, demonstration, and visual cues as necessary 1X per week.  Target Date: 05/31/20  Quarterly Review:  Goal remains appropriate.

## 2020-06-18 NOTE — PROGRESS NOTES
Unable to complete review at this time due to the COVID-19 pandemic.  Will continue to complete reviews once the day program has resumed services.  At this time, the member is not participating in alternative adult day services  [facility] Social History    Full Name: Karolyn Das      MRN: [id number]    Date of Birth: [birth year]  Nickname:      Sex: F     Home Phone: [phone]/ [phone]      Cell Phone: None  Address: [address]/Zip: [place] [address]  County: [place]      E-mail: None        Transportation: [place]  Language: English         needed? No       Ethnicity: American  Race: White      Country of Origin: [place]       Latter-day: Yazidism  Marital Status:                   Spouse/Significant Other: POA:  Jane Martinez  ______________________________________________________________________________________     Independence? No    Branch of Service: NA      Education Level:  studies @[employer]  Job History: [employer]       Organizations/Clubs: None  Whom do you live with? Assisted Living  Current living arrangement:   Assisted Living  Number of Children: 2  List: Rohit Serna  Number of Siblings: 3     List:  Deena, Marion and Jonathan  Other Important People/Pets: Best friend (lives in  [place])  What else should we know about you? Youth counseling was and is very important to her    Primary Care Provider: Vicente Elizabeth        Neurologist: Dr. Kebede  Emergency Contacts:  1. Kareem Pippa      Relationship: Son    Phone: [phone]  2. Jo Das         Relationship: Mother-in-Law     Phone: [phone]        Updated on: 1/25/17             Updated on: 7/30/18              Updated on: 7/18/19

## 2020-06-23 ENCOUNTER — RECORDS - HEALTHEAST (OUTPATIENT)
Dept: LAB | Facility: CLINIC | Age: 60
End: 2020-06-23

## 2020-06-25 ENCOUNTER — COMMUNICATION - HEALTHEAST (OUTPATIENT)
Dept: FAMILY MEDICINE | Facility: CLINIC | Age: 60
End: 2020-06-25

## 2020-06-25 LAB — BACTERIA SPEC CULT: NORMAL

## 2020-07-23 ENCOUNTER — HOSPITAL ENCOUNTER (OUTPATIENT)
Dept: REHABILITATION | Facility: CLINIC | Age: 60
End: 2020-07-23
Attending: FAMILY MEDICINE
Payer: MEDICAID

## 2020-07-23 PROCEDURE — S5100 ADULT DAYCARE SERVICES 15MIN: HCPCS | Performed by: OCCUPATIONAL THERAPIST

## 2020-07-23 NOTE — PROGRESS NOTES
INDIVIDUAL PLAN OF CARE   [facility]    Member Name: Karolyn Das; YOB: 1960  MRN: [id number]  2/19/2020    Goals developed in collaboration with: member  Staff responsible for plan: Mulu Nolan and day program staff  1. Long Term Goal (concrete, measurable, and time specific outcomes):  Member will maintain physical and cognitive function through participation of program activities every day of adult day program attendance  Target Date:  End of COVID-19 crisis    2. Short Term Goal: (concrete, measurable, and time specific outcomes):Member will participate in prescribed physical maintenance program virtually as able for UE strength, endurance and ROM.   Target Date:  End of COVID-19 crisis      3. Short Term Goal: (concrete, measurable, and time specific outcomes): Member will participate in one cognitive stimulation activity during alternative adult day in order to work on executive thinking skills.   Target Date:  End of COVID-19 crisis

## 2020-07-23 NOTE — PROGRESS NOTES
"Start time:  11:04 am                    End time:  11:30pm  Patient has given verbal consent for Video visit?  Yes    Originating Location (pt location): Patient's room  Additional Participants in Remote Visit: No  Distant Location (provider location): [facility]  Mode of Communication (Audio Visual or Audio Only):  Audio only  Person s response or participation level: Happy to be connecting with day program staff.   Service Provided:  Wellness Check      Medication: no change she is aware of    Meals/Food: no concerns    Home Nursing: no concerns    ADL needs/Bathing: no concerns    Homemaking tasks: no concerns    Recent Falls: not that she is aware of    Change in health condition: not that she is aware of  Goals     Reviewed goals and update for alternative adult day.   Socialization      Check-in- orientation- Member is doing really well.  She stated she has been busy but not really sure what she has been doing to stay busy.  She has enjoyed a book on tape \"Reyna.\"      She looks forward to having her friend come visit her every Thursday.      Cognitive Game: Played Family Feud with her for executive thinking skills and socialization.      She was in agreement with meeting again next week.    "
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
## 2020-07-23 NOTE — PROGRESS NOTES
[facility] Social History    Full Name: Karolyn Das      MRN: [id number]    Date of Birth: [birth year]  Nickname:      Sex: F     Home Phone:   Cell Phone:  [phone]  Address:    [address] E-mail:     City/State/Zip: [place] [address]    County: [place]      E-mail: None        Transportation: [place]  Language: English         needed? No       Ethnicity: American  Race: White      Country of Origin: [place]       Uatsdin: Yazidi  Marital Status:                   Spouse/Significant Other: POA:  Jane Funkdoramary  ______________________________________________________________________________________     ? No    Branch of Service: NA      Education Level:  studies @[employer]  Job History: [employer]       Organizations/Clubs: None  Whom do you live with? Assisted Living  Current living arrangement:   Assisted Living  Number of Children: 2  List: Rohit Serna  Number of Siblings: 3     List:  Deena, Marion and Jonathan  Other Important People/Pets: Best friend (lives in [facility])  What else should we know about you? Youth counseling was and is very important to her    Primary Care Provider: Vicente Elizabeth        Neurologist: Dr. Kebede  Emergency Contacts:  1. Kareem Das      Relationship: Son    Phone: [phone]  2. Jo Das         Relationship: Mother-in-Law     Phone: [phone]        Updated on: 1/25/17             Updated on: 7/30/18              Updated on: 7/18/19  Updated on 7/23/19

## 2020-07-27 ENCOUNTER — HOSPITAL ENCOUNTER (OUTPATIENT)
Dept: REHABILITATION | Facility: CLINIC | Age: 60
End: 2020-07-27
Attending: FAMILY MEDICINE
Payer: MEDICAID

## 2020-07-27 PROCEDURE — S5100 ADULT DAYCARE SERVICES 15MIN: HCPCS | Performed by: OCCUPATIONAL THERAPIST

## 2020-07-28 NOTE — PROGRESS NOTES
Start Time:  4:05pm          End Time 4:35pm    Verbal Consent - yes.    Originating Location: Member's assisted living facility.    Distant Location: [facility].    Mode of Communication: Audio.    Person's response - welcoming of visit.    Service Provided - Wellness Check.    All ADLS are met by assisted living staff; however, they are still allowing her to transfer to the toilet by herself, which is different from our day program policy of assist of 1.  She stated that she is grieving the loss of her sister-in-law, whom she reports  of COVID 19 recently in the [facility] area.  She is receiving pastoral visits from the assisted living facility's staff , listening to the bible in audio format, and calling relatives and friends for comfort.  She stated that she has stopped watching the news because it makes her upset and anxious. She has continued to listen to more audiobooks.  She asked me for a suggestion, and I referred her to the new book,   Yes to Life in Spite of Everything, posthumously published by the family of Eldon Marcano.  Since she has a master's degree in theology, I thought she would appreciate this particular work of positivity during challenging times.    Goals -  Listen to more books.  Get a second opinion from a doctor regarding ongoing lower back pain.  Process grief and loss related to her sister-in-law's death.    Exercise -   Work with assisted living staff on stretching exercises.    Socialization -   Continue to reach out to family and friends on the phone, since she must currently remain in her room at the care facility.

## 2020-07-29 ENCOUNTER — COMMUNICATION - HEALTHEAST (OUTPATIENT)
Dept: MAMMOGRAPHY | Facility: CLINIC | Age: 60
End: 2020-07-29

## 2020-08-05 ENCOUNTER — HOSPITAL ENCOUNTER (OUTPATIENT)
Dept: REHABILITATION | Facility: CLINIC | Age: 60
End: 2020-08-05
Attending: FAMILY MEDICINE
Payer: MEDICAID

## 2020-08-05 PROCEDURE — S5100 ADULT DAYCARE SERVICES 15MIN: HCPCS

## 2020-08-05 NOTE — PROGRESS NOTES
"Start time: 11:48am         End time: 12:28pm  Patient has given verbal consent for video visit? Yes  Originating location (pt location): assisted living facility  Additional participants in remote visit: none  Distant location (provider location): [facility]  Mode of communication (Audio Visual or Audio only): audio  Person's response or participation level:active    Service Provided:  Wellness Check:   Medication:same   Meals/Food: provided by care center   Home Nursing: provided by staff   ADL needs/Bathing: provided by staff   Homemaking tasks: provided by staff   Recent falls: none reported   Change in health condition: none  Is there any safety concerns at this time: no  Current mental health status: positive    Exercise: patient rides her stationary bike.    Socialization:    Check-in: We talked about the friends she has had phone conversations with.  We discussed excerpts from a book entitled, \"Great Mark Talks.\" We learned about the concept of a mental snooze button; pausing our creative ideas because we are afraid of failure and change.  We discussed how she has been courageous and willing to change in the past, and what she wants to do in the future to challenge herself.    Cognition:  unchanged           "

## 2020-08-12 ENCOUNTER — HOSPITAL ENCOUNTER (OUTPATIENT)
Dept: REHABILITATION | Facility: CLINIC | Age: 60
End: 2020-08-12
Attending: FAMILY MEDICINE
Payer: MEDICAID

## 2020-08-12 PROCEDURE — S5100 ADULT DAYCARE SERVICES 15MIN: HCPCS

## 2020-08-12 NOTE — PROGRESS NOTES
Start time: 12:00 noon          End time: 1:00pm  Patient has given verbal consent for video visit? Yes  Originating location (pt location): patient's assisted living facility  Additional participants in remote visit: none  Distant location (provider location): [facility]  Mode of communication (Audio Visual or Audio only): audio  Person's response or participation level: active    Service Provided:  Wellness Check:   Medication:no change   Meals/Food: prepared by care facility staff   Home Nursing: on staff   ADL needs/Bathing: done by staff   Homemaking tasks: done by staff   Recent falls: none reported   Change in health condition: no  Is there any safety concerns at this time: no  Current mental health status: good    Exercise: patient rides the stationary bike in her room    Socialization:    Check-in: Patient enjoyed getting a monthly mailing from her [facility].   Her roommate, Delores, who also has cognitive impairment, was found to have been putting leftover food from the room-delivered meals between her bed frame and mattress for several days.  The food was rotting and oozing out of the bed onto the carpet. Cleaning staff removed all of the food, put new sheets on the bed and cleaned the carpet.    Cognition:  Same as before stay at home order

## 2020-08-19 ENCOUNTER — HOSPITAL ENCOUNTER (OUTPATIENT)
Dept: REHABILITATION | Facility: CLINIC | Age: 60
End: 2020-08-19
Attending: FAMILY MEDICINE
Payer: MEDICAID

## 2020-08-19 PROCEDURE — S5100 ADULT DAYCARE SERVICES 15MIN: HCPCS

## 2020-08-26 PROCEDURE — S5100 ADULT DAYCARE SERVICES 15MIN: HCPCS

## 2020-08-27 ENCOUNTER — HOSPITAL ENCOUNTER (OUTPATIENT)
Dept: REHABILITATION | Facility: CLINIC | Age: 60
End: 2020-08-26
Attending: FAMILY MEDICINE
Payer: MEDICAID

## 2020-08-27 NOTE — PROGRESS NOTES
Start time: 5:23pm         End time: 5:35pm  Patient has given verbal consent for video visit? Yes  Originating location (pt location):patient's assisted living facility  Additional participants in remote visit: none  Distant location (provider location): [facility] center  Mode of communication (Audio Visual or Audio only): audio  Person's response or participation level: active    Service Provided:  Wellness Check:   Medication: no change   Meals/Food: provided by staff   Home Nursing: on staff   ADL needs/Bathing: assisted by staff   Homemaking tasks: done by staff   Recent falls: no   Change in health condition: none  Is there any safety concerns at this time: no  Current mental health status: she says she is bored and tired of being on lock down.    Exercise: she rides her stationary bike.    Socialization:    Check-in: she calls relatives and friends    Cognition:  Same as before stay at home order    She said her favorite music is jazz.  I played her some Paula Cooper Gato and Suzy Phelann music to cheer her up.

## 2020-09-02 ENCOUNTER — HOSPITAL ENCOUNTER (OUTPATIENT)
Dept: REHABILITATION | Facility: CLINIC | Age: 60
End: 2020-09-02
Attending: FAMILY MEDICINE
Payer: MEDICAID

## 2020-09-02 PROCEDURE — S5100 ADULT DAYCARE SERVICES 15MIN: HCPCS

## 2020-09-02 NOTE — PROGRESS NOTES
Start time:         End time:   There were two visits today because the patient left the first visit to take a shower and get some personal cares.    The first visit was from 12:23pm to 12:30pm  The second visit was from 4:55pm to 5:17pm    Patient has given verbal consent for video visit? Yes  Originating location (pt location): patient's assisted living facility  Additional participants in remote visit: none  Distant location (provider location): [facility] center  Mode of communication (Audio Visual or Audio only): audio  Person's response or participation level: active  Service Provided:  Wellness Check:   Medication: no change   Meals/Food: prepared by staff   Home Nursing: on staff   ADL needs/Bathing: She gets two baths per week, assisted by staff   Homemaking tasks: done by staff   Recent falls: none   Change in health condition: none  Is there any safety concerns at this time: none  Current mental health status: good    Exercise: still riding stationary bike    Socialization:    Check-in: She recently read a historical fiction book set in [place] during WWII.    Cognition:  No change since stay at home order.

## 2020-09-16 ENCOUNTER — HOSPITAL ENCOUNTER (OUTPATIENT)
Dept: REHABILITATION | Facility: CLINIC | Age: 60
End: 2020-09-16
Attending: FAMILY MEDICINE
Payer: MEDICAID

## 2020-09-16 PROCEDURE — S5100 ADULT DAYCARE SERVICES 15MIN: HCPCS

## 2020-09-16 NOTE — PROGRESS NOTES
"Start time:  1:17pm       End time: 2:00pm    Patient has given verbal consent for video visit? Yes  Originating location (pt location): Patient's home   Additional participants in remote visit: None  Distant location (provider location): [facility]  Mode of communication (Audio Visual or Audio only): Audio-only  Person's response or participation level: Client was active in conversation and participation throughout the session.  Service Provided:  Wellness Check:   Medication: No changes to report   Meals/Food: No issues, staff prepares meals and client reports that the food she is given is tasty but she wishes they wouldn't serve                       her so much of it.   Home Nursing: On staff at [facility]   ADL needs/Bathing: Baths 2x/week with assistance from staff, client reports she got a shower today   Homemaking tasks: Staff manages   Recent falls: None.   Change in health condition: Client reports that she thinks she may have a yeast infection and \"likes being where I am but I'm not               sure if I'm in the right place\" in regards to her orientation. We discussed her concerns and client wrote                herself a note over the phone to bring it up with her friend and POA that visits tomorrow.  Is there any safety concerns at this time: No  Current mental health status:     Exercise: Client reports that she is riding the stationary bike at her home and enjoys the exercise    Socialization:    Check-in: Chatted with client about her becoming a grandmother for the first time, books she has to read in her room, and her desire to swim.    Cognition:  Client participated in \"[employer]\" [employer] game (1st level questions were best) and answered 5 questions.           "

## 2020-09-23 ENCOUNTER — HOSPITAL ENCOUNTER (OUTPATIENT)
Dept: REHABILITATION | Facility: CLINIC | Age: 60
End: 2020-09-23
Attending: FAMILY MEDICINE
Payer: MEDICAID

## 2020-09-23 PROCEDURE — S5100 ADULT DAYCARE SERVICES 15MIN: HCPCS

## 2020-09-23 NOTE — PROGRESS NOTES
"Start time:  1:04pm       End time: 2:00pm    Patient has given verbal consent for video visit? Yes  Originating location (pt location): Patient's room in her correction  Additional participants in remote visit: None  Distant location (provider location): [facility]  Mode of communication (Audio Visual or Audio only): Audio-only  Person's response or participation level: Client was active in conversation and responded pleasantly to questions and conservation.    Service Provided:  Wellness Check:   Medication: No changes to report   Meals/Food: No changes, staff prepares and client reports it is plenty.   Home Nursing: On staff   ADL needs/Bathing: Baths 2x/week, and client reported she got one today. Client reports her toe nails need to be clipped.    Homemaking tasks: Staff manages and housekeeping came in during phone call. Client reported she does a \"really good job\".Client reports that she will be moving down a floor in her building and she thinks it will \"be a good change\".   Recent falls: No falls to report.   Change in health condition: Client reports that her yeast infection is being managed by staff. Client reported that her \"toes are messed up\" and that it's painful in certain positions but she keeps forgetting to ask. Rehab tech offered to have staff contact  to aid memory.  Is there any safety concerns at this time: Client reports she feels \"safe and taken care of\".  Current mental health status: \"I'm doing pretty well.\"    Exercise: Client reports that she rides the stationary bike in her building during the week, and hopes to do so today.     Socialization:    Check-in: Chatted with client about her day so far, health and wellness check-in, weather and going outside for fresh air to enjoy it, first grandchild on the way, and travel DVD set her friend sent.     Cognition:  Client read information about her new travel DVD set from the case upon request and we discussed the countries and " "her related travel memories. Rehab tech interspersed trivia related geography/history throughout to engage cognition, graded down to two possible answers or \"over/under\" depending on challenge level and client's response.          "

## 2020-10-02 ENCOUNTER — HOSPITAL ENCOUNTER (OUTPATIENT)
Dept: REHABILITATION | Facility: CLINIC | Age: 60
End: 2020-10-02
Attending: FAMILY MEDICINE
Payer: MEDICAID

## 2020-10-02 PROCEDURE — S5100 ADULT DAYCARE SERVICES 15MIN: HCPCS

## 2020-10-02 NOTE — PROGRESS NOTES
Start time: 11:45 am            End time: 12:00 pm  Patient has given verbal consent for video visit? Yes  Originating location (pt location):  Her room at her [facility]  Additional participants in remote visit: She had a friend visiting  Distant location (provider location): [facility]  Mode of communication (Audio Visual or Audio only): Audio only  Person's response or participation level:  Pleasantly engaged.  She stated she is happy.    Service Provided:  Wellness Check:   Medication:  Administered by staff   Meals/Food: Provided by [facility]   Home Nursing: Provided by [facility] staff   ADL needs/Bathing:  Assist provided by staff   Homemaking tasks: Assist provided by staff   Recent falls: None reported   Change in health condition: None reported  Is there any safety concerns at this time: None reported  Current mental health status: Karolyn stated she is very happy with her new room and she is feeling well.    Exercise:  Karolyn utilizes an exercise bike in her [facility]    Socialization:    Check-in:   Short conversation about what she was currently doing?  She and a friend were just settling down after lunch to watch a [employer] travel video on [place].  Karolyn reported that she loved to travel and she really enjoys these videos.  SPENCER engaged Karolyn in short conversation of using [employer]'s tips to travel throughout [facility].  Conversation ended early so that she and her friend could watch the video.      Cognition:  No noticeable changes in cognition.  Karolyn enjoys conversation on topics meaningful to her.

## 2020-10-07 ENCOUNTER — HOSPITAL ENCOUNTER (OUTPATIENT)
Dept: REHABILITATION | Facility: CLINIC | Age: 60
End: 2020-10-07
Attending: FAMILY MEDICINE
Payer: MEDICAID

## 2020-10-09 ENCOUNTER — HOSPITAL ENCOUNTER (OUTPATIENT)
Dept: REHABILITATION | Facility: CLINIC | Age: 60
End: 2020-10-09
Attending: FAMILY MEDICINE
Payer: MEDICAID

## 2020-10-09 PROCEDURE — S5100 ADULT DAYCARE SERVICES 15MIN: HCPCS

## 2020-10-14 ENCOUNTER — HOSPITAL ENCOUNTER (OUTPATIENT)
Dept: REHABILITATION | Facility: CLINIC | Age: 60
End: 2020-10-14
Attending: FAMILY MEDICINE
Payer: MEDICAID

## 2020-10-20 ENCOUNTER — RECORDS - HEALTHEAST (OUTPATIENT)
Dept: LAB | Facility: CLINIC | Age: 60
End: 2020-10-20

## 2020-10-21 ENCOUNTER — RECORDS - HEALTHEAST (OUTPATIENT)
Dept: LAB | Facility: CLINIC | Age: 60
End: 2020-10-21

## 2020-10-21 LAB
ALBUMIN UR-MCNC: NEGATIVE MG/DL
ANION GAP SERPL CALCULATED.3IONS-SCNC: 8 MMOL/L (ref 5–18)
APPEARANCE UR: ABNORMAL
BACTERIA #/AREA URNS HPF: ABNORMAL HPF
BASOPHILS # BLD AUTO: 0 THOU/UL (ref 0–0.2)
BASOPHILS NFR BLD AUTO: 1 % (ref 0–2)
BILIRUB UR QL STRIP: NEGATIVE
BUN SERPL-MCNC: 13 MG/DL (ref 8–22)
CALCIUM SERPL-MCNC: 9.3 MG/DL (ref 8.5–10.5)
CHLORIDE BLD-SCNC: 108 MMOL/L (ref 98–107)
CO2 SERPL-SCNC: 27 MMOL/L (ref 22–31)
COLOR UR AUTO: YELLOW
CREAT SERPL-MCNC: 0.79 MG/DL (ref 0.6–1.1)
EOSINOPHIL # BLD AUTO: 0.1 THOU/UL (ref 0–0.4)
EOSINOPHIL NFR BLD AUTO: 1 % (ref 0–6)
ERYTHROCYTE [DISTWIDTH] IN BLOOD BY AUTOMATED COUNT: 12.8 % (ref 11–14.5)
GFR SERPL CREATININE-BSD FRML MDRD: >60 ML/MIN/1.73M2
GLUCOSE BLD-MCNC: 68 MG/DL (ref 70–125)
GLUCOSE UR STRIP-MCNC: NEGATIVE MG/DL
HCT VFR BLD AUTO: 42.6 % (ref 35–47)
HGB BLD-MCNC: 13.5 G/DL (ref 12–16)
HGB UR QL STRIP: NEGATIVE
IMM GRANULOCYTES # BLD: 0 THOU/UL
IMM GRANULOCYTES NFR BLD: 0 %
KETONES UR STRIP-MCNC: NEGATIVE MG/DL
LEUKOCYTE ESTERASE UR QL STRIP: ABNORMAL
LYMPHOCYTES # BLD AUTO: 1.5 THOU/UL (ref 0.8–4.4)
LYMPHOCYTES NFR BLD AUTO: 18 % (ref 20–40)
MCH RBC QN AUTO: 29.8 PG (ref 27–34)
MCHC RBC AUTO-ENTMCNC: 31.7 G/DL (ref 32–36)
MCV RBC AUTO: 94 FL (ref 80–100)
MONOCYTES # BLD AUTO: 0.7 THOU/UL (ref 0–0.9)
MONOCYTES NFR BLD AUTO: 8 % (ref 2–10)
MUCOUS THREADS #/AREA URNS LPF: ABNORMAL LPF
NEUTROPHILS # BLD AUTO: 6.1 THOU/UL (ref 2–7.7)
NEUTROPHILS NFR BLD AUTO: 73 % (ref 50–70)
NITRATE UR QL: NEGATIVE
PH UR STRIP: 6.5 [PH] (ref 4.5–8)
PLATELET # BLD AUTO: 174 THOU/UL (ref 140–440)
PMV BLD AUTO: 11.8 FL (ref 8.5–12.5)
POTASSIUM BLD-SCNC: 4.9 MMOL/L (ref 3.5–5)
RBC # BLD AUTO: 4.53 MILL/UL (ref 3.8–5.4)
RBC #/AREA URNS AUTO: ABNORMAL HPF
SODIUM SERPL-SCNC: 143 MMOL/L (ref 136–145)
SP GR UR STRIP: 1.01 (ref 1–1.03)
SQUAMOUS #/AREA URNS AUTO: ABNORMAL LPF
T4 TOTAL - HISTORICAL: 9.1 UG/DL (ref 4.5–13)
TRANS CELLS #/AREA URNS HPF: ABNORMAL LPF
TSH SERPL DL<=0.005 MIU/L-ACNC: 0.33 UIU/ML (ref 0.3–5)
UROBILINOGEN UR STRIP-ACNC: ABNORMAL
WBC #/AREA URNS AUTO: ABNORMAL HPF
WBC CLUMPS #/AREA URNS HPF: PRESENT /[HPF]
WBC: 8.4 THOU/UL (ref 4–11)

## 2020-10-22 LAB — BACTERIA SPEC CULT: NORMAL

## 2020-10-29 ENCOUNTER — HOSPITAL ENCOUNTER (OUTPATIENT)
Dept: REHABILITATION | Facility: CLINIC | Age: 60
End: 2020-10-29
Attending: FAMILY MEDICINE
Payer: MEDICAID

## 2020-10-29 PROCEDURE — S5100 ADULT DAYCARE SERVICES 15MIN: HCPCS

## 2020-10-29 NOTE — PROGRESS NOTES
Start time: 3:02pm         End time:  3:57  Patient has given verbal consent for video visit? Yes  Originating location (pt location):patient's assisted living facility.  Additional participants in remote visit: none  Distant location (provider location): [facility]  Mode of communication (Audio Visual or Audio only): audio  Person's response or participation level: active    Service Provided:  Wellness Check:   Medication:She just finished a course of antibiotic treatment for a UTI.   Meals/Food: prepared by staff.   Home Nursing: on staff   ADL needs/Bathing: Two showers per week, assisted by staff   Homemaking tasks:    Recent falls: none   Change in health condition: no change  Is there any safety concerns at this time: none  Current mental health status: good    Exercise: she rides her stationary bike    Socialization:    Check-in: she participates in therapeutic recreation activities at her care center.    Cognition:  Same as before stay at home order.

## 2020-11-11 ENCOUNTER — HOSPITAL ENCOUNTER (OUTPATIENT)
Dept: REHABILITATION | Facility: CLINIC | Age: 60
End: 2020-11-11
Attending: FAMILY MEDICINE
Payer: MEDICAID

## 2021-01-04 ENCOUNTER — HOSPITAL ENCOUNTER (OUTPATIENT)
Dept: REHABILITATION | Facility: CLINIC | Age: 61
End: 2021-01-04
Attending: FAMILY MEDICINE
Payer: MEDICAID

## 2021-01-04 PROCEDURE — S5100 ADULT DAYCARE SERVICES 15MIN: HCPCS

## 2021-01-04 NOTE — PROGRESS NOTES
Start time: 10:40am            End time: 11:05am  Patient has given verbal consent for video visit? Yes  Originating location (pt location): patient's assisted living facility  Additional participants in remote visit: none  Distant location (provider location): [facility]  Mode of communication (Audio Visual or Audio only): audio  Person's response or participation level: active    Service Provided:  Wellness Check:   Medication: no change   Meals/Food:  Prepared by staff   Home Nursing: on staff   ADL needs/Bathing: done by staff   Homemaking tasks: done by staff   Recent falls: none   Change in health condition: none  Is there any safety concerns at this time: none  Current mental health status: good    Exercise: She rides her stationary bike.    Socialization:    Check-in: She calls friends and family.    Cognition:  She currently resides in the memory care wing of the care center.  She was in the the independent wing of the care center initially.

## 2021-01-25 ENCOUNTER — HOSPITAL ENCOUNTER (OUTPATIENT)
Dept: REHABILITATION | Facility: CLINIC | Age: 61
End: 2021-01-25
Attending: FAMILY MEDICINE
Payer: MEDICAID

## 2021-01-25 PROCEDURE — S5100 ADULT DAYCARE SERVICES 15MIN: HCPCS
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
## 2021-03-02 ENCOUNTER — RECORDS - HEALTHEAST (OUTPATIENT)
Dept: ADMINISTRATIVE | Facility: OTHER | Age: 61
End: 2021-03-02

## 2021-03-17 ENCOUNTER — HOSPITAL ENCOUNTER (OUTPATIENT)
Dept: REHABILITATION | Facility: CLINIC | Age: 61
End: 2021-03-17
Attending: FAMILY MEDICINE
Payer: MEDICAID

## 2021-03-17 PROCEDURE — S5100 ADULT DAYCARE SERVICES 15MIN: HCPCS

## 2021-03-17 NOTE — PROGRESS NOTES
Start time: 4:42pm          End time: 5:08pm  Patient has given verbal consent for video visit? Yes  Originating location (pt location): patient's assisted living community  Additional participants in remote visit: Patient's cook in the kitchen of her assisted living facility, where patient was observing dinner preparation.  Distant location (provider location): [facility]  Mode of communication (Audio Visual or Audio only): audio  Person's response or participation level: active    Service Provided:  Wellness Check:   Medication: no change   Meals/Food: prepared by assisted living staff   Home Nursing: on staff   ADL needs/Bathing: three showers per week, with staff assistance   Homemaking tasks: done by staff   Recent falls: several in the past week when transferring from bed to wheelchair.  Patient is getting a new bed.   Change in health condition: none  Is there any safety concerns at this time: not after transferring problem is fully addressed.  Current mental health status: she said average    Exercise: she rides her new step    Socialization:    Check-in:  She calls Kristyn    Cognition:  Her cognition has further declined.

## 2021-03-23 ENCOUNTER — RECORDS - HEALTHEAST (OUTPATIENT)
Dept: LAB | Facility: CLINIC | Age: 61
End: 2021-03-23

## 2021-03-24 ENCOUNTER — HOSPITAL ENCOUNTER (OUTPATIENT)
Dept: REHABILITATION | Facility: CLINIC | Age: 61
End: 2021-03-24
Attending: FAMILY MEDICINE
Payer: MEDICAID

## 2021-03-24 LAB
ANION GAP SERPL CALCULATED.3IONS-SCNC: 12 MMOL/L (ref 5–18)
BUN SERPL-MCNC: 16 MG/DL (ref 8–22)
CALCIUM SERPL-MCNC: 9.5 MG/DL (ref 8.5–10.5)
CHLORIDE BLD-SCNC: 106 MMOL/L (ref 98–107)
CO2 SERPL-SCNC: 26 MMOL/L (ref 22–31)
CREAT SERPL-MCNC: 0.75 MG/DL (ref 0.6–1.1)
ERYTHROCYTE [DISTWIDTH] IN BLOOD BY AUTOMATED COUNT: 14.1 % (ref 11–14.5)
GFR SERPL CREATININE-BSD FRML MDRD: >60 ML/MIN/1.73M2
GLUCOSE BLD-MCNC: 51 MG/DL (ref 70–125)
HCT VFR BLD AUTO: 42.5 % (ref 35–47)
HGB BLD-MCNC: 13.1 G/DL (ref 12–16)
MCH RBC QN AUTO: 28.9 PG (ref 27–34)
MCHC RBC AUTO-ENTMCNC: 30.8 G/DL (ref 32–36)
MCV RBC AUTO: 94 FL (ref 80–100)
PLATELET # BLD AUTO: 182 THOU/UL (ref 140–440)
PMV BLD AUTO: 12.9 FL (ref 8.5–12.5)
POTASSIUM BLD-SCNC: 4.3 MMOL/L (ref 3.5–5)
RBC # BLD AUTO: 4.53 MILL/UL (ref 3.8–5.4)
SODIUM SERPL-SCNC: 144 MMOL/L (ref 136–145)
WBC: 6.4 THOU/UL (ref 4–11)

## 2021-03-24 PROCEDURE — S5100 ADULT DAYCARE SERVICES 15MIN: HCPCS

## 2021-03-24 NOTE — PROGRESS NOTES
Start time: 11:32am End time: 12:29pm  Patient has given verbal consent for video visit? Yes  Originating location (pt location): patient's assisted living center, memory care unit  Additional participants in remote visit: assisted living center CNA  Distant location (provider location): [facility] center  Mode of communication (Audio Visual or Audio only): audio  Person's response or participation level: active    Service Provided:  Wellness Check:   Medication: no change   Meals/Food: prepared by assisted living staff.   Home Nursing: on staff   ADL needs/Bathing: one shower per week, with PCA assistance   Homemaking tasks: done by staff   Recent falls: none in the past seven days   Change in health condition: none  Is there any safety concerns at this time: no  Current mental health status: ok    Exercise: her stationary bicycle broke. She is doing light stretching and using her four-wheeled walker in her room.    Socialization:    Check-in: she socializes with her care center staff; also friends and family on the phone.    Cognition: She has significant memory loss due to her MS progression.  As previously stated, she lives in the memory care wing of her care center.

## 2021-03-26 ENCOUNTER — HOSPITAL ENCOUNTER (OUTPATIENT)
Dept: MAMMOGRAPHY | Facility: CLINIC | Age: 61
Discharge: HOME | End: 2021-03-26
Attending: FAMILY MEDICINE

## 2021-03-26 ENCOUNTER — COMMUNICATION - HEALTHEAST (OUTPATIENT)
Dept: FAMILY MEDICINE | Facility: CLINIC | Age: 61
End: 2021-03-26

## 2021-03-26 DIAGNOSIS — Z12.31 VISIT FOR SCREENING MAMMOGRAM: ICD-10-CM

## 2021-03-30 ENCOUNTER — RECORDS - HEALTHEAST (OUTPATIENT)
Dept: LAB | Facility: CLINIC | Age: 61
End: 2021-03-30

## 2021-03-31 LAB
TSH SERPL DL<=0.005 MIU/L-ACNC: 0.07 UIU/ML (ref 0.3–5)
VALPROATE SERPL-MCNC: 29.8 UG/ML (ref 50–150)

## 2021-04-01 ENCOUNTER — TRANSFERRED RECORDS (OUTPATIENT)
Dept: HEALTH INFORMATION MANAGEMENT | Facility: CLINIC | Age: 61
End: 2021-04-01

## 2021-04-02 ENCOUNTER — HOSPITAL ENCOUNTER (OUTPATIENT)
Dept: REHABILITATION | Facility: CLINIC | Age: 61
End: 2021-04-02
Attending: FAMILY MEDICINE
Payer: MEDICAID

## 2021-04-02 PROCEDURE — S5100 ADULT DAYCARE SERVICES 15MIN: HCPCS

## 2021-04-02 NOTE — PROGRESS NOTES
"Start time: 12:30 pm            End time: 1:23 pm  Patient has given verbal consent for video visit? Yes  Originating location (pt location):  Her apartment in her U  Additional participants in remote visit:  No  Distant location (provider location): [facility]  Mode of communication (Audio Visual or Audio only):   Audio only  Person's response or participation level:  Active and engaged    Service Provided:  Wellness Check:   Medication:  Administered by U staff   Meals/Food: Provided by U staff   Home Nursing:  Provided by [facility] staff   ADL needs/Bathing: Shower chair and grab bars.  Assist if she schedules it.   Homemaking tasks: Assist provided by staff   Recent falls: Yes, Karolyn reports her legs are weak and fatigued putting her at risk for falls.  She reports multiple falls but no head or bodily injuries.   Change in health condition: Karolyn has received both doses of her covid vaccination.  Is there any safety concerns at this time: None reported  Current mental health status: \"oh im pretty good\"    Exercise:  Karolyn reports that the Nu Step machine at her facility is broken so she is looking forward to returning to Nu Step physical maintenance programming upon return to AC programming starting next week Tuesday 4/6/21.  Karolyn's transfer status to be reviewed by [facility] to help determine if she needs PT eval to determine maintenance program appropriateness.    Socialization:    Check-in: [facility] facilitated regular weekly wellness check.    Cognition:  [facility] facilitated game of \"Ice Breaker Questions\" and general orientation questions for cognitive stimulation.    Karolyn's Room Phone #:  [phone]         "

## 2021-04-06 ENCOUNTER — HOSPITAL ENCOUNTER (OUTPATIENT)
Dept: REHABILITATION | Facility: CLINIC | Age: 61
End: 2021-04-06
Attending: FAMILY MEDICINE
Payer: MEDICAID

## 2021-04-06 PROCEDURE — S5100 ADULT DAYCARE SERVICES 15MIN: HCPCS | Performed by: OCCUPATIONAL THERAPIST

## 2021-04-06 NOTE — PROGRESS NOTES
[facility]  Needs Assessment  Falls:      Have you fallen 2 or more times in the past year? No    Have you fallen and gotten hurt in the past year? No  Ambulation:    Are you able to ambulate? no    Do you need a physical device when walking? NA  Mobility:     What type of wheelchair will you use at the day program? Manual w/c    For safety, do you need assistance maneuvering your wheelchair? Yes, assistance propelling the chair  Medication:    Will you need assistance with medication during your day program hours? No  Eating:    How much assistance is needed with self-feeding? independent    Do you have any food allergies? no    Do you have any swallowing precautions? no  Dressing:    How much assistance is needed with upper body dressing? Min assist    How much assistance is needed with lower body dressing? Min assist  Toileting:    Do you have a catheter? no    If so, can you empty the catheter yourself? NA    How much assistance is needed with a toilet transfer? Min assist    Do you need a lift for a toilet transfer?  No  Vision:  WFL  Hearing: WFL  Communication:  WFL  Cognition: impaired

## 2021-04-06 NOTE — PROGRESS NOTES
INDIVIDUAL PLAN OF CARE   [facility]    Member Name: Karolyn Das; YOB: 1960  MRN: [id number]  4/6/2021    Goals developed in collaboration with: member  Staff responsible for plan:  Mahin COSTELLO  1. Long Term Goal (concrete, measurable, and time specific outcomes):  Karolyn will maintain her current level of cognitive and physical function for as long as possible and prevent the progression of MS symptoms through active participation in [facility] activities each day of program attendance.  Target Date:  10/31/21  Bi-Annual Review:    2. Short Term Goal: (concrete, measurable, and time specific outcomes):  Karolyn will maintain her current level of physical function and strength of BL LE and BL UE through active participation in her prescribed Nu Step maintenance program for at least 30 minutes each day of program attendance.  CGAx1 w/ gait belt provided for all transfers for falls prevention  d/2 impaired balance 2/2 MS diagnosis.  Target Date: 7/31/21  Bi-Annual Review:    3. Short Term Goal: (concrete, measurable, and time specific outcomes):  Lissett will maintain her current level of cognitive function through active participation in [facility] cognitive stimulation activities each day of program attendance.  Target Date: 7/31/21  Bi-Annual Review:

## 2021-04-06 NOTE — PROGRESS NOTES
"SELF-PRESERVATION FORM  [facility]    Member Name: Karolyn Das; YOB: 1960  MRN: [id number]  4/6/2021    A. The person is ambulatory or mobile. No  B. The person has the combined physical and mental capacity to:  1. Recognize a danger, signal or alarm requiring evacuation from the center: Yes  2. Initiate and complete the evacuation without requiring more than sporadic assistance from another person, such as help in opening a door or getting into a wheelchair: No  3. Select an alternative means of escape or take another appropriate action if the primary escape route is blocked: No  4. Remain at a designated location outside the center until further instruction is given: Yes    \"Capable of taking appropriate action for self-preservation under emergency conditions\" is the designation applied in parts 1591.6974 to 0454.2221 to an adult who meets the criteria in items A and B.    FAC Self-Preservation Status: Not capable of self-preservation    "

## 2021-04-13 ENCOUNTER — HOSPITAL ENCOUNTER (OUTPATIENT)
Dept: REHABILITATION | Facility: CLINIC | Age: 61
End: 2021-04-13
Attending: FAMILY MEDICINE
Payer: MEDICAID

## 2021-04-13 PROCEDURE — S5100 ADULT DAYCARE SERVICES 15MIN: HCPCS

## 2021-04-20 ENCOUNTER — HOSPITAL ENCOUNTER (OUTPATIENT)
Dept: REHABILITATION | Facility: CLINIC | Age: 61
End: 2021-04-20
Attending: FAMILY MEDICINE
Payer: MEDICAID

## 2021-04-20 PROCEDURE — S5100 ADULT DAYCARE SERVICES 15MIN: HCPCS

## 2021-04-27 ENCOUNTER — HOSPITAL ENCOUNTER (OUTPATIENT)
Dept: REHABILITATION | Facility: CLINIC | Age: 61
End: 2021-04-27
Attending: FAMILY MEDICINE
Payer: MEDICAID

## 2021-04-27 PROCEDURE — S5100 ADULT DAYCARE SERVICES 15MIN: HCPCS

## 2021-05-06 NOTE — PROGRESS NOTES
MONTHLY PROGRESS NOTE AND PARTICIPATION REPORT   [facility]    Karolyn Das, 1960  Attendance: Please see Epic for attendance record.    Communication:   Does communicate   Hearing:   No impairment  Vision:   Glasses  Orientation/Cognition:   Minor forgetfulness  Partial disorientation  Short term memory loss  Behavior:   No behavioral concerns  Self-Preservation Skills:   Not capable of self-preservation  Eating:   Assist with set up  Ambulation Walking:   Needs assistance  Transferring:   Aide of one person/two  Wheelchair:   Needs help  Toileting:   Needs assistance  Maintenance Program:     [facility]  Living Arrangements:   Lives in assisted living facility  Spiritual Needs: Needs are being met through support group  Medication Assistance:   Medication not taken during program hours  Participation Report:   Aerobics/Exercise  Support Group  Cognitive Stimulation  Fire Drill  Creative Arts/Crafts  Games  Gardening  Speakers/Entertainment  Socialization  Current Events/News  Education/Health Topic  Level of Participation:   Active

## 2021-05-11 ENCOUNTER — RECORDS - HEALTHEAST (OUTPATIENT)
Dept: ADMINISTRATIVE | Facility: OTHER | Age: 61
End: 2021-05-11

## 2021-05-11 ENCOUNTER — HOSPITAL ENCOUNTER (OUTPATIENT)
Dept: REHABILITATION | Facility: CLINIC | Age: 61
End: 2021-05-11
Attending: FAMILY MEDICINE
Payer: MEDICAID

## 2021-05-11 PROCEDURE — S5100 ADULT DAYCARE SERVICES 15MIN: HCPCS | Performed by: OCCUPATIONAL THERAPIST

## 2021-05-18 ENCOUNTER — HOSPITAL ENCOUNTER (OUTPATIENT)
Dept: REHABILITATION | Facility: CLINIC | Age: 61
End: 2021-05-18
Attending: FAMILY MEDICINE
Payer: MEDICAID

## 2021-05-18 PROCEDURE — S5100 ADULT DAYCARE SERVICES 15MIN: HCPCS

## 2021-05-25 ENCOUNTER — HOSPITAL ENCOUNTER (OUTPATIENT)
Dept: REHABILITATION | Facility: CLINIC | Age: 61
End: 2021-05-25
Attending: FAMILY MEDICINE
Payer: MEDICAID

## 2021-05-25 PROCEDURE — S5100 ADULT DAYCARE SERVICES 15MIN: HCPCS

## 2021-05-27 ASSESSMENT — PATIENT HEALTH QUESTIONNAIRE - PHQ9: SUM OF ALL RESPONSES TO PHQ QUESTIONS 1-9: 5

## 2021-05-27 NOTE — TELEPHONE ENCOUNTER
RN cannot approve Refill Request    RN can NOT refill this medication med is not covered by policy/route to provider. Last office visit: 2017 Vicente Elizabeth MD Last Physical: 2018 Last MTM visit: Visit date not found Last visit same specialty: 2017 Vicente Elizabeth MD.  Next visit within 3 mo: Visit date not found  Next physical within 3 mo: Visit date not found      Ritika Grayson, [facility] Triage/Med Refill 2019    Requested Prescriptions   Pending Prescriptions Disp Refills     CALCIUM-VITAMIN D 500 mg(1,250mg) -200 unit per tablet [Pharmacy Med Name: OYSTER SHELL CALCIUM W/VIT D 500MG-200 TABLET] 60 tablet 10     Si TAB BY MOUTH TWICE DAILY       There is no refill protocol information for this order

## 2021-05-28 ENCOUNTER — RECORDS - HEALTHEAST (OUTPATIENT)
Dept: ADMINISTRATIVE | Facility: CLINIC | Age: 61
End: 2021-05-28

## 2021-05-28 NOTE — PROGRESS NOTES
Keratosis treatment    History   Scaly growth  Gradually getting bigger    location: Left shoulder  duration: 1 years    recent changes?  Yes: Larger  symptoms: Yes: Itchy and gets in the way  skin cancer history?  No  Patient would like to have it removed      Single large were treated with liquid nitrogen.    Location: Left trapezius  Appearance scaly, almost 1 cm, broad base  Cotton tipped applicator used to make frost ring on wart and this was maintained for 1 minute  Was patient asked to follow up for another treatment in 2-3 weeks?  Only if it does not resolve

## 2021-05-29 ENCOUNTER — RECORDS - HEALTHEAST (OUTPATIENT)
Dept: ADMINISTRATIVE | Facility: CLINIC | Age: 61
End: 2021-05-29

## 2021-05-29 NOTE — TELEPHONE ENCOUNTER
RN cannot approve Refill Request    RN can NOT refill this medication med is not covered by policy/route to provider. Last office visit: 5/3/2019 Vicente Elizabeth MD Last Physical: 9/5/2018 Last MTM visit: Visit date not found Last visit same specialty: 5/3/2019 Vicente Elizabeth MD.  Next visit within 3 mo: Visit date not found  Next physical within 3 mo: Visit date not found      Angela Meza, [facility] Triage/Med Refill 6/1/2019    Requested Prescriptions   Pending Prescriptions Disp Refills     ascorbic acid, vitamin C, (ASCORBIC ACID WITH SONIDO HIPS) 500 MG tablet 100 tablet 10     Sig: Take 1 tablet (500 mg total) by mouth daily.       There is no refill protocol information for this order

## 2021-05-29 NOTE — TELEPHONE ENCOUNTER
Refill Approved    Rx renewed per Medication Renewal Policy. Medication was last renewed on 3/29/2018 for 60/10  Last OV 5/3/2019      Isidra Kilgore, [facility] Triage/Med Refill 2019     Requested Prescriptions   Pending Prescriptions Disp Refills     divalproex (DEPAKOTE SPRINKLE) 125 mg capsule [Pharmacy Med Name: DIVALPROEX SODIUM 125MG CAP SPRINK] 60 capsule 10     Si CAP BY MOUTH TWICE DAILY       Divalproex/Valproic Acid Refill Protocol Passed - 2019 12:03 PM        Passed - LFT or AST or ALT in last 12 months     Albumin   Date Value Ref Range Status   2019 3.8 3.5 - 5.0 g/dL Final     Bilirubin, Total   Date Value Ref Range Status   2019 0.4 0.0 - 1.0 mg/dL Final     Bilirubin, Direct   Date Value Ref Range Status   2019 0.1 <=0.5 mg/dL Final     Alkaline Phosphatase   Date Value Ref Range Status   2019 63 45 - 120 U/L Final     AST   Date Value Ref Range Status   2019 19 0 - 40 U/L Final     ALT   Date Value Ref Range Status   2019 15 0 - 45 U/L Final     Protein, Total   Date Value Ref Range Status   2019 6.6 6.0 - 8.0 g/dL Final                Passed - CBC w/ plts (Hm2) in last 12 months      WBC   Date Value Ref Range Status   2019 6.0 4.0 - 11.0 thou/uL Final     Hemoglobin   Date Value Ref Range Status   2019 13.6 12.0 - 16.0 g/dL Final     Hematocrit   Date Value Ref Range Status   2019 42.9 35.0 - 47.0 % Final     Platelets   Date Value Ref Range Status   2019 182 140 - 440 thou/uL Final             Passed - PCP or prescribing provider visit in last 12 months       Last office visit with prescriber/PCP: 5/3/2019 Vicente Elizabeth MD OR same dept: 5/3/2019 Vicente Elizabeth MD OR same specialty: 5/3/2019 Vicente Elizabeth MD  Last physical: 2018 Last MTM visit: Visit date not found   Next visit within 3 mo: Visit date not found  Next physical within 3 mo: Visit date not found  Prescriber OR PCP:  Vicente Elizabeth MD  Last diagnosis associated with med order: There are no diagnoses linked to this encounter.  If protocol passes may refill for 12 months if within 3 months of last provider visit (or a total of 15 months).

## 2021-05-29 NOTE — TELEPHONE ENCOUNTER
Refill Approved    Rx renewed per Medication Renewal Policy. Medication was last renewed on 18.    Melony Cosby, [facility] Connection Triage/Med Refill 2019     Requested Prescriptions   Pending Prescriptions Disp Refills     alendronate (FOSAMAX) 70 MG tablet [Pharmacy Med Name: ALENDRONATE SODIUM U-U 70MG TABLET] 4 tablet 10     Si TAB BY MOUTH EVERY WEEK - TAKE WITH FULL GLASS H2O, 30 MINUTES BEFORE FIRST FOOD, DRINK, OR MEDS. REMAIN UPRIGHT FOR 30 MINUTES AFTER T...       Biphosphonates Refill Protocol Passed - 6/3/2019  2:58 PM        Passed - PCP or prescribing provider visit in last 12 months     Last office visit with prescriber/PCP: 5/3/2019 Vicente Elizabeth MD OR same dept: 5/3/2019 Vicente Elizabeth MD OR same specialty: 5/3/2019 Vicente Elizabeth MD  Last physical: 2018 Last MTM visit: Visit date not found   Next visit within 3 mo: Visit date not found  Next physical within 3 mo: Visit date not found  Prescriber OR PCP: Vicente Elizabeth MD  Last diagnosis associated with med order: 1. Osteoporosis  - alendronate (FOSAMAX) 70 MG tablet [Pharmacy Med Name: ALENDRONATE SODIUM U-U 70MG TABLET]; 1 TAB BY MOUTH EVERY WEEK - TAKE WITH FULL GLASS H2O, 30 MINUTES BEFORE FIRST FOOD, DRINK, OR MEDS. REMAIN UPRIGHT FOR 30 MINUTES AFTER T...  Dispense: 4 tablet; Refill: 10    If protocol passes may refill for 12 months if within 3 months of last provider visit (or a total of 15 months).             Passed - Serum creatinine in last 12 months     Creatinine   Date Value Ref Range Status   2019 0.78 0.60 - 1.10 mg/dL Final

## 2021-05-29 NOTE — TELEPHONE ENCOUNTER
RN cannot approve Refill Request    RN can NOT refill this medication med is not covered by policy/route to provider     . Last office visit: 5/3/2019 Vicente Elizabeth MD Last Physical: 9/5/2018 Last MTM visit: Visit date not found Last visit same specialty: 5/3/2019 Vicente Elizabeth MD.  Next visit within 3 mo: Visit date not found  Next physical within 3 mo: Visit date not found      Melony Cosby, [facility] Triage/Med Refill 6/5/2019    Requested Prescriptions   Pending Prescriptions Disp Refills     ASCORBIC ACID WITH SONIDO HIPS 500 MG tablet [Pharmacy Med Name: VITAMIN C (BULK) 500MG TABLET] 100 tablet 10     Sig: TAKE 1 TABLET (500 MG TOTAL) BY MOUTH DAILY.       There is no refill protocol information for this order

## 2021-05-30 ENCOUNTER — RECORDS - HEALTHEAST (OUTPATIENT)
Dept: ADMINISTRATIVE | Facility: CLINIC | Age: 61
End: 2021-05-30

## 2021-05-30 VITALS — WEIGHT: 144 LBS | BODY MASS INDEX: 22.6 KG/M2 | HEIGHT: 67 IN

## 2021-05-30 NOTE — TELEPHONE ENCOUNTER
Refill Approved    Rx renewed per Medication Renewal Policy. Medication was last renewed on 18.    Melony Cosby, [facility] Triage/Med Refill 2019     Requested Prescriptions   Pending Prescriptions Disp Refills     levothyroxine (SYNTHROID, LEVOTHROID) 100 MCG tablet [Pharmacy Med Name: LEVOTHYROXINE SODIUM 100MCG TABLET] 30 tablet 10     Si TAB BY MOUTH DAILY 1 HOUR BEFORE BREAKFAST       Thyroid Hormones Protocol Passed - 2019  4:03 PM        Passed - Provider visit in past 12 months or next 3 months     Last office visit with prescriber/PCP: 5/3/2019 Vicente Elizabeth MD OR same dept: 5/3/2019 Vicente Elizabeth MD OR same specialty: 5/3/2019 Vicente Elizabeth MD  Last physical: 2018 Last MTM visit: Visit date not found   Next visit within 3 mo: Visit date not found  Next physical within 3 mo: Visit date not found  Prescriber OR PCP: Vicente Elizabeth MD  Last diagnosis associated with med order: 1. Hypothyroidism  - levothyroxine (SYNTHROID, LEVOTHROID) 100 MCG tablet [Pharmacy Med Name: LEVOTHYROXINE SODIUM 100MCG TABLET]; 1 TAB BY MOUTH DAILY 1 HOUR BEFORE BREAKFAST  Dispense: 30 tablet; Refill: 10    If protocol passes may refill for 12 months if within 3 months of last provider visit (or a total of 15 months).             Passed - TSH on file in past 12 months for patient age 12 & older     TSH   Date Value Ref Range Status   10/31/2018 3.63 0.30 - 5.00 uIU/mL Final

## 2021-05-31 ENCOUNTER — RECORDS - HEALTHEAST (OUTPATIENT)
Dept: ADMINISTRATIVE | Facility: CLINIC | Age: 61
End: 2021-05-31

## 2021-06-01 ENCOUNTER — HOSPITAL ENCOUNTER (OUTPATIENT)
Dept: REHABILITATION | Facility: CLINIC | Age: 61
End: 2021-06-01
Attending: FAMILY MEDICINE
Payer: MEDICAID

## 2021-06-01 ENCOUNTER — RECORDS - HEALTHEAST (OUTPATIENT)
Dept: ADMINISTRATIVE | Facility: CLINIC | Age: 61
End: 2021-06-01

## 2021-06-01 PROCEDURE — S5100 ADULT DAYCARE SERVICES 15MIN: HCPCS

## 2021-06-02 VITALS — BODY MASS INDEX: 23.32 KG/M2 | WEIGHT: 140 LBS | HEIGHT: 65 IN

## 2021-06-03 VITALS — WEIGHT: 144 LBS | BODY MASS INDEX: 23.96 KG/M2

## 2021-06-04 VITALS
HEART RATE: 62 BPM | BODY MASS INDEX: 23.78 KG/M2 | RESPIRATION RATE: 16 BRPM | SYSTOLIC BLOOD PRESSURE: 117 MMHG | DIASTOLIC BLOOD PRESSURE: 71 MMHG | TEMPERATURE: 98.4 F | WEIGHT: 148 LBS | HEIGHT: 66 IN

## 2021-06-08 ENCOUNTER — HOSPITAL ENCOUNTER (OUTPATIENT)
Dept: REHABILITATION | Facility: CLINIC | Age: 61
End: 2021-06-08
Attending: FAMILY MEDICINE
Payer: MEDICAID

## 2021-06-08 PROCEDURE — S5100 ADULT DAYCARE SERVICES 15MIN: HCPCS

## 2021-06-08 NOTE — PROGRESS NOTES
Assessment/ Plan  1. Acute bronchitis  Empiric azithromycin.  Follow-up if not improving, consider chest x-ray    2. Hypothyroidism  On level thyroxine 100  g.  - Thyroid Stimulating Hormone (TSH)    3. Thrombocytopenia  Previous history, related to Depakote.  On lower dose Depakote, recheck  - HM2(CBC w/o Differential)  - Hepatic Profile    4. High risk medication use  Given Depakote and long-term sulfa use, will follow up on these labs.  - HM2(CBC w/o Differential)  - Hepatic Profile    Body mass index is 22.55 kg/(m^2).    Subjective  CC:  Chief Complaint   Patient presents with     Follow-up     HPI:  56-year-old with history of severe MS leading to cognitive dysfunction, neurogenic bladder, neuromuscular disease presents with the following symptoms:  Upper Respiratory infection  Duration about 2 weeks  Current symptoms  Worst - shortness of breath.  Mostly with walking  Runny nose?  Yes. For couple of days.  Sore throat? mild  Cough/ productive or dry? Cough- productive  Fever? No fever  HA/ achiness? Pounding, this was first symptom  Symptoms at start of illness headache  Any medications? no  History of allergies or sinus infections? Unsure    Had pink eye before this illness    Hypothyroidism on 100 ug daily l-th    H/o low platelets over years due to depakote- will recheck.      PFSH:  Current medications reviewed as follows:  Current Outpatient Prescriptions on File Prior to Visit   Medication Sig     alendronate (FOSAMAX) 70 MG tablet Take 1 tablet (70 mg total) by mouth every 7 days.     CALCIUM-VITAMIN D 500 mg(1,250mg) -200 unit per tablet TAKE 1 TABLET BY MOUTH TWICE A DAY     cholecalciferol, vitamin D3, 1,000 unit tablet TAKE 2 TABLETS BY MOUTH DAILY     clonazePAM (KLONOPIN) 0.5 MG tablet TAKE 1 TABLET BY MOUTH EVERY TWELVE HOURS     divalproex (DEPAKOTE SPRINKLE) 125 mg capsule Take 125 mg by mouth 2 (two) times a day.     FIBER, CALCIUM POLYCARBOPHIL, 625 mg tablet TAKE 1 TABLET BY MOUTH EVERY  MORNING     fluticasone (FLONASE) 50 mcg/actuation nasal spray 1 spray into each nostril daily as needed for rhinitis.     ibuprofen (ADVIL,MOTRIN) 600 MG tablet TAKE 1 TABLET BY MOUTH EVERY SIX HOURS AS NEEDED     lactulose 10 gram/15 mL solution TAKE 15ML BY MOUTH TWICE A DAY     levothyroxine (SYNTHROID, LEVOTHROID) 100 MCG tablet TAKE 1 TABLET BY MOUTH DAILY [ ONE HOUR BEFORE BREAKFAST ON AN EMPTY STOMACH ]     PARoxetine (PAXIL) 20 MG tablet TAKE 1 TABLET BY MOUTH DAILY     PARoxetine (PAXIL) 40 MG tablet TAKE 1 TABLET (40MG) BY MOUTH DAILY     sulfamethoxazole-trimethoprim (SEPTRA DS) 800-160 mg per tablet TAKE 1 TABLET BY MOUTH EVERY NIGHT AT BEDTIME     TAB-A-MATTHIAS per tablet TAKE 1 TABLET BY MOUTH DAILY     vitamin E 400 unit capsule TAKE 1 CAPSULE BY MOUTH DAILY     No current facility-administered medications on file prior to visit.      Patient Active Problem List   Diagnosis     Lumbar Sprain     Mood Disorder Due To General Medical Condition     Urinary Tract Infection     Thrombocytopenia     Seborrheic Keratosis     Abnormal Weight Loss     Onychomycosis     Hypothyroidism     Generalized Multiple Sclerosis     Nonpuerperal Galactorrhea     Cognitive Disorder     Constipation     Neurogenic bladder     Epilepsy And Recurrent Seizures     Ataxia     Benign Adenoma Of The Large Intestine     H/O: hysterectomy     Health care maintenance     Osteoporosis     History   Smoking Status     Never Smoker   Smokeless Tobacco     Not on file     Social History     Social History Narrative     Patient Care Team:  Vicente Elizabeth MD as PCP - General  ROS  Full 10 system review including constitutional, respiratory, cardiac, gi, urinary, rheumatologic, neurologic, reproductive, dermatologic psychiatric is  performed (via questionnaire) and is negative except shortness of breath, back pain, anxiety and mood swings      Objective  Physical Exam  Vitals:    01/20/17 1032   BP: 106/62   Patient Site: Left Arm  "  Patient Position: Sitting   Cuff Size: Adult Regular   Pulse: 88   Resp: 20   Temp: 97.5  F (36.4  C)   TempSrc: Oral   Weight: 144 lb (65.3 kg)   Height: 5' 7\" (1.702 m)     Gen- alert, oriented/ appropriately responsive  HEENT- normal cephalic, atraumatic.   Chest- Normal inspiration and expiration.  Clear to ascultation except for a few wheezes.  No chest wall deformity or scar.  CV- Heart regular rate and rhythm, normal tones, no murmurs gallops or rubs.  Ext- appear well perfused, no edema  Skin- warm and dry, no visualized rash    Diagnostics  Results for orders placed or performed in visit on 01/20/17   HM2(CBC w/o Differential)   Result Value Ref Range    WBC 6.5 4.0 - 11.0 thou/uL    RBC 4.36 3.80 - 5.40 mill/uL    Hemoglobin 13.2 12.0 - 16.0 g/dL    Hematocrit 39.8 35.0 - 47.0 %    MCV 91 80 - 100 fL    MCH 30.3 27.0 - 34.0 pg    MCHC 33.2 32.0 - 36.0 g/dL    RDW 10.5 (L) 11.0 - 14.5 %    Platelets 156 140 - 440 thou/uL    MPV 8.3 7.0 - 10.0 fL         Please note: Voice recognition software was used in this dictation.  It may therefore contain typographical errors.    "

## 2021-06-09 NOTE — PROGRESS NOTES
Tried calling [facility] to refill the Clonazepam, but the pharmacy was closed.    I tried calling the group home that is listed on the patient's chart to find out where to send the prescription and the worker there states that she no longer is a resident there and she did not know where she moved too.  Will try on Monday

## 2021-06-11 NOTE — PROGRESS NOTES
MONTHLY PROGRESS NOTE AND PARTICIPATION REPORT   [facility]    Karolyn Das, 1960  Attendance: Please see Epic for attendance record.    Communication:   Does communicate   Hearing:   No impairment  Vision:   Glasses  Orientation/Cognition:   Partial disorientation  Short term memory loss  Behavior:   Requires redirection  Self-Preservation Skills:   Not capable of self-preservation  Eating:   Assist with set up  Ambulation Walking:   Non-ambulatory  Transferring:   Aide of one person/two  Wheelchair:   Needs help  manual wheelchair  Toileting:   Needs assistance  Maintenance Program:     [facility]  [facility]  Living Arrangements:   Long term care  Spiritual Needs: Needs are being met through support group  Medication Assistance:   Medication not taken during program hours  Participation Report:   Aerobics/Exercise  Support Group  Cognitive Stimulation  Fire Drill  Creative Arts/Crafts  Games  Gardening  Speakers/Entertainment  Socialization  Current Events/News  Education/Health Topic  Level of Participation:   To the best of their ability

## 2021-06-15 ENCOUNTER — HOSPITAL ENCOUNTER (OUTPATIENT)
Dept: REHABILITATION | Facility: CLINIC | Age: 61
End: 2021-06-15
Attending: FAMILY MEDICINE
Payer: MEDICAID

## 2021-06-15 PROCEDURE — S5100 ADULT DAYCARE SERVICES 15MIN: HCPCS | Performed by: OCCUPATIONAL THERAPIST

## 2021-06-16 NOTE — TELEPHONE ENCOUNTER
"Reason for Call:  Other  fyi     Detailed comments: pt is at breast care for screening mammogram her caregiver is upset that this is a mammogram and not \"imaging\"  the tech explained to her that is what the Mammogram is they are not sure if she will have this mammogram today     Phone Number Patient can be reached at: [phone]    Best Time: no call back needed    Can we leave a detailed message on this number?: No call back needed    Call taken on 3/26/2021 at 10:53 AM by Carolina Contreras    "

## 2021-06-18 NOTE — PATIENT INSTRUCTIONS - HE
Patient Instructions by Vicente Elizabeth MD at 1/31/2020  1:00 PM     Author: Vicente Elizabeth MD Service: -- Author Type: Physician    Filed: 1/31/2020  5:48 PM Encounter Date: 1/31/2020 Status: Signed    : Vicente Elizabeth MD (Physician)         Patient Education   Activities of Daily Living  Your Health Risk Assessment indicates you have difficulties with activities of daily living such as eating, getting dressed, grooming, bathing, walking, or using the toilet. Please make a follow up appointment for us to address this issue in more detail.     Patient Education   Instrumental Activities of Daily Living  Your Health Risk Assessment indicates you have difficulties with instrumental activities of daily living which include laundry, housekeeping, banking, shopping, using the telephone, food preparation, transportation, or taking your own medications. Please make a follow up appointment for us to address this issue in more detail.    [employer] has resources available on the following website: https://www.[employer].org/caregivers.html     Also, here is a local agency that provides help with meals and other assistance:   [facility]: 807.224.1491     Patient Education   Depression and Suicide in Older Adults  Nearly 2 million older Americans have some type of depression. Sadly, some of them even take their own lives. Yet depression among older adults is often ignored. Learn the warning signs. You may help spare a loved one needless pain. You may also save a life.       What Is Depression?  Depression is a mood disorder that affects the way you think and feel. The most common symptom is a feeling of deep sadness. People who are depressed also may seem tired and listless. And nothing seems to give them pleasure. Its normal to grieve or be sad sometimes. But sadness lessens or passes with time. Depression rarely goes away or improves on its own. Other symptoms of depression  are:    Sleeping more or less than normal    Eating more or less than normal    Having headaches, stomachaches, or other pains that dont go away    Feeling nervous, empty, or worthless    Crying a great deal    Thinking or talking about suicide or death    Feeling confused or forgetful  What Causes It?  The causes of depression arent fully known. Certain chemicals in the brain play a role. Depression does run in families. And life stresses can also trigger depression in some people. That may be the case with older adults. They often face great burdens, such as the death of friends or a spouse. They may have failing health. And they are more likely to be alone, lonely, or poor.  How You Can Help  Often, depressed people may not want to ask for help. When they do, they may be ignored. Or, they may receive the wrong treatment. You can help by showing parents and older friends love and support. If they seem depressed, help them find the right treatment. Talk to your doctor. Or contact a local mental health center, social service agency, or hospital. With modern treatment, no one has to suffer from depression.  Resources:    National [place] of Mental Health  806.685.8558  www.nimh.nih.gov    National [place] on Mental Illness  602.530.8983  www.oralia.org    [facility]  621.882.4712  www.[facility].org    National Suicide Hotline  587.456.5108 (800-SUICIDE)      6984-7086 [employer]. [address] [place] [address]. All rights reserved. This information is not intended as a substitute for professional medical care. Always follow your healthcare professional's instructions.         Patient Education   Preventing Falls in the Home  As you get older, falls are more likely. Thats because your reaction time slows. Your muscles and joints may also get stiffer, making them less flexible. Illness, medications, and vision changes can also affect your balance. A fall could leave you unable to live  on your own. To make your home safer, follow these tips:    Floors    Put nonskid pads under area rugs.    Remove throw rugs.    Replace worn floor coverings.    Tack carpets firmly to each step on carpeted stairs. Put nonskid strips on the edges of uncarpeted stairs.    Keep floors and stairs free of clutter and cords.    Arrange furniture so there are clear pathways.    Clean up any spills right away.    Bathrooms    Install grab bars in the tub or shower.    Apply nonskid strips or put a nonskid rubber mat in the tub or shower.    Sit on a bath chair to bathe.    Use bathmats with nonskid backing.    Lighting    Keep a flashlight in each room.    Put a nightlight along the pathway between the bedroom and the bathroom.    5955-6661 The [employer]. [address] [place] [address]. All rights reserved. This information is not intended as a substitute for professional medical care. Always follow your healthcare professional's instructions.           Advance Directive  Patients advance directive was discussed and I am comfortable with the patients wishes.  Patient Education   Personalized Prevention Plan  You are due for the preventive services outlined below.  Your care team is available to assist you in scheduling these services.  If you have already completed any of these items, please share that information with your care team to update in your medical record.  Health Maintenance   Topic Date Due   ? HEPATITIS C SCREENING  1960   ? HIV SCREENING  06/20/1974   ? ZOSTER VACCINES (1 of 2) 06/20/2009   ? COLONOSCOPY  08/11/2019   ? MAMMOGRAM  03/28/2020   ? INFLUENZA VACCINE RULE BASED (1) 06/30/2020 (Originally 8/1/2019)   ? MEDICARE ANNUAL WELLNESS VISIT  01/31/2021   ? TD 18+ HE  07/11/2021   ? LIPID  09/06/2022   ? ADVANCE CARE PLANNING  09/05/2023   ? DEPRESSION ACTION PLAN  Completed   ? TDAP ADULT ONE TIME DOSE  Completed   ? PAP SMEAR  Discontinued

## 2021-06-20 NOTE — PROGRESS NOTES
Assessment and Plan:   58-year-old with secondary progressive multiple sclerosis with associated cognitive disorder, neurogenic bladder presents for a physical exam.    Currently resides in a facility in [place]-requires that  doctor Dr. Ulysses Wong and a nurse practitioner be assigned to her.  She prefers to see me as I have been her primary doctor for quite some time.  Settled on seeing me once a year for physical, seeing Dr. Wong and the nurse practitioner at other times.  I discussed that this is not a functional arrangement.  I indicated that I have high regard for him as I work with him in my training.    It is asked that I make any recommendations to Dr. Wong and Florencia Sibley the nurse practitioner so that they can consider them.      Problem List Items Addressed This Visit        High    Hypothyroidism     Levothyroxine 100 mcg, check TSH         Relevant Orders    Thyroid Stimulating Hormone (TSH)    Cognitive impairment     Chronic and related to MS, now per Dr Thurston-   Executive function is affected  Doing reasonably well           Neurogenic bladder     Per patient's friend today, there have been complaints from the [facility] about frequent urination and at times inability to void.  Patient does not sense a big problem and friend, who has helped patient with her voiding in the past, thinks that this can be handled with patients and simply turning the water on in the bathroom.  [facility] suggested suprapubic catheter which I do not think is a good idea.  They have had contact from [facility] urology regarding need for follow-up.  Encouraged them to do so.  Stephanie caution with anticholinergics given cognitive issue         Epilepsy And Recurrent Seizures     Consideration of neurology evaluation to stop Depakote was discussed.         Relevant Orders    HM2(CBC w/o Differential) (Completed)    ALT (SGPT)    Basic Metabolic Panel    Valproic Acid (Depakene )     Benign Adenoma Of The Large Intestine    Osteoporosis     And treatment through 2021, check vitamin D, on calcium and vitamin D supplement         Relevant Orders    Vitamin D, Total (25-Hydroxy)       Medium    Constipation     Ongoing problems, already on lactulose, fiber tablet and stool softener.  Will recommend MiraLAX            Unprioritized    Recurrent UTI     Little problems currently, apparently has been taken off of daily trimethoprim sulfa prophylaxis  Caregiver indicates intention for follow-up urology visit         Health care maintenance     Up-to-date vaccines, cancer screening             Other Visit Diagnoses     Routine general medical examination at a health care facility    -  Primary            The patient's current medical problems were reviewed.    I have had an Advance Directives discussion with the patient.  Briefly only, to indicate that she has advanced directives and that power of  is Jane  The following health maintenance schedule was reviewed with the patient and provided in printed form in the after visit summary:   Health Maintenance   Topic Date Due     DEPRESSION FOLLOW UP  1960     INFLUENZA VACCINE RULE BASED (1) 08/01/2018     COLONOSCOPY  08/11/2019     ADVANCE DIRECTIVES DISCUSSED WITH PATIENT  01/19/2020     MAMMOGRAM  03/28/2020     TD 18+ HE  07/11/2021     TDAP ADULT ONE TIME DOSE  Completed        Subjective:   Chief Complaint: Karolyn Das is an 58 y.o. female here for an Annual Wellness visit.   HPI: Complains of skin tags    Patient is here to follow-up for yearly physical.  Now currently residing in a facility in [place].  She is assigned a doctor there, Dr. Ulysses Wong and nurse practitioner Florencia Sibley.  Apparently she is not allowed to have an outside doctor.  She is allowed to have 1 visit with the different physician yearly for physical and is brought in for this.    Outside of the caregivers in that facility, her primary over [place]  LELA Saravia who is here today and Rani's friend Jane who has power of .  The 2 of them are somewhat concerned about polypharmacy.  They note the numerous psychotropic medications including Paxil, Depakote and Klonopin and wonder if there is a way that Heather could get off some of these.        REviewed note Dr Grossman on 8/8/18.  RNew MS specialist Secondary progressive MS, not on any medications.  Previously on Avelox and Imuran from Dr. Parra.  Works 3 times a week with MS achievement [place].  No changes made        Saw  the around the time I saw her last in 2017  On Depakote long-term for possible seizure which  Following platelets, on Depakote for possible seizure but due to her thrombocytopenia, running a little bit low to allow platelets to be near normal    History of constipation which is not going well.  And recurrent UTIs.  On daily trimethoprim sulfa.    History of depression.    Here with LOU Saravia who is friend of Jane who has power of       Insomnia  Narrative: Very vague symptoms of occasional awakening in the middle of the night and then some difficulty getting back to sleep due to worry  When questioned about this, it sounds like it happens very infrequently  ------------------  Problem falling asleep or waking up after having fallen asleep? Waking up  Duration of problem?  Unclear  Usual sleep pattern:  Bedtime?  Between 8 and 10*  Wake up time around 7 AM does this vary throughout the week?  Very little though she needs to be up earlier when she has her MS achievement    Caffeine in afternoon?  Denies alcohol?  No other Drugs?  Multiple  Mood- anxiety or depression?  Patient thinks she is doing quite well.  Did not actually measure PHQ 9 and DEBRA today    Multiple skin lesions, most of which are seborrheic keratoses.  One warty lesion on her arm.  Also has a fairly extensive, deep comedone on the right side of her back which she like to have removed    Review of  Systems:  Full 10 system review including constitutional, respiratory, cardiac, gi, urinary, rheumatologic, neurologic, reproductive, dermatologic psychiatric is  performed  and is otherwise negative     Please see above.  The rest of the review of systems are negative for all systems.    Patient Care Team:  Vicente Elizabeth MD as PCP - General     Patient Active Problem List   Diagnosis     Lumbar Sprain     Mood Disorder Due To General Medical Condition     Recurrent UTI     Thrombocytopenia     Seborrheic Keratosis     Abnormal Weight Loss     Onychomycosis     Hypothyroidism     Generalized Multiple Sclerosis     Nonpuerperal Galactorrhea     Cognitive impairment     Constipation     Neurogenic bladder     Epilepsy And Recurrent Seizures     Ataxia     Benign Adenoma Of The Large Intestine     H/O: hysterectomy     Health care maintenance     Osteoporosis     Multiple allergies     No past medical history on file.   Past Surgical History:   Procedure Laterality Date     HYSTERECTOMY  1998     OOPHORECTOMY  1998     [place] APP[place]DECTOM[place]      Description: Appendectomy;  Recorded: 12/21/2011;     [place] APPENDECTOMY      Description: Appendectomy;  Recorded: 06/02/2014;     [place] TOTAL ABDOM HYSTERECTOMY      Description: Total Abdominal Hysterectomy;  Proc Date: 01/01/1998;     [place] TOTAL ABDOM HYSTERECTOMY      Description: Total Abdominal Hysterectomy;  Recorded: 08/29/2012;     [place] TOTAL ABDOM HYSTERECTOMY      Description: Total Abdominal Hysterectomy;  Recorded: 06/02/2014;      Family History   Problem Relation Age of Onset     Breast cancer Mother      Coronary artery disease Mother      Hypertension Mother      Diabetes type II Mother      BRCA 1/2 Neg Hx      Cancer Neg Hx      Colon cancer Neg Hx      Endometrial cancer Neg Hx      Ovarian cancer Neg Hx       Social History     Social History     Marital status:      Spouse name: N/A     Number of children: 1     Years of education: N/A     Occupational  History     disability      Social History Main Topics     Smoking status: Never Smoker     Smokeless tobacco: Never Used     Alcohol use Not on file     Drug use: Not on file     Sexual activity: Not on file     Other Topics Concern     Not on file     Social History Narrative      Current Outpatient Prescriptions   Medication Sig Dispense Refill     alendronate (FOSAMAX) 70 MG tablet 1 TAB BY MOUTH EVERY WEEK - TAKE WITH FULL GLASS H2O, 30MIN BEFORE FIRST FOOD, DRINK, OR MEDS. REMAIN UPRIGHT FOR 30MIN AFTER TAKING 4 tablet 10     ascorbic acid, vitamin C, (ASCORBIC ACID WITH SONIDO HIPS) 500 MG tablet Take 1 tablet (500 mg total) by mouth daily. 100 tablet 10     calcium polycarbophil (FIBER-LAX) 625 mg tablet Take 1 tablet (625 mg total) by mouth daily. 90 tablet 9     CALCIUM-VITAMIN D 500 mg(1,250mg) -200 unit per tablet 1 TAB BY MOUTH TWICE DAILY 60 tablet 11     clonazePAM (KLONOPIN) 0.5 MG tablet TAKE 1 TABLET BY MOUTH EVERY TWELVE HOURS 56 tablet 2     DAILY-MATTHIAS tablet 1 TAB BY MOUTH DAILY 100 tablet 10     divalproex (DEPAKOTE SPRINKLE) 125 mg capsule 1 CAP BY MOUTH TWICE DAILY 60 capsule 10     docusate sodium (COLACE) 100 MG capsule TAKE 1 CAP CAPSULE (100 MG TOTAL) BY MOUTH 2 (TWO) TIMES A DAY. 100 capsule 10     ibuprofen (ADVIL,MOTRIN) 600 MG tablet 1 TAB BY MOUTH EVERY 6 HOURS AS NEEDED 30 tablet 10     levothyroxine (SYNTHROID, LEVOTHROID) 100 MCG tablet 1 TAB BY MOUTH DAILY 1 HOUR BEFORE BREAKFAST 30 tablet 10     PARoxetine (PAXIL) 20 MG tablet TAKE 3 TABS (60MG) BY MOUTH DAILY 90 tablet 10     vitamin E 400 unit capsule 1 CAP BY MOUTH DAILY 100 capsule 10     CHOLECALCIFEROL 1,000 unit tablet 2 TABS (2000UNIT) BY MOUTH DAILY 100 tablet 10     fluticasone (FLONASE) 50 mcg/actuation nasal spray 1 SPRAY IN EACH NOSTRIL DAILY AS NEEDED FOR RHINITIS 16 g 10     GENERLAC 10 gram/15 mL solution TAKE BY MOUTH AS DIRECTED 236 mL 10     No current facility-administered medications for this visit.      "  Objective:   Vital Signs:   Visit Vitals     BP 98/60 (Patient Site: Right Arm, Patient Position: Sitting, Cuff Size: Adult Regular)     Pulse 72     Temp 97.2  F (36.2  C) (Oral)     Resp 20     Ht 5' 5\" (1.651 m)     Wt 140 lb (63.5 kg)     BMI 23.3 kg/m2        VisionScreening:  No exam data present     PHYSICAL EXAM  BP 98/60 (Patient Site: Right Arm, Patient Position: Sitting, Cuff Size: Adult Regular)  Pulse 72  Temp 97.2  F (36.2  C) (Oral)   Resp 20  Ht 5' 5\" (1.651 m)  Wt 140 lb (63.5 kg)  BMI 23.3 kg/m2    General Appearance:    Alert, cooperative, no distress, appears stated age  Patient is in a wheelchair  Mental status appears approximately stable, needs help with some issues of orientation   Head:    Normocephalic, without obvious abnormality, atraumatic   Eyes:    PERRL, conjunctiva/corneas clear, EOM's intact, fundi     benign, both eyes   Ears:    Normal TM's and external ear canals, both ears   Nose:   Nares normal, septum midline, mucosa normal, no drainage     or sinus tenderness   Throat:   Lips, mucosa, and tongue normal; teeth and gums normal   Neck:   Supple, symmetrical, trachea midline, no adenopathy;     thyroid:  no enlargement/tenderness/nodules; no carotid    bruit or JVD   Back:     Symmetric, no curvature, ROM normal, no CVA tenderness   Lungs:     Clear to auscultation bilaterally, respirations unlabored   Chest Wall:    No tenderness or deformity    Heart:    Regular rate and rhythm, S1 and S2 normal, no murmur, rub    or gallop   Breast Exam:    No tenderness, masses, or nipple abnormality   Abdomen:     Soft, non-tender, bowel sounds active all four quadrants,     no masses, no organomegaly   Genitalia:    Normal female without lesion, discharge or tenderness   Rectal:    Normal tone, no masses or tenderness; guaiac negative stool   Extremities:   Extremities normal, atraumatic, no cyanosis or edema   Pulses:   2+ and symmetric all extremities   Skin:  Significant comedone " right side of her back which I did not have adequate time to evacuate today.  She has a number of seborrheic keratoses on her back and flank which I reassured her about and a warty lesion on her right forearm which is frozen with liquid nitrogen, typical fashion, 1 minute on, thaw, freeze refreeze   Lymph nodes:   Cervical, supraclavicular, and axillary nodes normal             Assessment Results 9/5/2018   Activities of Daily Living 5-6 - Severe functional impairment   Instrumental Activities of Daily Living 5-6 - Severe functional impairment   Mini Cog Total Score 2   Some recent data might be hidden     A Mini-Cog score of 0-2 suggests the possibility of dementia, score of 3-5 suggests no dementia    Identified Health Risks:     Multiple concerns including IADLs, ADL difficulties, memory problems mood problems all chronic and known and I reason for patient to be in group home.  Patient's advanced directive was discussed and I am comfortable with the patient's wishes.        The patient was provided with appropriate referrals to address her memory problem.

## 2021-06-22 ENCOUNTER — HOSPITAL ENCOUNTER (OUTPATIENT)
Dept: REHABILITATION | Facility: CLINIC | Age: 61
End: 2021-06-22
Attending: FAMILY MEDICINE
Payer: MEDICAID

## 2021-06-22 PROCEDURE — S5100 ADULT DAYCARE SERVICES 15MIN: HCPCS | Performed by: OCCUPATIONAL THERAPIST

## 2021-06-22 NOTE — PROGRESS NOTES
[facility] RN Note    Previous documentation reviewed.  No concerns reported by [facility] staff or member.    No new deficients identified.

## 2021-06-23 NOTE — TELEPHONE ENCOUNTER
Refill Approved    Rx renewed per Medication Renewal Policy. Medication was last renewed on 7/17/18.    Melony Cosby, [facility] [place] Connection Triage/Med Refill 1/18/2019     Requested Prescriptions   Pending Prescriptions Disp Refills     GENERLAC 10 gram/15 mL solution [Pharmacy Med Name: GENERLAC 10GM/15ML SOLUTION]  10     Sig: TAKE BY MOUTH AS DIRECTED    GI Medications Refill Protocol Passed - 1/17/2019  9:38 AM       Passed - PCP or prescribing provider visit in last 12 or next 3 months.    Last office visit with prescriber/PCP: 1/20/2017 Vicente Elizabeth MD OR same dept: Visit date not found OR same specialty: 1/20/2017 Vicente Elizabeth MD  Last physical: 9/5/2018 Last MTM visit: Visit date not found   Next visit within 3 mo: Visit date not found  Next physical within 3 mo: Visit date not found  Prescriber OR PCP: Vicente Elizabeth MD  Last diagnosis associated with med order: 1. Constipation  - GENERLAC 10 gram/15 mL solution [Pharmacy Med Name: GENERLAC 10GM/15ML SOLUTION]; TAKE BY MOUTH AS DIRECTED; Refill: 10    If protocol passes may refill for 12 months if within 3 months of last provider visit (or a total of 15 months).

## 2021-06-24 NOTE — TELEPHONE ENCOUNTER
Refill Approved    Rx renewed per Medication Renewal Policy. Medication was last renewed on 18.    Ov: 18    Carey Millan, [facility] Triage/Med Refill 2019     Requested Prescriptions   Pending Prescriptions Disp Refills     levothyroxine (SYNTHROID, LEVOTHROID) 100 MCG tablet [Pharmacy Med Name: LEVOTHYROXINE SODIUM 100MCG TABLET] 30 tablet 10     Si TAB BY MOUTH DAILY 1 HOUR BEFORE BREAKFAST    Thyroid Hormones Protocol Passed - 2019 11:04 AM       Passed - Provider visit in past 12 months or next 3 months    Last office visit with prescriber/PCP: 2017 Vicente Elizabeth MD OR same dept: Visit date not found OR same specialty: 2017 Vicente Elizabeth MD  Last physical: 2018 Last MTM visit: Visit date not found   Next visit within 3 mo: Visit date not found  Next physical within 3 mo: Visit date not found  Prescriber OR PCP: Vicente Elizabeth MD  Last diagnosis associated with med order: There are no diagnoses linked to this encounter.  If protocol passes may refill for 12 months if within 3 months of last provider visit (or a total of 15 months).            Passed - TSH on file in past 12 months for patient age 12 & older    TSH   Date Value Ref Range Status   10/31/2018 3.63 0.30 - 5.00 uIU/mL Final

## 2021-06-25 NOTE — TELEPHONE ENCOUNTER
RN cannot approve Refill Request    RN can NOT refill this medication [facility] last refilled on 3/29/2018 for an entire year, too soon for more. . Last office visit: 2017 Vicente Elizabeth MD Last Physical: 2018 Last MTM visit: Visit date not found Last visit same specialty: 2017 Vicente Elizabeth MD.  Next visit within 3 mo: Visit date not found  Next physical within 3 mo: Visit date not found      Mary Weeks, [facility] Triage/Med Refill 3/16/2019    Requested Prescriptions   Pending Prescriptions Disp Refills     divalproex (DEPAKOTE SPRINKLE) 125 mg capsule [Pharmacy Med Name: DIVALPROEX SODIUM 125MG CAP SPRINK] 60 capsule 10     Si CAP BY MOUTH TWICE DAILY    Divalproex/Valproic Acid Refill Protocol Passed - 3/13/2019  8:48 AM       Passed - LFT or AST or ALT in last 12 months    Albumin   Date Value Ref Range Status   2019 3.8 3.5 - 5.0 g/dL Final     Bilirubin, Total   Date Value Ref Range Status   2019 0.4 0.0 - 1.0 mg/dL Final     Bilirubin, Direct   Date Value Ref Range Status   2019 0.1 <=0.5 mg/dL Final     Alkaline Phosphatase   Date Value Ref Range Status   2019 63 45 - 120 U/L Final     AST   Date Value Ref Range Status   2019 19 0 - 40 U/L Final     ALT   Date Value Ref Range Status   2019 15 0 - 45 U/L Final     Protein, Total   Date Value Ref Range Status   2019 6.6 6.0 - 8.0 g/dL Final               Passed - CBC w/ plts (Hm2) in last 12 months     WBC   Date Value Ref Range Status   2019 6.0 4.0 - 11.0 thou/uL Final     Hemoglobin   Date Value Ref Range Status   2019 13.6 12.0 - 16.0 g/dL Final     Hematocrit   Date Value Ref Range Status   2019 42.9 35.0 - 47.0 % Final     Platelets   Date Value Ref Range Status   2019 182 140 - 440 thou/uL Final            Passed - PCP or prescribing provider visit in last 12 months      Last office visit with prescriber/PCP: 2017 Vicente Elizabeth  MD Yousif OR same dept: Visit date not found OR same specialty: 1/20/2017 Vicente Elizabeth MD  Last physical: 9/5/2018 Last MTM visit: Visit date not found   Next visit within 3 mo: Visit date not found  Next physical within 3 mo: Visit date not found  Prescriber OR PCP: Vicente Elizabeth MD  Last diagnosis associated with med order: There are no diagnoses linked to this encounter.  If protocol passes may refill for 12 months if within 3 months of last provider visit (or a total of 15 months).

## 2021-06-29 ENCOUNTER — HOSPITAL ENCOUNTER (OUTPATIENT)
Dept: REHABILITATION | Facility: CLINIC | Age: 61
End: 2021-06-29
Attending: FAMILY MEDICINE
Payer: MEDICAID

## 2021-06-29 PROCEDURE — S5100 ADULT DAYCARE SERVICES 15MIN: HCPCS | Performed by: OCCUPATIONAL THERAPIST

## 2021-06-29 NOTE — PROGRESS NOTES
Individual abuse prevention plan (IAPP)  [facility]     Assessment of Susceptibility to Abuse, Including Self Abuse, Neglect (Identification of characteristics, which make the individual susceptible to abuse, and how these characteristics cause the individual to be susceptible to abuse.)     Is the person susceptible to abuse in each area?  Sexual Abuse:   No  Referrals made when the person is susceptible to abuse outside the scope or control of this program (Identify the referral and date it occurred): No additional risk reduction means needed at this time    Physical Abuse:   No  Referrals made when the person is susceptible to abuse outside the scope or control of this program (Identify the referral and date it occurred): No additional risk reduction means needed at this time    Self-Abuse:   No  Referrals made when the person is susceptible to abuse outside the scope or control of this program (Identify the referral and date it occurred): No additional risk reduction means needed at this time    Financial  Exploitation  No  Referrals made when the person is susceptible to abuse outside the scope or control of this program (Identify the referral and date it occurred): No additional risk reduction means needed at this time    Is the program aware of this person committing a violent crime or act of physical aggression towards others?  No  Referrals made when the person is susceptible to abuse outside the scope or control of this program (Identify the referral and date it occurred): No additional risk reduction means needed at this time    INDIVIDUAL ABUSE PREVENTION PLAN-MEASURES TAKEN TO MINIMIZE RISK OF ABUSE   ADL:   Assist with clothing management  Assist with toileting  Ambulation/Transfers/Wheelchairs:  Provide transfer belt and assist with transfers and ambulation   Behavior:   Patient gets agitated when she becomes confused.  Communication:  Encourage verbalization of  needs/concerns  Cognitive Issues:   Provider reminders due to confusion, forgetfulness  Give simple step-by-step direction  Diet:   No present concerns  Exercise:   Encourage participation in maintenance program  Encourage participation in wheelchair aerobics  Hearing:   No concerns  Isolation:   Patient lives in an assisted living facility  Medical Monitoring:   Monitor physical and emotional comfort  Mental Health:   Encourage client to express feelings  Offer emotional support  Sensory:   Provide and encourage participation in activities for stimulation  Vision:   Ensure client is wearing glasses and clean prn  Other: N/A  Developed in consultation with:  Client  The Program Abuse Prevention Plan/IAPP identifies the specific actions that minimize abuse to the [facility] participant.  Yes

## 2021-07-08 ENCOUNTER — HOSPITAL ENCOUNTER (OUTPATIENT)
Dept: REHABILITATION | Facility: CLINIC | Age: 61
End: 2021-07-08
Attending: FAMILY MEDICINE
Payer: MEDICAID

## 2021-07-08 PROCEDURE — S5100 ADULT DAYCARE SERVICES 15MIN: HCPCS | Performed by: REHABILITATION PRACTITIONER

## 2021-07-13 ENCOUNTER — HOSPITAL ENCOUNTER (OUTPATIENT)
Dept: REHABILITATION | Facility: CLINIC | Age: 61
End: 2021-07-13
Attending: FAMILY MEDICINE
Payer: MEDICAID

## 2021-07-13 ENCOUNTER — RECORDS - HEALTHEAST (OUTPATIENT)
Dept: ADMINISTRATIVE | Facility: CLINIC | Age: 61
End: 2021-07-13

## 2021-07-13 PROCEDURE — S5100 ADULT DAYCARE SERVICES 15MIN: HCPCS

## 2021-07-15 ENCOUNTER — HOSPITAL ENCOUNTER (OUTPATIENT)
Dept: REHABILITATION | Facility: CLINIC | Age: 61
End: 2021-07-15
Attending: FAMILY MEDICINE
Payer: MEDICAID

## 2021-07-15 PROCEDURE — S5100 ADULT DAYCARE SERVICES 15MIN: HCPCS | Performed by: OCCUPATIONAL THERAPIST

## 2021-07-19 NOTE — PROGRESS NOTES
[facility] Social History    Full Name: Karolyn Das      MRN: [id number]    Date of Birth: [birth year]  Nickname:      Sex: F     Home Phone:   Cell Phone:  [phone]  Address:    [address] [place], [place] [address] [place]  [address] E-mail:     City/State/Zip: [place] [address]    County: [place]      E-mail: None        Transportation: [place]  Language: English         needed? No       Ethnicity: American  Race: White      Country of Origin: [place]       Restorationism: Lutheran  Marital Status:                   Spouse/Significant Other: POA:  Jane Martinez  ______________________________________________________________________________________     ? No    Branch of Service: NA      Education Level:  studies @[employer]  Job History: [employer]       Organizations/Clubs: None  Whom do you live with? Assisted Living  Current living arrangement:   Assisted Living  Number of Children: 2  List: Rohit Serna  Number of Siblings: 3     List:  Deena, Marion and Jonathan  Other Important People/Pets: Best friend (lives in [facility])  What else should we know about you? Youth counseling was and is very important to her    Primary Care Provider:  Dr. Ulysses Wong        Neurologist: In the process of setting up a new onw   Emergency Contacts:  1. Kareem Das      Relationship: Son    Phone: [phone]  2. Jo Das         Relationship: Mother-in-Law     Phone: [phone]        Updated on: 1/25/17             Updated on: 7/30/18              Updated on: 7/18/19  Updated on 7/23/19  Updated on 7/19/21

## 2021-07-20 ENCOUNTER — HOSPITAL ENCOUNTER (OUTPATIENT)
Dept: REHABILITATION | Facility: CLINIC | Age: 61
End: 2021-07-20
Attending: FAMILY MEDICINE
Payer: MEDICAID

## 2021-07-20 PROCEDURE — S5102 ADULT DAY CARE PER DIEM: HCPCS

## 2021-07-21 ENCOUNTER — RECORDS - HEALTHEAST (OUTPATIENT)
Dept: ADMINISTRATIVE | Facility: CLINIC | Age: 61
End: 2021-07-21

## 2021-07-21 NOTE — PROGRESS NOTES
MONTHLY PROGRESS NOTE AND PARTICIPATION REPORT   [facility]    Karolyn Das, 1960  Attendance: Please see Epic for attendance record.    Communication:   Does communicate   Hearing:   No impairment  Vision:   Glasses  Orientation/Cognition:   Minor forgetfulness  Partial disorientation  Short term memory loss  Behavior:   Requires redirection  Self-Preservation Skills:   Not capable of self-preservation  Eating:   Assist with set up  Ambulation Walking:   Non-ambulatory  Transferring:   Aide of one person/two  Wheelchair:   Needs help  Toileting:   Needs assistance  Maintenance Program:     [facility]  Living Arrangements:   Lives in assisted living facility  Spiritual Needs: Needs are being met through support group  Medication Assistance:   Medication not taken during program hours  Participation Report:   Aerobics/Exercise  Support Group  Cognitive Stimulation  Fire Drill  Creative Arts/Crafts  Games  Gardening  Speakers/Entertainment  Socialization  Current Events/News  Education/Health Topic  Level of Participation:   Active

## 2021-07-22 ENCOUNTER — HOSPITAL ENCOUNTER (OUTPATIENT)
Dept: REHABILITATION | Facility: CLINIC | Age: 61
End: 2021-07-22
Attending: FAMILY MEDICINE
Payer: MEDICAID

## 2021-07-22 ENCOUNTER — RECORDS - HEALTHEAST (OUTPATIENT)
Dept: FAMILY MEDICINE | Facility: CLINIC | Age: 61
End: 2021-07-22

## 2021-07-22 DIAGNOSIS — Z12.31 OTHER SCREENING MAMMOGRAM: ICD-10-CM

## 2021-07-22 PROCEDURE — S5102 ADULT DAY CARE PER DIEM: HCPCS

## 2021-07-27 ENCOUNTER — HOSPITAL ENCOUNTER (OUTPATIENT)
Dept: REHABILITATION | Facility: CLINIC | Age: 61
End: 2021-07-27
Attending: FAMILY MEDICINE
Payer: MEDICAID

## 2021-07-27 PROCEDURE — S5102 ADULT DAY CARE PER DIEM: HCPCS

## 2021-07-27 NOTE — PROGRESS NOTES
INDIVIDUAL PLAN OF CARE   [facility]    Member Name: Karolyn Das; YOB: 1960  MRN: [id number]  4/6/2021    Goals developed in collaboration with: member  Staff responsible for plan:  Mahin COSTELLO  1. Long Term Goal (concrete, measurable, and time specific outcomes):  Karolyn will maintain her current level of cognitive and physical function for as long as possible and prevent the progression of MS symptoms through active participation in [facility] activities each day of program attendance.  Target Date:  1/31/22  Bi-Annual Review:    2. Short Term Goal: (concrete, measurable, and time specific outcomes):  Karolyn will maintain her current level of physical function and strength of BL LE and BL UE through active participation in her prescribed Nu Step maintenance program for at least 30 minutes each day of program attendance.  CGAx1 w/ gait belt provided for all transfers for falls prevention  d/2 impaired balance 2/2 MS diagnosis.  Target Date:  7/31/21  Bi-Annual Review:  Goal remains appropriate.    3. Short Term Goal: (concrete, measurable, and time specific outcomes):  Lissett will maintain her current level of cognitive function through active participation in Brain and Body cognitive stimulation activities each day of program attendance.  Target Date: 7/31/21  Bi-Annual Review:  Goal remains appropriate

## 2021-07-29 ENCOUNTER — HOSPITAL ENCOUNTER (OUTPATIENT)
Dept: REHABILITATION | Facility: CLINIC | Age: 61
End: 2021-07-29
Attending: FAMILY MEDICINE
Payer: MEDICAID

## 2021-07-29 PROCEDURE — S5102 ADULT DAY CARE PER DIEM: HCPCS | Performed by: REHABILITATION PRACTITIONER

## 2021-08-03 ENCOUNTER — HOSPITAL ENCOUNTER (OUTPATIENT)
Dept: REHABILITATION | Facility: CLINIC | Age: 61
End: 2021-08-03
Attending: FAMILY MEDICINE
Payer: MEDICAID

## 2021-08-03 PROCEDURE — S5100 ADULT DAYCARE SERVICES 15MIN: HCPCS | Performed by: REHABILITATION PRACTITIONER

## 2021-08-05 ENCOUNTER — HOSPITAL ENCOUNTER (OUTPATIENT)
Dept: REHABILITATION | Facility: CLINIC | Age: 61
End: 2021-08-05
Attending: FAMILY MEDICINE
Payer: MEDICAID

## 2021-08-05 PROCEDURE — S5100 ADULT DAYCARE SERVICES 15MIN: HCPCS | Performed by: REHABILITATION PRACTITIONER

## 2021-08-06 NOTE — PROGRESS NOTES
MONTHLY PROGRESS NOTE AND PARTICIPATION REPORT   [facility]    Karolyn Das, 1960  Attendance: Please see Epic for attendance record.    Communication:   Does communicate   Hearing:   No impairment  Vision:   Difficulty reading or seeing print  Orientation/Cognition:   Minor forgetfulness  Partial disorientation  Short term memory loss  Karolyn presents with moderate cognitive impairment and requires close supervision for safety.  Behavior:   No behavioral concerns  Requires redirection  Karolyn requires redirection if she becomes upset by a late ride home or missing time in [place].  Self-Preservation Skills:   Not capable of self-preservation  Eating:   Assist with set up  Ambulation Walking:   Non-ambulatory  Transferring:   Aide of one person/two  Karolyn presents with a strong rear lean while performing stand pivot tx's to toilet and Nu Step and requires CGA and gait belt for safe tx's and fall prevention.  Staff provides v/c's to apply manual wc brakes and to use grab bars during tx's.  Wheelchair:   Needs help  Toileting:   Independent  Staff provides Karolyn with room alarm after toilet tx is complete so she can notify staff that she has completed her bathroom needs so staff can assist with her tx back to manual WC.  Maintenance Program:     NuStep  Living Arrangements:   Lives in assisted living facility  Spiritual Needs: Needs are being met through support group  Medication Assistance:   Medication not taken during program hours  Participation Report:   Aerobics/Exercise  Support Group  Cognitive Stimulation  Fire Drill  Creative Arts/Crafts  Games  Gardening  Speakers/Entertainment  Socialization  Current Events/News  Education/Health Topic  Level of Participation:   Active

## 2021-08-10 ENCOUNTER — HOSPITAL ENCOUNTER (OUTPATIENT)
Dept: REHABILITATION | Facility: CLINIC | Age: 61
End: 2021-08-10
Attending: FAMILY MEDICINE
Payer: MEDICAID

## 2021-08-10 PROCEDURE — S5100 ADULT DAYCARE SERVICES 15MIN: HCPCS | Performed by: REHABILITATION PRACTITIONER

## 2021-08-12 ENCOUNTER — HOSPITAL ENCOUNTER (OUTPATIENT)
Dept: REHABILITATION | Facility: CLINIC | Age: 61
End: 2021-08-12
Attending: FAMILY MEDICINE
Payer: MEDICAID

## 2021-08-12 PROCEDURE — S5100 ADULT DAYCARE SERVICES 15MIN: HCPCS | Performed by: REHABILITATION PRACTITIONER

## 2021-08-17 ENCOUNTER — HOSPITAL ENCOUNTER (OUTPATIENT)
Dept: REHABILITATION | Facility: CLINIC | Age: 61
End: 2021-08-17
Attending: FAMILY MEDICINE
Payer: MEDICAID

## 2021-08-17 PROCEDURE — S5100 ADULT DAYCARE SERVICES 15MIN: HCPCS

## 2021-08-19 ENCOUNTER — HOSPITAL ENCOUNTER (OUTPATIENT)
Dept: REHABILITATION | Facility: CLINIC | Age: 61
End: 2021-08-19
Attending: FAMILY MEDICINE
Payer: MEDICAID

## 2021-08-19 PROCEDURE — S5100 ADULT DAYCARE SERVICES 15MIN: HCPCS

## 2021-08-24 ENCOUNTER — HOSPITAL ENCOUNTER (OUTPATIENT)
Dept: REHABILITATION | Facility: CLINIC | Age: 61
End: 2021-08-24
Attending: FAMILY MEDICINE
Payer: MEDICAID

## 2021-08-24 PROCEDURE — S5100 ADULT DAYCARE SERVICES 15MIN: HCPCS

## 2021-08-26 ENCOUNTER — HOSPITAL ENCOUNTER (OUTPATIENT)
Dept: REHABILITATION | Facility: CLINIC | Age: 61
End: 2021-08-26
Attending: FAMILY MEDICINE
Payer: MEDICAID

## 2021-08-26 PROCEDURE — S5100 ADULT DAYCARE SERVICES 15MIN: HCPCS

## 2021-08-31 ENCOUNTER — HOSPITAL ENCOUNTER (OUTPATIENT)
Dept: REHABILITATION | Facility: CLINIC | Age: 61
End: 2021-08-31
Attending: FAMILY MEDICINE
Payer: MEDICAID

## 2021-08-31 PROCEDURE — S5100 ADULT DAYCARE SERVICES 15MIN: HCPCS

## 2021-09-02 ENCOUNTER — HOSPITAL ENCOUNTER (OUTPATIENT)
Dept: REHABILITATION | Facility: CLINIC | Age: 61
End: 2021-09-02
Attending: FAMILY MEDICINE
Payer: MEDICAID

## 2021-09-02 PROCEDURE — S5100 ADULT DAYCARE SERVICES 15MIN: HCPCS

## 2021-09-07 ENCOUNTER — HOSPITAL ENCOUNTER (OUTPATIENT)
Dept: REHABILITATION | Facility: CLINIC | Age: 61
End: 2021-09-07
Attending: FAMILY MEDICINE
Payer: MEDICAID

## 2021-09-07 PROCEDURE — S5100 ADULT DAYCARE SERVICES 15MIN: HCPCS

## 2021-09-09 ENCOUNTER — HOSPITAL ENCOUNTER (OUTPATIENT)
Dept: REHABILITATION | Facility: CLINIC | Age: 61
End: 2021-09-09
Attending: FAMILY MEDICINE
Payer: MEDICAID

## 2021-09-09 PROCEDURE — S5100 ADULT DAYCARE SERVICES 15MIN: HCPCS

## 2021-09-14 ENCOUNTER — HOSPITAL ENCOUNTER (OUTPATIENT)
Dept: REHABILITATION | Facility: CLINIC | Age: 61
End: 2021-09-14
Attending: FAMILY MEDICINE
Payer: MEDICAID

## 2021-09-14 PROCEDURE — S5100 ADULT DAYCARE SERVICES 15MIN: HCPCS

## 2021-09-16 ENCOUNTER — HOSPITAL ENCOUNTER (OUTPATIENT)
Dept: REHABILITATION | Facility: CLINIC | Age: 61
End: 2021-09-16
Attending: FAMILY MEDICINE
Payer: MEDICAID

## 2021-09-16 PROCEDURE — S5100 ADULT DAYCARE SERVICES 15MIN: HCPCS

## 2021-09-17 NOTE — PROGRESS NOTES
MONTHLY PROGRESS NOTE AND PARTICIPATION REPORT   [facility]    Karolyn Das, 1960  Attendance: Please see Epic for attendance record.    Communication:   Does communicate   Hearing:   No impairment  Vision:   Difficulty reading or seeing print  Orientation/Cognition:   Minor forgetfulness  Partial disorientation  Short term memory loss  Karolyn presents with moderate cognitive impairment and requires close supervision for safety.  Behavior:   No behavioral concerns  Requires redirection  Karolyn requires redirection if she becomes upset by a late ride home or missing time in [email or web address].  Self-Preservation Skills:   Not capable of self-preservation  Eating:   Assist with set up  Ambulation Walking:   Non-ambulatory  Transferring:   Aide of one person/two  Karolyn presents with a strong rear lean while performing stand pivot tx's to toilet and Nu Step and requires CGA and gait belt for safe tx's and fall prevention.  Staff provides v/c's to apply manual wc brakes and to use grab bars during tx's.  Wheelchair:   Needs help  Toileting:   Independent  Staff provides Karolyn with room alarm after toilet tx is complete so she can notify staff that she has completed her bathroom needs so staff can assist with her tx back to manual WC.  Maintenance Program:     NuStep  Living Arrangements:   Lives in assisted living facility  Spiritual Needs: Needs are being met through support group  Medication Assistance:   Medication not taken during program hours  Participation Report:   Aerobics/Exercise  Support Group  Cognitive Stimulation  Fire Drill  Creative Arts/Crafts  Games  Gardening  Speakers/Entertainment  Socialization  Current Events/News  Education/Health Topic  Level of Participation:   Active

## 2021-09-21 ENCOUNTER — HOSPITAL ENCOUNTER (OUTPATIENT)
Dept: REHABILITATION | Facility: CLINIC | Age: 61
End: 2021-09-21
Attending: FAMILY MEDICINE
Payer: MEDICAID

## 2021-09-21 PROCEDURE — S5100 ADULT DAYCARE SERVICES 15MIN: HCPCS

## 2021-09-23 ENCOUNTER — HOSPITAL ENCOUNTER (OUTPATIENT)
Dept: REHABILITATION | Facility: CLINIC | Age: 61
End: 2021-09-23
Attending: FAMILY MEDICINE
Payer: MEDICAID

## 2021-09-23 PROCEDURE — S5102 ADULT DAY CARE PER DIEM: HCPCS

## 2021-09-28 ENCOUNTER — HOSPITAL ENCOUNTER (OUTPATIENT)
Dept: REHABILITATION | Facility: CLINIC | Age: 61
End: 2021-09-28
Attending: FAMILY MEDICINE
Payer: MEDICAID

## 2021-09-28 PROCEDURE — S5100 ADULT DAYCARE SERVICES 15MIN: HCPCS

## 2021-09-30 ENCOUNTER — HOSPITAL ENCOUNTER (OUTPATIENT)
Dept: REHABILITATION | Facility: CLINIC | Age: 61
End: 2021-09-30
Attending: FAMILY MEDICINE
Payer: MEDICAID

## 2021-09-30 PROCEDURE — S5100 ADULT DAYCARE SERVICES 15MIN: HCPCS | Performed by: REHABILITATION PRACTITIONER

## 2021-10-05 ENCOUNTER — HOSPITAL ENCOUNTER (OUTPATIENT)
Dept: REHABILITATION | Facility: CLINIC | Age: 61
End: 2021-10-05
Attending: FAMILY MEDICINE
Payer: MEDICAID

## 2021-10-05 PROCEDURE — S5100 ADULT DAYCARE SERVICES 15MIN: HCPCS

## 2021-10-07 ENCOUNTER — HOSPITAL ENCOUNTER (OUTPATIENT)
Dept: REHABILITATION | Facility: CLINIC | Age: 61
End: 2021-10-07
Attending: FAMILY MEDICINE
Payer: MEDICAID

## 2021-10-07 PROCEDURE — S5100 ADULT DAYCARE SERVICES 15MIN: HCPCS

## 2021-10-07 NOTE — PROGRESS NOTES
MONTHLY PROGRESS NOTE AND PARTICIPATION REPORT   [facility]    Karolyn Das, 1960  Attendance: Please see Epic for attendance record.    Communication:   Does communicate   Hearing:   No impairment  Vision:   Difficulty reading or seeing print  Orientation/Cognition:   Minor forgetfulness  Partial disorientation  Short term memory loss  Karolyn presents with moderate cognitive impairment and requires close supervision for safety.  Behavior:   No behavioral concerns  Requires redirection  Karolyn requires redirection if she becomes upset by a late ride home or missing time in [place].  Self-Preservation Skills:   Not capable of self-preservation  Eating:   Assist with set up  Ambulation Walking:   Non-ambulatory  Transferring:   Aide of one person/two  Karolyn presents with a strong rear lean while performing stand pivot tx's to toilet and Nu Step and requires CGA and gait belt for safe tx's and fall prevention.  Staff provides v/c's to apply manual wc brakes and to use grab bars during tx's.  Wheelchair:   Needs help  Toileting:   Independent  Staff provides Karolyn with room alarm after toilet tx is complete so she can notify staff that she has completed her bathroom needs so staff can assist with her tx back to manual WC.  Maintenance Program:     [facility]   Has expressed interest in using parallel bars- may not be appropriate at this time.   Living Arrangements:   Lives in assisted living facility  Spiritual Needs: Needs are being met through support group  Medication Assistance:   Medication not taken during program hours  Participation Report:   Aerobics/Exercise  Support Group  Cognitive Stimulation  Fire Drill  Creative Arts/Crafts  Games  Gardening  Speakers/Entertainment  Socialization  Current Events/News  Education/Health Topic  Level of Participation:   Active

## 2021-10-11 NOTE — PROGRESS NOTES
INDIVIDUAL PLAN OF CARE   [facility]    Member Name: Karolyn Das; YOB: 1960  MRN: [id number]  4/6/2021    Goals developed in collaboration with: member  Staff responsible for plan:  Mahin COSTELLO, Lucero LEE  1. Long Term Goal (concrete, measurable, and time specific outcomes):  Karolyn will maintain her current level of cognitive and physical function for as long as possible and prevent the progression of MS symptoms through active participation in [facility] activities each day of program attendance.  Target Date:  1/31/22  Bi-Annual Review: Mirtha benefits from attending the program to interact with others and engage in meaningful activities. The above goal remains appropriate.     2. Short Term Goal: (concrete, measurable, and time specific outcomes):  Karolyn will maintain her current level of physical function and strength of BL LE and BL UE through active participation in her prescribed Nu Step maintenance program for at least 30 minutes each day of program attendance.  CGAx1 w/ gait belt provided for all transfers for falls prevention  d/2 impaired balance 2/2 MS diagnosis.  Target Date:  1/31/22  Bi-Annual Review:  Goal remains appropriate. Mirtha is meeting her goal of exercising on the NuStep which she enjoys doing and wants to continue to be her goal.      3. Short Term Goal: (concrete, measurable, and time specific outcomes):  Lissett will maintain her current level of cognitive function through active participation in Brain and Body cognitive stimulation activities each day of program attendance.  Target Date: 1/31/22  Bi-Annual Review:  Goal remains appropriate Mirtha participates in all program activities to the best of her abilities. She also gains self esteem when completing her art projects and getting compliments from her peers.

## 2021-10-11 NOTE — PROGRESS NOTES
Individual abuse prevention plan (IAPP)  [facility]     Assessment of Susceptibility to Abuse, Including Self Abuse, Neglect (Identification of characteristics, which make the individual susceptible to abuse, and how these characteristics cause the individual to be susceptible to abuse.)     Is the person susceptible to abuse in each area?  Sexual Abuse:   No  Referrals made when the person is susceptible to abuse outside the scope or control of this program (Identify the referral and date it occurred): No additional risk reduction means needed at this time    Physical Abuse:   No  Referrals made when the person is susceptible to abuse outside the scope or control of this program (Identify the referral and date it occurred): No additional risk reduction means needed at this time    Self-Abuse:   No  Referrals made when the person is susceptible to abuse outside the scope or control of this program (Identify the referral and date it occurred): No additional risk reduction means needed at this time    Financial  Exploitation  No  Referrals made when the person is susceptible to abuse outside the scope or control of this program (Identify the referral and date it occurred): No additional risk reduction means needed at this time    Is the program aware of this person committing a violent crime or act of physical aggression towards others?  No  Referrals made when the person is susceptible to abuse outside the scope or control of this program (Identify the referral and date it occurred): No additional risk reduction means needed at this time    INDIVIDUAL ABUSE PREVENTION PLAN-MEASURES TAKEN TO MINIMIZE RISK OF ABUSE   ADL:   Assist with clothing management  Assist with toileting  Ambulation/Transfers/Wheelchairs:  Provide transfer belt and assist with transfers and ambulation   Behavior:   Patient gets agitated when she becomes confused.  Communication:  Encourage verbalization of  needs/concerns  Cognitive Issues:   Provider reminders due to confusion, forgetfulness  Give simple step-by-step direction  Diet:   No present concerns  Exercise:   Encourage participation in maintenance program  Encourage participation in wheelchair aerobics  Hearing:   No concerns  Isolation:   Patient lives in an assisted living facility  Medical Monitoring:   Monitor physical and emotional comfort  Mental Health:   Encourage client to express feelings  Offer emotional support  Sensory:   Provide and encourage participation in activities for stimulation  Vision:   Ensure client is wearing glasses and clean prn  Other: N/A  Developed in consultation with:  Client  The Program Abuse Prevention Plan/IAPP identifies the specific actions that minimize abuse to the [facility] participant.  Yes

## 2021-10-12 ENCOUNTER — HOSPITAL ENCOUNTER (OUTPATIENT)
Dept: REHABILITATION | Facility: CLINIC | Age: 61
End: 2021-10-12
Attending: FAMILY MEDICINE
Payer: MEDICAID

## 2021-10-12 PROCEDURE — S5100 ADULT DAYCARE SERVICES 15MIN: HCPCS

## 2021-10-19 ENCOUNTER — HOSPITAL ENCOUNTER (OUTPATIENT)
Dept: REHABILITATION | Facility: CLINIC | Age: 61
End: 2021-10-19
Attending: FAMILY MEDICINE
Payer: MEDICAID

## 2021-10-19 PROCEDURE — S5100 ADULT DAYCARE SERVICES 15MIN: HCPCS | Performed by: REHABILITATION PRACTITIONER

## 2021-10-21 ENCOUNTER — HOSPITAL ENCOUNTER (OUTPATIENT)
Dept: REHABILITATION | Facility: CLINIC | Age: 61
End: 2021-10-21
Attending: FAMILY MEDICINE
Payer: MEDICAID

## 2021-10-21 PROCEDURE — S5100 ADULT DAYCARE SERVICES 15MIN: HCPCS

## 2021-10-26 ENCOUNTER — HOSPITAL ENCOUNTER (OUTPATIENT)
Dept: REHABILITATION | Facility: CLINIC | Age: 61
End: 2021-10-26
Attending: FAMILY MEDICINE
Payer: MEDICAID

## 2021-10-26 PROCEDURE — S5100 ADULT DAYCARE SERVICES 15MIN: HCPCS

## 2021-10-28 ENCOUNTER — HOSPITAL ENCOUNTER (OUTPATIENT)
Dept: REHABILITATION | Facility: CLINIC | Age: 61
End: 2021-10-28
Attending: FAMILY MEDICINE
Payer: MEDICAID

## 2021-10-28 PROCEDURE — S5100 ADULT DAYCARE SERVICES 15MIN: HCPCS

## 2021-11-04 ENCOUNTER — HOSPITAL ENCOUNTER (OUTPATIENT)
Dept: REHABILITATION | Facility: CLINIC | Age: 61
End: 2021-11-04
Attending: FAMILY MEDICINE
Payer: MEDICAID

## 2021-11-04 PROCEDURE — S5100 ADULT DAYCARE SERVICES 15MIN: HCPCS

## 2021-11-09 PROCEDURE — U0005 INFEC AGEN DETEC AMPLI PROBE: HCPCS | Mod: ORL | Performed by: INTERNAL MEDICINE

## 2021-11-10 ENCOUNTER — LAB REQUISITION (OUTPATIENT)
Dept: LAB | Facility: CLINIC | Age: 61
End: 2021-11-10
Payer: COMMERCIAL

## 2021-11-10 DIAGNOSIS — U07.1 COVID-19: ICD-10-CM

## 2021-11-10 LAB — SARS-COV-2 RNA RESP QL NAA+PROBE: NEGATIVE

## 2021-11-11 ENCOUNTER — HOSPITAL ENCOUNTER (OUTPATIENT)
Dept: REHABILITATION | Facility: CLINIC | Age: 61
End: 2021-11-11
Attending: FAMILY MEDICINE
Payer: MEDICAID

## 2021-11-11 PROCEDURE — S5100 ADULT DAYCARE SERVICES 15MIN: HCPCS

## 2021-11-11 NOTE — PROGRESS NOTES
MONTHLY PROGRESS NOTE AND PARTICIPATION REPORT   [facility]    Karolyn Das, 1960  Attendance: Please see Epic for attendance record.    Communication:   Does communicate   Hearing:   No impairment  Vision:   Difficulty reading or seeing print  Orientation/Cognition:   Minor forgetfulness  Partial disorientation  Short term memory loss  Karolyn presents with moderate cognitive impairment and requires close supervision for safety.  Behavior:   No behavioral concerns  Requires redirection  Karolyn requires redirection if she becomes upset by a late ride home or missing time in [place].  Self-Preservation Skills:   Not capable of self-preservation  Eating:   Assist with set up  Ambulation Walking:   Non-ambulatory  Transferring:   Aide of one person/two  Karolyn presents with a strong rear lean while performing stand pivot tx's to toilet and Nu Step and requires CGA and gait belt for safe tx's and fall prevention.  Staff provides v/c's to apply manual wc brakes and to use grab bars during tx's.  Wheelchair:   Needs help  Toileting:   Independent  Staff provides Karolyn with room alarm after toilet tx is complete so she can notify staff that she has completed her bathroom needs so staff can assist with her tx back to manual WC.  Maintenance Program:     [facility]   Has expressed interest in using parallel bars- may not be appropriate at this time.   Living Arrangements:   Lives in assisted living facility  Spiritual Needs: Needs are being met through support group  Medication Assistance:   Medication not taken during program hours  Participation Report:   Aerobics/Exercise  Support Group  Cognitive Stimulation  Fire Drill  Creative Arts/Crafts  Games  Gardening  Speakers/Entertainment  Socialization  Current Events/News  Education/Health Topic  Level of Participation:   Active

## 2021-11-16 ENCOUNTER — HOSPITAL ENCOUNTER (OUTPATIENT)
Dept: REHABILITATION | Facility: CLINIC | Age: 61
End: 2021-11-16
Attending: FAMILY MEDICINE
Payer: MEDICAID

## 2021-11-16 PROCEDURE — S5100 ADULT DAYCARE SERVICES 15MIN: HCPCS

## 2021-11-16 NOTE — PROGRESS NOTES
"[facility] Note:  Home and Community Based Services Outing    Date:  11/16/21    Destination:  [facility]  [address] [place] [address]     Description of Outing:  3 members in manual WC's escorted to [facility] a safe community distance to [facility] for socialization and food of their choice.      Member Response:  Karolyn reported that she had a \"wonderful time\" as she ate an apple pie and drank a warm tea, which was cooled by staff with ice cubes for safe drinking.  2 friends from her Religious actually visited Karolyn and accompanied the group for the outing.      Members in Attendance:  3 members  Staff in Attendance:  3 staff.  Mulu Tatum and Lauren ([employer] student)    Departure Time:  10:30 am  Return Time:  11:30 am  "

## 2021-11-18 ENCOUNTER — HOSPITAL ENCOUNTER (OUTPATIENT)
Dept: REHABILITATION | Facility: CLINIC | Age: 61
End: 2021-11-18
Attending: FAMILY MEDICINE
Payer: MEDICAID

## 2021-11-18 PROCEDURE — S5100 ADULT DAYCARE SERVICES 15MIN: HCPCS

## 2021-11-23 ENCOUNTER — HOSPITAL ENCOUNTER (OUTPATIENT)
Dept: REHABILITATION | Facility: CLINIC | Age: 61
End: 2021-11-23
Attending: FAMILY MEDICINE
Payer: MEDICAID

## 2021-11-23 PROCEDURE — S5100 ADULT DAYCARE SERVICES 15MIN: HCPCS

## 2021-11-30 ENCOUNTER — HOSPITAL ENCOUNTER (OUTPATIENT)
Dept: REHABILITATION | Facility: CLINIC | Age: 61
End: 2021-11-30
Attending: FAMILY MEDICINE
Payer: MEDICAID

## 2021-11-30 PROCEDURE — S5100 ADULT DAYCARE SERVICES 15MIN: HCPCS

## 2021-12-02 ENCOUNTER — HOSPITAL ENCOUNTER (OUTPATIENT)
Dept: REHABILITATION | Facility: CLINIC | Age: 61
End: 2021-12-02
Attending: FAMILY MEDICINE
Payer: MEDICAID

## 2021-12-02 PROCEDURE — S5100 ADULT DAYCARE SERVICES 15MIN: HCPCS

## 2021-12-07 ENCOUNTER — HOSPITAL ENCOUNTER (OUTPATIENT)
Dept: REHABILITATION | Facility: CLINIC | Age: 61
End: 2021-12-07
Attending: FAMILY MEDICINE
Payer: MEDICAID

## 2021-12-07 PROCEDURE — S5100 ADULT DAYCARE SERVICES 15MIN: HCPCS

## 2021-12-09 ENCOUNTER — HOSPITAL ENCOUNTER (OUTPATIENT)
Dept: REHABILITATION | Facility: CLINIC | Age: 61
End: 2021-12-09
Attending: FAMILY MEDICINE
Payer: MEDICAID

## 2021-12-09 PROCEDURE — S5100 ADULT DAYCARE SERVICES 15MIN: HCPCS

## 2021-12-14 ENCOUNTER — HOSPITAL ENCOUNTER (OUTPATIENT)
Dept: REHABILITATION | Facility: CLINIC | Age: 61
End: 2021-12-14
Attending: FAMILY MEDICINE
Payer: MEDICAID

## 2021-12-14 PROCEDURE — S5100 ADULT DAYCARE SERVICES 15MIN: HCPCS

## 2021-12-16 ENCOUNTER — HOSPITAL ENCOUNTER (OUTPATIENT)
Dept: REHABILITATION | Facility: CLINIC | Age: 61
End: 2021-12-16
Attending: FAMILY MEDICINE
Payer: MEDICAID

## 2021-12-16 PROCEDURE — S5100 ADULT DAYCARE SERVICES 15MIN: HCPCS

## 2021-12-21 ENCOUNTER — HOSPITAL ENCOUNTER (OUTPATIENT)
Dept: REHABILITATION | Facility: CLINIC | Age: 61
End: 2021-12-21
Attending: FAMILY MEDICINE
Payer: MEDICAID

## 2021-12-21 PROCEDURE — S5100 ADULT DAYCARE SERVICES 15MIN: HCPCS

## 2021-12-23 ENCOUNTER — HOSPITAL ENCOUNTER (OUTPATIENT)
Dept: REHABILITATION | Facility: CLINIC | Age: 61
End: 2021-12-23
Attending: FAMILY MEDICINE
Payer: MEDICAID

## 2021-12-23 PROCEDURE — S5100 ADULT DAYCARE SERVICES 15MIN: HCPCS

## 2021-12-28 ENCOUNTER — HOSPITAL ENCOUNTER (OUTPATIENT)
Dept: REHABILITATION | Facility: CLINIC | Age: 61
End: 2021-12-28
Attending: FAMILY MEDICINE
Payer: MEDICAID

## 2021-12-28 PROCEDURE — S5100 ADULT DAYCARE SERVICES 15MIN: HCPCS

## 2021-12-30 ENCOUNTER — HOSPITAL ENCOUNTER (OUTPATIENT)
Dept: REHABILITATION | Facility: CLINIC | Age: 61
End: 2021-12-30
Attending: FAMILY MEDICINE
Payer: MEDICAID

## 2021-12-30 PROCEDURE — S5100 ADULT DAYCARE SERVICES 15MIN: HCPCS

## 2022-01-04 ENCOUNTER — HOSPITAL ENCOUNTER (OUTPATIENT)
Dept: REHABILITATION | Facility: CLINIC | Age: 62
End: 2022-01-04
Attending: FAMILY MEDICINE
Payer: MEDICAID

## 2022-01-04 ENCOUNTER — LAB REQUISITION (OUTPATIENT)
Dept: LAB | Facility: CLINIC | Age: 62
End: 2022-01-04
Payer: COMMERCIAL

## 2022-01-04 DIAGNOSIS — G40.909 EPILEPSY, UNSPECIFIED, NOT INTRACTABLE, WITHOUT STATUS EPILEPTICUS (H): ICD-10-CM

## 2022-01-04 DIAGNOSIS — E03.9 HYPOTHYROIDISM, UNSPECIFIED: ICD-10-CM

## 2022-01-04 DIAGNOSIS — G90.09 OTHER IDIOPATHIC PERIPHERAL AUTONOMIC NEUROPATHY: ICD-10-CM

## 2022-01-04 PROCEDURE — S5100 ADULT DAYCARE SERVICES 15MIN: HCPCS

## 2022-01-05 ENCOUNTER — LAB REQUISITION (OUTPATIENT)
Dept: LAB | Facility: CLINIC | Age: 62
End: 2022-01-05
Payer: COMMERCIAL

## 2022-01-05 DIAGNOSIS — G90.09 OTHER IDIOPATHIC PERIPHERAL AUTONOMIC NEUROPATHY: ICD-10-CM

## 2022-01-05 DIAGNOSIS — G40.909 EPILEPSY, UNSPECIFIED, NOT INTRACTABLE, WITHOUT STATUS EPILEPTICUS (H): ICD-10-CM

## 2022-01-05 DIAGNOSIS — E03.9 HYPOTHYROIDISM, UNSPECIFIED: ICD-10-CM

## 2022-01-05 LAB
ALBUMIN SERPL-MCNC: 3.7 G/DL (ref 3.5–5)
ALP SERPL-CCNC: 82 U/L (ref 45–120)
ALT SERPL W P-5'-P-CCNC: 15 U/L (ref 0–45)
ANION GAP SERPL CALCULATED.3IONS-SCNC: 9 MMOL/L (ref 5–18)
AST SERPL W P-5'-P-CCNC: 24 U/L (ref 0–40)
BILIRUB SERPL-MCNC: 0.3 MG/DL (ref 0–1)
BUN SERPL-MCNC: 16 MG/DL (ref 8–22)
CALCIUM SERPL-MCNC: 9.2 MG/DL (ref 8.5–10.5)
CHLORIDE BLD-SCNC: 105 MMOL/L (ref 98–107)
CO2 SERPL-SCNC: 25 MMOL/L (ref 22–31)
CREAT SERPL-MCNC: 0.74 MG/DL (ref 0.6–1.1)
ERYTHROCYTE [DISTWIDTH] IN BLOOD BY AUTOMATED COUNT: 12.9 % (ref 10–15)
GFR SERPL CREATININE-BSD FRML MDRD: >90 ML/MIN/1.73M2
GLUCOSE BLD-MCNC: 85 MG/DL (ref 70–125)
HCT VFR BLD AUTO: 41.1 % (ref 35–47)
HGB BLD-MCNC: 13.3 G/DL (ref 11.7–15.7)
MCH RBC QN AUTO: 30.4 PG (ref 26.5–33)
MCHC RBC AUTO-ENTMCNC: 32.4 G/DL (ref 31.5–36.5)
MCV RBC AUTO: 94 FL (ref 78–100)
PLATELET # BLD AUTO: 180 10E3/UL (ref 150–450)
POTASSIUM BLD-SCNC: 4.6 MMOL/L (ref 3.5–5)
PROT SERPL-MCNC: 7 G/DL (ref 6–8)
RBC # BLD AUTO: 4.38 10E6/UL (ref 3.8–5.2)
SODIUM SERPL-SCNC: 139 MMOL/L (ref 136–145)
TSH SERPL DL<=0.005 MIU/L-ACNC: 1.88 UIU/ML (ref 0.3–5)
VALPROATE SERPL-MCNC: 26.8 UG/ML
WBC # BLD AUTO: 10.7 10E3/UL (ref 4–11)

## 2022-01-05 PROCEDURE — 82306 VITAMIN D 25 HYDROXY: CPT | Mod: ORL | Performed by: NURSE PRACTITIONER

## 2022-01-05 PROCEDURE — 80164 ASSAY DIPROPYLACETIC ACD TOT: CPT | Mod: ORL | Performed by: NURSE PRACTITIONER

## 2022-01-05 PROCEDURE — 85027 COMPLETE CBC AUTOMATED: CPT | Mod: ORL | Performed by: NURSE PRACTITIONER

## 2022-01-05 PROCEDURE — 84443 ASSAY THYROID STIM HORMONE: CPT | Mod: ORL | Performed by: NURSE PRACTITIONER

## 2022-01-05 PROCEDURE — 36415 COLL VENOUS BLD VENIPUNCTURE: CPT | Mod: ORL | Performed by: NURSE PRACTITIONER

## 2022-01-05 PROCEDURE — 80053 COMPREHEN METABOLIC PANEL: CPT | Mod: ORL | Performed by: NURSE PRACTITIONER

## 2022-01-05 NOTE — PROGRESS NOTES
MONTHLY PROGRESS NOTE AND PARTICIPATION REPORT   [facility]    Karolyn Das, 1960  Attendance: Please see Epic for attendance record.    Communication:   Does communicate   Hearing:   No impairment  Vision:   Difficulty reading or seeing print wears glasses to program less than half the time  Orientation/Cognition:   Minor forgetfulness  Partial disorientation  Short term memory loss  Karolyn presents with moderate cognitive impairment and requires close supervision for safety.  Behavior:   No behavioral concerns  Requires redirection  Karolyn requires redirection if she becomes upset by a late ride home or missing time in [place].  Self-Preservation Skills:   Not capable of self-preservation  Eating:   Assist with set up  Ambulation Walking:   Non-ambulatory  Transferring:   Aide of one person/two  Karolyn presents with a strong rear lean while performing stand pivot tx's to toilet and Nu Step and requires CGA and gait belt for safe tx's and fall prevention.  Staff provides v/c's to apply manual wc brakes and to use grab bars during tx's.  Wheelchair:   Needs help  Toileting:   Independent  Staff provides Karolyn with room alarm after toilet tx is complete so she can notify staff that she has completed her bathroom needs so staff can assist with her tx back to manual WC.  Maintenance Program:     [facility]   Has expressed interest in using parallel bars- may not be appropriate at this time.   Living Arrangements:   Lives in assisted living facility  Spiritual Needs: Needs are being met through support group  Medication Assistance:   Medication not taken during program hours  Participation Report:   Aerobics/Exercise  Support Group  Cognitive Stimulation  Fire Drill  Creative Arts/Crafts  Games  Gardening  Speakers/Entertainment  Socialization  Current Events/News  Education/Health Topic  Level of Participation:   Active

## 2022-01-06 ENCOUNTER — HOSPITAL ENCOUNTER (OUTPATIENT)
Dept: REHABILITATION | Facility: CLINIC | Age: 62
End: 2022-01-06
Attending: FAMILY MEDICINE
Payer: MEDICAID

## 2022-01-06 LAB — DEPRECATED CALCIDIOL+CALCIFEROL SERPL-MC: 40 UG/L (ref 30–80)

## 2022-01-06 PROCEDURE — 82607 VITAMIN B-12: CPT | Mod: ORL | Performed by: NURSE PRACTITIONER

## 2022-01-06 PROCEDURE — 36415 COLL VENOUS BLD VENIPUNCTURE: CPT | Mod: ORL | Performed by: NURSE PRACTITIONER

## 2022-01-06 PROCEDURE — P9604 ONE-WAY ALLOW PRORATED TRIP: HCPCS | Mod: ORL | Performed by: NURSE PRACTITIONER

## 2022-01-06 PROCEDURE — 82746 ASSAY OF FOLIC ACID SERUM: CPT | Mod: ORL | Performed by: NURSE PRACTITIONER

## 2022-01-06 PROCEDURE — S5100 ADULT DAYCARE SERVICES 15MIN: HCPCS

## 2022-01-11 ENCOUNTER — HOSPITAL ENCOUNTER (OUTPATIENT)
Dept: REHABILITATION | Facility: CLINIC | Age: 62
End: 2022-01-11
Attending: FAMILY MEDICINE
Payer: MEDICAID

## 2022-01-11 PROCEDURE — S5100 ADULT DAYCARE SERVICES 15MIN: HCPCS

## 2022-01-12 LAB
FOLATE SERPL-MCNC: 17.8 NG/ML
VIT B12 SERPL-MCNC: 499 PG/ML (ref 213–816)

## 2022-01-12 NOTE — PROGRESS NOTES
INDIVIDUAL PLAN OF CARE   [facility]    Member Name: Karolyn Das; YOB: 1960  MRN: [id number]  1/12/2022    Goals developed in collaboration with: member  Staff responsible for plan:  Mahin COSTELLO, Lucero LEE  1. Long Term Goal (concrete, measurable, and time specific outcomes):  Karolyn will maintain her current level of cognitive and physical function for as long as possible and prevent the progression of MS symptoms through active participation in [facility] activities each day of program attendance.  Target Date:  1/31/2023  Bi-Annual Review:     2. Short Term Goal: (concrete, measurable, and time specific outcomes):  Karolyn will maintain her current level of physical function and strength of BL LE and BL UE through active participation in her prescribed Nu Step maintenance program for at least 30 minutes each day of program attendance.  CGAx1 w/ gait belt provided for all transfers for falls prevention  d/2 impaired balance 2/2 MS diagnosis.  Target Date:  4/30/2022  Bi-Annual Review:     3. Short Term Goal: (concrete, measurable, and time specific outcomes):  Lissett will maintain her current level of cognitive function through active participation in Brain and Body cognitive stimulation activities each day of program attendance.  Target Date: 4/30/2022  Bi-Annual Review:

## 2022-01-13 ENCOUNTER — HOSPITAL ENCOUNTER (OUTPATIENT)
Dept: REHABILITATION | Facility: CLINIC | Age: 62
End: 2022-01-13
Attending: FAMILY MEDICINE
Payer: MEDICAID

## 2022-01-13 PROCEDURE — S5100 ADULT DAYCARE SERVICES 15MIN: HCPCS

## 2022-01-18 ENCOUNTER — HOSPITAL ENCOUNTER (OUTPATIENT)
Dept: REHABILITATION | Facility: CLINIC | Age: 62
End: 2022-01-18
Attending: FAMILY MEDICINE
Payer: MEDICAID

## 2022-01-18 PROCEDURE — S5100 ADULT DAYCARE SERVICES 15MIN: HCPCS

## 2022-01-25 ENCOUNTER — HOSPITAL ENCOUNTER (OUTPATIENT)
Dept: REHABILITATION | Facility: CLINIC | Age: 62
End: 2022-01-25
Attending: FAMILY MEDICINE
Payer: MEDICAID

## 2022-01-25 PROCEDURE — S5100 ADULT DAYCARE SERVICES 15MIN: HCPCS

## 2022-01-27 ENCOUNTER — HOSPITAL ENCOUNTER (OUTPATIENT)
Dept: REHABILITATION | Facility: CLINIC | Age: 62
End: 2022-01-27
Attending: FAMILY MEDICINE
Payer: MEDICAID

## 2022-01-27 PROCEDURE — S5100 ADULT DAYCARE SERVICES 15MIN: HCPCS

## 2022-02-01 ENCOUNTER — HOSPITAL ENCOUNTER (OUTPATIENT)
Dept: REHABILITATION | Facility: CLINIC | Age: 62
End: 2022-02-01
Attending: FAMILY MEDICINE
Payer: MEDICAID

## 2022-02-01 PROCEDURE — S5100 ADULT DAYCARE SERVICES 15MIN: HCPCS

## 2022-02-03 ENCOUNTER — HOSPITAL ENCOUNTER (OUTPATIENT)
Dept: REHABILITATION | Facility: CLINIC | Age: 62
End: 2022-02-03
Attending: FAMILY MEDICINE
Payer: MEDICAID

## 2022-02-03 ENCOUNTER — MEDICAL CORRESPONDENCE (OUTPATIENT)
Dept: HEALTH INFORMATION MANAGEMENT | Facility: CLINIC | Age: 62
End: 2022-02-03
Payer: COMMERCIAL

## 2022-02-03 PROCEDURE — S5100 ADULT DAYCARE SERVICES 15MIN: HCPCS

## 2022-02-08 ENCOUNTER — HOSPITAL ENCOUNTER (OUTPATIENT)
Dept: REHABILITATION | Facility: CLINIC | Age: 62
End: 2022-02-08
Attending: FAMILY MEDICINE
Payer: MEDICAID

## 2022-02-08 PROCEDURE — S5100 ADULT DAYCARE SERVICES 15MIN: HCPCS

## 2022-02-10 ENCOUNTER — HOSPITAL ENCOUNTER (OUTPATIENT)
Dept: REHABILITATION | Facility: CLINIC | Age: 62
End: 2022-02-10
Attending: FAMILY MEDICINE
Payer: MEDICAID

## 2022-02-10 PROCEDURE — S5100 ADULT DAYCARE SERVICES 15MIN: HCPCS

## 2022-02-15 ENCOUNTER — HOSPITAL ENCOUNTER (OUTPATIENT)
Dept: REHABILITATION | Facility: CLINIC | Age: 62
End: 2022-02-15
Attending: FAMILY MEDICINE
Payer: MEDICAID

## 2022-02-15 PROCEDURE — S5100 ADULT DAYCARE SERVICES 15MIN: HCPCS | Performed by: REHABILITATION PRACTITIONER

## 2022-02-17 ENCOUNTER — HOSPITAL ENCOUNTER (OUTPATIENT)
Dept: REHABILITATION | Facility: CLINIC | Age: 62
End: 2022-02-17
Attending: FAMILY MEDICINE
Payer: MEDICAID

## 2022-02-17 PROCEDURE — S5100 ADULT DAYCARE SERVICES 15MIN: HCPCS

## 2022-02-24 ENCOUNTER — HOSPITAL ENCOUNTER (OUTPATIENT)
Dept: REHABILITATION | Facility: CLINIC | Age: 62
End: 2022-02-24
Attending: FAMILY MEDICINE
Payer: MEDICAID

## 2022-02-24 PROCEDURE — S5100 ADULT DAYCARE SERVICES 15MIN: HCPCS

## 2022-03-01 ENCOUNTER — HOSPITAL ENCOUNTER (OUTPATIENT)
Dept: REHABILITATION | Facility: CLINIC | Age: 62
End: 2022-03-01
Attending: FAMILY MEDICINE
Payer: MEDICAID

## 2022-03-01 PROCEDURE — S5100 ADULT DAYCARE SERVICES 15MIN: HCPCS

## 2022-03-02 NOTE — PROGRESS NOTES
MONTHLY PROGRESS NOTE AND PARTICIPATION REPORT   [facility]  (February late entry)    Karolyn Das, 1960  Attendance: Please see Epic for attendance record.    Communication:   Does communicate   Hearing:   No impairment  Vision:   Difficulty reading or seeing print wears glasses to program less than half the time  Orientation/Cognition:   Minor forgetfulness  Partial disorientation  Short term memory loss  Karolyn presents with moderate cognitive impairment and requires close supervision for safety.  Behavior:   No behavioral concerns  Requires redirection  Karolyn requires redirection if she becomes upset by a late ride home or missing time in [employer].  Self-Preservation Skills:   Not capable of self-preservation  Eating:   Assist with set up  Ambulation Walking:   Non-ambulatory  Transferring:   Aide of one person/two  Karolyn presents with a strong rear lean while performing stand pivot tx's to toilet and Nu Step and requires CGA and gait belt for safe tx's and fall prevention.  Staff provides v/c's to apply manual wc brakes and to use grab bars during tx's.  Wheelchair:   Needs help  Toileting:   Independent  Staff provides Karolyn with room alarm after toilet tx is complete so she can notify staff that she has completed her bathroom needs so staff can assist with her tx back to manual WC.  Maintenance Program:     Sofia   Has expressed interest in using parallel bars- may not be appropriate at this time.   Living Arrangements:   Lives in assisted living facility  Spiritual Needs: Needs are being met through support group  Medication Assistance:   Medication not taken during program hours  Participation Report:   Aerobics/Exercise  Support Group  Cognitive Stimulation  Fire Drill  Creative Arts/Crafts  Games  Gardening  Speakers/Entertainment  Socialization  Current Events/News  Education/Health Topic  Level of Participation:   Active

## 2022-03-03 ENCOUNTER — HOSPITAL ENCOUNTER (OUTPATIENT)
Dept: REHABILITATION | Facility: CLINIC | Age: 62
End: 2022-03-03
Attending: FAMILY MEDICINE
Payer: MEDICAID

## 2022-03-03 PROCEDURE — S5100 ADULT DAYCARE SERVICES 15MIN: HCPCS

## 2022-03-04 ENCOUNTER — LAB REQUISITION (OUTPATIENT)
Dept: LAB | Facility: CLINIC | Age: 62
End: 2022-03-04
Payer: COMMERCIAL

## 2022-03-04 DIAGNOSIS — R35.0 FREQUENCY OF MICTURITION: ICD-10-CM

## 2022-03-04 DIAGNOSIS — R53.1 WEAKNESS: ICD-10-CM

## 2022-03-07 ENCOUNTER — LAB REQUISITION (OUTPATIENT)
Dept: LAB | Facility: CLINIC | Age: 62
End: 2022-03-07
Payer: COMMERCIAL

## 2022-03-07 DIAGNOSIS — R53.83 OTHER FATIGUE: ICD-10-CM

## 2022-03-07 DIAGNOSIS — R53.1 WEAKNESS: ICD-10-CM

## 2022-03-08 ENCOUNTER — LAB REQUISITION (OUTPATIENT)
Dept: LAB | Facility: CLINIC | Age: 62
End: 2022-03-08
Payer: COMMERCIAL

## 2022-03-08 DIAGNOSIS — R30.0 DYSURIA: ICD-10-CM

## 2022-03-08 DIAGNOSIS — G35 MULTIPLE SCLEROSIS (H): ICD-10-CM

## 2022-03-08 LAB
ALBUMIN UR-MCNC: 10 MG/DL
APPEARANCE UR: CLEAR
BILIRUB UR QL STRIP: NEGATIVE
COLOR UR AUTO: YELLOW
GLUCOSE UR STRIP-MCNC: NEGATIVE MG/DL
HGB UR QL STRIP: NEGATIVE
KETONES UR STRIP-MCNC: NEGATIVE MG/DL
LEUKOCYTE ESTERASE UR QL STRIP: NEGATIVE
MUCOUS THREADS #/AREA URNS LPF: PRESENT /LPF
NITRATE UR QL: NEGATIVE
PH UR STRIP: 6 [PH] (ref 5–7)
RBC URINE: 2 /HPF
SP GR UR STRIP: 1.02 (ref 1–1.03)
SQUAMOUS EPITHELIAL: <1 /HPF
UROBILINOGEN UR STRIP-MCNC: <2 MG/DL
WBC URINE: 2 /HPF

## 2022-03-08 PROCEDURE — 87086 URINE CULTURE/COLONY COUNT: CPT | Mod: ORL | Performed by: NURSE PRACTITIONER

## 2022-03-08 PROCEDURE — 81001 URINALYSIS AUTO W/SCOPE: CPT | Mod: ORL | Performed by: NURSE PRACTITIONER

## 2022-03-09 LAB
CREAT SERPL-MCNC: 0.75 MG/DL (ref 0.6–1.1)
ERYTHROCYTE [DISTWIDTH] IN BLOOD BY AUTOMATED COUNT: 12.9 % (ref 10–15)
GFR SERPL CREATININE-BSD FRML MDRD: 90 ML/MIN/1.73M2
HCT VFR BLD AUTO: 42.3 % (ref 35–47)
HGB BLD-MCNC: 13.2 G/DL (ref 11.7–15.7)
MCH RBC QN AUTO: 29.7 PG (ref 26.5–33)
MCHC RBC AUTO-ENTMCNC: 31.2 G/DL (ref 31.5–36.5)
MCV RBC AUTO: 95 FL (ref 78–100)
PLATELET # BLD AUTO: 192 10E3/UL (ref 150–450)
RBC # BLD AUTO: 4.45 10E6/UL (ref 3.8–5.2)
WBC # BLD AUTO: 8.6 10E3/UL (ref 4–11)

## 2022-03-09 PROCEDURE — 82565 ASSAY OF CREATININE: CPT | Mod: ORL | Performed by: PSYCHIATRY & NEUROLOGY

## 2022-03-09 PROCEDURE — P9604 ONE-WAY ALLOW PRORATED TRIP: HCPCS | Mod: ORL | Performed by: PSYCHIATRY & NEUROLOGY

## 2022-03-09 PROCEDURE — 85027 COMPLETE CBC AUTOMATED: CPT | Mod: ORL | Performed by: PSYCHIATRY & NEUROLOGY

## 2022-03-09 PROCEDURE — 36415 COLL VENOUS BLD VENIPUNCTURE: CPT | Mod: ORL | Performed by: PSYCHIATRY & NEUROLOGY

## 2022-03-10 ENCOUNTER — HOSPITAL ENCOUNTER (OUTPATIENT)
Dept: REHABILITATION | Facility: CLINIC | Age: 62
Discharge: HOME | End: 2022-03-10
Attending: FAMILY MEDICINE
Payer: MEDICAID

## 2022-03-10 LAB — BACTERIA UR CULT: NORMAL

## 2022-03-10 PROCEDURE — S5100 ADULT DAYCARE SERVICES 15MIN: HCPCS

## 2022-03-15 ENCOUNTER — HOSPITAL ENCOUNTER (OUTPATIENT)
Dept: REHABILITATION | Facility: CLINIC | Age: 62
Discharge: HOME | End: 2022-03-15
Attending: FAMILY MEDICINE
Payer: MEDICAID

## 2022-03-15 PROCEDURE — S5100 ADULT DAYCARE SERVICES 15MIN: HCPCS

## 2022-03-17 ENCOUNTER — HOSPITAL ENCOUNTER (OUTPATIENT)
Dept: REHABILITATION | Facility: CLINIC | Age: 62
Discharge: HOME | End: 2022-03-17
Attending: FAMILY MEDICINE
Payer: MEDICAID

## 2022-03-17 PROCEDURE — S5100 ADULT DAYCARE SERVICES 15MIN: HCPCS

## 2022-03-17 NOTE — PROGRESS NOTES
MONTHLY PROGRESS NOTE AND PARTICIPATION REPORT   [facility]    Karolyn Das, 1960  Attendance: Please see Epic for attendance record.    Communication:   Does communicate   Hearing:   No impairment  Vision:   Difficulty reading or seeing print wears glasses to program less than half the time  Orientation/Cognition:   Minor forgetfulness  Partial disorientation  Short term memory loss  Karolyn presents with moderate cognitive impairment and requires close supervision for safety.  Behavior:   No behavioral concerns  Requires redirection  Karolyn requires redirection if she becomes upset by a late ride home or missing time in [employer].  Self-Preservation Skills:   Not capable of self-preservation  Eating:   Assist with set up  Ambulation Walking:   Non-ambulatory  Transferring:   Aide of one person/two  Karolyn presents with a strong rear lean while performing stand pivot tx's to toilet and Nu Step and requires CGA and gait belt for safe tx's and fall prevention.  Staff provides v/c's to apply manual wc brakes and to use grab bars during tx's.  Wheelchair:   Needs help  Toileting:   Independent  Staff provides Karolyn with room alarm after toilet tx is complete so she can notify staff that she has completed her bathroom needs so staff can assist with her tx back to manual WC.  Maintenance Program:     Sofia   Has expressed interest in using parallel bars- may not be appropriate at this time.   Living Arrangements:   Lives in assisted living facility  Spiritual Needs: Needs are being met through support group  Medication Assistance:   Medication not taken during program hours  Participation Report:   Aerobics/Exercise  Support Group  Cognitive Stimulation  Fire Drill  Creative Arts/Crafts  Games  Gardening  Speakers/Entertainment  Socialization  Current Events/News  Education/Health Topic  Level of Participation:   Active

## 2022-03-22 ENCOUNTER — HOSPITAL ENCOUNTER (OUTPATIENT)
Dept: REHABILITATION | Facility: CLINIC | Age: 62
Discharge: HOME | End: 2022-03-22
Attending: FAMILY MEDICINE
Payer: MEDICAID

## 2022-03-22 PROCEDURE — S5100 ADULT DAYCARE SERVICES 15MIN: HCPCS

## 2022-03-24 ENCOUNTER — HOSPITAL ENCOUNTER (OUTPATIENT)
Dept: REHABILITATION | Facility: CLINIC | Age: 62
Discharge: HOME | End: 2022-03-24
Attending: FAMILY MEDICINE
Payer: MEDICAID

## 2022-03-24 PROCEDURE — S5100 ADULT DAYCARE SERVICES 15MIN: HCPCS

## 2022-03-29 ENCOUNTER — HOSPITAL ENCOUNTER (OUTPATIENT)
Dept: REHABILITATION | Facility: CLINIC | Age: 62
Discharge: HOME | End: 2022-03-29
Attending: FAMILY MEDICINE
Payer: MEDICAID

## 2022-03-29 PROCEDURE — S5100 ADULT DAYCARE SERVICES 15MIN: HCPCS

## 2022-03-31 ENCOUNTER — HOSPITAL ENCOUNTER (OUTPATIENT)
Dept: REHABILITATION | Facility: CLINIC | Age: 62
Discharge: HOME | End: 2022-03-31
Attending: FAMILY MEDICINE
Payer: MEDICAID

## 2022-03-31 PROCEDURE — S5100 ADULT DAYCARE SERVICES 15MIN: HCPCS

## 2022-04-05 ENCOUNTER — HOSPITAL ENCOUNTER (OUTPATIENT)
Dept: REHABILITATION | Facility: CLINIC | Age: 62
Discharge: HOME | End: 2022-04-05
Attending: FAMILY MEDICINE
Payer: MEDICAID

## 2022-04-05 DIAGNOSIS — R26.9 ABNORMAL GAIT: ICD-10-CM

## 2022-04-05 DIAGNOSIS — R29.898 WEAKNESS OF BOTH LOWER EXTREMITIES: ICD-10-CM

## 2022-04-05 DIAGNOSIS — G35 MS (MULTIPLE SCLEROSIS) (H): Primary | ICD-10-CM

## 2022-04-05 PROCEDURE — S5100 ADULT DAYCARE SERVICES 15MIN: HCPCS

## 2022-04-07 ENCOUNTER — HOSPITAL ENCOUNTER (OUTPATIENT)
Dept: REHABILITATION | Facility: CLINIC | Age: 62
Discharge: HOME | End: 2022-04-07
Attending: FAMILY MEDICINE
Payer: MEDICAID

## 2022-04-07 PROCEDURE — S5100 ADULT DAYCARE SERVICES 15MIN: HCPCS

## 2022-04-07 NOTE — PROGRESS NOTES
INDIVIDUAL PLAN OF CARE   [facility]    Member Name: Karolyn Das; YOB: 1960  MRN: [id number]  4/7/2022    Goals developed in collaboration with: member  Staff responsible for plan:  Mahin COSTELLO, Lucero LEE  1. Long Term Goal (concrete, measurable, and time specific outcomes):  Karolyn will maintain her current level of cognitive and physical function for as long as possible and prevent the progression of MS symptoms through active participation in [facility] activities each day of program attendance.  Target Date:  1/31/2023  Bi-Annual Review: consistent attendance this quarter, there has been a decline in overall  Endurance,  PT is scheduled. Continue goal.  2. Short Term Goal: (concrete, measurable, and time specific outcomes):  Karolyn will maintain her current level of physical function and strength of BL LE and BL UE through active participation in her prescribed Nu Step maintenance program for at least 30 minutes each day of program attendance.  CGAx1 w/ gait belt provided for all transfers for falls prevention  d/2 impaired balance 2/2 MS diagnosis.  Target Date:  7/30/2022  Bi-Annual Review:  NuStep exercise each day of attendance and morning aerobic exercise participation. Continue goal.    3. Short Term Goal: (concrete, measurable, and time specific outcomes):  Lissett will maintain her current level of cognitive function through active participation in [facility] cognitive stimulation activities each day of program attendance.  Target Date: 7/30/2022  Bi-Annual Review:   Mirtha enjoys program activities, especially the special music programs that are scheduled this month, Choir Tues, Wed. And Thurs. Continue goal

## 2022-04-07 NOTE — PROGRESS NOTES
Individual abuse prevention plan (IAPP)  [facility]     Assessment of Susceptibility to Abuse, Including Self Abuse, Neglect (Identification of characteristics, which make the individual susceptible to abuse, and how these characteristics cause the individual to be susceptible to abuse.)     Is the person susceptible to abuse in each area?  Sexual Abuse:   No  Referrals made when the person is susceptible to abuse outside the scope or control of this program (Identify the referral and date it occurred): No additional risk reduction means needed at this time    Physical Abuse:   No  Referrals made when the person is susceptible to abuse outside the scope or control of this program (Identify the referral and date it occurred): No additional risk reduction means needed at this time    Self-Abuse:   No  Referrals made when the person is susceptible to abuse outside the scope or control of this program (Identify the referral and date it occurred): No additional risk reduction means needed at this time    Financial  Exploitation  No  Referrals made when the person is susceptible to abuse outside the scope or control of this program (Identify the referral and date it occurred): No additional risk reduction means needed at this time    Is the program aware of this person committing a violent crime or act of physical aggression towards others?  No  Referrals made when the person is susceptible to abuse outside the scope or control of this program (Identify the referral and date it occurred): No additional risk reduction means needed at this time    INDIVIDUAL ABUSE PREVENTION PLAN-MEASURES TAKEN TO MINIMIZE RISK OF ABUSE   ADL:   Assist with clothing management  Assist with toileting  Ambulation/Transfers/Wheelchairs:  Provide transfer belt and assist with transfers and ambulation   Behavior:   Patient gets agitated when she becomes confused.  Communication:  Encourage verbalization of  needs/concerns  Cognitive Issues:   Provider reminders due to confusion, forgetfulness  Give simple step-by-step direction  Diet:   No present concerns  Exercise:   Encourage participation in maintenance program  Encourage participation in wheelchair aerobics  Hearing:   No concerns  Isolation:   Patient lives in an assisted living facility  Medical Monitoring:   Monitor physical and emotional comfort  Mental Health:   Encourage client to express feelings  Offer emotional support  Sensory:   Provide and encourage participation in activities for stimulation  Vision:   Ensure client is wearing glasses and clean prn  Other: N/A  Developed in consultation with:  Client  The Program Abuse Prevention Plan/IAPP identifies the specific actions that minimize abuse to the [facility] participant.  Yes

## 2022-04-07 NOTE — PROGRESS NOTES
MONTHLY PROGRESS NOTE AND PARTICIPATION REPORT   [facility]    Karolyn Das, 1960  Attendance: Please see Epic for attendance record.    Communication:   Does communicate   Hearing:   No impairment  Vision:   Difficulty reading or seeing print wears glasses to program less than half the time  Orientation/Cognition:   Minor forgetfulness  Partial disorientation  Short term memory loss  Karolyn presents with moderate cognitive impairment and requires close supervision for safety.  Behavior:   No behavioral concerns  Requires redirection  Karolyn requires redirection if she becomes upset by a late ride home or missing time in [employer].  Self-Preservation Skills:   Not capable of self-preservation  Eating:   Assist with set up  Ambulation Walking:   Non-ambulatory  Transferring:   Aide of one person/two  Karolyn presents with a strong rear lean while performing stand pivot tx's to toilet and Nu Step and requires CGA and gait belt for safe tx's and fall prevention.  Staff provides v/c's to apply manual wc brakes and to use grab bars during tx's.  Wheelchair:   Needs help to propel manual wheelchair  Toileting:   Independent  Staff provides Karolyn with room alarm after toilet tx is complete so she can notify staff that she has completed her bathroom needs so staff can assist with her tx back to manual WC.  Maintenance Program:     Sofia   Has expressed interest in using parallel bars- may not be appropriate at this time.   Living Arrangements:   Lives in assisted living facility  Spiritual Needs: Needs are being met through support group  Medication Assistance:   Medication not taken during program hours  Participation Report:   Aerobics/Exercise  Support Group  Cognitive Stimulation  Fire Drill  Creative Arts/Crafts  Games  Gardening  Speakers/Entertainment  Socialization  Current Events/News  Education/Health Topic  Level of Participation:   Active

## 2022-04-12 ENCOUNTER — HOSPITAL ENCOUNTER (OUTPATIENT)
Dept: REHABILITATION | Facility: CLINIC | Age: 62
Discharge: HOME | End: 2022-04-12
Attending: FAMILY MEDICINE
Payer: MEDICAID

## 2022-04-12 PROCEDURE — S5100 ADULT DAYCARE SERVICES 15MIN: HCPCS

## 2022-04-12 NOTE — PROGRESS NOTES
[facility] RN Note    Previous documentation reviewed.  No concerns reported by AC staff or member.    Has left for the day. No reports of problems.  Karolyn has diagnosis of Osteopenia/Osteoporosis, Hip and spine.

## 2022-04-14 ENCOUNTER — HOSPITAL ENCOUNTER (OUTPATIENT)
Dept: REHABILITATION | Facility: CLINIC | Age: 62
Discharge: HOME | End: 2022-04-14
Attending: FAMILY MEDICINE
Payer: MEDICAID

## 2022-04-14 PROCEDURE — S5100 ADULT DAYCARE SERVICES 15MIN: HCPCS

## 2022-04-19 ENCOUNTER — HOSPITAL ENCOUNTER (OUTPATIENT)
Dept: REHABILITATION | Facility: CLINIC | Age: 62
Discharge: HOME | End: 2022-04-19
Attending: FAMILY MEDICINE
Payer: MEDICAID

## 2022-04-19 PROCEDURE — S5100 ADULT DAYCARE SERVICES 15MIN: HCPCS

## 2022-04-21 ENCOUNTER — HOSPITAL ENCOUNTER (OUTPATIENT)
Dept: REHABILITATION | Facility: CLINIC | Age: 62
Discharge: HOME | End: 2022-04-21
Attending: FAMILY MEDICINE
Payer: MEDICAID

## 2022-04-21 PROCEDURE — S5100 ADULT DAYCARE SERVICES 15MIN: HCPCS

## 2022-04-26 ENCOUNTER — LAB REQUISITION (OUTPATIENT)
Dept: LAB | Facility: CLINIC | Age: 62
End: 2022-04-26
Payer: COMMERCIAL

## 2022-04-26 ENCOUNTER — HOSPITAL ENCOUNTER (OUTPATIENT)
Dept: REHABILITATION | Facility: CLINIC | Age: 62
Discharge: HOME | End: 2022-04-26
Attending: FAMILY MEDICINE
Payer: MEDICAID

## 2022-04-26 DIAGNOSIS — G40.909 EPILEPSY, UNSPECIFIED, NOT INTRACTABLE, WITHOUT STATUS EPILEPTICUS (H): ICD-10-CM

## 2022-04-26 DIAGNOSIS — R63.4 ABNORMAL WEIGHT LOSS: ICD-10-CM

## 2022-04-26 DIAGNOSIS — D64.9 ANEMIA, UNSPECIFIED: ICD-10-CM

## 2022-04-26 DIAGNOSIS — G60.9 HEREDITARY AND IDIOPATHIC NEUROPATHY, UNSPECIFIED: ICD-10-CM

## 2022-04-26 PROCEDURE — S5100 ADULT DAYCARE SERVICES 15MIN: HCPCS

## 2022-04-27 LAB
ALBUMIN SERPL-MCNC: 4 G/DL (ref 3.5–5)
ALP SERPL-CCNC: 88 U/L (ref 45–120)
ALT SERPL W P-5'-P-CCNC: 15 U/L (ref 0–45)
ANION GAP SERPL CALCULATED.3IONS-SCNC: 12 MMOL/L (ref 5–18)
AST SERPL W P-5'-P-CCNC: 22 U/L (ref 0–40)
BILIRUB SERPL-MCNC: 0.4 MG/DL (ref 0–1)
BUN SERPL-MCNC: 18 MG/DL (ref 8–22)
CALCIUM SERPL-MCNC: 9.7 MG/DL (ref 8.5–10.5)
CHLORIDE BLD-SCNC: 106 MMOL/L (ref 98–107)
CO2 SERPL-SCNC: 26 MMOL/L (ref 22–31)
CREAT SERPL-MCNC: 0.71 MG/DL (ref 0.6–1.1)
ERYTHROCYTE [DISTWIDTH] IN BLOOD BY AUTOMATED COUNT: 12.9 % (ref 10–15)
FOLATE SERPL-MCNC: 14.6 NG/ML
GFR SERPL CREATININE-BSD FRML MDRD: >90 ML/MIN/1.73M2
GLUCOSE BLD-MCNC: 78 MG/DL (ref 70–125)
HCT VFR BLD AUTO: 43 % (ref 35–47)
HGB BLD-MCNC: 13.4 G/DL (ref 11.7–15.7)
MCH RBC QN AUTO: 29.4 PG (ref 26.5–33)
MCHC RBC AUTO-ENTMCNC: 31.2 G/DL (ref 31.5–36.5)
MCV RBC AUTO: 94 FL (ref 78–100)
PLATELET # BLD AUTO: 184 10E3/UL (ref 150–450)
POTASSIUM BLD-SCNC: 4 MMOL/L (ref 3.5–5)
PROT SERPL-MCNC: 7.6 G/DL (ref 6–8)
RBC # BLD AUTO: 4.56 10E6/UL (ref 3.8–5.2)
SODIUM SERPL-SCNC: 144 MMOL/L (ref 136–145)
TSH SERPL DL<=0.005 MIU/L-ACNC: 0.11 UIU/ML (ref 0.3–5)
VALPROATE SERPL-MCNC: 29.9 UG/ML
VIT B12 SERPL-MCNC: 489 PG/ML (ref 213–816)
WBC # BLD AUTO: 8.2 10E3/UL (ref 4–11)

## 2022-04-27 PROCEDURE — 80053 COMPREHEN METABOLIC PANEL: CPT | Mod: ORL | Performed by: NURSE PRACTITIONER

## 2022-04-27 PROCEDURE — 36415 COLL VENOUS BLD VENIPUNCTURE: CPT | Mod: ORL | Performed by: NURSE PRACTITIONER

## 2022-04-27 PROCEDURE — 82607 VITAMIN B-12: CPT | Mod: ORL | Performed by: NURSE PRACTITIONER

## 2022-04-27 PROCEDURE — 85027 COMPLETE CBC AUTOMATED: CPT | Mod: ORL | Performed by: NURSE PRACTITIONER

## 2022-04-27 PROCEDURE — 82746 ASSAY OF FOLIC ACID SERUM: CPT | Mod: ORL | Performed by: NURSE PRACTITIONER

## 2022-04-27 PROCEDURE — 84443 ASSAY THYROID STIM HORMONE: CPT | Mod: ORL | Performed by: NURSE PRACTITIONER

## 2022-04-27 PROCEDURE — P9604 ONE-WAY ALLOW PRORATED TRIP: HCPCS | Mod: ORL | Performed by: NURSE PRACTITIONER

## 2022-04-27 PROCEDURE — 80164 ASSAY DIPROPYLACETIC ACD TOT: CPT | Mod: ORL | Performed by: NURSE PRACTITIONER

## 2022-04-28 ENCOUNTER — HOSPITAL ENCOUNTER (OUTPATIENT)
Dept: REHABILITATION | Facility: CLINIC | Age: 62
Discharge: HOME | End: 2022-04-28
Attending: FAMILY MEDICINE
Payer: MEDICAID

## 2022-04-28 PROCEDURE — S5100 ADULT DAYCARE SERVICES 15MIN: HCPCS

## 2022-05-03 ENCOUNTER — HOSPITAL ENCOUNTER (OUTPATIENT)
Dept: REHABILITATION | Facility: CLINIC | Age: 62
Discharge: HOME | End: 2022-05-03
Attending: FAMILY MEDICINE
Payer: MEDICAID

## 2022-05-03 PROCEDURE — S5100 ADULT DAYCARE SERVICES 15MIN: HCPCS

## 2022-05-05 ENCOUNTER — HOSPITAL ENCOUNTER (OUTPATIENT)
Dept: REHABILITATION | Facility: CLINIC | Age: 62
Discharge: HOME | End: 2022-05-05
Attending: FAMILY MEDICINE
Payer: MEDICAID

## 2022-05-05 PROCEDURE — S5100 ADULT DAYCARE SERVICES 15MIN: HCPCS

## 2022-05-10 ENCOUNTER — HOSPITAL ENCOUNTER (OUTPATIENT)
Dept: REHABILITATION | Facility: CLINIC | Age: 62
Discharge: HOME | End: 2022-05-10
Attending: FAMILY MEDICINE
Payer: MEDICAID

## 2022-05-10 PROCEDURE — S5100 ADULT DAYCARE SERVICES 15MIN: HCPCS | Performed by: REHABILITATION PRACTITIONER

## 2022-05-12 ENCOUNTER — HOSPITAL ENCOUNTER (OUTPATIENT)
Dept: REHABILITATION | Facility: CLINIC | Age: 62
Discharge: HOME | End: 2022-05-12
Attending: FAMILY MEDICINE
Payer: MEDICAID

## 2022-05-12 PROCEDURE — S5100 ADULT DAYCARE SERVICES 15MIN: HCPCS

## 2022-05-12 NOTE — PROGRESS NOTES
[facility] Center Note:  Home and Community Based Services Outing     Date:  5/5/22     Destination:  [facility]                         [address] [facility] [place] [address]                                                  [employer]                         [address] [place] [address], [place] [address]     Description of Outing:  At 10:30 am Staff x 2 assisted 6 members (3 power wheelchairs, 2 manual chairs) onto [employer] transportation bus.  Staff provided maximum assistance for driving 4 members onto bus who cannot propel or drive wheelchair independently.  Staff provided moderate assist and verbal cues for remaining 2 members for safe driving.  Staff provided maximum assistance for securing chairs and members safely in bus.  Loading time = 30 minutes.  Drive time to destination 1 = 30 minutes.  Members and staff participated in a drive through tour of [facility] [employer] while in bus.  Tour lasted ~30 minutes.  Members chose [employer] for lunch.  Staff provided same assistance and direction for all members to exit bus and propel wheelchairs to outdoor patio of restaurant.  1 staff member took orders for all members and placed orders inside restaurant.  Rain began to fall so other staff member provided moderate x 2, and maximum x 4 assistance for members to safely enter restaurant.  Staff x 1 plus restaurant staff x 1 rearranged tables to accommodate all members inside restaurant safely.  Staff x 2 provided 2 independent members with food once order arrived.  Staff prepared food to IDDSI level 6, soft and bite sized, for member x 1 with diet restrictions and swallowing precautions.  Staff x 2 provided maximum assist for 2 members with self feeding d/2 limited motor function and control.  Meal lasted ~45 minutes.  Staff x 1 provided gait belt and contact gueard assist to member for bathroom trip in the restaurant.  Staff x 2 provided same assistance for members to exit restaurant, navigate  parking lot, enter bus via lift, and safety secure all members and chairs.  Two members were provided gait belt and contact guard assistance for transfers from manual chairs to seats in bus for all trips.  Return ride to [facility] lasted ~30 minutes.  Staff x 2 provided same assist via lift for all members to exit bus via lift and enter clinic.  Return time 2:00.  Everyone back into clinic 2:30.            Member Response:  Members reported they enjoyed the activity and the lunch.  They stated they would chose to continue to participate in outings.     Members in Attendance:  6  Staff in Attendance:  2     Departure Time:  10:30 am  Return Time:  2:30 pm

## 2022-05-17 ENCOUNTER — HOSPITAL ENCOUNTER (OUTPATIENT)
Dept: REHABILITATION | Facility: CLINIC | Age: 62
Discharge: HOME | End: 2022-05-17
Attending: FAMILY MEDICINE
Payer: MEDICAID

## 2022-05-17 PROCEDURE — S5100 ADULT DAYCARE SERVICES 15MIN: HCPCS

## 2022-05-17 NOTE — PROGRESS NOTES
[facility] RN Note    Previous documentation reviewed.  No concerns reported by AC staff or member.    History of osteoporosis. At risk for fractures per dexa report.

## 2022-05-18 NOTE — PROGRESS NOTES
MONTHLY PROGRESS NOTE AND PARTICIPATION REPORT   [facility]    Karolyn Das, 1960  Attendance: Please see Epic for attendance record.    Communication:   Does communicate   Hearing:   No impairment  Vision:   Difficulty reading or seeing print wears glasses to program less than half the time  Orientation/Cognition:   Minor forgetfulness  Partial disorientation  Short term memory loss  Karolyn presents with moderate cognitive impairment and requires close supervision for safety.  Behavior:   No behavioral concerns  Requires redirection  Karolyn requires redirection if she becomes upset by a late ride home or missing time in [employer].  Self-Preservation Skills:   Not capable of self-preservation  Eating:   Assist with set up  Ambulation Walking:   Non-ambulatory  Transferring:   Aide of one person/two  Karolyn presents with a strong rear lean while performing stand pivot tx's to toilet and Nu Step and requires CGA and gait belt for safe tx's and fall prevention.  Staff provides v/c's to apply manual wc brakes and to use grab bars during tx's.  Wheelchair:   Needs help to propel manual wheelchair  Toileting:   Independent  Staff provides Karolyn with room alarm after toilet tx is complete so she can notify staff that she has completed her bathroom needs so staff can assist with her tx back to manual WC.  Maintenance Program:     Sofia   Has expressed interest in using parallel bars- may not be appropriate at this time.   Living Arrangements:   Lives in assisted living facility  Spiritual Needs: Needs are being met through support group  Medication Assistance:   Medication not taken during program hours  Participation Report:   Aerobics/Exercise  Support Group  Cognitive Stimulation  Fire Drill  Creative Arts/Crafts  Games  Gardening  Speakers/Entertainment  Socialization  Current Events/News  Education/Health Topic  Level of Participation:   Active

## 2022-05-19 ENCOUNTER — HOSPITAL ENCOUNTER (OUTPATIENT)
Dept: REHABILITATION | Facility: CLINIC | Age: 62
Discharge: HOME | End: 2022-05-19
Attending: FAMILY MEDICINE
Payer: MEDICAID

## 2022-05-19 PROCEDURE — S5100 ADULT DAYCARE SERVICES 15MIN: HCPCS | Performed by: REHABILITATION PRACTITIONER

## 2022-05-26 ENCOUNTER — HOSPITAL ENCOUNTER (OUTPATIENT)
Dept: REHABILITATION | Facility: CLINIC | Age: 62
Discharge: HOME | End: 2022-05-26
Attending: FAMILY MEDICINE
Payer: MEDICAID

## 2022-05-26 PROCEDURE — S5100 ADULT DAYCARE SERVICES 15MIN: HCPCS

## 2022-05-31 ENCOUNTER — HOSPITAL ENCOUNTER (OUTPATIENT)
Dept: REHABILITATION | Facility: CLINIC | Age: 62
Discharge: HOME | End: 2022-05-31
Attending: FAMILY MEDICINE
Payer: MEDICAID

## 2022-05-31 PROCEDURE — S5100 ADULT DAYCARE SERVICES 15MIN: HCPCS

## 2022-06-07 ENCOUNTER — LAB REQUISITION (OUTPATIENT)
Dept: LAB | Facility: CLINIC | Age: 62
End: 2022-06-07
Payer: COMMERCIAL

## 2022-06-07 DIAGNOSIS — E03.9 HYPOTHYROIDISM, UNSPECIFIED: ICD-10-CM

## 2022-06-08 LAB — TSH SERPL DL<=0.005 MIU/L-ACNC: 0.07 UIU/ML (ref 0.3–5)

## 2022-06-08 PROCEDURE — P9604 ONE-WAY ALLOW PRORATED TRIP: HCPCS | Mod: ORL | Performed by: NURSE PRACTITIONER

## 2022-06-08 PROCEDURE — 84443 ASSAY THYROID STIM HORMONE: CPT | Mod: ORL | Performed by: NURSE PRACTITIONER

## 2022-06-08 PROCEDURE — 36415 COLL VENOUS BLD VENIPUNCTURE: CPT | Mod: ORL | Performed by: NURSE PRACTITIONER

## 2022-06-14 ENCOUNTER — HOSPITAL ENCOUNTER (OUTPATIENT)
Dept: REHABILITATION | Facility: CLINIC | Age: 62
Discharge: HOME | End: 2022-06-14
Attending: FAMILY MEDICINE
Payer: MEDICAID

## 2022-06-14 PROCEDURE — S5102 ADULT DAY CARE PER DIEM: HCPCS | Performed by: REHABILITATION PRACTITIONER

## 2022-06-14 NOTE — PROGRESS NOTES
Facilitated physical maintenance of bilateral upper extremity and bilateral lower extrimity strength and endurance through NuStep therapeutic exercise.  Patient completed 30 minutes of NuStep activity.    Nu Step Settings:  Seat:  9  Arms:  14  Resistance:  7    Patient tolerated this exercise well.

## 2022-06-16 ENCOUNTER — HOSPITAL ENCOUNTER (OUTPATIENT)
Dept: REHABILITATION | Facility: CLINIC | Age: 62
Discharge: HOME | End: 2022-06-16
Attending: FAMILY MEDICINE
Payer: MEDICAID

## 2022-06-16 PROCEDURE — S5100 ADULT DAYCARE SERVICES 15MIN: HCPCS

## 2022-06-16 NOTE — PROGRESS NOTES
Facilitated physical maintenance of bilateral upper extremity and bilateral lower extrimity strength and endurance through NuStep therapeutic exercise.  Patient completed 25 minutes of NuStep activity.    Nu Step Settings:  Seat:  9  Arms:  14  Resistance:  7    Patient tolerated this exercise well.

## 2022-06-16 NOTE — PROGRESS NOTES
"[facility] Center Note:  Home and Community Based Services Outing    Destination: [facility]     Description of Outing: Outside member socialized with others, learned about the plants growing in the garden, and listened to music as well. Drinks and snacks were provided by staff.    Member Response:  \"It was great to be outside before it gets too hot this weekend.\"  "

## 2022-06-16 NOTE — PROGRESS NOTES
History and Physical Form:   We have attempted to contact the MD via fax to fill out the annual History and Physical Form.  Another fax was sent on this date.  Will continue to follow-up as needed.

## 2022-06-20 ENCOUNTER — NURSE TRIAGE (OUTPATIENT)
Dept: NURSING | Facility: CLINIC | Age: 62
End: 2022-06-20
Payer: COMMERCIAL

## 2022-06-20 NOTE — TELEPHONE ENCOUNTER
ONEIL Lara - with consent to communicate on file asks for results of colonoscopy in 2014.    No specific result shows up in [facility], but per Dr. Elizabeth's note on 9/5/2021, colonoscopy was done 8/11/2014 result: benign adenoma of large intestine, recommend every 5 years colonoscopy.    Pt also has a primary team coming over to her facility - [facility].    Currently, family is discussing whether she should go inpatient for colonoscopy or use [facility] as she was not able to complete bowel prep for colonoscopy.   FNA informed her that whichever they decide, to share results electronically to PCP.    Caller verbalized understanding.    Clare Mercado RN/[place] Nurse Advisor         Additional Information    Information only question and nurse able to answer    Protocols used: INFORMATION ONLY CALL - NO TRIAGE-A-OH

## 2022-06-21 ENCOUNTER — HOSPITAL ENCOUNTER (OUTPATIENT)
Dept: REHABILITATION | Facility: CLINIC | Age: 62
Discharge: HOME | End: 2022-06-21
Attending: FAMILY MEDICINE
Payer: MEDICAID

## 2022-06-21 PROCEDURE — S5100 ADULT DAYCARE SERVICES 15MIN: HCPCS

## 2022-06-21 NOTE — PROGRESS NOTES
MONTHLY PROGRESS NOTE AND PARTICIPATION REPORT   [facility]    Karolyn Das, 1960  Attendance: Please see Epic for attendance record.    Communication:   Does communicate   Hearing:   No impairment  Vision:   Difficulty reading or seeing print wears glasses to program less than half the time  Orientation/Cognition:   Minor forgetfulness  Partial disorientation  Short term memory loss  Karolyn presents with moderate cognitive impairment and requires close supervision for safety.  Behavior:   No behavioral concerns  Requires redirection  Karolyn requires redirection if she becomes upset by a late ride home or missing time in [employer].  Self-Preservation Skills:   Not capable of self-preservation  Eating:   Assist with set up  Ambulation Walking:   Non-ambulatory  Transferring:   Aide of one person/two  Karolyn presents with a strong rear lean while performing stand pivot tx's to toilet and Nu Step and requires CGA and gait belt for safe tx's and fall prevention.  Staff provides v/c's to apply manual wc brakes and to use grab bars during tx's.  Wheelchair:   Needs help to propel manual wheelchair  Toileting:   Independent  Staff provides Karolyn with room alarm after toilet tx is complete so she can notify staff that she has completed her bathroom needs so staff can assist with her tx back to manual WC.  Maintenance Program:     Sofia   Has expressed interest in using parallel bars- may not be appropriate at this time.   Living Arrangements:   Lives in assisted living facility  Spiritual Needs: Needs are being met through support group  Medication Assistance:   Medication not taken during program hours  Participation Report:   Aerobics/Exercise  Support Group  Cognitive Stimulation  Fire Drill  Creative Arts/Crafts  Games  Gardening  Speakers/Entertainment  Socialization  Current Events/News  Education/Health Topic   Community Based Outings  Level of Participation:   Active

## 2022-06-23 ENCOUNTER — HOSPITAL ENCOUNTER (OUTPATIENT)
Dept: REHABILITATION | Facility: CLINIC | Age: 62
Discharge: HOME | End: 2022-06-23
Attending: FAMILY MEDICINE
Payer: MEDICAID

## 2022-06-23 PROCEDURE — S5100 ADULT DAYCARE SERVICES 15MIN: HCPCS

## 2022-06-23 NOTE — PROGRESS NOTES
[facility] Note:  Home and Community Based Services Outing    Destination:  [facility]    Description of Outing:  Member was escorted by staff to the [facility] were music, games, and snacks were provided by staff.    Member Response:  Member assisted in watering the plants around the garden. Member spent the entirety of the time outside with the rest of the members.

## 2022-06-28 ENCOUNTER — HOSPITAL ENCOUNTER (OUTPATIENT)
Dept: REHABILITATION | Facility: CLINIC | Age: 62
Discharge: HOME | End: 2022-06-28
Attending: FAMILY MEDICINE
Payer: MEDICAID

## 2022-06-28 PROCEDURE — S5100 ADULT DAYCARE SERVICES 15MIN: HCPCS

## 2022-06-28 NOTE — PROGRESS NOTES
Facilitated physical maintenance of bilateral upper extremity and bilateral lower extrimity strength and endurance through NuStep therapeutic exercise.  Patient completed 35 minutes of NuStep activity.    Nu Step Settings:  Seat:  9  Arms:  14  Resistance:  7

## 2022-06-28 NOTE — PROGRESS NOTES
[facility] Note:  Home and Community Based Services Outing    Destination:  [facility]    Description of Outing:  Member was assisted outside by staff to [facility]. Morning aerobics were performed and member listened to music while socializing with other members in attendance. While at the garden member had the opportunity to help plant news flowers, water plants, and learn about all the new vegetables growing in the garden. Freeze pops and beverages were provided to member.     Member Response:  Member loved being able to enjoy the weather and see the progress of the garden which they helped plant.

## 2022-06-29 NOTE — PROGRESS NOTES
Late Entry    [facility] RN Note    Previous documentation reviewed.  No concerns reported by AC staff or member.    Non wt bearing.

## 2022-06-30 ENCOUNTER — HOSPITAL ENCOUNTER (OUTPATIENT)
Dept: REHABILITATION | Facility: CLINIC | Age: 62
Discharge: HOME | End: 2022-06-30
Attending: FAMILY MEDICINE
Payer: MEDICAID

## 2022-06-30 PROCEDURE — S5102 ADULT DAY CARE PER DIEM: HCPCS

## 2022-06-30 NOTE — PROGRESS NOTES
Facilitated physical maintenance of bilateral upper extremity and bilateral lower extrimity strength and endurance through NuStep therapeutic exercise.  Patient completed 25 minutes of NuStep activity.    Nu Step Settings:  Seat:  9  Arms:  14  Resistance:  7    Patient tolerated exercise well.

## 2022-06-30 NOTE — PROGRESS NOTES
[facility] Note:  Home and Community Based Services Outing    Destination:  [facility]     Description of Outing:  Assisted outside by staff, member went to the [facility]. While outside member participated in morning aerobics to the best of their ability. Freeze pops were provided for member as well as music. Member socialized with those around as well as listened to staff talk about the garden and what is all growing. Member was able to smell fresh lavender that was grown in the garden as well as cilantro and mint.     Member Response: It is great to be able to go somewhere outside where I helped in the planting process.

## 2022-07-05 ENCOUNTER — HOSPITAL ENCOUNTER (OUTPATIENT)
Dept: REHABILITATION | Facility: CLINIC | Age: 62
Discharge: HOME | End: 2022-07-05
Attending: FAMILY MEDICINE
Payer: MEDICAID

## 2022-07-05 PROCEDURE — S5100 ADULT DAYCARE SERVICES 15MIN: HCPCS

## 2022-07-05 NOTE — PROGRESS NOTES
Facilitated physical maintenance of bilateral upper extremity and bilateral lower extrimity strength and endurance through NuStep therapeutic exercise.  Patient completed 20 minutes of NuStep activity.    Nu Step Settings:  Seat:  9  Arms:  14  Resistance:  6

## 2022-07-07 ENCOUNTER — HOSPITAL ENCOUNTER (OUTPATIENT)
Dept: REHABILITATION | Facility: CLINIC | Age: 62
Discharge: HOME | End: 2022-07-07
Attending: FAMILY MEDICINE
Payer: MEDICAID

## 2022-07-07 PROCEDURE — S5100 ADULT DAYCARE SERVICES 15MIN: HCPCS

## 2022-07-07 NOTE — PROGRESS NOTES
[facility] Note:  Home and Community Based Services Outing    Destination:  [facility]    Description of Outing:  Assisted by staff member was helped outside to [facility]. Freeze pops were enjoyed with other members around. Member socialized with those around her while listening to music provided by staff. Due to humidity rising member went back to [facility] inside after an hour.     Member Response:  It was great to be outside before it became too humid or hot.

## 2022-07-07 NOTE — PROGRESS NOTES
Facilitated physical maintenance of bilateral upper extremity and bilateral lower extrimity strength and endurance through NuStep therapeutic exercise.  Patient completed 27 minutes of NuStep activity.    Nu Step Settings:  Seat:  9  Arms:  12  Resistance:  7     Patient tolerated this exercise well.

## 2022-07-12 ENCOUNTER — HOSPITAL ENCOUNTER (OUTPATIENT)
Dept: REHABILITATION | Facility: CLINIC | Age: 62
Discharge: HOME | End: 2022-07-12
Attending: FAMILY MEDICINE
Payer: MEDICAID

## 2022-07-12 PROCEDURE — S5100 ADULT DAYCARE SERVICES 15MIN: HCPCS

## 2022-07-14 ENCOUNTER — HOSPITAL ENCOUNTER (OUTPATIENT)
Dept: REHABILITATION | Facility: CLINIC | Age: 62
Discharge: HOME | End: 2022-07-14
Attending: FAMILY MEDICINE
Payer: MEDICAID

## 2022-07-14 PROCEDURE — S5100 ADULT DAYCARE SERVICES 15MIN: HCPCS | Performed by: REHABILITATION PRACTITIONER

## 2022-07-14 NOTE — PROGRESS NOTES
[facility] RN Note    Previous documentation reviewed.  No concerns reported by AC staff or member.    Non amb.

## 2022-07-18 ENCOUNTER — LAB REQUISITION (OUTPATIENT)
Dept: LAB | Facility: CLINIC | Age: 62
End: 2022-07-18
Payer: COMMERCIAL

## 2022-07-18 DIAGNOSIS — R63.4 ABNORMAL WEIGHT LOSS: ICD-10-CM

## 2022-07-19 ENCOUNTER — HOSPITAL ENCOUNTER (OUTPATIENT)
Dept: REHABILITATION | Facility: CLINIC | Age: 62
Discharge: HOME | End: 2022-07-19
Attending: FAMILY MEDICINE
Payer: MEDICAID

## 2022-07-19 PROCEDURE — S5100 ADULT DAYCARE SERVICES 15MIN: HCPCS

## 2022-07-19 NOTE — PROGRESS NOTES
Facilitated physical maintenance of bilateral upper extremity and bilateral lower extrimity strength and endurance through NuStep therapeutic exercise.  Patient completed 15 minutes of NuStep activity.    Nu Step Settings:  Seat:  9  Arms:  14  Resistance: 7

## 2022-07-19 NOTE — PROGRESS NOTES
Facilitated physical maintenance of bilateral upper extremity and bilateral lower extrimity strength and endurance through NuStep therapeutic exercise.  Patient completed  12 minutes of NuStep activity.    Nu Step Settings:  Seat:  9  Arms:  14  Resistance:   7    Patient tolerated this exercise well.

## 2022-07-19 NOTE — PROGRESS NOTES
INDIVIDUAL PLAN OF CARE   [facility]    Member Name: Karolyn Das; YOB: 1960  MRN: [id number]  7/19/2022    Goals developed in collaboration with: member  Staff responsible for plan:  Mahin COSTELLO  1. Long Term Goal (concrete, measurable, and time specific outcomes):  Karolyn will maintain her current level of cognitive and physical function for as long as possible and prevent the progression of MS symptoms through active participation in [facility] activities each day of program attendance.  Target Date:  1/31/2023  Bi-Annual Review: consistent attendance this quarter, there has been a decline in overall  Endurance,  Looking into PT orders. Continue goal.  2. Short Term Goal: (concrete, measurable, and time specific outcomes):  Karolyn will maintain her current level of physical function and strength of BL LE and BL UE through active participation in her prescribed Nu Step maintenance program for at least 30 minutes each day of program attendance.  CGAx1 w/ gait belt provided for all transfers for falls prevention  d/2 impaired balance 2/2 MS diagnosis.  Target Date:  10/2022  Bi-Annual Review:  NuStep exercise each day of attendance and morning aerobic exercise participation. Continue goal.    3. Short Term Goal: (concrete, measurable, and time specific outcomes):  Lissett will maintain her current level of cognitive function through active participation in [facility] cognitive stimulation activities each day of program attendance.  Target Date: 10/2022  Bi-Annual Review:   Mirtha enjoys program activities, especially the educational aspects.  Continue goal

## 2022-07-19 NOTE — PROGRESS NOTES
MONTHLY PROGRESS NOTE AND PARTICIPATION REPORT   [facility]    Karolyn Das, 1960  Attendance: Please see Epic for attendance record.    Communication:   Does communicate   Hearing:   No impairment  Vision:   Difficulty reading or seeing print wears glasses to program less than half the time  Orientation/Cognition:   Minor forgetfulness  Partial disorientation  Short term memory loss  Karolyn presents with moderate cognitive impairment and requires close supervision for safety.  Behavior:   No behavioral concerns  Requires redirection  Karolyn requires redirection if she becomes upset by a late ride home or missing time in [employer].  Self-Preservation Skills:   Not capable of self-preservation  Eating:   Assist with set up  Ambulation Walking:   Non-ambulatory  Transferring:   Aide of one person/two  Karolyn presents with a strong rear lean while performing stand pivot tx's to toilet and Nu Step and requires CGA and gait belt for safe tx's and fall prevention.  Staff provides v/c's to apply manual wc brakes and to use grab bars during tx's.  Wheelchair:   Needs help to propel manual wheelchair  Toileting:   Independent  Staff provides Karolyn with room alarm after toilet tx is complete so she can notify staff that she has completed her bathroom needs so staff can assist with her tx back to manual WC.  Maintenance Program:     Sofia   Has expressed interest in using parallel bars- may not be appropriate at this time.   Living Arrangements:   Lives in assisted living facility  Spiritual Needs: Needs are being met through support group  Medication Assistance:   Medication not taken during program hours  Participation Report:   Aerobics/Exercise  Support Group  Cognitive Stimulation  Fire Drill  Creative Arts/Crafts  Games  Gardening  Speakers/Entertainment  Socialization  Current Events/News  Education/Health Topic   Community Based Outings  Level of Participation:   Active

## 2022-07-19 NOTE — PROGRESS NOTES
Individual abuse prevention plan (IAPP)  [facility]     Assessment of Susceptibility to Abuse, Including Self Abuse, Neglect (Identification of characteristics, which make the individual susceptible to abuse, and how these characteristics cause the individual to be susceptible to abuse.)     Is the person susceptible to abuse in each area?  Sexual Abuse:   No  Referrals made when the person is susceptible to abuse outside the scope or control of this program (Identify the referral and date it occurred): No additional risk reduction means needed at this time    Physical Abuse:   No  Referrals made when the person is susceptible to abuse outside the scope or control of this program (Identify the referral and date it occurred): No additional risk reduction means needed at this time    Self-Abuse:   No  Referrals made when the person is susceptible to abuse outside the scope or control of this program (Identify the referral and date it occurred): No additional risk reduction means needed at this time    Financial  Exploitation  No  Referrals made when the person is susceptible to abuse outside the scope or control of this program (Identify the referral and date it occurred): No additional risk reduction means needed at this time    Is the program aware of this person committing a violent crime or act of physical aggression towards others?  No  Referrals made when the person is susceptible to abuse outside the scope or control of this program (Identify the referral and date it occurred): No additional risk reduction means needed at this time    INDIVIDUAL ABUSE PREVENTION PLAN-MEASURES TAKEN TO MINIMIZE RISK OF ABUSE   ADL:   Assist with clothing management  Assist with toileting  Ambulation/Transfers/Wheelchairs:  Provide transfer belt and assist with transfers and ambulation   Behavior:   Patient gets agitated when she becomes confused.  Communication:  Encourage verbalization of  needs/concerns  Cognitive Issues:   Provider reminders due to confusion, forgetfulness  Give simple step-by-step direction  Diet:   No present concerns  Exercise:   Encourage participation in maintenance program  Encourage participation in wheelchair aerobics  Hearing:   No concerns  Isolation:   Patient lives in an assisted living facility  Medical Monitoring:   Monitor physical and emotional comfort  Mental Health:   Encourage client to express feelings  Offer emotional support  Sensory:   Provide and encourage participation in activities for stimulation  Vision:   Ensure client is wearing glasses and clean prn  Other: N/A  Developed in consultation with:  Client  The Program Abuse Prevention Plan/IAPP identifies the specific actions that minimize abuse to the [facility] participant.  Yes

## 2022-07-19 NOTE — PROGRESS NOTES
[facility] Note:  Home and Community Based Services Outing     Destination:  No outing was offered today due to extreme high temperatures and heat advisory.     Description of Outing:       Member Response:

## 2022-07-20 LAB — TSH SERPL DL<=0.005 MIU/L-ACNC: 7.05 UIU/ML (ref 0.3–4.2)

## 2022-07-20 PROCEDURE — 36415 COLL VENOUS BLD VENIPUNCTURE: CPT | Mod: ORL | Performed by: NURSE PRACTITIONER

## 2022-07-20 PROCEDURE — 84443 ASSAY THYROID STIM HORMONE: CPT | Mod: ORL | Performed by: NURSE PRACTITIONER

## 2022-07-20 PROCEDURE — P9604 ONE-WAY ALLOW PRORATED TRIP: HCPCS | Mod: ORL | Performed by: NURSE PRACTITIONER

## 2022-07-21 ENCOUNTER — HOSPITAL ENCOUNTER (OUTPATIENT)
Dept: REHABILITATION | Facility: CLINIC | Age: 62
Discharge: HOME | End: 2022-07-21
Attending: FAMILY MEDICINE
Payer: MEDICAID

## 2022-07-21 PROCEDURE — S5100 ADULT DAYCARE SERVICES 15MIN: HCPCS

## 2022-07-21 NOTE — PROGRESS NOTES
"[facility] Note:  Home and Community Based Services Outing    Destination:  [facility]    Description of Outing:  Member was helped outside by staff to the garden where morning aerobics were performed to the best of their ability, freeze pops were enjoyed, music was played, and they were able to socialize with other members around.    Member Response:  \"It was super nice to get outside before getting too warm. I like that we were able to eat the freeze pops.\"  "

## 2022-07-26 ENCOUNTER — HOSPITAL ENCOUNTER (OUTPATIENT)
Dept: REHABILITATION | Facility: CLINIC | Age: 62
Discharge: HOME | End: 2022-07-26
Attending: FAMILY MEDICINE
Payer: MEDICAID

## 2022-07-26 PROCEDURE — S5100 ADULT DAYCARE SERVICES 15MIN: HCPCS

## 2022-07-28 ENCOUNTER — HOSPITAL ENCOUNTER (OUTPATIENT)
Dept: REHABILITATION | Facility: CLINIC | Age: 62
Discharge: HOME | End: 2022-07-28
Attending: FAMILY MEDICINE
Payer: MEDICAID

## 2022-07-28 PROCEDURE — S5100 ADULT DAYCARE SERVICES 15MIN: HCPCS

## 2022-07-28 NOTE — PROGRESS NOTES
Facilitated physical maintenance of bilateral upper extremity and bilateral lower extrimity strength and endurance through NuStep therapeutic exercise.  Patient completed 20 minutes of NuStep activity.    Nu Step Settings:  Seat:  9  Arms:  14  Resistance:  7

## 2022-07-28 NOTE — PROGRESS NOTES
"[facility] Note:  Home and Community Based Services Outing    Destination:  [facility]    Description of Outing:  Member was helped outside to garden by staff. While outside member enjoyed freeze pops and morning aerobics were performed. Member socialized with others around while staff talked about all the different vegetables growing.     Member Response:  \"I really enjoyed being here while it was cool outside, I look forward to coming again.\"  "

## 2022-08-02 ENCOUNTER — HOSPITAL ENCOUNTER (OUTPATIENT)
Dept: REHABILITATION | Facility: CLINIC | Age: 62
Discharge: HOME | End: 2022-08-02
Attending: FAMILY MEDICINE
Payer: MEDICAID

## 2022-08-02 PROCEDURE — S5100 ADULT DAYCARE SERVICES 15MIN: HCPCS

## 2022-08-04 ENCOUNTER — HOSPITAL ENCOUNTER (OUTPATIENT)
Dept: REHABILITATION | Facility: CLINIC | Age: 62
Discharge: HOME | End: 2022-08-04
Attending: FAMILY MEDICINE
Payer: MEDICAID

## 2022-08-04 PROCEDURE — S5100 ADULT DAYCARE SERVICES 15MIN: HCPCS

## 2022-08-04 NOTE — PROGRESS NOTES
Facilitated physical maintenance of bilateral upper extremity and bilateral lower extrimity strength and endurance through NuStep therapeutic exercise.  Patient completed 27 minutes of NuStep activity.    Nu Step Settings:  Seat:  9  Arms:  14  Resistance:  7

## 2022-08-04 NOTE — PROGRESS NOTES
"[facility] Note:  Home and Community Based Services Outing     Destination:  [facility]     Description of Outing:  Member was assisted outside to the garden by staff. While outside, member enjoyed freeze pops and performed morning aerobics to the best of their ability. Member was able to socialize with peers and observe the vegetables growing in the garden.     Member Response:  \"It was a beautiful day to be outside. I look forward to being in the garden.\"  "

## 2022-08-11 ENCOUNTER — HOSPITAL ENCOUNTER (OUTPATIENT)
Dept: REHABILITATION | Facility: CLINIC | Age: 62
Discharge: HOME | End: 2022-08-11
Attending: FAMILY MEDICINE
Payer: MEDICAID

## 2022-08-11 PROCEDURE — S5100 ADULT DAYCARE SERVICES 15MIN: HCPCS

## 2022-08-11 NOTE — PROGRESS NOTES
"[facility] Note:  Home and Community Based Services Outing     Destination:  [facility]     Description of Outing:  Member was helped outside to garden by staff. While outside member enjoyed freeze pops and morning aerobics were performed. Member socialized with others around while staff talked about all the different vegetables growing.      Member Response:  \"I really enjoyed being here while it was cool outside, I look forward to coming again.\"  "

## 2022-08-11 NOTE — PROGRESS NOTES
[facility] Note:  Home and Community Based Services Outing    Destination:  [facility]    Description of Outing:  Members were assisted with transport to the [facility]. There, members participated in aerobic exercise, listened to music, and assisted with garden maintenance as able.     Member Response:  Member arrived late but upon arrival was transported to the [facility]. Member chose to actively participate in [facility] aerobic exercise and assisted in garden maintenance by watering the plants within the garden.

## 2022-08-16 ENCOUNTER — HOSPITAL ENCOUNTER (OUTPATIENT)
Dept: REHABILITATION | Facility: CLINIC | Age: 62
Discharge: HOME | End: 2022-08-16
Attending: FAMILY MEDICINE
Payer: MEDICAID

## 2022-08-16 PROCEDURE — S5100 ADULT DAYCARE SERVICES 15MIN: HCPCS

## 2022-08-16 NOTE — PROGRESS NOTES
Facilitated physical maintenance of bilateral upper extremity and bilateral lower extrimity strength and endurance through NuStep therapeutic exercise.  Patient completed 33 minutes of NuStep activity.    Nu Step Settings:  Seat:  9  Arms:  14  Resistance:  7

## 2022-08-18 ENCOUNTER — HOSPITAL ENCOUNTER (OUTPATIENT)
Dept: REHABILITATION | Facility: CLINIC | Age: 62
Discharge: HOME | End: 2022-08-18
Attending: FAMILY MEDICINE
Payer: MEDICAID

## 2022-08-18 PROCEDURE — S5100 ADULT DAYCARE SERVICES 15MIN: HCPCS

## 2022-08-18 NOTE — PROGRESS NOTES
"[facility] Note:  Home and Community Based Services Outing     Destination:  [facility]     Description of Outing:  Member was assisted outside by staff to the garden. While outside member enjoyed freeze pops and listened to music of their choice. They performed aerobics to the best of their ability and socialized with their peers.     Member Response:  \"The weather was nice today and I enjoyed being in the garden.\"  "

## 2022-08-23 ENCOUNTER — HOSPITAL ENCOUNTER (OUTPATIENT)
Dept: REHABILITATION | Facility: CLINIC | Age: 62
Discharge: HOME | End: 2022-08-23
Attending: FAMILY MEDICINE
Payer: MEDICAID

## 2022-08-23 PROCEDURE — S5100 ADULT DAYCARE SERVICES 15MIN: HCPCS

## 2022-08-23 NOTE — PROGRESS NOTES
"[facility] Note:  Home and Community Based Services Outing    Destination:  [facility]     Description of Outing:  Member was assisted outside by staff to [facility]. While outside member performed morning aerobics to the best of their ability. Member also listened to music of their choice, socialized with their peers, and observed the vegetables growing in the garden.    Member Response:  \"The weather was perfect today. I enjoyed being outside talking with everyone.\"      "

## 2022-08-23 NOTE — PROGRESS NOTES
Facilitated physical maintenance of bilateral upper extremity and bilateral lower extrimity strength and endurance through NuStep therapeutic exercise.  Patient completed 30 minutes of NuStep activity.    Nu Step Settings:  Seat:  9  Arms:  14  Resistance:  7

## 2022-08-25 ENCOUNTER — TELEPHONE (OUTPATIENT)
Dept: FAMILY MEDICINE | Facility: CLINIC | Age: 62
End: 2022-08-25

## 2022-08-25 ENCOUNTER — HOSPITAL ENCOUNTER (OUTPATIENT)
Dept: REHABILITATION | Facility: CLINIC | Age: 62
Discharge: HOME | End: 2022-08-25
Attending: FAMILY MEDICINE
Payer: MEDICAID

## 2022-08-25 PROBLEM — R27.9 LACK OF COORDINATION: Status: ACTIVE | Noted: 2022-08-25

## 2022-08-25 PROBLEM — D69.6 THROMBOCYTOPENIA (H): Status: ACTIVE | Noted: 2022-08-25

## 2022-08-25 PROBLEM — K59.00 CONSTIPATION: Status: ACTIVE | Noted: 2022-08-25

## 2022-08-25 PROBLEM — R41.89 COGNITIVE IMPAIRMENT: Status: ACTIVE | Noted: 2022-08-25

## 2022-08-25 PROBLEM — R15.9 INCONTINENCE OF FECES, UNSPECIFIED FECAL INCONTINENCE TYPE: Status: ACTIVE | Noted: 2020-01-31

## 2022-08-25 PROBLEM — L82.1 SEBORRHEIC KERATOSIS: Status: ACTIVE | Noted: 2022-08-25

## 2022-08-25 PROBLEM — N30.00 ACUTE CYSTITIS: Status: ACTIVE | Noted: 2017-09-29

## 2022-08-25 PROBLEM — N64.3 GALACTORRHEA NOT ASSOCIATED WITH CHILDBIRTH: Status: ACTIVE | Noted: 2022-08-25

## 2022-08-25 PROBLEM — D12.6 BENIGN NEOPLASM OF COLON: Status: ACTIVE | Noted: 2022-08-25

## 2022-08-25 PROBLEM — F06.30 MOOD DISORDER IN CONDITIONS CLASSIFIED ELSEWHERE: Status: ACTIVE | Noted: 2022-08-25

## 2022-08-25 PROBLEM — R63.4 ABNORMAL WEIGHT LOSS: Status: ACTIVE | Noted: 2022-08-25

## 2022-08-25 PROBLEM — G40.909 EPILEPTIC SEIZURE (H): Status: ACTIVE | Noted: 2022-08-25

## 2022-08-25 PROBLEM — S33.5XXA LUMBAR SPRAIN: Status: ACTIVE | Noted: 2022-08-25

## 2022-08-25 PROBLEM — E03.9 HYPOTHYROIDISM: Status: ACTIVE | Noted: 2022-08-25

## 2022-08-25 PROBLEM — B35.1 ONYCHOMYCOSIS: Status: ACTIVE | Noted: 2022-08-25

## 2022-08-25 PROBLEM — N39.0 RECURRENT UTI: Status: ACTIVE | Noted: 2022-08-25

## 2022-08-25 PROBLEM — Z88.9 MULTIPLE ALLERGIES: Status: ACTIVE | Noted: 2017-04-01

## 2022-08-25 PROCEDURE — S5100 ADULT DAYCARE SERVICES 15MIN: HCPCS

## 2022-08-25 NOTE — PROGRESS NOTES
Facilitated physical maintenance of bilateral upper extremity and bilateral lower extrimity strength and endurance through NuStep therapeutic exercise.  Patient completed 28 minutes of NuStep activity.    Nu Step Settings:  Seat:  9  Arms:  14  Resistance:  7

## 2022-08-25 NOTE — TELEPHONE ENCOUNTER
Reason for Call: Request for an order or referral:    Order or referral being requested: Order for a Sofa Cane--Has MS and she has been falling after getting up from recliner    Date needed: as soon as possible    Has the patient been seen by the PCP for this problem? YES    Additional comments: PT has evaled for Sofa cane and recommended but has not followed through with getting the order    Phone number Patient can be reached at: Anahi Goodman    [phone]    Best Time:        Can we leave a detailed message on this number?  YES    Call taken on 8/25/2022 at 1:00 PM by Amy Cheung PTA

## 2022-08-25 NOTE — PROGRESS NOTES
MONTHLY PROGRESS NOTE AND PARTICIPATION REPORT   [facility]    Karolyn Das, 1960  Attendance: Please see Epic for attendance record.    Communication:   Does communicate   Hearing:   No impairment  Vision:   Glasses  Difficulty reading or seeing print  Orientation/Cognition:   Minor forgetfulness  Partial disorientation  Short term memory loss  Karolyn presents with moderate cognitive impairment and requires supervision for safety  Behavior:   No behavioral concerns  Requires redirection  Karolyn requires redirection if she becomes upset by a late ride home or missing time in [employer]  Self-Preservation Skills:   Not capable of self-preservation  Eating:   Assist with set up  Ambulation Walking:   Non-ambulatory  Transferring:   Aide of one person/two  Karolyn presents with a strong rear lean while performing stand pivot tx's to toilet and Nu Step and requires CGA and gait belt for safe tx's and fall prevention.  Staff provides v/c's to apply manual wc brakes and to use grab bars during tx's.  Wheelchair:   Needs help  Toileting:   Independent  Staff provides Karolyn with room alarm after toilet tx is complete so she can notify staff that she has completed her bathroom needs so staff can assist with her tx back to manual WC.  Maintenance Program:     NuStep  Living Arrangements:   Lives in assisted living facility  Spiritual Needs: Needs are being met through support group  Medication Assistance:   Medication not taken during program hours  Participation Report:   Aerobics/Exercise  Support Group  Cognitive Stimulation  Creative Arts/Crafts  Games  Gardening  Speakers/Entertainment  Socialization  Current Events/News  Education/Health Topic  Community based outings  Level of Participation:   Active

## 2022-08-30 ENCOUNTER — HOSPITAL ENCOUNTER (OUTPATIENT)
Dept: REHABILITATION | Facility: CLINIC | Age: 62
Discharge: HOME | End: 2022-08-30
Attending: FAMILY MEDICINE
Payer: MEDICAID

## 2022-08-30 PROCEDURE — S5100 ADULT DAYCARE SERVICES 15MIN: HCPCS

## 2022-08-30 NOTE — PROGRESS NOTES
"[facility] Note:  Home and Community Based Services Outing    Destination:  [facility]    Description of Outing:  Member was assisted outside by staff to the [facility]. While outside, member performed morning aerobics to the best of their ability. Member also listened to music of their choice, socialized with their peers, and observed the vegetables growing in the garden.    Member Response:  \"The garden is growing well and it was nice to talk with everyone.\"      "

## 2022-08-30 NOTE — PROGRESS NOTES
Facilitated physical maintenance of bilateral upper extremity and bilateral lower extrimity strength and endurance through NuStep therapeutic exercise.  Patient completed 30 minutes of NuStep activity.    Nu Step Settings:  Seat:  8  Arms:  14  Resistance:  6

## 2022-08-31 ENCOUNTER — LAB REQUISITION (OUTPATIENT)
Dept: LAB | Facility: CLINIC | Age: 62
End: 2022-08-31
Payer: COMMERCIAL

## 2022-08-31 DIAGNOSIS — R63.4 ABNORMAL WEIGHT LOSS: ICD-10-CM

## 2022-09-01 ENCOUNTER — HOSPITAL ENCOUNTER (OUTPATIENT)
Dept: REHABILITATION | Facility: CLINIC | Age: 62
Discharge: HOME | End: 2022-09-01
Attending: FAMILY MEDICINE
Payer: MEDICAID

## 2022-09-01 PROCEDURE — S5100 ADULT DAYCARE SERVICES 15MIN: HCPCS

## 2022-09-01 NOTE — PROGRESS NOTES
"[facility] Note:  Home and Community Based Services Outing     Destination:  [facility]     Description of Outing:  Member was assisted outside by staff to the [facility]. While outside, member performed morning aerobics to the best of their ability. Member listened to music of their choice, observed the vegetables growing in the garden, and socialized with their peers.      Member Response:  \"It was a little warmer today, but it was nice to sit outside with everyone.\"  "

## 2022-09-06 ENCOUNTER — HOSPITAL ENCOUNTER (OUTPATIENT)
Dept: REHABILITATION | Facility: CLINIC | Age: 62
Discharge: HOME | End: 2022-09-06
Attending: FAMILY MEDICINE
Payer: MEDICAID

## 2022-09-06 PROCEDURE — S5100 ADULT DAYCARE SERVICES 15MIN: HCPCS

## 2022-09-06 NOTE — PROGRESS NOTES
Facilitated physical maintenance of bilateral upper extremity and bilateral lower extrimity strength and endurance through NuStep therapeutic exercise.  Patient completed 33 minutes of NuStep activity.    Nu Step Settings:  Seat:  8  Arms:  14  Resistance:  7

## 2022-09-07 LAB — TSH SERPL DL<=0.005 MIU/L-ACNC: 0.31 UIU/ML (ref 0.3–4.2)

## 2022-09-07 PROCEDURE — 84443 ASSAY THYROID STIM HORMONE: CPT | Mod: ORL | Performed by: NURSE PRACTITIONER

## 2022-09-07 PROCEDURE — P9604 ONE-WAY ALLOW PRORATED TRIP: HCPCS | Mod: ORL | Performed by: NURSE PRACTITIONER

## 2022-09-07 PROCEDURE — 36415 COLL VENOUS BLD VENIPUNCTURE: CPT | Mod: ORL | Performed by: NURSE PRACTITIONER

## 2022-09-08 ENCOUNTER — HOSPITAL ENCOUNTER (OUTPATIENT)
Dept: REHABILITATION | Facility: CLINIC | Age: 62
Discharge: HOME | End: 2022-09-08
Attending: FAMILY MEDICINE
Payer: MEDICAID

## 2022-09-08 PROCEDURE — S5100 ADULT DAYCARE SERVICES 15MIN: HCPCS

## 2022-09-08 NOTE — PROGRESS NOTES
[facility] Social History    Full Name: Karolyn Sanchez       MRN: [id number]    Date of Birth: [birth year]  Nickname: Mirtha      Sex: F     Home Phone:       Cell Phone: [phone]  Address: [address] [place], [place] [address]Zip: [place] [address]  County: [place]      E-mail:None        Transportation:[place]  Language:English         needed? No       Ethnicity: American  Race: White      Country of Origin: [place]       Druze: [employer]  Marital Status:                   Spouse/Significant Other: POA: Jane Martinez   ______________________________________________________________________________________     [place]? No   Branch of Service: N/A      Education Level:  Studies @[employer]  Job History: [employer]    Organizations/Clubs: None  Whom do you live with? Assisted Living   Current living arrangement:  Assisted Living   Number of Children: 2  List: Rohit Serna  Number of Siblings: 3     List: Deena, Marion, and Jonathan  Other Important People/Pets: Best friend (lives in [facility])  What else should we know about you? Youth counseling is very important to her. She enjoys watching the [employer].    Primary Care Provider: Dr. Ulysses Wong        Neurologist: In process of getting a new one  Emergency Contacts:  1. Kareem Sanchez      Relationship: Son    Phone: [phone]  2. Jo sanchez         Relationship: Mother-in-Law     Phone:[phone]        Updated on: 1/25/17             Updated on: 7/30/18              Updated on: 7/18/19  Updated on 7/23/19  Updated on 7/19/21  Updated on 9/08/22

## 2022-09-08 NOTE — PROGRESS NOTES
"[facility] Note:  Home and Community Based Services Outing     Destination:  [facility]     Description of Outing:  Member was assisted outside by staff. While outside, member performed morning aerobics to the best of their ability, listen to music of their choice, and enjoy the weather and vegetables growing in the garden.     Member Response:  \"It is always nice sitting outside in the garden and talking with everyone.\"  "

## 2022-09-08 NOTE — PROGRESS NOTES
Facilitated physical maintenance of bilateral upper extremity and bilateral lower extrimity strength and endurance through NuStep therapeutic exercise.  Patient completed 23 minutes of NuStep activity.    Nu Step Settings:  Seat:  9  Arms:  14  Resistance:  7

## 2022-09-13 ENCOUNTER — HOSPITAL ENCOUNTER (OUTPATIENT)
Dept: REHABILITATION | Facility: CLINIC | Age: 62
Discharge: HOME | End: 2022-09-13
Attending: FAMILY MEDICINE
Payer: MEDICAID

## 2022-09-13 PROCEDURE — S5100 ADULT DAYCARE SERVICES 15MIN: HCPCS

## 2022-09-13 NOTE — PROGRESS NOTES
[facility] Note:  Home and Community Based Services Outing     Destination:  [facility]     Description of Outing:  Members and staff spent ~60 minutes outside in the [facility] for socialization, conversation, and adaptive wheelchair aerobics.     Member Response:  Member responded that they enjoy their time in the garden.  Member states that we need to enjoy the garden while the weather permits.

## 2022-09-15 ENCOUNTER — HOSPITAL ENCOUNTER (OUTPATIENT)
Dept: REHABILITATION | Facility: CLINIC | Age: 62
Discharge: HOME | End: 2022-09-15
Attending: FAMILY MEDICINE
Payer: MEDICAID

## 2022-09-15 PROCEDURE — S5100 ADULT DAYCARE SERVICES 15MIN: HCPCS

## 2022-09-15 NOTE — PROGRESS NOTES
MONTHLY PROGRESS NOTE AND PARTICIPATION REPORT   [facility]    Karolyn Das, 1960  Attendance: Please see Epic for attendance record.    Communication:   Does communicate   Hearing:   No impairment  Vision:   Glasses  Difficulty reading or seeing print  Orientation/Cognition:   Minor forgetfulness  Partial disorientation  Short term memory loss  Karolyn presents with moderate cognitive impairment and requires supervision for safety  Behavior:   No behavioral concerns  Requires redirection  Karolyn requires redirection if she becomes upset by a late ride home or missing time in [employer]  Self-Preservation Skills:   Not capable of self-preservation  Eating:   Assist with set up  Ambulation Walking:   Non-ambulatory  Transferring:   Aide of one person/two  Karolyn presents with a strong rear lean while performing stand pivot tx's to toilet and Nu Step and requires CGA and gait belt for safe tx's and fall prevention.  Staff provides v/c's to apply manual wc brakes and to use grab bars during tx's.  Wheelchair:   Needs help  Toileting:   Independent  Staff provides Karolyn with room alarm after toilet tx is complete so she can notify staff that she has completed her bathroom needs so staff can assist with her tx back to manual WC.  Maintenance Program:     NuStep  Living Arrangements:   Lives in assisted living facility  Spiritual Needs: Needs are being met through support group  Medication Assistance:   Medication not taken during program hours  Participation Report:   Aerobics/Exercise  Support Group  Cognitive Stimulation  Fire Drill  Creative Arts/Crafts  Games  Gardening  Speakers/Entertainment  Socialization  Current Events/News  Education/Health Topic  Community Based Outings  Level of Participation:   Active     Karolyn has been enjoying her time in art. She just finished up a ceramic ghost.

## 2022-09-15 NOTE — PROGRESS NOTES
Facilitated physical maintenance of bilateral upper extremity and bilateral lower extrimity strength and endurance through NuStep therapeutic exercise.  Patient completed 25 minutes of NuStep activity.    Nu Step Settings:  Seat:  9  Arms:  14  Resistance:  7

## 2022-09-15 NOTE — PROGRESS NOTES
"[facility] Note:  Home and Community Based Services Outing     Destination:  [facility]     Description of Outing:  Member was assisted outside by staff. Outside they participated in socialization, morning aerobics to the best of their ability, and music of their choice. Freeze pops were also enjoyed.     Member Response:  \"I really like being outside in the nice weather. The freeze pop was delicious.\"  "

## 2022-09-22 ENCOUNTER — HOSPITAL ENCOUNTER (OUTPATIENT)
Dept: REHABILITATION | Facility: CLINIC | Age: 62
Discharge: HOME | End: 2022-09-22
Attending: FAMILY MEDICINE
Payer: MEDICAID

## 2022-09-22 PROCEDURE — S5100 ADULT DAYCARE SERVICES 15MIN: HCPCS

## 2022-09-27 ENCOUNTER — HOSPITAL ENCOUNTER (OUTPATIENT)
Dept: REHABILITATION | Facility: CLINIC | Age: 62
Discharge: HOME | End: 2022-09-27
Attending: FAMILY MEDICINE
Payer: MEDICAID

## 2022-09-27 PROCEDURE — S5100 ADULT DAYCARE SERVICES 15MIN: HCPCS

## 2022-09-29 ENCOUNTER — HOSPITAL ENCOUNTER (OUTPATIENT)
Dept: REHABILITATION | Facility: CLINIC | Age: 62
Discharge: HOME | End: 2022-09-29
Attending: FAMILY MEDICINE
Payer: MEDICAID

## 2022-09-29 PROCEDURE — S5100 ADULT DAYCARE SERVICES 15MIN: HCPCS

## 2022-09-29 NOTE — PROGRESS NOTES
Facilitated physical maintenance of bilateral upper extremity and bilateral lower extrimity strength and endurance through NuStep therapeutic exercise.  Patient completed 21 minutes of NuStep activity.    Nu Step Settings:  Seat:  9  Arms:  14  Resistance:  7

## 2022-10-04 ENCOUNTER — HOSPITAL ENCOUNTER (OUTPATIENT)
Dept: REHABILITATION | Facility: CLINIC | Age: 62
Discharge: HOME | End: 2022-10-04
Attending: FAMILY MEDICINE
Payer: MEDICAID

## 2022-10-04 PROCEDURE — S5100 ADULT DAYCARE SERVICES 15MIN: HCPCS

## 2022-10-04 NOTE — PROGRESS NOTES
"[facility] Note:  Home and Community Based Services Outing     Destination:  [facility]     Description of Outing:  Member was assisted outside by staff. Outside they participated in socialization, morning aerobics to the best of their ability, and music of their choice.     Member Response:  \"It is a beautiful day outside.\"  "

## 2022-10-06 ENCOUNTER — HOSPITAL ENCOUNTER (OUTPATIENT)
Dept: REHABILITATION | Facility: CLINIC | Age: 62
Discharge: HOME | End: 2022-10-06
Attending: FAMILY MEDICINE
Payer: MEDICAID

## 2022-10-06 PROCEDURE — S5100 ADULT DAYCARE SERVICES 15MIN: HCPCS

## 2022-10-11 ENCOUNTER — HOSPITAL ENCOUNTER (OUTPATIENT)
Dept: REHABILITATION | Facility: CLINIC | Age: 62
Discharge: HOME | End: 2022-10-11
Attending: FAMILY MEDICINE
Payer: MEDICAID

## 2022-10-11 PROCEDURE — S5100 ADULT DAYCARE SERVICES 15MIN: HCPCS

## 2022-10-13 ENCOUNTER — HOSPITAL ENCOUNTER (OUTPATIENT)
Dept: REHABILITATION | Facility: CLINIC | Age: 62
Discharge: HOME | End: 2022-10-13
Attending: FAMILY MEDICINE
Payer: MEDICAID

## 2022-10-13 PROCEDURE — S5100 ADULT DAYCARE SERVICES 15MIN: HCPCS

## 2022-10-18 ENCOUNTER — HOSPITAL ENCOUNTER (OUTPATIENT)
Dept: REHABILITATION | Facility: CLINIC | Age: 62
Discharge: HOME | End: 2022-10-18
Attending: FAMILY MEDICINE
Payer: MEDICAID

## 2022-10-18 PROCEDURE — S5100 ADULT DAYCARE SERVICES 15MIN: HCPCS

## 2022-10-20 ENCOUNTER — HOSPITAL ENCOUNTER (OUTPATIENT)
Dept: REHABILITATION | Facility: CLINIC | Age: 62
Discharge: HOME | End: 2022-10-20
Attending: FAMILY MEDICINE
Payer: MEDICAID

## 2022-10-20 PROCEDURE — S5100 ADULT DAYCARE SERVICES 15MIN: HCPCS

## 2022-10-20 NOTE — PROGRESS NOTES
MONTHLY PROGRESS NOTE AND PARTICIPATION REPORT   [facility]    Karolyn Das, 1960  Attendance: Please see Epic for attendance record.    Communication:   Does communicate   Hearing:   No impairment  Vision:   Difficulty reading or seeing print wears glasses to program less than half the time  Orientation/Cognition:   Minor forgetfulness  Partial disorientation  Short term memory loss  Karolyn presents with moderate cognitive impairment and requires close supervision for safety.  Behavior:   No behavioral concerns  Requires redirection  Karolyn requires redirection if she becomes upset by a late ride home or missing time in [email or web address].  Self-Preservation Skills:   Not capable of self-preservation  Eating:   Assist with set up  Ambulation Walking:   Non-ambulatory  Transferring:   Aide of one person/two  Karolyn presents with a strong rear lean while performing stand pivot tx's to toilet and Nu Step and requires CGA and gait belt for safe tx's and fall prevention.  Staff provides v/c's to apply manual wc brakes and to use grab bars during tx's.  Wheelchair:   Needs help to propel manual wheelchair  Toileting:   Independent  Staff provides Karolyn with room alarm after toilet tx is complete so she can notify staff that she has completed her bathroom needs so staff can assist with her tx back to manual WC.  Maintenance Program:     Sofia   Has expressed interest in using parallel bars- may not be appropriate at this time.   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  She especially enjoys art and painting ceramics.

## 2022-10-20 NOTE — PROGRESS NOTES
Individual abuse prevention plan (IAPP)  [facility]     Assessment of Susceptibility to Abuse, Including Self Abuse, Neglect (Identification of characteristics, which make the individual susceptible to abuse, and how these characteristics cause the individual to be susceptible to abuse.)     Is the person susceptible to abuse in each area?  Sexual Abuse:   No  Referrals made when the person is susceptible to abuse outside the scope or control of this program (Identify the referral and date it occurred): No additional risk reduction means needed at this time    Physical Abuse:   No  Referrals made when the person is susceptible to abuse outside the scope or control of this program (Identify the referral and date it occurred): No additional risk reduction means needed at this time    Self-Abuse:   No  Referrals made when the person is susceptible to abuse outside the scope or control of this program (Identify the referral and date it occurred): No additional risk reduction means needed at this time    Financial  Exploitation  No  Referrals made when the person is susceptible to abuse outside the scope or control of this program (Identify the referral and date it occurred): No additional risk reduction means needed at this time    Is the program aware of this person committing a violent crime or act of physical aggression towards others?  No  Referrals made when the person is susceptible to abuse outside the scope or control of this program (Identify the referral and date it occurred): No additional risk reduction means needed at this time    INDIVIDUAL ABUSE PREVENTION PLAN-MEASURES TAKEN TO MINIMIZE RISK OF ABUSE   ADL:   Assist with clothing management  Assist with toileting  Ambulation/Transfers/Wheelchairs:  Provide transfer belt and assist with transfers and ambulation   Behavior:   Patient gets agitated when she becomes confused.  Communication:  Encourage verbalization of  needs/concerns  Cognitive Issues:   Provider reminders due to confusion, forgetfulness  Give simple step-by-step direction  Diet:   No present concerns  Exercise:   Encourage participation in maintenance program  Encourage participation in wheelchair aerobics  Hearing:   No concerns  Isolation:   Patient lives in an assisted living facility  Medical Monitoring:   Monitor physical and emotional comfort  Mental Health:   Encourage client to express feelings  Offer emotional support  Sensory:   Provide and encourage participation in activities for stimulation  Vision:   Ensure client is wearing glasses and clean prn  Other: N/A  Developed in consultation with:  Client  The Program Abuse Prevention Plan/IAPP identifies the specific actions that minimize abuse to the [facility] participant.  Yes

## 2022-10-20 NOTE — PROGRESS NOTES
[facility] RN Note     Previous documentation since 9/13/22 reviewed.  No concerns reported by AC staff or member.

## 2022-10-20 NOTE — PROGRESS NOTES
INDIVIDUAL PLAN OF CARE   [facility]    Member Name: Karolyn Das; YOB: 1960  MRN: [id number]  7/19/2022    Goals developed in collaboration with: member  Staff responsible for plan:  Elizabeth COSTELLO  1. Long Term Goal (concrete, measurable, and time specific outcomes):  Karolyn will maintain her current level of cognitive and physical function for as long as possible and prevent the progression of MS symptoms through active participation in [facility] activities each day of program attendance.  Target Date:  10/20/2023  Bi-Annual Review: consistent attendance this quarter, there has been a decline in overall  Endurance,  Looking into PT orders. Continue goal.  2. Short Term Goal: (concrete, measurable, and time specific outcomes):  Karolyn will maintain her current level of physical function and strength of BL LE and BL UE through active participation in her prescribed Nu Step maintenance program for at least 30 minutes each day of program attendance.  CGAx1 w/ gait belt provided for all transfers for falls prevention  d/2 impaired balance 2/2 MS diagnosis.  Target Date:  04/2023  Bi-Annual Review:  NuStep exercise each day of attendance and morning aerobic exercise participation. Continue goal.    3. Short Term Goal: (concrete, measurable, and time specific outcomes):  Lissett will maintain her current level of cognitive function through active participation in Brain and Body cognitive stimulation activities each day of program attendance.  Target Date: 04/2023  Bi-Annual Review:   Mirtha enjoys program activities, especially the educational aspects.  Continue goal

## 2022-10-25 ENCOUNTER — HOSPITAL ENCOUNTER (OUTPATIENT)
Dept: REHABILITATION | Facility: CLINIC | Age: 62
Discharge: HOME | End: 2022-10-25
Attending: FAMILY MEDICINE
Payer: MEDICAID

## 2022-10-25 PROCEDURE — S5100 ADULT DAYCARE SERVICES 15MIN: HCPCS

## 2022-10-27 ENCOUNTER — HOSPITAL ENCOUNTER (OUTPATIENT)
Dept: REHABILITATION | Facility: CLINIC | Age: 62
Discharge: HOME | End: 2022-10-27
Attending: FAMILY MEDICINE
Payer: MEDICAID

## 2022-10-27 PROCEDURE — S5100 ADULT DAYCARE SERVICES 15MIN: HCPCS

## 2022-11-01 ENCOUNTER — HOSPITAL ENCOUNTER (OUTPATIENT)
Dept: REHABILITATION | Facility: CLINIC | Age: 62
Discharge: HOME | End: 2022-11-01
Attending: FAMILY MEDICINE
Payer: MEDICAID

## 2022-11-01 PROCEDURE — S5100 ADULT DAYCARE SERVICES 15MIN: HCPCS

## 2022-11-03 ENCOUNTER — HOSPITAL ENCOUNTER (OUTPATIENT)
Dept: REHABILITATION | Facility: CLINIC | Age: 62
Discharge: HOME | End: 2022-11-03
Attending: FAMILY MEDICINE
Payer: MEDICAID

## 2022-11-03 PROCEDURE — S5100 ADULT DAYCARE SERVICES 15MIN: HCPCS

## 2022-11-03 NOTE — PROGRESS NOTES
MONTHLY PROGRESS NOTE AND PARTICIPATION REPORT   [facility]    Karolyn Das, 1960  Attendance: Please see Epic for attendance record.    Communication:   Does communicate   Hearing:   No impairment  Vision:   Difficulty reading or seeing print  Wears glasses to the program less than half the time  Orientation/Cognition:   Minor forgetfulness  Partial disorientation  Short term memory loss  Karolyn presents with moderate cognitive impairment and requires close supervision for safety  Behavior:   No behavioral concerns  Requires redirection  Karolyn requires redirection if she becomes upset by a late ride home or missing time in [employer]  Self-Preservation Skills:   Not capable of self-preservation  Eating:   Assist with set up  Ambulation Walking:   Non-ambulatory  Transferring:   Aide of one person/two  Karolyn presents with a strong rear lean while performing stand pivot tx's to toilet and Nu Step and requires CGA and gait belt for safe tx's and fall prevention.  Staff provides v/c's to apply manual wc brakes and to use grab bars during tx's.  Wheelchair:   Needs help to propel manual wheelchair  Toileting:   Independent  Staff provides Karolyn with room alarm after toilet tx is complete so she can notify staff that she has completed her bathroom needs so staff can assist with her tx back to manual WC  Maintenance Program:     Sofia  Has expressed interest in using parallel bars- may not be appropriate at this time.   Living Arrangements:   Lives in assisted living facility  Spiritual Needs: Needs are being met through support group  Medication Assistance:   Medication not taken during program hours  Participation Report:   Aerobics/Exercise  Support Group  Cognitive Stimulation  Fire Drill  Creative Arts/Crafts  Games  Gardening  Speakers/Entertainment  Socialization  Current Events/News  Education/Health Topic  Community Based Outings  Level of Participation:   Active      Karolyn seems to enjoy her time at the day center.  She especially enjoys art and painting ceramics.

## 2022-11-03 NOTE — PROGRESS NOTES
Health Care Summary Report:    Contact was made with the member's care team  on 10/18/2022 in order to get an updated Health Care Summary Report.  Will continue to follow-up as needed.

## 2022-11-08 ENCOUNTER — HOSPITAL ENCOUNTER (OUTPATIENT)
Dept: REHABILITATION | Facility: CLINIC | Age: 62
Discharge: HOME | End: 2022-11-08
Attending: FAMILY MEDICINE
Payer: MEDICAID

## 2022-11-08 PROCEDURE — S5100 ADULT DAYCARE SERVICES 15MIN: HCPCS

## 2022-11-08 NOTE — PROGRESS NOTES
[facility] RN Note    Previous documentation since 10/20/22 reviewed.  No concerns reported by AC staff or member.

## 2022-11-10 ENCOUNTER — HOSPITAL ENCOUNTER (OUTPATIENT)
Dept: REHABILITATION | Facility: CLINIC | Age: 62
Discharge: HOME | End: 2022-11-10
Attending: FAMILY MEDICINE
Payer: MEDICAID

## 2022-11-10 PROCEDURE — S5100 ADULT DAYCARE SERVICES 15MIN: HCPCS

## 2022-11-15 ENCOUNTER — HOSPITAL ENCOUNTER (OUTPATIENT)
Dept: REHABILITATION | Facility: CLINIC | Age: 62
Discharge: HOME | End: 2022-11-15
Attending: FAMILY MEDICINE
Payer: MEDICAID

## 2022-11-15 PROCEDURE — S5100 ADULT DAYCARE SERVICES 15MIN: HCPCS

## 2022-11-17 ENCOUNTER — HOSPITAL ENCOUNTER (OUTPATIENT)
Dept: REHABILITATION | Facility: CLINIC | Age: 62
Discharge: HOME | End: 2022-11-17
Attending: FAMILY MEDICINE
Payer: MEDICAID

## 2022-11-17 PROCEDURE — S5100 ADULT DAYCARE SERVICES 15MIN: HCPCS

## 2022-11-29 ENCOUNTER — HOSPITAL ENCOUNTER (OUTPATIENT)
Dept: REHABILITATION | Facility: CLINIC | Age: 62
Discharge: HOME | End: 2022-11-29
Attending: FAMILY MEDICINE
Payer: MEDICAID

## 2022-11-29 PROCEDURE — S5100 ADULT DAYCARE SERVICES 15MIN: HCPCS

## 2022-12-01 ENCOUNTER — HOSPITAL ENCOUNTER (OUTPATIENT)
Dept: REHABILITATION | Facility: CLINIC | Age: 62
Discharge: HOME | End: 2022-12-01
Attending: FAMILY MEDICINE
Payer: MEDICAID

## 2022-12-01 PROCEDURE — S5100 ADULT DAYCARE SERVICES 15MIN: HCPCS

## 2022-12-06 ENCOUNTER — HOSPITAL ENCOUNTER (OUTPATIENT)
Dept: REHABILITATION | Facility: CLINIC | Age: 62
Discharge: HOME | End: 2022-12-06
Attending: FAMILY MEDICINE
Payer: MEDICAID

## 2022-12-06 PROCEDURE — S5100 ADULT DAYCARE SERVICES 15MIN: HCPCS

## 2022-12-08 ENCOUNTER — HOSPITAL ENCOUNTER (OUTPATIENT)
Dept: REHABILITATION | Facility: CLINIC | Age: 62
Discharge: HOME | End: 2022-12-08
Attending: FAMILY MEDICINE
Payer: MEDICAID

## 2022-12-08 PROCEDURE — S5100 ADULT DAYCARE SERVICES 15MIN: HCPCS

## 2022-12-08 NOTE — PROGRESS NOTES
.[facility] RN Note    No new progress notes noted since 11/8/22.  No concerns reported by AC staff or member.

## 2022-12-08 NOTE — PROGRESS NOTES
MONTHLY PROGRESS NOTE AND PARTICIPATION REPORT   [facility]    Karolyn Das, 1960  Attendance: Please see Epic for attendance record.    Communication:   Does communicate   Hearing:   No impairment  Vision:   Difficulty reading or seeing print  Wears glasses to the program less than half of the time  Orientation/Cognition:   Minor forgetfulness  Partial disorientation  Short term memory loss  Karolyn presents with moderte cognitive impairment and requires close supervision for safety  Behavior:   No behavioral concerns  Requires redirection  Requires redirection if she becomes upset by a late ride home or missing time in [employer]  Self-Preservation Skills:   Not capable of self-preservation  Eating:   Assist with set up  Ambulation Walking:   Non-ambulatory  Transferring:   Aide of one person/two  Karolyn presents with a strong rear lean while performing stand pivot tx's to toilet and Nu Step and requires CGA and gait belt for safe tx's and fall prevention.  Staff provides v/c's to apply manual wc brakes and to use grab bars during tx's  Wheelchair:   Needs help to propel manual wheelchair  Toileting:   Independent  Staff provides Karolyn with room alarm after toilet tx is complete so she can notify staff that she has completed her bathroom needs so staff can assist with her tx back to manual WC  Maintenance Program:     Sofia  Has expressed interest in using parallel bars- may not be appropriate at this time.   Living Arrangements:   Lives in assisted living facility  Spiritual Needs: Needs are being met through support group  Medication Assistance:   Medication not taken during program hours  Participation Report:   Aerobics/Exercise  Support Group  Cognitive Stimulation  Fire Drill  Creative Arts/Crafts  Games  Gardening  Speakers/Entertainment  Socialization  Current Events/News  Education/Health Topic  Community Based Outings  Level of Participation:   Active     Karolyn seems  to enjoy her time at the day center.  She especially enjoys art and painting ceramics.

## 2022-12-13 ENCOUNTER — HOSPITAL ENCOUNTER (OUTPATIENT)
Dept: REHABILITATION | Facility: CLINIC | Age: 62
Discharge: HOME | End: 2022-12-13
Attending: FAMILY MEDICINE
Payer: MEDICAID

## 2022-12-13 PROCEDURE — S5100 ADULT DAYCARE SERVICES 15MIN: HCPCS

## 2022-12-15 ENCOUNTER — HOSPITAL ENCOUNTER (OUTPATIENT)
Dept: REHABILITATION | Facility: CLINIC | Age: 62
Discharge: HOME | End: 2022-12-15
Attending: FAMILY MEDICINE
Payer: MEDICAID

## 2022-12-15 PROCEDURE — S5100 ADULT DAYCARE SERVICES 15MIN: HCPCS

## 2022-12-20 ENCOUNTER — HOSPITAL ENCOUNTER (OUTPATIENT)
Dept: REHABILITATION | Facility: CLINIC | Age: 62
Discharge: HOME | End: 2022-12-20
Attending: FAMILY MEDICINE
Payer: MEDICAID

## 2022-12-20 PROCEDURE — S5100 ADULT DAYCARE SERVICES 15MIN: HCPCS

## 2023-01-10 ENCOUNTER — HOSPITAL ENCOUNTER (OUTPATIENT)
Dept: REHABILITATION | Facility: CLINIC | Age: 63
Discharge: HOME | End: 2023-01-10
Attending: FAMILY MEDICINE
Payer: MEDICAID

## 2023-01-10 PROCEDURE — S5100 ADULT DAYCARE SERVICES 15MIN: HCPCS

## 2023-01-12 ENCOUNTER — HOSPITAL ENCOUNTER (OUTPATIENT)
Dept: REHABILITATION | Facility: CLINIC | Age: 63
Discharge: HOME | End: 2023-01-12
Attending: FAMILY MEDICINE
Payer: MEDICAID

## 2023-01-12 PROCEDURE — S5100 ADULT DAYCARE SERVICES 15MIN: HCPCS

## 2023-01-17 ENCOUNTER — HOSPITAL ENCOUNTER (OUTPATIENT)
Dept: REHABILITATION | Facility: CLINIC | Age: 63
Discharge: HOME | End: 2023-01-17
Attending: FAMILY MEDICINE
Payer: MEDICAID

## 2023-01-17 PROCEDURE — S5100 ADULT DAYCARE SERVICES 15MIN: HCPCS

## 2023-01-19 ENCOUNTER — HOSPITAL ENCOUNTER (OUTPATIENT)
Dept: REHABILITATION | Facility: CLINIC | Age: 63
Discharge: HOME | End: 2023-01-19
Attending: FAMILY MEDICINE
Payer: MEDICAID

## 2023-01-19 PROCEDURE — S5100 ADULT DAYCARE SERVICES 15MIN: HCPCS

## 2023-01-19 NOTE — PROGRESS NOTES
[facility] RN Note    No new progress notes noted since 12/8/22.  No concerns reported by AC staff or member.

## 2023-01-24 ENCOUNTER — HOSPITAL ENCOUNTER (OUTPATIENT)
Dept: REHABILITATION | Facility: CLINIC | Age: 63
Discharge: HOME | End: 2023-01-24
Attending: FAMILY MEDICINE
Payer: MEDICAID

## 2023-01-24 PROCEDURE — S5100 ADULT DAYCARE SERVICES 15MIN: HCPCS

## 2023-01-26 ENCOUNTER — HOSPITAL ENCOUNTER (OUTPATIENT)
Dept: REHABILITATION | Facility: CLINIC | Age: 63
Discharge: HOME | End: 2023-01-26
Attending: FAMILY MEDICINE
Payer: MEDICAID

## 2023-01-26 PROCEDURE — S5100 ADULT DAYCARE SERVICES 15MIN: HCPCS

## 2023-01-30 NOTE — PROGRESS NOTES
MONTHLY PROGRESS NOTE AND PARTICIPATION REPORT   [facility]    Karolyn Das, 1960  Attendance: Please see Epic for attendance record.    Communication:   Does communicate   Hearing:   No impairment  Vision:   Difficulty reading or seeing print wears glasses to program less than half the time  Orientation/Cognition:   Minor forgetfulness  Partial disorientation  Short term memory loss  Karolyn presents with moderate cognitive impairment and requires close supervision for safety.  Behavior:   No behavioral concerns  Requires redirection  Karolyn requires redirection if she becomes upset by a late ride home or missing time in [employer].  Self-Preservation Skills:   Not capable of self-preservation  Eating:   Assist with set up  Ambulation Walking:   Non-ambulatory  Transferring:   Aide of one person/two  Karolyn presents with a strong rear lean while performing stand pivot tx's to toilet and Nu Step and requires CGA and gait belt for safe tx's and fall prevention.  Staff provides v/c's to apply manual wc brakes and to use grab bars during tx's.  Wheelchair:   Needs help to propel manual wheelchair  Toileting:   Independent  Staff provides Karolyn with room alarm after toilet tx is complete so she can notify staff that she has completed her bathroom needs so staff can assist with her tx back to manual WC.  Maintenance Program:     Sofia   Has expressed interest in using parallel bars- may not be appropriate at this time.   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  She especially enjoys art and painting ceramics.  Karolyn is participating in home PT at her residence.  [facility] staff has communicated falls concerns with her PT via phone.

## 2023-01-30 NOTE — PROGRESS NOTES
INDIVIDUAL PLAN OF CARE   [facility]    Member Name: Karolyn Das; YOB: 1960  MRN: [id number]  7/19/2022    Goals developed in collaboration with: member  Staff responsible for plan:  Elizabeth COSTELLO    1. Long Term Goal (concrete, measurable, and time specific outcomes):  Karolyn will maintain her current level of cognitive and physical function for as long as possible and prevent the progression of MS symptoms through active participation in [facility] activities each day of program attendance.  Target Date:  January 2024  Bi-Annual Review: consistent attendance this quarter, there has been a decline in overall  Endurance.  Karolyn is participating in home PT. Continue goal.    2. Short Term Goal: (concrete, measurable, and time specific outcomes):  Karolyn will maintain her current level of physical function and strength of BL LE and BL UE through active participation in her prescribed Nu Step maintenance program for at least 30 minutes each day of program attendance.  CGAx1 w/ gait belt provided for all transfers for falls prevention  d/2 impaired balance 2/2 MS diagnosis.  Target Date:  June 2023  Bi-Annual Review:  NuStep exercise each day of attendance and morning aerobic exercise participation. Continue goal.    3. Short Term Goal: (concrete, measurable, and time specific outcomes):  Lissett will maintain her current level of cognitive function through active participation in Brain and Body cognitive stimulation activities each day of program attendance.  Target Date:  June 2023  Bi-Annual Review:   Mirtha enjoys program activities, especially the educational aspects.  Continue goal

## 2023-01-30 NOTE — PROGRESS NOTES
Individual abuse prevention plan (IAPP)  [facility]     Assessment of Susceptibility to Abuse, Including Self Abuse, Neglect (Identification of characteristics, which make the individual susceptible to abuse, and how these characteristics cause the individual to be susceptible to abuse.)     Is the person susceptible to abuse in each area?  Sexual Abuse:   No  Referrals made when the person is susceptible to abuse outside the scope or control of this program (Identify the referral and date it occurred): No additional risk reduction means needed at this time    Physical Abuse:   No  Referrals made when the person is susceptible to abuse outside the scope or control of this program (Identify the referral and date it occurred): No additional risk reduction means needed at this time    Self-Abuse:   No  Referrals made when the person is susceptible to abuse outside the scope or control of this program (Identify the referral and date it occurred): No additional risk reduction means needed at this time    Financial  Exploitation  No  Referrals made when the person is susceptible to abuse outside the scope or control of this program (Identify the referral and date it occurred): No additional risk reduction means needed at this time    Is the program aware of this person committing a violent crime or act of physical aggression towards others?  No  Referrals made when the person is susceptible to abuse outside the scope or control of this program (Identify the referral and date it occurred): No additional risk reduction means needed at this time    INDIVIDUAL ABUSE PREVENTION PLAN-MEASURES TAKEN TO MINIMIZE RISK OF ABUSE   ADL:   Assist with clothing management  Assist with toileting  Ambulation/Transfers/Wheelchairs:  Provide transfer belt and assist with transfers and ambulation   Behavior:   Patient gets agitated when she becomes confused.  Communication:  Encourage verbalization of  needs/concerns  Cognitive Issues:   Provider reminders due to confusion, forgetfulness  Give simple step-by-step direction  Diet:   No present concerns  Exercise:   Encourage participation in maintenance program  Encourage participation in wheelchair aerobics  Hearing:   No concerns  Isolation:   Patient lives in an assisted living facility  Medical Monitoring:   Monitor physical and emotional comfort  Mental Health:   Encourage client to express feelings  Offer emotional support  Sensory:   Provide and encourage participation in activities for stimulation  Vision:   Ensure client is wearing glasses and clean prn  Other: N/A  Developed in consultation with:  Client  The Program Abuse Prevention Plan/IAPP identifies the specific actions that minimize abuse to the [facility] participant.  Yes

## 2023-01-31 ENCOUNTER — HOSPITAL ENCOUNTER (OUTPATIENT)
Dept: REHABILITATION | Facility: CLINIC | Age: 63
Discharge: HOME | End: 2023-01-31
Attending: FAMILY MEDICINE
Payer: MEDICAID

## 2023-01-31 PROCEDURE — S5100 ADULT DAYCARE SERVICES 15MIN: HCPCS

## 2023-01-31 NOTE — PROGRESS NOTES
Facilitated physical maintenance of bilateral upper extremity and bilateral lower extrimity strength and endurance through NuStep therapeutic exercise.  Patient completed 10 minutes of NuStep activity.    Nu Step Settings:  Seat:  9  Arms:  14  Resistance:  6

## 2023-02-01 ENCOUNTER — TRANSCRIBE ORDERS (OUTPATIENT)
Dept: OTHER | Age: 63
End: 2023-02-01

## 2023-02-01 DIAGNOSIS — G35 MS (MULTIPLE SCLEROSIS) (H): Primary | ICD-10-CM

## 2023-02-02 ENCOUNTER — HOSPITAL ENCOUNTER (OUTPATIENT)
Dept: REHABILITATION | Facility: CLINIC | Age: 63
Discharge: HOME | End: 2023-02-02
Attending: FAMILY MEDICINE
Payer: MEDICAID

## 2023-02-02 PROCEDURE — S5100 ADULT DAYCARE SERVICES 15MIN: HCPCS

## 2023-02-07 ENCOUNTER — HOSPITAL ENCOUNTER (OUTPATIENT)
Dept: REHABILITATION | Facility: CLINIC | Age: 63
Discharge: HOME | End: 2023-02-07
Attending: FAMILY MEDICINE
Payer: MEDICAID

## 2023-02-07 PROCEDURE — S5100 ADULT DAYCARE SERVICES 15MIN: HCPCS

## 2023-02-09 ENCOUNTER — HOSPITAL ENCOUNTER (OUTPATIENT)
Dept: REHABILITATION | Facility: CLINIC | Age: 63
Discharge: HOME | End: 2023-02-09
Attending: FAMILY MEDICINE
Payer: MEDICAID

## 2023-02-09 PROCEDURE — S5100 ADULT DAYCARE SERVICES 15MIN: HCPCS

## 2023-02-09 NOTE — PROGRESS NOTES
[facility] RN Note    Previous documentation since 1/19/23 reviewed.  No concerns reported by AC staff or member.

## 2023-02-14 ENCOUNTER — HOSPITAL ENCOUNTER (OUTPATIENT)
Dept: REHABILITATION | Facility: CLINIC | Age: 63
Discharge: HOME | End: 2023-02-14
Attending: FAMILY MEDICINE
Payer: MEDICAID

## 2023-02-14 PROCEDURE — S5100 ADULT DAYCARE SERVICES 15MIN: HCPCS

## 2023-02-16 ENCOUNTER — HOSPITAL ENCOUNTER (OUTPATIENT)
Dept: REHABILITATION | Facility: CLINIC | Age: 63
Discharge: HOME | End: 2023-02-16
Attending: FAMILY MEDICINE
Payer: MEDICAID

## 2023-02-16 ENCOUNTER — HOSPITAL ENCOUNTER (OUTPATIENT)
Dept: OCCUPATIONAL THERAPY | Facility: CLINIC | Age: 63
Setting detail: THERAPIES SERIES
Discharge: HOME | End: 2023-02-16
Attending: FAMILY MEDICINE
Payer: COMMERCIAL

## 2023-02-16 DIAGNOSIS — G35 MS (MULTIPLE SCLEROSIS) (H): ICD-10-CM

## 2023-02-16 PROCEDURE — 97542 WHEELCHAIR MNGMENT TRAINING: CPT | Mod: GO | Performed by: OCCUPATIONAL THERAPIST

## 2023-02-16 PROCEDURE — S5100 ADULT DAYCARE SERVICES 15MIN: HCPCS

## 2023-02-16 NOTE — PROGRESS NOTES
"   SEATING AND WHEELED MOBILITY ASSESSMENT  02/16/23 1100   Quick Adds   Quick Adds Current Manual Wheelchair   General Information    Rehab Discipline OT   Funding [employer]   Service Outpatient;Occupational Therapy;Seating/Wheeled Mobility Evaluation   Height 5'7\"   Weight 148   Start Of Care Date 02/16/23   Referring Physician Valentina Blackburn NP   Orders Evaluate And Treat As Indicated;Per Therapist Evaluation   Orders Date 02/01/23   Others Present at Evaluation mirlande- friend   Patient/Caregiver Goals manual wheelchair replacement   Rehabilitation Technology Supplier Kendell ZHENG from [facility]   Current Community Support Family/Friend Caregiver;Personal Care Attendant;Transportation Service;Emergency Call System  (private transport)   Patient role/Employment history Disabled   Fall Risk Screen   Fall screen completed by OT   Have you fallen 2 or more times in the past year? No   Have you fallen and had an injury in the past year? No   Is patient a fall risk? Yes   Medical History   Onset Of Illness/injury Or Date Of Surgery 2/1/23  (order)   Medical Diagnosis Nervous and Auditory  Epileptic seizure (H)  Low back pain  Multiple sclerosis (H)  Other cerebellar ataxia  Other quadriplegia and quadriparesis     Digestive  Benign neoplasm of colon  Constipation  Incontinence of feces, unspecified fecal incontinence type     Endocrine  Galactorrhea not associated with childbirth  Hypothyroidism     Musculoskeletal and Integumentary  Lumbar sprain  Onychomycosis  Osteoporosis  Seborrheic keratosis     Urinary  Acute cystitis  Overactive bladder  Recurrent UTI  Uninhibited neurogenic bladder     Hematologic  Thrombocytopenia (H)     Behavioral  Mood disorder in conditions classified elsewhere     Other  Advance Care Planning  Abnormal findings on diagnostic imaging of urinary organs  Abnormal weight loss  Abnormality of gait  Cognitive impairment  H/O: hysterectomy  Lack of coordination   Current Manual " Wheelchair   Age 6/17/2012    Quickammon 2   Cushion   (foam)   Wheelchair Back Solid Curved   Footrest Style swing away   Settings Used Home;Outdoor Community Mobility;Primary Means of Transportation   Condition Beyond Further Justifiable Repair   Current Equipment Requires Replacement   Rationale for Equipment Changes hard to manuever, needs better postural support due to progressing weakness   Home Accessibility   Living Environment Assisted living  (memory care)   Primary Entrance Level;Elevator   Community ADL   Transportation Transportation Services;Car  (transport chair into car with famil)   Community Mobility Requirements Medical Appointments;Day Program  (day program t/th)   Cognitive/Visual/Hearing Status   Cognitive Screen Score cognitive impairment   Observations Limited Insight;Difficulty Reporting Medical History   Vision Intact   Hearing Intact   ADL Status   Feeding Independent   Grooming/Hygiene Requires Assist   Dressing Requires Assist   Toileting Requires Assist;Uses Equipment   Bathing Requires Assist;Uses Equipment  (shower chair)   Meal Preparation Unable   Home Management Unable   Balance   Unsupported Sitting Balance Uses Upper Extremities for Balance   Sitting Balance in Chair Uses Upper Extremities for Balance   Standing Balance Physical Assist Required   Ambulation   Ambulation Non Ambulatory   Transfers   Transfer Assist Moderate Assist   Transfer Method Stand Pivot   Sleep/Rest   Sleep Surface/Equipment standard bed with rail   Wheelchair Ability   Wheelchair Ability Quick Adds Manual Chair;Wheelchair Use   Manual Wheelchair Propulsion   Manual Wheelchair Propulsion Assist   Comments for longer distances   Wheelchair Use   Ability to Perform Weight Shifts Assist   Bed Confined Without Wheelchair? Yes   Hours in Wheelchair Daily 12   Hours Spent Alone Daily 0   Neuromuscular   History of Pressure Sores No   Current Pressure Sores No   Pain Yes   Pain Location all over  generalized pain  (daily pain meds)   Coordination UE Impaired;LE Impaired   Tone Hypotonic   Sensory Deficits Reported hands and feet are numb   Head and Neck   Head and Neck Position Flexed;Laterally Flexed to Left   Head Control Adequate   Upper Extremities   UE ROM WFl with end range limitations and ataxic movement   UE Strength 4/5   Pelvis   Anterior/Posterior Pelvis Position Posterior Tilt   Trunk   Anterior/Posterior Trunk Position Increased Thoracic Kyphosis   Left-Right Trunk Position C Curve;Multiple Curvatures;Partially Flexible   Trunk Comments significant L lean and worsens with fatigue and throughout the day   Lower Extremities   LE ROM WFL   LE Strength 4/5   Patient Measurements   Other per atp notes   Education Assessment   Barriers to Learning Physical;Cognitive   Preferred Learning Style Listening;Demonstration   Assessment/Plan   Criteria for Skilled Interventions Met Yes, Treatment Indicated   Treatment Diagnosis impaired participation in MRADLS and IADLS   Therapy Frequency once   Planned Therapy Interventions Wheelchair Management/Propulsion Training   Planned Therapy Interventions Comments Determined need for manual wheelchair replacement due to progresing needs and chair being 10+ years old and needing changes for optimal use.  Seat to lowered for foot propulsion and safety with transfers, deeper back.laterals needed to support leaning   Risks and benefits of treatment have been explained Yes   Patient/family & other staff in agreement with plan of care Yes   Session Time   OT Wheelchair Management Minutes (52337) 55   Adult OT Eval Goals   OT Eval Goals (Adult) 1    OT Goal 1   Goal Identifier manual wheelchair   Goal Description Patient to demonstrate a successful clinical trial of the recommended wheelchair;   Goal Progress met   Target Date 02/16/23   Date Met 02/16/23   Electronically signed by:  Daysi CADET/LAUREN, ATP      Occupational Therapist, Assistive Technology  Professional  [phone]      fax: [phone]      [email or web address]  Seating Clinic- [facility], [address]  [place]   [address]

## 2023-02-21 ENCOUNTER — HOSPITAL ENCOUNTER (OUTPATIENT)
Dept: REHABILITATION | Facility: CLINIC | Age: 63
Discharge: HOME | End: 2023-02-21
Attending: FAMILY MEDICINE
Payer: MEDICAID

## 2023-02-21 PROCEDURE — S5100 ADULT DAYCARE SERVICES 15MIN: HCPCS

## 2023-02-24 NOTE — PROGRESS NOTES
Facilitated physical maintenance of bilateral upper extremity and bilateral lower extrimity strength and endurance through NuStep therapeutic exercise.  Patient completed 20 minutes of NuStep activity.    Nu Step Settings:  Seat:  14  Arms:  12  Resistance:  8

## 2023-02-28 ENCOUNTER — HOSPITAL ENCOUNTER (OUTPATIENT)
Dept: REHABILITATION | Facility: CLINIC | Age: 63
Discharge: HOME | End: 2023-02-28
Attending: FAMILY MEDICINE
Payer: MEDICAID

## 2023-02-28 PROCEDURE — S5100 ADULT DAYCARE SERVICES 15MIN: HCPCS

## 2023-03-02 ENCOUNTER — HOSPITAL ENCOUNTER (OUTPATIENT)
Dept: REHABILITATION | Facility: CLINIC | Age: 63
Discharge: HOME | End: 2023-03-02
Attending: FAMILY MEDICINE
Payer: MEDICAID

## 2023-03-02 PROCEDURE — S5100 ADULT DAYCARE SERVICES 15MIN: HCPCS

## 2023-03-02 NOTE — PROGRESS NOTES
"[facility] RN Note    Previous documentation since 2/9/23 reviewed. Participant not in facility at this time.      Per 2/16/23 Seating evaluation:    \"Determined need for manual wheelchair  replacement due to progresing needs and chair  being 10+ years old and needing changes for  optimal use.  Seat to lowered for foot propulsion  and safety with transfers, deeper back.laterals  needed to support leaning.\" (copied)    Received e-mail dated 2/28/23. Per OT, \"due to her cognition a power chair is not an option.  We will be replacing her manual chair.\" (copied)  "

## 2023-03-02 NOTE — PROGRESS NOTES
Facilitated physical maintenance of bilateral upper extremity and bilateral lower extrimity strength and endurance through NuStep therapeutic exercise.  Patient completed 22 minutes of NuStep activity.    Nu Step Settings:  Seat:  9  Arms:  14  Resistance:  7

## 2023-03-21 ENCOUNTER — HOSPITAL ENCOUNTER (OUTPATIENT)
Dept: REHABILITATION | Facility: CLINIC | Age: 63
Discharge: HOME | End: 2023-03-21
Attending: FAMILY MEDICINE
Payer: MEDICAID

## 2023-03-21 PROCEDURE — S5100 ADULT DAYCARE SERVICES 15MIN: HCPCS

## 2023-03-23 ENCOUNTER — HOSPITAL ENCOUNTER (OUTPATIENT)
Dept: REHABILITATION | Facility: CLINIC | Age: 63
Discharge: HOME | End: 2023-03-23
Attending: FAMILY MEDICINE
Payer: MEDICAID

## 2023-03-23 PROCEDURE — S5100 ADULT DAYCARE SERVICES 15MIN: HCPCS

## 2023-03-24 NOTE — PROGRESS NOTES
MONTHLY PROGRESS NOTE AND PARTICIPATION REPORT   [facility]     Karolyn Das, 1960  Attendance: Please see Epic for attendance record.     Communication:   Does communicate   Hearing:   No impairment  Vision:   Difficulty reading or seeing print wears glasses to program less than half the time  Orientation/Cognition:   Minor forgetfulness  Partial disorientation  Short term memory loss  Karolyn presents with moderate cognitive impairment and requires close supervision for safety.  Behavior:   No behavioral concerns  Requires redirection  Karolyn requires redirection if she becomes upset by a late ride home or missing time in [place].  Self-Preservation Skills:   Not capable of self-preservation  Eating:   Assist with set up  Ambulation Walking:   Non-ambulatory  Transferring:   Aide of one person/two  Karolyn presents with a strong rear lean while performing stand pivot tx's to toilet and Nu Step and requires CGA and gait belt for safe tx's and fall prevention.  Staff provides v/c's to apply manual wc brakes and to use grab bars during tx's.  Wheelchair:   Needs help to propel manual wheelchair  Toileting:   Independent  Staff provides Karolyn with room alarm after toilet tx is complete so she can notify staff that she has completed her bathroom needs so staff can assist with her tx back to manual WC.  Maintenance Program:     Sofia   Has expressed interest in using parallel bars- may not be appropriate at this time.   Living Arrangements:   Lives in assisted living facility  Spiritual Needs: Needs are being met through support group  Medication Assistance:   Medication not taken during program hours  Participation Report:   Aerobics/Exercise  Support Group  Cognitive Stimulation  Fire Drill  Creative Arts/Crafts  Games  Gardening  Speakers/Entertainment  Socialization  Current Events/News  Education/Health Topic   Community Based Outings  Level of Participation:   Active       Karolyn seems to enjoy her time at the day center.  She especially enjoys art and painting ceramics.  Karolyn is participating in home PT at her residence.  [facility] staff has communicated falls concerns with her PT via phone.    Karolyn has also had recent problems with her ride service. We have reached out to her  to fix this issue but her friend has been dropping off and picking her up as of late.

## 2023-03-28 ENCOUNTER — HOSPITAL ENCOUNTER (OUTPATIENT)
Dept: REHABILITATION | Facility: CLINIC | Age: 63
Discharge: HOME | End: 2023-03-28
Attending: FAMILY MEDICINE
Payer: MEDICAID

## 2023-03-28 PROCEDURE — S5100 ADULT DAYCARE SERVICES 15MIN: HCPCS

## 2023-03-28 NOTE — PROGRESS NOTES
Facilitated physical maintenance of bilateral upper extremity and bilateral lower extrimity strength and endurance through NuStep therapeutic exercise.  Patient completed 17 minutes of NuStep activity.    Nu Step Settings:  Seat:  9  Arms:  14  Resistance: 7

## 2023-03-30 ENCOUNTER — HOSPITAL ENCOUNTER (OUTPATIENT)
Dept: REHABILITATION | Facility: CLINIC | Age: 63
Discharge: HOME | End: 2023-03-30
Attending: FAMILY MEDICINE
Payer: MEDICAID

## 2023-03-30 PROCEDURE — S5102 ADULT DAY CARE PER DIEM: HCPCS

## 2023-04-04 ENCOUNTER — HOSPITAL ENCOUNTER (OUTPATIENT)
Dept: REHABILITATION | Facility: CLINIC | Age: 63
Discharge: HOME | End: 2023-04-04
Attending: FAMILY MEDICINE
Payer: MEDICAID

## 2023-04-04 PROCEDURE — S5100 ADULT DAYCARE SERVICES 15MIN: HCPCS

## 2023-04-06 ENCOUNTER — HOSPITAL ENCOUNTER (OUTPATIENT)
Dept: REHABILITATION | Facility: CLINIC | Age: 63
Discharge: HOME | End: 2023-04-06
Attending: FAMILY MEDICINE
Payer: MEDICAID

## 2023-04-06 PROCEDURE — S5100 ADULT DAYCARE SERVICES 15MIN: HCPCS

## 2023-04-11 ENCOUNTER — HOSPITAL ENCOUNTER (OUTPATIENT)
Dept: REHABILITATION | Facility: CLINIC | Age: 63
Discharge: HOME | End: 2023-04-11
Attending: FAMILY MEDICINE
Payer: MEDICAID

## 2023-04-11 PROCEDURE — S5100 ADULT DAYCARE SERVICES 15MIN: HCPCS

## 2023-04-11 NOTE — PROGRESS NOTES
[facility] RN Note    Previous documentation since 3/2/23 reviewed.  No concerns reported by AC staff or member.

## 2023-04-18 ENCOUNTER — HOSPITAL ENCOUNTER (OUTPATIENT)
Dept: REHABILITATION | Facility: CLINIC | Age: 63
Discharge: HOME | End: 2023-04-18
Attending: FAMILY MEDICINE
Payer: MEDICAID

## 2023-04-18 PROCEDURE — S5100 ADULT DAYCARE SERVICES 15MIN: HCPCS

## 2023-04-20 ENCOUNTER — HOSPITAL ENCOUNTER (OUTPATIENT)
Dept: REHABILITATION | Facility: CLINIC | Age: 63
Discharge: HOME | End: 2023-04-20
Attending: FAMILY MEDICINE
Payer: MEDICAID

## 2023-04-20 PROCEDURE — S5100 ADULT DAYCARE SERVICES 15MIN: HCPCS

## 2023-04-25 ENCOUNTER — HOSPITAL ENCOUNTER (OUTPATIENT)
Dept: REHABILITATION | Facility: CLINIC | Age: 63
Discharge: HOME | End: 2023-04-25
Attending: FAMILY MEDICINE
Payer: MEDICAID

## 2023-04-25 PROCEDURE — S5100 ADULT DAYCARE SERVICES 15MIN: HCPCS

## 2023-04-27 ENCOUNTER — HOSPITAL ENCOUNTER (OUTPATIENT)
Dept: REHABILITATION | Facility: CLINIC | Age: 63
Discharge: HOME | End: 2023-04-27
Attending: FAMILY MEDICINE
Payer: MEDICAID

## 2023-04-27 ENCOUNTER — LAB REQUISITION (OUTPATIENT)
Dept: LAB | Facility: CLINIC | Age: 63
End: 2023-04-27
Payer: COMMERCIAL

## 2023-04-27 DIAGNOSIS — R41.82 ALTERED MENTAL STATUS, UNSPECIFIED: ICD-10-CM

## 2023-04-27 LAB
ALBUMIN UR-MCNC: NEGATIVE MG/DL
APPEARANCE UR: CLEAR
BILIRUB UR QL STRIP: NEGATIVE
COLOR UR AUTO: ABNORMAL
GLUCOSE UR STRIP-MCNC: NEGATIVE MG/DL
HGB UR QL STRIP: NEGATIVE
KETONES UR STRIP-MCNC: NEGATIVE MG/DL
LEUKOCYTE ESTERASE UR QL STRIP: NEGATIVE
MUCOUS THREADS #/AREA URNS LPF: PRESENT /LPF
NITRATE UR QL: NEGATIVE
PH UR STRIP: 7.5 [PH] (ref 5–7)
RBC URINE: 1 /HPF
SP GR UR STRIP: 1.01 (ref 1–1.03)
SQUAMOUS EPITHELIAL: 1 /HPF
UROBILINOGEN UR STRIP-MCNC: NORMAL MG/DL
WBC URINE: 2 /HPF

## 2023-04-27 PROCEDURE — 87086 URINE CULTURE/COLONY COUNT: CPT | Mod: ORL | Performed by: NURSE PRACTITIONER

## 2023-04-27 PROCEDURE — 81001 URINALYSIS AUTO W/SCOPE: CPT | Mod: ORL | Performed by: NURSE PRACTITIONER

## 2023-04-27 PROCEDURE — S5100 ADULT DAYCARE SERVICES 15MIN: HCPCS

## 2023-04-28 LAB — BACTERIA UR CULT: NORMAL

## 2023-05-02 ENCOUNTER — LAB REQUISITION (OUTPATIENT)
Dept: LAB | Facility: CLINIC | Age: 63
End: 2023-05-02
Payer: COMMERCIAL

## 2023-05-02 ENCOUNTER — HOSPITAL ENCOUNTER (OUTPATIENT)
Dept: REHABILITATION | Facility: CLINIC | Age: 63
Discharge: HOME | End: 2023-05-02
Attending: FAMILY MEDICINE
Payer: MEDICAID

## 2023-05-02 DIAGNOSIS — E20.9 HYPOPARATHYROIDISM, UNSPECIFIED (H): ICD-10-CM

## 2023-05-02 DIAGNOSIS — G40.911 EPILEPSY, UNSPECIFIED, INTRACTABLE, WITH STATUS EPILEPTICUS (H): ICD-10-CM

## 2023-05-02 DIAGNOSIS — R53.1 WEAKNESS: ICD-10-CM

## 2023-05-02 PROCEDURE — S5100 ADULT DAYCARE SERVICES 15MIN: HCPCS

## 2023-05-02 NOTE — PROGRESS NOTES
[facility] RN Note    Previous documentation since 4/11/23  reviewed.  No concerns reported by AC staff or member.

## 2023-05-03 LAB
ANION GAP SERPL CALCULATED.3IONS-SCNC: 13 MMOL/L (ref 7–15)
BASOPHILS # BLD AUTO: 0.1 10E3/UL (ref 0–0.2)
BASOPHILS NFR BLD AUTO: 1 %
BUN SERPL-MCNC: 20.5 MG/DL (ref 8–23)
CALCIUM SERPL-MCNC: 9.5 MG/DL (ref 8.8–10.2)
CHLORIDE SERPL-SCNC: 105 MMOL/L (ref 98–107)
CREAT SERPL-MCNC: 0.73 MG/DL (ref 0.51–0.95)
DEPRECATED HCO3 PLAS-SCNC: 25 MMOL/L (ref 22–29)
EOSINOPHIL # BLD AUTO: 0.1 10E3/UL (ref 0–0.7)
EOSINOPHIL NFR BLD AUTO: 1 %
ERYTHROCYTE [DISTWIDTH] IN BLOOD BY AUTOMATED COUNT: 13.4 % (ref 10–15)
GFR SERPL CREATININE-BSD FRML MDRD: >90 ML/MIN/1.73M2
GLUCOSE SERPL-MCNC: 65 MG/DL (ref 70–99)
HCT VFR BLD AUTO: 42.8 % (ref 35–47)
HGB BLD-MCNC: 13 G/DL (ref 11.7–15.7)
IMM GRANULOCYTES # BLD: 0 10E3/UL
IMM GRANULOCYTES NFR BLD: 0 %
LYMPHOCYTES # BLD AUTO: 1.7 10E3/UL (ref 0.8–5.3)
LYMPHOCYTES NFR BLD AUTO: 22 %
MCH RBC QN AUTO: 29.3 PG (ref 26.5–33)
MCHC RBC AUTO-ENTMCNC: 30.4 G/DL (ref 31.5–36.5)
MCV RBC AUTO: 96 FL (ref 78–100)
MONOCYTES # BLD AUTO: 0.5 10E3/UL (ref 0–1.3)
MONOCYTES NFR BLD AUTO: 6 %
NEUTROPHILS # BLD AUTO: 5.2 10E3/UL (ref 1.6–8.3)
NEUTROPHILS NFR BLD AUTO: 70 %
NRBC # BLD AUTO: 0 10E3/UL
NRBC BLD AUTO-RTO: 0 /100
PLATELET # BLD AUTO: 240 10E3/UL (ref 150–450)
POTASSIUM SERPL-SCNC: 3.9 MMOL/L (ref 3.4–5.3)
RBC # BLD AUTO: 4.44 10E6/UL (ref 3.8–5.2)
SODIUM SERPL-SCNC: 143 MMOL/L (ref 136–145)
TSH SERPL DL<=0.005 MIU/L-ACNC: 0.75 UIU/ML (ref 0.3–4.2)
VALPROATE SERPL-MCNC: 31.2 UG/ML
WBC # BLD AUTO: 7.4 10E3/UL (ref 4–11)

## 2023-05-03 PROCEDURE — 85025 COMPLETE CBC W/AUTO DIFF WBC: CPT | Mod: ORL | Performed by: NURSE PRACTITIONER

## 2023-05-03 PROCEDURE — 36415 COLL VENOUS BLD VENIPUNCTURE: CPT | Mod: ORL | Performed by: NURSE PRACTITIONER

## 2023-05-03 PROCEDURE — 80164 ASSAY DIPROPYLACETIC ACD TOT: CPT | Mod: ORL | Performed by: NURSE PRACTITIONER

## 2023-05-03 PROCEDURE — P9604 ONE-WAY ALLOW PRORATED TRIP: HCPCS | Mod: ORL | Performed by: NURSE PRACTITIONER

## 2023-05-03 PROCEDURE — 80048 BASIC METABOLIC PNL TOTAL CA: CPT | Mod: ORL | Performed by: NURSE PRACTITIONER

## 2023-05-03 PROCEDURE — 84443 ASSAY THYROID STIM HORMONE: CPT | Mod: ORL | Performed by: NURSE PRACTITIONER

## 2023-05-04 ENCOUNTER — HOSPITAL ENCOUNTER (OUTPATIENT)
Dept: REHABILITATION | Facility: CLINIC | Age: 63
Discharge: HOME | End: 2023-05-04
Attending: FAMILY MEDICINE
Payer: MEDICAID

## 2023-05-04 PROCEDURE — S5100 ADULT DAYCARE SERVICES 15MIN: HCPCS

## 2023-05-09 ENCOUNTER — HOSPITAL ENCOUNTER (OUTPATIENT)
Dept: REHABILITATION | Facility: CLINIC | Age: 63
Discharge: HOME | End: 2023-05-09
Attending: FAMILY MEDICINE
Payer: MEDICAID

## 2023-05-09 PROCEDURE — S5100 ADULT DAYCARE SERVICES 15MIN: HCPCS

## 2023-05-09 NOTE — PROGRESS NOTES
INDIVIDUAL PLAN OF CARE   [facility]    Member Name: Karolyn Das; YOB: 1960  MRN: [id number]  4/28/2023    Goals developed in collaboration with: member  Staff responsible for plan:  Elizabeth SPENCER/LAUREN, Rc COSTELLO    1. Long Term Goal (concrete, measurable, and time specific outcomes):  Karolyn will maintain her current level of cognitive and physical function for as long as possible and prevent the progression of MS symptoms through active participation in [facility] activities each day of program attendance.  Target Date:  April 2024  Bi-Annual Review: consistent attendance this quarter, there has been a decline in overall  Endurance.  Karolyn is participating in home PT. Continue goal.    2. Short Term Goal: (concrete, measurable, and time specific outcomes):  Karolyn will maintain her current level of physical function and strength of BL LE and BL UE through active participation in her prescribed Nu Step maintenance program for at least 30 minutes each day of program attendance.  CGAx1 w/ gait belt provided for all transfers for falls prevention  d/2 impaired balance 2/2 MS diagnosis.  Target Date:  October 2023  Bi-Annual Review:  NuStep exercise each day of attendance and morning aerobic exercise participation. Continue goal.    3. Short Term Goal: (concrete, measurable, and time specific outcomes):  Lissett will maintain her current level of cognitive function through active participation in [facility] cognitive stimulation activities each day of program attendance.  Target Date:  October 2023  Bi-Annual Review:   Mirtha enjoys program activities, especially the educational aspects.  Continue goal

## 2023-05-09 NOTE — PROGRESS NOTES
Individual abuse prevention plan (IAPP)  [facility]     Assessment of Susceptibility to Abuse, Including Self Abuse, Neglect (Identification of characteristics, which make the individual susceptible to abuse, and how these characteristics cause the individual to be susceptible to abuse.)     Is the person susceptible to abuse in each area?  Sexual Abuse:   No  Referrals made when the person is susceptible to abuse outside the scope or control of this program (Identify the referral and date it occurred): No additional risk reduction means needed at this time    Physical Abuse:   No  Referrals made when the person is susceptible to abuse outside the scope or control of this program (Identify the referral and date it occurred): No additional risk reduction means needed at this time    Self-Abuse:   No  Referrals made when the person is susceptible to abuse outside the scope or control of this program (Identify the referral and date it occurred): No additional risk reduction means needed at this time    Financial  Exploitation  No  Referrals made when the person is susceptible to abuse outside the scope or control of this program (Identify the referral and date it occurred): No additional risk reduction means needed at this time    Is the program aware of this person committing a violent crime or act of physical aggression towards others?  No  Referrals made when the person is susceptible to abuse outside the scope or control of this program (Identify the referral and date it occurred): No additional risk reduction means needed at this time    INDIVIDUAL ABUSE PREVENTION PLAN-MEASURES TAKEN TO MINIMIZE RISK OF ABUSE   ADL:   Assist with clothing management  Assist with toileting  Ambulation/Transfers/Wheelchairs:  Provide transfer belt and assist with transfers and ambulation   Behavior:   Patient gets agitated when she becomes confused.  Communication:  Encourage verbalization of  needs/concerns  Cognitive Issues:   Provider reminders due to confusion, forgetfulness  Give simple step-by-step direction  Diet:   No present concerns  Exercise:   Encourage participation in maintenance program  Encourage participation in wheelchair aerobics  Hearing:   No concerns  Isolation:   Patient lives in an assisted living facility  Medical Monitoring:   Monitor physical and emotional comfort  Mental Health:   Encourage client to express feelings  Offer emotional support  Sensory:   Provide and encourage participation in activities for stimulation  Vision:   Ensure client is wearing glasses and clean prn  Other: N/A  Developed in consultation with:  Client  The Program Abuse Prevention Plan/IAPP identifies the specific actions that minimize abuse to the [facility] participant.  Yes

## 2023-05-09 NOTE — PROGRESS NOTES
MONTHLY PROGRESS NOTE AND PARTICIPATION REPORT   [facility]    Karolyn Das, 1960  Attendance: Please see Epic for attendance record.    Communication:   Does communicate   Hearing:   No impairment  Vision:   Difficulty reading or seeing print wears glasses to program less than half the time  Orientation/Cognition:   Minor forgetfulness  Partial disorientation  Short term memory loss  Karolyn presents with moderate cognitive impairment and requires close supervision for safety.  Behavior:   No behavioral concerns  Requires redirection  Karolyn requires redirection if she becomes upset by a late ride home or missing time in [employer].  Self-Preservation Skills:   Not capable of self-preservation  Eating:   Assist with set up  Ambulation Walking:   Non-ambulatory  Transferring:   Aide of one person/two  Karolyn presents with a strong rear lean while performing stand pivot tx's to toilet and Nu Step and requires CGA and gait belt for safe tx's and fall prevention.  Staff provides v/c's to apply manual wc brakes and to use grab bars during tx's.  Wheelchair:   Needs help to propel manual wheelchair  Toileting:   Independent  Staff provides Karolyn with room alarm after toilet tx is complete so she can notify staff that she has completed her bathroom needs so staff can assist with her tx back to manual WC.  Maintenance Program:     Sofia   Has expressed interest in using parallel bars- may not be appropriate at this time.   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  She especially enjoys art and painting ceramics.  Karolyn is participating in home PT at her residence.  [facility] [place] has communicated falls concerns with her PT via phone.

## 2023-05-11 ENCOUNTER — HOSPITAL ENCOUNTER (OUTPATIENT)
Dept: REHABILITATION | Facility: CLINIC | Age: 63
Discharge: HOME | End: 2023-05-11
Attending: FAMILY MEDICINE
Payer: MEDICAID

## 2023-05-11 PROCEDURE — S5100 ADULT DAYCARE SERVICES 15MIN: HCPCS

## 2023-05-16 ENCOUNTER — HOSPITAL ENCOUNTER (OUTPATIENT)
Dept: REHABILITATION | Facility: CLINIC | Age: 63
Discharge: HOME | End: 2023-05-16
Attending: FAMILY MEDICINE
Payer: MEDICAID

## 2023-05-16 PROCEDURE — S5100 ADULT DAYCARE SERVICES 15MIN: HCPCS

## 2023-05-18 ENCOUNTER — HOSPITAL ENCOUNTER (OUTPATIENT)
Dept: REHABILITATION | Facility: CLINIC | Age: 63
Discharge: HOME | End: 2023-05-18
Attending: FAMILY MEDICINE
Payer: MEDICAID

## 2023-05-18 PROCEDURE — S5100 ADULT DAYCARE SERVICES 15MIN: HCPCS

## 2023-05-23 ENCOUNTER — HOSPITAL ENCOUNTER (OUTPATIENT)
Dept: REHABILITATION | Facility: CLINIC | Age: 63
Discharge: HOME | End: 2023-05-23
Attending: FAMILY MEDICINE
Payer: MEDICAID

## 2023-05-23 PROCEDURE — S5100 ADULT DAYCARE SERVICES 15MIN: HCPCS

## 2023-05-25 ENCOUNTER — HOSPITAL ENCOUNTER (OUTPATIENT)
Dept: REHABILITATION | Facility: CLINIC | Age: 63
Discharge: HOME | End: 2023-05-25
Attending: FAMILY MEDICINE
Payer: MEDICAID

## 2023-05-25 PROCEDURE — S5100 ADULT DAYCARE SERVICES 15MIN: HCPCS

## 2023-05-30 ENCOUNTER — HOSPITAL ENCOUNTER (OUTPATIENT)
Dept: REHABILITATION | Facility: CLINIC | Age: 63
Discharge: HOME | End: 2023-05-30
Attending: FAMILY MEDICINE
Payer: MEDICAID

## 2023-05-30 PROCEDURE — S5100 ADULT DAYCARE SERVICES 15MIN: HCPCS

## 2023-05-30 NOTE — PROGRESS NOTES
MONTHLY PROGRESS NOTE AND PARTICIPATION REPORT   [facility]    Karolyn Das, 1960  Attendance: Please see Epic for attendance record.    Communication:   Does communicate   Hearing:   No impairment  Vision:   Difficulty reading or seeing print wears glasses to program less than half the time  Orientation/Cognition:   Minor forgetfulness  Partial disorientation  Short term memory loss  Karolyn presents with moderate cognitive impairment and requires close supervision for safety.  Behavior:   No behavioral concerns  Requires redirection  Karolyn requires redirection if she becomes upset by a late ride home or missing time in [employer].  Self-Preservation Skills:   Not capable of self-preservation  Eating:   Assist with set up  Ambulation Walking:   Non-ambulatory  Transferring:   Aide of one person/two  Karolyn presents with a strong rear lean while performing stand pivot tx's to toilet and Nu Step and requires CGA and gait belt for safe tx's and fall prevention.  Staff provides v/c's to apply manual wc brakes and to use grab bars during tx's.  Wheelchair:   Needs help to propel manual wheelchair  Toileting:   Independent  Staff provides Karolyn with room alarm after toilet tx is complete so she can notify staff that she has completed her bathroom needs so staff can assist with her tx back to manual WC.  Maintenance Program:     Sofia   Has expressed interest in using parallel bars- may not be appropriate at this time.   Living Arrangements:   Lives in assisted living facility  Spiritual Needs: Needs are being met through support group  Medication Assistance:   Medication not taken during program hours  Participation Report:   Aerobics/Exercise  Support Group  Cognitive Stimulation  Fire Drill  Creative Arts/Crafts  Games  Gardening  Speakers/Entertainment  Socialization  Current Events/News  Education/Health Topic   Community Based Outings  Level of Participation:   Active      Karolyn enjoys working on detailed art projects. She socializes with all members and staff throughout the day. She often participates in aerobic exercises.

## 2023-05-30 NOTE — PROGRESS NOTES
Facilitated physical maintenance of bilateral upper extremity and bilateral lower extrimity strength and endurance through NuStep therapeutic exercise.  Patient completed 30 minutes of NuStep activity.    Nu Step Settings:  Seat:  8  Arms:  13  Resistance:   7

## 2023-05-31 NOTE — PROGRESS NOTES
E-advise sent to patient.    Facilitated physical maintenance of bilateral upper extremity and bilateral lower extrimity strength and endurance through NuStep therapeutic exercise.  Patient completed 30 minutes of NuStep activity.    Nu Step Settings:  Seat:  9  Arms:  13  Resistance:  5    Patient tolerated this exercise well.

## 2023-06-01 ENCOUNTER — HOSPITAL ENCOUNTER (OUTPATIENT)
Dept: REHABILITATION | Facility: CLINIC | Age: 63
Discharge: HOME | End: 2023-06-01
Attending: FAMILY MEDICINE
Payer: MEDICAID

## 2023-06-01 PROCEDURE — S5100 ADULT DAYCARE SERVICES 15MIN: HCPCS

## 2023-06-01 NOTE — PROGRESS NOTES
Facilitated physical maintenance of bilateral upper extremity and bilateral lower extrimity strength and endurance through NuStep therapeutic exercise.  Patient completed 33 minutes of NuStep activity.    Nu Step Settings:  Seat:  10  Arms:  14  Resistance:  7

## 2023-06-01 NOTE — PROGRESS NOTES
[facility] RN Note    Previous documentation since 5/2/23 reviewed.  Participant not in facility at this time.

## 2023-06-05 NOTE — PROGRESS NOTES
"SELF-PRESERVATION FORM  [facility]    Member Name: Karolyn Das; YOB: 1960  MRN: [id number]  6/5/2023    A. The person is ambulatory or mobile. No  B. The person has the combined physical and mental capacity to:  1. Recognize a danger, signal or alarm requiring evacuation from the center: No  2. Initiate and complete the evacuation without requiring more than sporadic assistance from another person, such as help in opening a door or getting into a wheelchair: No  3. Select an alternative means of escape or take another appropriate action if the primary escape route is blocked: No  4. Remain at a designated location outside the center until further instruction is given: No    \"Capable of taking appropriate action for self-preservation under emergency conditions\" is the designation applied in parts 0817.5805 to 4745.6363 to an adult who meets the criteria in items A and B.    FAC Self-Preservation Status: Not capable of self-preservation    "

## 2023-06-06 ENCOUNTER — HOSPITAL ENCOUNTER (OUTPATIENT)
Dept: REHABILITATION | Facility: CLINIC | Age: 63
Discharge: HOME | End: 2023-06-06
Attending: FAMILY MEDICINE
Payer: MEDICAID

## 2023-06-06 PROCEDURE — S5100 ADULT DAYCARE SERVICES 15MIN: HCPCS

## 2023-06-08 ENCOUNTER — HOSPITAL ENCOUNTER (OUTPATIENT)
Dept: REHABILITATION | Facility: CLINIC | Age: 63
Discharge: HOME | End: 2023-06-08
Attending: FAMILY MEDICINE
Payer: MEDICAID

## 2023-06-08 PROCEDURE — S5100 ADULT DAYCARE SERVICES 15MIN: HCPCS

## 2023-06-13 ENCOUNTER — HOSPITAL ENCOUNTER (OUTPATIENT)
Dept: REHABILITATION | Facility: CLINIC | Age: 63
Discharge: HOME | End: 2023-06-13
Attending: FAMILY MEDICINE
Payer: MEDICAID

## 2023-06-13 PROCEDURE — S5100 ADULT DAYCARE SERVICES 15MIN: HCPCS

## 2023-06-13 NOTE — PROGRESS NOTES
Facilitated physical maintenance of bilateral upper extremity and bilateral lower extrimity strength and endurance through NuStep therapeutic exercise.  Patient completed 37 minutes of NuStep activity.    Nu Step Settings:  Seat:  9  Arms:  14  Resistance:  7

## 2023-06-14 NOTE — PROGRESS NOTES
[facility] Social History    Full Name: Karolyn Sanchez       MRN: [id number]    Date of Birth: [birth year]  Nickname: Mirtha      Sex: F     Home Phone:       Cell Phone: [phone]  Address: [address] [place], [place] [address]Zip: [place] [address]  County: [place]      E-mail:None        Transportation: Metro   Language:English         needed? No       Ethnicity: American  Race: White      Country of Origin: [place]       Denominational: Hindu  Marital Status:                   Spouse/Significant Other: POA: Jane Martinez   ______________________________________________________________________________________     ? No   Branch of Service: N/A      Education Level:  Studies @[employer]  Job History: [employer]    Organizations/Clubs: None  Whom do you live with? Assisted Living   Current living arrangement:  Assisted Living   Number of Children: 2  List: Rohit Serna  Number of Siblings: 3     List: Deena, Marion, and Jonathan  Other Important People/Pets: Best friend (lives in [facility] [place])  What else should we know about you? Youth counseling is very important to her. She enjoys watching the [employer].    Primary Care Provider: Dr. Vicente Elizabeth      Neurologist: In process of getting a new one  Emergency Contacts:  1. Kareem Sanchez      Relationship: Son    Phone: [phone]  2. Jo sanchez         Relationship: Mother-in-Law     Phone:[phone]        Updated on: 1/25/17             Updated on: 7/30/18              Updated on: 7/18/19  Updated on 7/23/19  Updated on 7/19/21  Updated on 9/08/22  Update on 6/14/2023

## 2023-06-20 ENCOUNTER — HOSPITAL ENCOUNTER (OUTPATIENT)
Dept: REHABILITATION | Facility: CLINIC | Age: 63
Discharge: HOME | End: 2023-06-20
Attending: FAMILY MEDICINE
Payer: MEDICAID

## 2023-06-20 PROCEDURE — S5100 ADULT DAYCARE SERVICES 15MIN: HCPCS

## 2023-06-22 ENCOUNTER — HOSPITAL ENCOUNTER (OUTPATIENT)
Dept: REHABILITATION | Facility: CLINIC | Age: 63
Discharge: HOME | End: 2023-06-22
Attending: FAMILY MEDICINE
Payer: MEDICAID

## 2023-06-22 PROCEDURE — S5100 ADULT DAYCARE SERVICES 15MIN: HCPCS

## 2023-06-26 NOTE — PROGRESS NOTES
MONTHLY PROGRESS NOTE AND PARTICIPATION REPORT   [facility]    Karolyn Das, 1960  Attendance: Please see Epic for attendance record.    Communication:   Does communicate   Hearing:   No impairment  Vision:   Difficulty reading or seeing print wears glasses to program less than half the time  Orientation/Cognition:   Minor forgetfulness  Partial disorientation  Short term memory loss  Karolyn presents with moderate cognitive impairment and requires close supervision for safety.  Behavior:   No behavioral concerns  Requires redirection  Karolyn requires redirection if she becomes upset by a late ride home or missing time in [employer].  Self-Preservation Skills:   Not capable of self-preservation  Eating:   Assist with set up  Ambulation Walking:   Non-ambulatory  Transferring:   Aide of one person/two  Karolyn presents with a strong rear lean while performing stand pivot tx's to toilet and Nu Step and requires CGA and gait belt for safe tx's and fall prevention.  Staff provides v/c's to apply manual wc brakes and to use grab bars during tx's.  Wheelchair:   Needs help to propel manual wheelchair  Toileting:   Independent  Staff provides Karolyn with room alarm after toilet tx is complete so she can notify staff that she has completed her bathroom needs so staff can assist with her tx back to manual WC.  Maintenance Program:     Sofia   Has expressed interest in using parallel bars- may not be appropriate at this time.   Living Arrangements:   Lives in assisted living facility  Spiritual Needs: Needs are being met through support group  Medication Assistance:   Medication not taken during program hours  Participation Report:   Aerobics/Exercise  Support Group  Cognitive Stimulation  Fire Drill  Creative Arts/Crafts  Games  Gardening  Speakers/Entertainment  Socialization  Current Events/News  Education/Health Topic   Community Based Outings  Level of Participation:   Active      Karolyn works diligently on projects in the art room. This month she worked on a ceramic of a boy with a duck to give as a gift. Karolyn often expresses frustrations with rides, but practices patience and understanding with assistance from staff.

## 2023-06-27 ENCOUNTER — HOSPITAL ENCOUNTER (OUTPATIENT)
Dept: REHABILITATION | Facility: CLINIC | Age: 63
Discharge: HOME | End: 2023-06-27
Attending: FAMILY MEDICINE
Payer: MEDICAID

## 2023-06-27 PROCEDURE — S5100 ADULT DAYCARE SERVICES 15MIN: HCPCS

## 2023-06-29 ENCOUNTER — HOSPITAL ENCOUNTER (OUTPATIENT)
Dept: REHABILITATION | Facility: CLINIC | Age: 63
Discharge: HOME | End: 2023-06-29
Attending: FAMILY MEDICINE
Payer: MEDICAID

## 2023-06-29 PROCEDURE — S5100 ADULT DAYCARE SERVICES 15MIN: HCPCS

## 2023-07-05 ENCOUNTER — HOSPITAL ENCOUNTER (OUTPATIENT)
Facility: HOSPITAL | Age: 63
Setting detail: OBSERVATION
Discharge: NURSING, REHAB OR LONG-TERM CARE | End: 2023-07-08
Attending: EMERGENCY MEDICINE | Admitting: INTERNAL MEDICINE
Payer: COMMERCIAL

## 2023-07-05 DIAGNOSIS — G35 SECONDARY PROGRESSIVE MULTIPLE SCLEROSIS (H): Primary | ICD-10-CM

## 2023-07-05 DIAGNOSIS — R53.1 GENERALIZED WEAKNESS: ICD-10-CM

## 2023-07-05 DIAGNOSIS — G35 MULTIPLE SCLEROSIS (H): ICD-10-CM

## 2023-07-05 LAB
ALBUMIN SERPL BCG-MCNC: 4.3 G/DL (ref 3.5–5.2)
ALP SERPL-CCNC: 89 U/L (ref 35–104)
ALT SERPL W P-5'-P-CCNC: 18 U/L (ref 0–50)
ANION GAP SERPL CALCULATED.3IONS-SCNC: 9 MMOL/L (ref 7–15)
AST SERPL W P-5'-P-CCNC: 26 U/L (ref 0–45)
BILIRUB SERPL-MCNC: 0.2 MG/DL
BUN SERPL-MCNC: 23.5 MG/DL (ref 8–23)
CALCIUM SERPL-MCNC: 9.9 MG/DL (ref 8.8–10.2)
CHLORIDE SERPL-SCNC: 106 MMOL/L (ref 98–107)
CREAT SERPL-MCNC: 0.81 MG/DL (ref 0.51–0.95)
DEPRECATED HCO3 PLAS-SCNC: 29 MMOL/L (ref 22–29)
ERYTHROCYTE [DISTWIDTH] IN BLOOD BY AUTOMATED COUNT: 13.6 % (ref 10–15)
GFR SERPL CREATININE-BSD FRML MDRD: 81 ML/MIN/1.73M2
GLUCOSE SERPL-MCNC: 87 MG/DL (ref 70–99)
HCT VFR BLD AUTO: 40.5 % (ref 35–47)
HGB BLD-MCNC: 12.9 G/DL (ref 11.7–15.7)
MAGNESIUM SERPL-MCNC: 2.2 MG/DL (ref 1.7–2.3)
MCH RBC QN AUTO: 30.2 PG (ref 26.5–33)
MCHC RBC AUTO-ENTMCNC: 31.9 G/DL (ref 31.5–36.5)
MCV RBC AUTO: 95 FL (ref 78–100)
PLATELET # BLD AUTO: 164 10E3/UL (ref 150–450)
POTASSIUM SERPL-SCNC: 3.9 MMOL/L (ref 3.4–5.3)
PROT SERPL-MCNC: 7.1 G/DL (ref 6.4–8.3)
RBC # BLD AUTO: 4.27 10E6/UL (ref 3.8–5.2)
SODIUM SERPL-SCNC: 144 MMOL/L (ref 136–145)
WBC # BLD AUTO: 7.7 10E3/UL (ref 4–11)

## 2023-07-05 PROCEDURE — 80053 COMPREHEN METABOLIC PANEL: CPT | Performed by: EMERGENCY MEDICINE

## 2023-07-05 PROCEDURE — 258N000003 HC RX IP 258 OP 636: Performed by: EMERGENCY MEDICINE

## 2023-07-05 PROCEDURE — 96360 HYDRATION IV INFUSION INIT: CPT

## 2023-07-05 PROCEDURE — 82550 ASSAY OF CK (CPK): CPT | Performed by: INTERNAL MEDICINE

## 2023-07-05 PROCEDURE — 85027 COMPLETE CBC AUTOMATED: CPT | Performed by: EMERGENCY MEDICINE

## 2023-07-05 PROCEDURE — 84443 ASSAY THYROID STIM HORMONE: CPT | Performed by: INTERNAL MEDICINE

## 2023-07-05 PROCEDURE — 96361 HYDRATE IV INFUSION ADD-ON: CPT

## 2023-07-05 PROCEDURE — 82607 VITAMIN B-12: CPT | Performed by: INTERNAL MEDICINE

## 2023-07-05 PROCEDURE — 36415 COLL VENOUS BLD VENIPUNCTURE: CPT | Performed by: EMERGENCY MEDICINE

## 2023-07-05 PROCEDURE — 86140 C-REACTIVE PROTEIN: CPT | Performed by: INTERNAL MEDICINE

## 2023-07-05 PROCEDURE — 99285 EMERGENCY DEPT VISIT HI MDM: CPT | Mod: 25

## 2023-07-05 PROCEDURE — 83735 ASSAY OF MAGNESIUM: CPT | Performed by: EMERGENCY MEDICINE

## 2023-07-05 RX ADMIN — SODIUM CHLORIDE 1000 ML: 9 INJECTION, SOLUTION INTRAVENOUS at 21:26

## 2023-07-05 RX ADMIN — SODIUM CHLORIDE 1000 ML: 9 INJECTION, SOLUTION INTRAVENOUS at 23:52

## 2023-07-05 ASSESSMENT — ACTIVITIES OF DAILY LIVING (ADL)
ADLS_ACUITY_SCORE: 35
ADLS_ACUITY_SCORE: 33

## 2023-07-06 ENCOUNTER — APPOINTMENT (OUTPATIENT)
Dept: MRI IMAGING | Facility: HOSPITAL | Age: 63
End: 2023-07-06
Attending: INTERNAL MEDICINE
Payer: COMMERCIAL

## 2023-07-06 ENCOUNTER — APPOINTMENT (OUTPATIENT)
Dept: PHYSICAL THERAPY | Facility: HOSPITAL | Age: 63
End: 2023-07-06
Attending: INTERNAL MEDICINE
Payer: COMMERCIAL

## 2023-07-06 PROBLEM — R53.1 GENERALIZED WEAKNESS: Status: ACTIVE | Noted: 2023-07-06

## 2023-07-06 PROBLEM — R53.1 WEAKNESS: Status: ACTIVE | Noted: 2023-07-06

## 2023-07-06 LAB
ALBUMIN SERPL BCG-MCNC: 3.3 G/DL (ref 3.5–5.2)
ALBUMIN UR-MCNC: NEGATIVE MG/DL
ALP SERPL-CCNC: 67 U/L (ref 35–104)
ALT SERPL W P-5'-P-CCNC: 14 U/L (ref 0–50)
AMMONIA PLAS-SCNC: 19 UMOL/L (ref 11–51)
ANION GAP SERPL CALCULATED.3IONS-SCNC: 7 MMOL/L (ref 7–15)
APPEARANCE UR: CLEAR
AST SERPL W P-5'-P-CCNC: 24 U/L (ref 0–45)
BASOPHILS # BLD AUTO: 0 10E3/UL (ref 0–0.2)
BASOPHILS NFR BLD AUTO: 1 %
BILIRUB SERPL-MCNC: 0.3 MG/DL
BILIRUB UR QL STRIP: NEGATIVE
BUN SERPL-MCNC: 17.2 MG/DL (ref 8–23)
CALCIUM SERPL-MCNC: 8.7 MG/DL (ref 8.8–10.2)
CHLORIDE SERPL-SCNC: 111 MMOL/L (ref 98–107)
CK SERPL-CCNC: 75 U/L (ref 26–192)
COLOR UR AUTO: ABNORMAL
CREAT SERPL-MCNC: 0.64 MG/DL (ref 0.51–0.95)
CRP SERPL-MCNC: 5.1 MG/L
DEPRECATED HCO3 PLAS-SCNC: 25 MMOL/L (ref 22–29)
EOSINOPHIL # BLD AUTO: 0.1 10E3/UL (ref 0–0.7)
EOSINOPHIL NFR BLD AUTO: 1 %
ERYTHROCYTE [DISTWIDTH] IN BLOOD BY AUTOMATED COUNT: 13.4 % (ref 10–15)
ERYTHROCYTE [SEDIMENTATION RATE] IN BLOOD BY WESTERGREN METHOD: 6 MM/HR (ref 0–30)
FOLATE SERPL-MCNC: 15.8 NG/ML (ref 4.6–34.8)
GFR SERPL CREATININE-BSD FRML MDRD: >90 ML/MIN/1.73M2
GLUCOSE SERPL-MCNC: 80 MG/DL (ref 70–99)
GLUCOSE UR STRIP-MCNC: NEGATIVE MG/DL
HCT VFR BLD AUTO: 38.9 % (ref 35–47)
HGB BLD-MCNC: 12.6 G/DL (ref 11.7–15.7)
HGB UR QL STRIP: NEGATIVE
HOLD SPECIMEN: NORMAL
HOLD SPECIMEN: NORMAL
HYALINE CASTS: 1 /LPF
IMM GRANULOCYTES # BLD: 0 10E3/UL
IMM GRANULOCYTES NFR BLD: 0 %
KETONES UR STRIP-MCNC: NEGATIVE MG/DL
LEUKOCYTE ESTERASE UR QL STRIP: ABNORMAL
LYMPHOCYTES # BLD AUTO: 1.5 10E3/UL (ref 0.8–5.3)
LYMPHOCYTES NFR BLD AUTO: 25 %
MCH RBC QN AUTO: 30.7 PG (ref 26.5–33)
MCHC RBC AUTO-ENTMCNC: 32.4 G/DL (ref 31.5–36.5)
MCV RBC AUTO: 95 FL (ref 78–100)
MONOCYTES # BLD AUTO: 0.5 10E3/UL (ref 0–1.3)
MONOCYTES NFR BLD AUTO: 8 %
MUCOUS THREADS #/AREA URNS LPF: PRESENT /LPF
NEUTROPHILS # BLD AUTO: 3.9 10E3/UL (ref 1.6–8.3)
NEUTROPHILS NFR BLD AUTO: 65 %
NITRATE UR QL: NEGATIVE
NRBC # BLD AUTO: 0 10E3/UL
NRBC BLD AUTO-RTO: 0 /100
PH UR STRIP: 6.5 [PH] (ref 5–7)
PLATELET # BLD AUTO: 140 10E3/UL (ref 150–450)
POTASSIUM SERPL-SCNC: 4.1 MMOL/L (ref 3.4–5.3)
PROT SERPL-MCNC: 5.8 G/DL (ref 6.4–8.3)
RBC # BLD AUTO: 4.11 10E6/UL (ref 3.8–5.2)
RBC URINE: 2 /HPF
SODIUM SERPL-SCNC: 143 MMOL/L (ref 136–145)
SP GR UR STRIP: 1.02 (ref 1–1.03)
SQUAMOUS EPITHELIAL: <1 /HPF
TSH SERPL DL<=0.005 MIU/L-ACNC: 1.8 UIU/ML (ref 0.3–4.2)
UROBILINOGEN UR STRIP-MCNC: <2 MG/DL
VIT B12 SERPL-MCNC: 608 PG/ML (ref 232–1245)
WBC # BLD AUTO: 6 10E3/UL (ref 4–11)
WBC CLUMPS #/AREA URNS HPF: PRESENT /HPF
WBC URINE: 3 /HPF

## 2023-07-06 PROCEDURE — 96361 HYDRATE IV INFUSION ADD-ON: CPT

## 2023-07-06 PROCEDURE — 36415 COLL VENOUS BLD VENIPUNCTURE: CPT | Performed by: INTERNAL MEDICINE

## 2023-07-06 PROCEDURE — G0378 HOSPITAL OBSERVATION PER HR: HCPCS

## 2023-07-06 PROCEDURE — 80053 COMPREHEN METABOLIC PANEL: CPT | Performed by: INTERNAL MEDICINE

## 2023-07-06 PROCEDURE — 250N000009 HC RX 250: Performed by: INTERNAL MEDICINE

## 2023-07-06 PROCEDURE — 99223 1ST HOSP IP/OBS HIGH 75: CPT | Performed by: INTERNAL MEDICINE

## 2023-07-06 PROCEDURE — 81001 URINALYSIS AUTO W/SCOPE: CPT | Performed by: EMERGENCY MEDICINE

## 2023-07-06 PROCEDURE — 82746 ASSAY OF FOLIC ACID SERUM: CPT | Performed by: INTERNAL MEDICINE

## 2023-07-06 PROCEDURE — 255N000002 HC RX 255 OP 636: Mod: JZ | Performed by: INTERNAL MEDICINE

## 2023-07-06 PROCEDURE — 72158 MRI LUMBAR SPINE W/O & W/DYE: CPT

## 2023-07-06 PROCEDURE — 97162 PT EVAL MOD COMPLEX 30 MIN: CPT | Mod: GP | Performed by: PHYSICAL THERAPIST

## 2023-07-06 PROCEDURE — 97530 THERAPEUTIC ACTIVITIES: CPT | Mod: GP | Performed by: PHYSICAL THERAPIST

## 2023-07-06 PROCEDURE — 72157 MRI CHEST SPINE W/O & W/DYE: CPT

## 2023-07-06 PROCEDURE — 85025 COMPLETE CBC W/AUTO DIFF WBC: CPT | Performed by: INTERNAL MEDICINE

## 2023-07-06 PROCEDURE — 250N000013 HC RX MED GY IP 250 OP 250 PS 637: Performed by: INTERNAL MEDICINE

## 2023-07-06 PROCEDURE — A9585 GADOBUTROL INJECTION: HCPCS | Mod: JZ | Performed by: INTERNAL MEDICINE

## 2023-07-06 PROCEDURE — 258N000003 HC RX IP 258 OP 636: Performed by: INTERNAL MEDICINE

## 2023-07-06 PROCEDURE — 70553 MRI BRAIN STEM W/O & W/DYE: CPT

## 2023-07-06 PROCEDURE — 82140 ASSAY OF AMMONIA: CPT | Performed by: INTERNAL MEDICINE

## 2023-07-06 PROCEDURE — 85652 RBC SED RATE AUTOMATED: CPT | Performed by: INTERNAL MEDICINE

## 2023-07-06 PROCEDURE — 72156 MRI NECK SPINE W/O & W/DYE: CPT

## 2023-07-06 RX ORDER — FLUTICASONE PROPIONATE 50 MCG
2 SPRAY, SUSPENSION (ML) NASAL DAILY
Status: DISCONTINUED | OUTPATIENT
Start: 2023-07-06 | End: 2023-07-08 | Stop reason: HOSPADM

## 2023-07-06 RX ORDER — LEVOTHYROXINE SODIUM 25 UG/1
75 TABLET ORAL
Status: DISCONTINUED | OUTPATIENT
Start: 2023-07-07 | End: 2023-07-08 | Stop reason: HOSPADM

## 2023-07-06 RX ORDER — LIDOCAINE 40 MG/G
CREAM TOPICAL 2 TIMES DAILY
COMMUNITY

## 2023-07-06 RX ORDER — ONDANSETRON 2 MG/ML
4 INJECTION INTRAMUSCULAR; INTRAVENOUS EVERY 6 HOURS PRN
Status: DISCONTINUED | OUTPATIENT
Start: 2023-07-06 | End: 2023-07-08 | Stop reason: HOSPADM

## 2023-07-06 RX ORDER — SODIUM CHLORIDE 9 MG/ML
INJECTION, SOLUTION INTRAVENOUS CONTINUOUS
Status: DISCONTINUED | OUTPATIENT
Start: 2023-07-06 | End: 2023-07-07

## 2023-07-06 RX ORDER — POLYETHYLENE GLYCOL 3350 17 G/17G
17 POWDER, FOR SOLUTION ORAL DAILY PRN
Status: DISCONTINUED | OUTPATIENT
Start: 2023-07-06 | End: 2023-07-08 | Stop reason: HOSPADM

## 2023-07-06 RX ORDER — ACETAMINOPHEN 500 MG
1000 TABLET ORAL EVERY 8 HOURS
Status: DISCONTINUED | OUTPATIENT
Start: 2023-07-06 | End: 2023-07-07

## 2023-07-06 RX ORDER — BISACODYL 10 MG
10 SUPPOSITORY, RECTAL RECTAL DAILY PRN
Status: DISCONTINUED | OUTPATIENT
Start: 2023-07-06 | End: 2023-07-08 | Stop reason: HOSPADM

## 2023-07-06 RX ORDER — ONDANSETRON 4 MG/1
4 TABLET, ORALLY DISINTEGRATING ORAL EVERY 6 HOURS PRN
Status: DISCONTINUED | OUTPATIENT
Start: 2023-07-06 | End: 2023-07-08 | Stop reason: HOSPADM

## 2023-07-06 RX ORDER — DIVALPROEX SODIUM 125 MG/1
125 CAPSULE, COATED PELLETS ORAL EVERY 12 HOURS SCHEDULED
Status: DISCONTINUED | OUTPATIENT
Start: 2023-07-06 | End: 2023-07-08 | Stop reason: HOSPADM

## 2023-07-06 RX ORDER — CLONAZEPAM 0.5 MG/1
0.5 TABLET ORAL 2 TIMES DAILY
Status: DISCONTINUED | OUTPATIENT
Start: 2023-07-06 | End: 2023-07-08 | Stop reason: HOSPADM

## 2023-07-06 RX ORDER — CETIRIZINE HYDROCHLORIDE 10 MG/1
10 TABLET ORAL DAILY
COMMUNITY

## 2023-07-06 RX ORDER — FLUTICASONE PROPIONATE 50 MCG
2 SPRAY, SUSPENSION (ML) NASAL DAILY
COMMUNITY

## 2023-07-06 RX ORDER — BACLOFEN 5 MG/1
5 TABLET ORAL 3 TIMES DAILY
Status: DISCONTINUED | OUTPATIENT
Start: 2023-07-06 | End: 2023-07-08 | Stop reason: HOSPADM

## 2023-07-06 RX ORDER — GABAPENTIN 100 MG/1
100 CAPSULE ORAL 3 TIMES DAILY
Status: DISCONTINUED | OUTPATIENT
Start: 2023-07-06 | End: 2023-07-08 | Stop reason: HOSPADM

## 2023-07-06 RX ORDER — ACETAMINOPHEN 325 MG/1
650 TABLET ORAL EVERY 4 HOURS PRN
Status: DISCONTINUED | OUTPATIENT
Start: 2023-07-06 | End: 2023-07-07

## 2023-07-06 RX ORDER — LEVOTHYROXINE SODIUM 75 UG/1
75 TABLET ORAL DAILY
COMMUNITY

## 2023-07-06 RX ORDER — LIDOCAINE 40 MG/G
CREAM TOPICAL
Status: DISCONTINUED | OUTPATIENT
Start: 2023-07-06 | End: 2023-07-08 | Stop reason: HOSPADM

## 2023-07-06 RX ORDER — LIDOCAINE 40 MG/G
CREAM TOPICAL 2 TIMES DAILY
Status: DISCONTINUED | OUTPATIENT
Start: 2023-07-06 | End: 2023-07-08 | Stop reason: HOSPADM

## 2023-07-06 RX ORDER — CALCIUM CARBONATE 500 MG/1
500 TABLET, CHEWABLE ORAL 2 TIMES DAILY
Status: DISCONTINUED | OUTPATIENT
Start: 2023-07-06 | End: 2023-07-08 | Stop reason: HOSPADM

## 2023-07-06 RX ORDER — MECLIZINE HYDROCHLORIDE 25 MG/1
25 TABLET ORAL 3 TIMES DAILY PRN
COMMUNITY

## 2023-07-06 RX ORDER — MECLIZINE HYDROCHLORIDE 25 MG/1
25 TABLET ORAL 3 TIMES DAILY PRN
Status: DISCONTINUED | OUTPATIENT
Start: 2023-07-06 | End: 2023-07-08 | Stop reason: HOSPADM

## 2023-07-06 RX ORDER — GABAPENTIN 100 MG/1
100 CAPSULE ORAL 3 TIMES DAILY
COMMUNITY

## 2023-07-06 RX ORDER — CALCIUM CARBONATE 500 MG/1
1 TABLET, CHEWABLE ORAL 2 TIMES DAILY
COMMUNITY

## 2023-07-06 RX ORDER — LACTULOSE 10 G/15ML
10 SOLUTION ORAL DAILY
Status: DISCONTINUED | OUTPATIENT
Start: 2023-07-06 | End: 2023-07-08 | Stop reason: HOSPADM

## 2023-07-06 RX ORDER — BACLOFEN 5 MG/1
5 TABLET ORAL 3 TIMES DAILY
COMMUNITY

## 2023-07-06 RX ORDER — GADOBUTROL 604.72 MG/ML
7 INJECTION INTRAVENOUS ONCE
Status: COMPLETED | OUTPATIENT
Start: 2023-07-06 | End: 2023-07-06

## 2023-07-06 RX ORDER — CETIRIZINE HYDROCHLORIDE 10 MG/1
10 TABLET ORAL DAILY
Status: DISCONTINUED | OUTPATIENT
Start: 2023-07-06 | End: 2023-07-08 | Stop reason: HOSPADM

## 2023-07-06 RX ORDER — CALCIUM POLYCARBOPHIL 625 MG 625 MG/1
625 TABLET ORAL DAILY
Status: DISCONTINUED | OUTPATIENT
Start: 2023-07-06 | End: 2023-07-08 | Stop reason: HOSPADM

## 2023-07-06 RX ADMIN — BACLOFEN 5 MG: 5 TABLET ORAL at 14:20

## 2023-07-06 RX ADMIN — LIDOCAINE: 40 CREAM TOPICAL at 21:06

## 2023-07-06 RX ADMIN — ACETAMINOPHEN 1000 MG: 500 TABLET ORAL at 14:13

## 2023-07-06 RX ADMIN — GADOBUTROL 7 ML: 604.72 INJECTION INTRAVENOUS at 04:44

## 2023-07-06 RX ADMIN — PAROXETINE 50 MG: 40 TABLET, FILM COATED ORAL at 14:17

## 2023-07-06 RX ADMIN — CALCIUM POLYCARBOPHIL 625 MG: 625 TABLET, FILM COATED ORAL at 14:20

## 2023-07-06 RX ADMIN — Medication 125 MCG: at 14:17

## 2023-07-06 RX ADMIN — ANTACID TABLETS 500 MG: 500 TABLET, CHEWABLE ORAL at 20:50

## 2023-07-06 RX ADMIN — ACETAMINOPHEN 1000 MG: 500 TABLET ORAL at 22:19

## 2023-07-06 RX ADMIN — DIVALPROEX SODIUM 125 MG: 125 CAPSULE, COATED PELLETS ORAL at 21:05

## 2023-07-06 RX ADMIN — SODIUM CHLORIDE: 9 INJECTION, SOLUTION INTRAVENOUS at 02:07

## 2023-07-06 RX ADMIN — GABAPENTIN 100 MG: 100 CAPSULE ORAL at 20:50

## 2023-07-06 RX ADMIN — FLUTICASONE PROPIONATE 2 SPRAY: 50 SPRAY, METERED NASAL at 14:21

## 2023-07-06 RX ADMIN — BACLOFEN 5 MG: 5 TABLET ORAL at 21:05

## 2023-07-06 RX ADMIN — LACTULOSE 10 G: 10 SOLUTION ORAL at 14:25

## 2023-07-06 RX ADMIN — GABAPENTIN 100 MG: 100 CAPSULE ORAL at 14:17

## 2023-07-06 RX ADMIN — CLONAZEPAM 0.5 MG: 0.5 TABLET ORAL at 20:51

## 2023-07-06 RX ADMIN — SODIUM CHLORIDE: 9 INJECTION, SOLUTION INTRAVENOUS at 13:41

## 2023-07-06 RX ADMIN — CETIRIZINE HYDROCHLORIDE 10 MG: 10 TABLET, FILM COATED ORAL at 14:19

## 2023-07-06 ASSESSMENT — ACTIVITIES OF DAILY LIVING (ADL)
ADLS_ACUITY_SCORE: 37
ADLS_ACUITY_SCORE: 35
ADLS_ACUITY_SCORE: 33
ADLS_ACUITY_SCORE: 33
DEPENDENT_IADLS:: CLEANING;COOKING;LAUNDRY;SHOPPING;MEAL PREPARATION;MEDICATION MANAGEMENT;MONEY MANAGEMENT;TRANSPORTATION;INCONTINENCE
ADLS_ACUITY_SCORE: 33
ADLS_ACUITY_SCORE: 37
ADLS_ACUITY_SCORE: 35
ADLS_ACUITY_SCORE: 37

## 2023-07-06 NOTE — PROGRESS NOTES
"   07/06/23 0653   Appointment Info   Signing Clinician's Name / Credentials (PT) Chantelle Rosenthal, PT, DPT   Quick Adds   Quick Adds Certification   Living Environment   People in Home alone   Current Living Arrangements assisted living   Home Accessibility no concerns   Living Environment Comments Pt states she has had a roommate at times.   Self-Care   Equipment Currently Used at Home walker, rolling;wheelchair, manual   Fall history within last six months no   Activity/Exercise/Self-Care Comment Pt states she receives assist with dressing, bathing. Reports occasionally receiving assist of 1 with transfers.   General Information   Onset of Illness/Injury or Date of Surgery 07/05/23   Referring Physician Johnny Torres,    Patient/Family Therapy Goals Statement (PT) None stated.   Pertinent History of Current Problem (include personal factors and/or comorbidities that impact the POC) Per H&P: \"63 year old female who has a PMHx of MS lives in [facility].  Presented with generalized weakness and fatigue, loss of appetite, decreased p.o. intake. Typically mobilizes with assistance of a wheelchair and walker but is able to care for herself majority of the time\"   Existing Precautions/Restrictions fall   Range of Motion (ROM)   Range of Motion ROM deficits secondary to weakness   Strength (Manual Muscle Testing)   Strength (Manual Muscle Testing) Deficits observed during functional mobility   Bed Mobility   Bed Mobility supine-sit;sit-supine   Supine-Sit [place] (Bed Mobility) moderate assist (50% patient effort);2 person assist   Sit-Supine [place] (Bed Mobility) moderate assist (50% patient effort);maximum assist (25% patient effort);2 person assist   Bed Mobility Limitations decreased ability to use arms for pushing/pulling;decreased ability to use legs for bridging/pushing   Impairments Contributing to Impaired Bed Mobility decreased strength   Assistive Device (Bed Mobility) draw sheet   Transfers "   Transfers sit-stand transfer   Transfer Safety Concerns Noted decreased weight-shifting ability;losing balance backward   Impairments Contributing to Impaired Transfers impaired balance;decreased strength   Sit-Stand Transfer   Sit-Stand [place] (Transfers) moderate assist (50% patient effort);2 person assist   Assistive Device (Sit-Stand Transfers) other (see comments)  (arm-in-arm)   Gait/Stairs (Locomotion)   Comment, (Gait/Stairs) Unable to take steps at this time due to poor standing balance. Likely to need significant assistance to complete transfer.   Clinical Impression   Criteria for Skilled Therapeutic Intervention Yes, treatment indicated   PT Diagnosis (PT) impaired functional mobility   Influenced by the following impairments decreased strength, impaired balance, decreased activity tolerance.   Functional limitations due to impairments transfers, ambulation   Clinical Presentation (PT Evaluation Complexity) Evolving/Changing   Clinical Presentation Rationale Pt appears much weaker than baseline.   Clinical Decision Making (Complexity) moderate complexity   Planned Therapy Interventions (PT) balance training;bed mobility training;gait training;home exercise program;neuromuscular re-education;postural re-education;strengthening;transfer training   Risk & Benefits of therapy have been explained evaluation/treatment results reviewed;participants voiced agreement with care plan;participants included;patient   PT Total Evaluation Time   PT Eval, Moderate Complexity Minutes (66088) 15   Therapy Certification   Start of care date 07/06/23   Certification date from 07/06/23   Certification date to 07/13/23   Medical Diagnosis multiple sclerosis   Physical Therapy Goals   PT Frequency Daily   PT Predicted Duration/Target Date for Goal Attainment 07/13/23   PT Goals Bed Mobility;Transfers;Gait   PT: Bed Mobility Minimal assist;Supine to/from sit   PT: Transfers Minimal assist;Sit to/from stand;Bed to/from  chair;Assistive device   PT: Gait Minimal assist;Assistive device;Rolling walker;5 feet   Interventions   Interventions Quick Adds Therapeutic Activity   Therapeutic Activity   Therapeutic Activities: dynamic activities to improve functional performance Minutes (33741) 8   Symptoms Noted During/After Treatment Fatigue;Dizziness   Treatment Detail/Skilled Intervention Pt supine in bed at start of session. Cues for technique for supine > sit transfer. BP at start of session 119/60 mm Hg. Pt notes increased dizziness with initial sitting, resolves with time. Cues for anterior weight shift with sit <> stand. Pt requires blocking at knees to prevent foot extension. Pt supine in bed at end of session. /71 mm Hg.   PT Discharge Planning   PT Plan sit <> stand, pivot transfers, LE strengthening   PT Discharge Recommendation (DC Rec) Transitional Care Facility   PT Rationale for DC Rec Patient's strength is below baseline, pt currently requiring assist of 2 for transfers. At baseline, pt occasionally needs A of 1 for transfers. Recommend TCU at discharge.   PT Brief overview of current status Supine <> sit, mod assist of 2. Sit <> stand, mod assist of 2.   Total Session Time   Timed Code Treatment Minutes 8   Total Session Time (sum of timed and untimed services) 23     [facility]  OUTPATIENT PHYSICAL THERAPY EVALUATION  PLAN OF TREATMENT FOR OUTPATIENT REHABILITATION  (COMPLETE FOR INITIAL CLAIMS ONLY)  Patient's Last Name, First Name, M.I.  YOB: 1960  Karolyn Das                        Provider's Name  [facility] Medical Record No.  [id number]                             Onset Date:  07/05/23   Start of Care Date:  07/06/23   Type:     _X_PT   ___OT   ___SLP Medical Diagnosis:  multiple sclerosis              PT Diagnosis:  impaired functional mobility Visits from SOC:  1     See note for plan of treatment, functional goals and  certification details    I CERTIFY THE NEED FOR THESE SERVICES FURNISHED UNDER        THIS PLAN OF TREATMENT AND WHILE UNDER MY CARE     (Physician co-signature of this document indicates review and certification of the therapy plan).

## 2023-07-06 NOTE — ED NOTES
[facility]  Expected date: 7/6/23  Expected time:   Means of arrival:   Comments:  Hold for room 9

## 2023-07-06 NOTE — ED PROVIDER NOTES
EMERGENCY DEPARTMENT ENCOUNTER      NAME: Karolyn Das  YOB: 1960  MRN: [id number]    FINAL IMPRESSION  1. Generalized weakness    2. Multiple sclerosis (H)        MEDICAL DECISION MAKING   Pertinent Labs & Imaging studies reviewed. (See chart for details)    Karolyn Das is a 63 year old female who presents for evaluation of generalized weakness and fatigue, loss of appetite, decreased p.o. intake.  Records reviewed.  Patient has a history of MS and lives in assisted living.  She typically moves around with assistance of a wheelchair and walker but is able to care for herself majority of the time.  She presents today with friends for evaluation of generalized weakness and fatigue.  She is unable to provide much in the way of history due to severity of symptoms.  She does have chronic weakness and pain related to MS but has not had any obvious change in this and reports that her symptoms around MS have actually been stable for about 20 years.  Friend notes that she has had some increase in bilateral lower extremity swelling and possibly, some mild worsening in chronic low back and leg pain.  She has a history of UTIs but has not gone recently.  Patient denies fever, chills, chest pain, difficulty breathing, cough, diarrhea, urinary symptoms. Remainder of history and exam, as below.     Records reviewed.  Patient was last seen by neurology in [facility] on 1/11/2023.  At that time, MS was felt to be stable and she was advised to continue physical therapy which she has been doing at her facility.  Osmany notes that today, physical therapist was unable to work with patient for more than 5 minutes due to severity of her weakness.    I considered a broad differential include but not limited to electrolyte derangement, acute kidney injury, progression of known chronic neurologic condition, anemia, occult infection, psychiatric, viral illness.  Weakness is generalized and there are no  signs/symptoms to suggest CVA, TIA, spinal cord compromise/cauda equina.  Discussed options for work-up and management with patient and her friends.  We agreed on plan for labs, UA, IV fluids.  Patient declined offer for analgesic/antiemetic but will update me if she changes her mind.    Laboratory work-up was reassuring.  CBC without leukocytosis to suggest systemic infectious/inflammatory process.  No acute anemia.  Platelets within normal limits.  CMP reassuring. No evidence of JIM, acidosis, or significant electrolyte derangement. No acute elevation of bilirubin or transaminates to suggest acute hepatobiliary process.  It took a significant period of time to obtain a urine sample but when this returned, there was no obvious evidence of infection, although did have WBC clumps and leukocyte Estrace.  We will plan to send for culture.  I rechecked the patient multiple times.  She slept comfortably after initial assessment and remained hemodynamically stable.  Unfortunately, she has also remained profoundly weak.  At this point, etiology of symptoms is unclear.  Cannot definitively rule out progression/flare of MS and patient may benefit from evaluation by neurology and/or physical therapy.  Discussed options for ongoing work-up and management with she and her friends.  Given limited support/care in assisted living, we have agreed on plan for admission.    I discussed the case with hospital medicine team agreed to facilitate admission and will add on MRI of spines to rule out MS flare.  No acute events under my care.      Medical Decision Making    History:    Supplemental history from: Documented in chart, if applicable    External Record(s) reviewed: Documented in chart, if applicable.    Work Up:    Chart documentation includes differential considered and any EKGs or imaging independently interpreted by provider, where specified.    In additional to work up documented, I considered the following work up:  Documented in chart, if applicable.    External consultation:    Discussion of management with another provider: Documented in chart, if applicable and Hospitalist    Complicating factors:    Care impacted by chronic illness: Other: MS, hypothyroidism, osteoporosis    Care affected by social determinants of health: Access to Medical Care, No Support for Care at Home and No Transportation for Health Care    Disposition considerations: Admit.          ED COURSE  8:52 PM I met with the patient, obtained history, performed an initial exam, and discussed options and plan for diagnostics and treatment here in the ED.   10:39 PM I rechecked the patient and updated them on laboratory results.  12:30 AM I discussed the case with hospitalist, Dr Torres, who accepts the patient        MEDICATIONS GIVEN IN THE ED  Medications   0.9% sodium chloride BOLUS (1,000 mLs Intravenous $New Bag 7/5/23 9006)       NEW PRESCRIPTIONS STARTED AT TODAY'S VISIT  New Prescriptions    No medications on file          =================================================================    Chief Complaint   Patient presents with     Fatigue         HPI:    Patient information was obtained from: Patient    Use of : N/A    Karolyn Das is a 63 year old female who presents via private car for fatigue.    Patient has a history of MS and lives in assisted living. The past few days the patient has noticed increased generalized fatigue and weakness. She has been sleeping all day and can only do 5 minutes of PT instead of the usual 45. She has generalized weakness at baseline but feels it has become worse lately. She has a history of frequent UTI's but has not had one recently. Patient also reports worse back and leg pain, abdominal pain, and feet swelling. Patient denies fever, chest pain, shortness of breath, diarrhea, dysuria, or any other complaints at this time.     Per chart review: Patient was last evaluated for her MS at  "[facility] neurology on 01/11/23. They report no clear decline in function and that PT has been beneficial.      RELEVANT HISTORY, MEDICATIONS, & ALLERGIES   Past medical history, surgical history, family history, medications, and allergies reviewed and pertinent noted in HPI.    REVIEW OF SYSTEMS:  A complete review of systems was performed with pertinent positives and negatives noted in the HPI. All other systems negative.     PHYSICAL EXAM:    Vitals: BP 94/43   Pulse 78   Temp 98  F (36.7  C) (Temporal)   Resp 18   Ht 1.702 m (5' 7\")   Wt 65.8 kg (145 lb)   SpO2 97%   BMI 22.71 kg/m     General: Alert and interactive.  Very soft-spoken, appears chronically ill and quite significantly fatigued but in no acute distress.  HENT: Atraumatic. Full AROM of neck. Conjunctiva clear.  Very dry mucous membranes.  Cardiovascular: Regular rate and rhythm.   Chest/Pulmonary: Normal work of breathing. Speaking in complete sentences. Lungs CTAB. No chest wall tenderness or deformities.  Abdomen: Soft, nondistended. Nontender without guarding or rebound.  Extremities: Normal AROM of all major joints.  No external signs of trauma.  Trace pitting edema bilaterally.  Skin: Warm and dry.  Pale.  Neuro: Speech clear. CNs grossly intact. Moves all extremities spontaneously.  Generalized weakness with handgrip, dorsiflexion, plantarflexion all 4 extremities, unable to participate in full neurologic testing.  Psych: Normal affect/mood, cooperative, memory appropriate.    LAB  Labs Ordered and Resulted from Time of ED Arrival to Time of ED Departure   COMPREHENSIVE METABOLIC PANEL - Abnormal       Result Value    Sodium 144      Potassium 3.9      Chloride 106      Carbon Dioxide (CO2) 29      Anion Gap 9      Urea Nitrogen 23.5 (*)     Creatinine 0.81      Calcium 9.9      Glucose 87      Alkaline Phosphatase 89      AST 26      ALT 18      Protein Total 7.1      Albumin 4.3      Bilirubin Total 0.2      GFR Estimate 81   "   MAGNESIUM - Normal    Magnesium 2.2     CBC WITH PLATELETS - Normal    WBC Count 7.7      RBC Count 4.27      Hemoglobin 12.9      Hematocrit 40.5      MCV 95      MCH 30.2      MCHC 31.9      RDW 13.6      Platelet Count 164     ROUTINE UA WITH MICROSCOPIC REFLEX TO CULTURE         I, Cedrick Lucio, am serving as a scribe to document services personally performed by Dr. Suzy Berry based on my observation and the provider's statements to me. I, Suzy Berry MD attest that Cedrick Lucio is acting in a scribe capacity, has observed my performance of the services and has documented them in accordance with my direction.    Suzy Berry M.D.  Emergency Medicine  [facility] EMERGENCY DEPARTMENT  [facility] [facility] [id number] [phone]  Dept: [phone]     Suzy Berry MD  07/06/23 0138

## 2023-07-06 NOTE — CONSULTS
Care Management Initial Consult    General Information  Assessment completed with: Friend, Patient and friend Sea  Type of CM/SW Visit: Initial Assessment    Primary Care Provider verified and updated as needed: Yes   Readmission within the last 30 days:           Advance Care Planning: Advance Care Planning Reviewed: present on chart          Communication Assessment  Patient's communication style: spoken language (English or Bilingual)             Cognitive  Cognitive/Neuro/Behavioral: WDL                      Living Environment:   People in home: facility resident     Current living Arrangements: assisted living  Name of Facility: [facility]   Able to return to prior arrangements: yes       Family/Social Support:  Care provided by:  (Facility)  Provides care for: no one, unable/limited ability to care for self  Marital Status: Single   (Friends)          Description of Support System: Supportive    Support Assessment: Adequate family and caregiver support    Current Resources:   Patient receiving home care services: No     Community Resources:   Equipment currently used at home: walker, rolling, wheelchair, manual  Supplies currently used at home:      Employment/Financial:  Employment Status: disabled        Financial Concerns:     Referral to Financial Worker: No       Does the patient's insurance plan have a 3 day qualifying hospital stay waiver?  No    Lifestyle & Psychosocial Needs:  Social Determinants of Health     Tobacco Use: Low Risk  (7/11/2021)    Patient History     Smoking Tobacco Use: Never     Smokeless Tobacco Use: Never     Passive Exposure: Not on file   Alcohol Use: Not on file   Financial Resource Strain: Not on file   Food Insecurity: Not on file   Transportation Needs: Not on file   Physical Activity: Not on file   Stress: Not on file   Social Connections: Not on file   Intimate Partner Violence: Not on file   Depression: Not at risk (1/31/2020)    PHQ-2     PHQ-2 Score: 1    Housing Stability: Not on file       Functional Status:  Prior to admission patient needed assistance:   Dependent ADLs:: Wheelchair-with assist, Wheelchair-independent, Bathing, Dressing  Dependent IADLs:: Cleaning, Cooking, Laundry, Shopping, Meal Preparation, Medication Management, Money Management, Transportation, Incontinence       Mental Health Status:  Mental Health Status: No Current Concerns       Chemical Dependency Status:  Chemical Dependency Status: No Current Concerns             Values/Beliefs:  Spiritual, Cultural Beliefs, Church Practices, Values that affect care: no               Additional Information:  Chart reviewed.  met with patient in her room to introduce self, CM role and complete initial assessment.   Pt  lives at [facility] in [facility]. SW left VM for director of Nursing Serenity ( [phone]).  Patient uses a wheelchair at baseline and receives assist with all ADLs. Patient requested SW contact her friend Rani for additional information.  SW spoke to Rani - (friend). Rani confirms the above information and states she can transport patient home with a transport chair when medically ready.   Siobhan Quezada  ([phone]).     2:05 PM  LESLI left message for [facility] ( [phone], or main line: [phone]) Nurse at facility, Director not in today.     2:54 PM  LESLI spoke nurse Abimbola at facility. At baseline, patient is able to assist her self with getting out of bed with a pole at bedside and side rails. Abimbola reports staff does help with dressing and showers however pt is independent in her wheelchair in her apartment. If patient needs assist of two, patient may need to go to the care center / TCU at [facility]. Facility manages all meds. Her room is next to the nurses station and is technically in the memory care facility.   Recently started on Baclofen. RN states she seems weaker with this particular medication.     Jeannine Zamora,  SUBHASHSW

## 2023-07-06 NOTE — H&P
[facility]    History and Physical - Hospitalist Service       Date of Admission:  7/5/2023    Assessment & Plan      Generalized weakness  H/O MS, r/o acute exacerbation  Fatigue  Hypotension  H/O UTI    Plan:  -neuro consult  -MRI head C/T/L spine  -CK, TSH, ammonia, B12, folate  -pt/ot  -fall precautions  -await pharmacy to reconcile meds    < 2mn stay     Diet: Regular Diet Adult    DVT Prophylaxis: Pneumatic Compression Devices  Cline Catheter: Not present  Lines: None     Cardiac Monitoring: None  Code Status: Full Code      Clinically Significant Risk Factors Present on Admission                                Disposition Plan      Expected Discharge Date: 07/07/2023                  Johnny Torres DO, DO  Hospitalist Service  [facility]  Securely message with [employer] (more info)  Text page via [employer] Paging/Directory     ______________________________________________________________________    Chief Complaint     weakness    History of Present Illness   Karolyn Das is a 63 year old female who has a PMHx of MS lives in [facility].  Presented with generalized weakness and fatigue, loss of appetite, decreased p.o. intake. Typically mobilizes with assistance of a wheelchair and walker but is able to care for herself majority of the time.  Per friends, today more generalized weakness and fatigue than baseline.  She is unable to provide much in the way of history due to severity of symptoms.  some increase in bilateral lower extremity swelling and possibly, some mild worsening in chronic low back and leg pain.  She has a history of UTIs but has not gone recently.  Patient denies fever, chills, chest pain, difficulty breathing, cough, diarrhea, urinary symptoms.       Past Medical History    No past medical history on file.    Past Surgical History   Past Surgical History:   Procedure Laterality Date     HYSTERECTOMY  1998     OOPHORECTOMY  1998     [facility]  APPENDECTOMY      Description: Appendectomy;  Recorded: 12/21/2011;     [facility] APPENDECTOMY      Description: Appendectomy;  Recorded: 06/02/2014;     [facility] TOTAL ABDOM HYSTERECTOMY      Description: Total Abdominal Hysterectomy;  Proc Date: 01/01/1998;     [facility] TOTAL ABDOM HYSTERECTOMY      Description: Total Abdominal Hysterectomy;  Recorded: 08/29/2012;     [facility] TOTAL ABDOM HYSTERECTOMY      Description: Total Abdominal Hysterectomy;  Recorded: 06/02/2014;       Prior to Admission Medications   Prior to Admission Medications   Prescriptions Last Dose Informant Patient Reported? Taking?   ASCORBIC ACID WITH SONIDO HIPS 500 MG tablet   No No   Sig: [ASCORBIC ACID WITH SONIDO HIPS 500 MG TABLET] TAKE 1 TABLET (500 MG TOTAL) BY MOUTH DAILY.   CALCIUM-VITAMIN D 500 mg(1,250mg) -200 unit per tablet   No No   Sig: [CALCIUM-VITAMIN D 500 MG(1,250MG) -200 UNIT PER TABLET] 1 TAB BY MOUTH TWICE DAILY   CHOLECALCIFEROL 1,000 unit tablet   No No   Sig: [CHOLECALCIFEROL 1,000 UNIT TABLET] 2 TABS (2000UNIT) BY MOUTH DAILY   DAILY-MATTHIAS tablet   No No   Sig: [DAILY-MATTHIAS TABLET] 1 TAB BY MOUTH DAILY   PARoxetine (PAXIL) 20 MG tablet   No No   Sig: [PAROXETINE (PAXIL) 20 MG TABLET] TAKE 3 TABS (60MG) BY MOUTH DAILY   acetaminophen (TYLENOL) 500 MG tablet   Yes No   Sig: [ACETAMINOPHEN (TYLENOL) 500 MG TABLET] Take 500 mg by mouth every 4 (four) hours as needed for pain.   alendronate (FOSAMAX) 70 MG tablet   No No   Sig: [ALENDRONATE (FOSAMAX) 70 MG TABLET] 1 TAB BY MOUTH EVERY WEEK - TAKE WITH FULL GLASS H2O, 30 MINUTES BEFORE FIRST FOOD, DRINK, OR MEDS. REMAIN UPRIGHT FOR 30 MINUTES AFTER T...   ascorbic acid, vitamin C, (ASCORBIC ACID WITH SONIDO HIPS) 500 MG tablet   No No   Sig: [ASCORBIC ACID, VITAMIN C, (ASCORBIC ACID WITH SONIDO HIPS) 500 MG TABLET] Take 1 tablet (500 mg total) by mouth daily.   calcium polycarbophil (FIBER-LAX) 625 mg tablet   No No   Sig: [CALCIUM POLYCARBOPHIL (FIBER-LAX) 625 MG TABLET] Take 1 tablet (625 mg total) by  mouth daily.   clonazePAM (KLONOPIN) 0.5 MG tablet   No No   Sig: [CLONAZEPAM (KLONOPIN) 0.5 MG TABLET] TAKE 1 TABLET BY MOUTH EVERY TWELVE HOURS   divalproex (DEPAKOTE SPRINKLE) 125 mg capsule   No No   Sig: [DIVALPROEX (DEPAKOTE SPRINKLE) 125 MG CAPSULE] 1 CAP BY MOUTH TWICE DAILY   docusate sodium (COLACE) 100 MG capsule   No No   Sig: [DOCUSATE SODIUM (COLACE) 100 MG CAPSULE] TAKE 1 CAP CAPSULE (100 MG TOTAL) BY MOUTH daily   ibuprofen (ADVIL,MOTRIN) 600 MG tablet   No No   Sig: [IBUPROFEN (ADVIL,MOTRIN) 600 MG TABLET] 1 TAB BY MOUTH EVERY 6 HOURS AS NEEDED   lactulose (GENERLAC) 10 gram/15 mL solution   No No   Sig: [LACTULOSE (GENERLAC) 10 GRAM/15 ML SOLUTION] Take 15 mL (10 g total) by mouth daily.   levothyroxine (SYNTHROID, LEVOTHROID) 100 MCG tablet   No No   Sig: [LEVOTHYROXINE (SYNTHROID, LEVOTHROID) 100 MCG TABLET] 1 TAB BY MOUTH DAILY 1 HOUR BEFORE BREAKFAST   vitamin E 400 unit capsule   No No   Sig: [VITAMIN E 400 UNIT CAPSULE] 1 CAP BY MOUTH DAILY      Facility-Administered Medications: None        Physical Exam   Vital Signs: Temp: 98  F (36.7  C) Temp src: Temporal BP: 94/43 Pulse: 78   Resp: 18 SpO2: 97 % O2 Device: None (Room air)    Weight: 145 lbs 0 oz    Physical Examination:   General appearance - no distress  Eyes - pupils equal and reactive, extraocular eye movements intact, sclera anicteric  Mouth - mucous membranes pasty, pharynx normal without lesions  Lungs - clear to auscultation, no wheezes, rales or rhonchi, symmetric air entry  Heart - normal rate, regular rhythm, normal S1, S2, no murmurs, rubs, clicks or gallops. No peripheral edema.  Abdomen - soft, nontender, nondistended, BS+  Neurological - alert, oriented, quiet speech but not dysarthric, generalized symmetric BUE/BLE weakness.  Skin - no c/c/p    Lab/imaging reviewed

## 2023-07-06 NOTE — PROGRESS NOTES
[facility] follow-up  63-year-old female with known secondary progressive MS, dementia who presented for evaluation of increased weakness and fatigue  There is no clear evidence of an infection.  MRI brain and spine is not suggestive of MS exacerbation  Patient is oriented to person only    Continue IV fluids until patient has adequate oral intake  PT/OT eval  Appreciate neurology recommendations  Patient does have advance care directive which states DNR - therefore will change CODE STATUS    Discussed with nursing staff    Stephanie Car MD

## 2023-07-06 NOTE — PLAN OF CARE
Occupational Therapy: Orders received and chart reviewed, discussed with care team. Per PT, patient has assistance with ADLs, IADLs, and transfers at [facility], no skilled need for OT services at this time. Will complete orders and defer discharge recommendations to PT. Please reorder OT if further needs arise.    Silvana Lema, OTR/L 7/6/23

## 2023-07-06 NOTE — PHARMACY-ADMISSION MEDICATION HISTORY
Pharmacist Admission Medication History    Admission medication history is complete. The information provided in this note is only as accurate as the sources available at the time of the update.    Medication reconciliation/reorder completed by provider prior to medication history? No    Information Source(s): Facility ([facility] medication list/MAR and CareEverywhere/SureScripts via fax      Pertinent Information: Dose changes of levothyroxine and paroxetine from PTA list (levothyroxine from 100mcg to 75mcg daily, paroxetine from 60mg daily to 50mg daily)    Changes made to PTA medication list:    Added: Baclofen, meclizine, gabapentin, Flonase, cetirizine, calcium carbonate, lidocaine cream    Deleted: Alendronate, ascorbic acid, calcium-vitamin d, daily-nehal, docusate, ibuprofen, vitamin e    Changed: Tylenol to 1g q8h, levothyroxine, paroxetine    Medication Affordability: Unable to assess     Allergies reviewed with patient and updates made in EHR: unable to assess    Medication History Completed By: NESS RODRÍGUEZ [facility] 7/6/2023 11:20 AM    Prior to Admission medications    Medication Sig Last Dose Taking? Auth Provider Long Term End Date   acetaminophen (TYLENOL) 500 MG tablet Take 1,000 mg by mouth every 8 hours 7/5/2023 Yes Provider, Historical     Baclofen (LIORESAL) 5 MG tablet Take 5 mg by mouth 3 times daily 7/5/2023 Yes Unknown, Entered By History     calcium carbonate (TUMS) 500 MG chewable tablet Take 1 chew tab by mouth 2 times daily 7/5/2023 Yes Unknown, Entered By History     calcium polycarbophil (FIBER-LAX) 625 mg tablet [CALCIUM POLYCARBOPHIL (FIBER-LAX) 625 MG TABLET] Take 1 tablet (625 mg total) by mouth daily. 7/5/2023 Yes Vicente Elizabeth MD     cetirizine (ZYRTEC) 10 MG tablet Take 10 mg by mouth daily 7/5/2023 Yes Unknown, Entered By History     clonazePAM (KLONOPIN) 0.5 MG tablet [CLONAZEPAM (KLONOPIN) 0.5 MG TABLET] TAKE 1 TABLET BY MOUTH EVERY TWELVE HOURS 7/5/2023 Yes Clara  MD Vicente     divalproex (DEPAKOTE SPRINKLE) 125 mg capsule [DIVALPROEX (DEPAKOTE SPRINKLE) 125 MG CAPSULE] 1 CAP BY MOUTH TWICE DAILY 7/5/2023 Yes Vicente Elizabeth MD     fluticasone (FLONASE) 50 MCG/ACT nasal spray Spray 2 sprays into both nostrils daily 7/5/2023 Yes Unknown, Entered By History     gabapentin (NEURONTIN) 100 MG capsule Take 100 mg by mouth 3 times daily 7/5/2023 Yes Unknown, Entered By History Yes    lactulose (GENERLAC) 10 gram/15 mL solution [LACTULOSE (GENERLAC) 10 GRAM/15 ML SOLUTION] Take 15 mL (10 g total) by mouth daily. 7/5/2023 Yes Vicente Elizabeth MD     levothyroxine (SYNTHROID/LEVOTHROID) 75 MCG tablet Take 75 mcg by mouth daily 7/5/2023 Yes Unknown, Entered By History     lidocaine (LMX4) 4 % external cream Apply topically 2 times daily Apply to feet for neuropathy 7/5/2023 Yes Unknown, Entered By History     meclizine (ANTIVERT) 25 MG tablet Take 25 mg by mouth 3 times daily as needed for dizziness Unknown Yes Unknown, Entered By History     PARoxetine (PAXIL) 20 MG tablet [PAROXETINE (PAXIL) 20 MG TABLET] TAKE 3 TABS (60MG) BY MOUTH DAILY  Patient taking differently: Take 50 mg by mouth daily 7/5/2023 Yes Vicente Elizabeth MD Yes    Vitamin D3 (CHOLECALCIFEROL) 125 MCG (5000 UT) tablet Take 125 mcg by mouth daily 7/5/2023 Yes Unknown, Entered By History

## 2023-07-06 NOTE — ED TRIAGE NOTES
Pt here with friends for c/o increased fatigue, leg swelling, and increased pain to lower legs and back. Pt is from an assisted living facility. Pt/family then stated she has not been eating as well. Hx of MS.      Triage Assessment       Row Name 07/05/23 1943       Triage Assessment (Adult)    Airway WDL WDL       Respiratory WDL    Respiratory WDL WDL

## 2023-07-07 ENCOUNTER — APPOINTMENT (OUTPATIENT)
Dept: SPEECH THERAPY | Facility: HOSPITAL | Age: 63
End: 2023-07-07
Attending: INTERNAL MEDICINE
Payer: COMMERCIAL

## 2023-07-07 ENCOUNTER — APPOINTMENT (OUTPATIENT)
Dept: PHYSICAL THERAPY | Facility: HOSPITAL | Age: 63
End: 2023-07-07
Payer: COMMERCIAL

## 2023-07-07 PROBLEM — N30.00 ACUTE CYSTITIS: Status: RESOLVED | Noted: 2017-09-29 | Resolved: 2023-07-07

## 2023-07-07 PROBLEM — D69.6 THROMBOCYTOPENIA (H): Status: RESOLVED | Noted: 2022-08-25 | Resolved: 2023-07-07

## 2023-07-07 PROBLEM — S33.5XXA LUMBAR SPRAIN: Status: RESOLVED | Noted: 2022-08-25 | Resolved: 2023-07-07

## 2023-07-07 PROBLEM — R63.4 ABNORMAL WEIGHT LOSS: Status: RESOLVED | Noted: 2022-08-25 | Resolved: 2023-07-07

## 2023-07-07 PROBLEM — K59.00 CONSTIPATION: Status: RESOLVED | Noted: 2022-08-25 | Resolved: 2023-07-07

## 2023-07-07 PROCEDURE — 99223 1ST HOSP IP/OBS HIGH 75: CPT | Performed by: PSYCHIATRY & NEUROLOGY

## 2023-07-07 PROCEDURE — G0378 HOSPITAL OBSERVATION PER HR: HCPCS

## 2023-07-07 PROCEDURE — 97530 THERAPEUTIC ACTIVITIES: CPT | Mod: GP

## 2023-07-07 PROCEDURE — 250N000013 HC RX MED GY IP 250 OP 250 PS 637

## 2023-07-07 PROCEDURE — 250N000013 HC RX MED GY IP 250 OP 250 PS 637: Performed by: INTERNAL MEDICINE

## 2023-07-07 PROCEDURE — 99232 SBSQ HOSP IP/OBS MODERATE 35: CPT | Performed by: INTERNAL MEDICINE

## 2023-07-07 PROCEDURE — 92610 EVALUATE SWALLOWING FUNCTION: CPT | Mod: GN

## 2023-07-07 PROCEDURE — 96372 THER/PROPH/DIAG INJ SC/IM: CPT | Performed by: INTERNAL MEDICINE

## 2023-07-07 PROCEDURE — 96361 HYDRATE IV INFUSION ADD-ON: CPT

## 2023-07-07 PROCEDURE — 250N000011 HC RX IP 250 OP 636: Mod: JZ | Performed by: INTERNAL MEDICINE

## 2023-07-07 PROCEDURE — 97110 THERAPEUTIC EXERCISES: CPT | Mod: GP

## 2023-07-07 RX ORDER — ENOXAPARIN SODIUM 100 MG/ML
40 INJECTION SUBCUTANEOUS EVERY 24 HOURS
Status: DISCONTINUED | OUTPATIENT
Start: 2023-07-07 | End: 2023-07-08 | Stop reason: HOSPADM

## 2023-07-07 RX ORDER — ACETAMINOPHEN 325 MG/10.15ML
975 LIQUID ORAL EVERY 8 HOURS
Status: DISCONTINUED | OUTPATIENT
Start: 2023-07-07 | End: 2023-07-08 | Stop reason: HOSPADM

## 2023-07-07 RX ADMIN — DIVALPROEX SODIUM 125 MG: 125 CAPSULE, COATED PELLETS ORAL at 09:53

## 2023-07-07 RX ADMIN — CLONAZEPAM 0.5 MG: 0.5 TABLET ORAL at 20:55

## 2023-07-07 RX ADMIN — ACETAMINOPHEN 975 MG: 325 SOLUTION ORAL at 03:36

## 2023-07-07 RX ADMIN — FLUTICASONE PROPIONATE 2 SPRAY: 50 SPRAY, METERED NASAL at 09:55

## 2023-07-07 RX ADMIN — DIVALPROEX SODIUM 125 MG: 125 CAPSULE, COATED PELLETS ORAL at 20:55

## 2023-07-07 RX ADMIN — GABAPENTIN 100 MG: 100 CAPSULE ORAL at 09:50

## 2023-07-07 RX ADMIN — CLONAZEPAM 0.5 MG: 0.5 TABLET ORAL at 09:50

## 2023-07-07 RX ADMIN — Medication 125 MCG: at 09:49

## 2023-07-07 RX ADMIN — ACETAMINOPHEN 975 MG: 325 SOLUTION ORAL at 20:55

## 2023-07-07 RX ADMIN — ANTACID TABLETS 500 MG: 500 TABLET, CHEWABLE ORAL at 09:50

## 2023-07-07 RX ADMIN — PAROXETINE 50 MG: 40 TABLET, FILM COATED ORAL at 09:51

## 2023-07-07 RX ADMIN — CALCIUM POLYCARBOPHIL 625 MG: 625 TABLET, FILM COATED ORAL at 09:54

## 2023-07-07 RX ADMIN — CETIRIZINE HYDROCHLORIDE 10 MG: 10 TABLET, FILM COATED ORAL at 09:50

## 2023-07-07 RX ADMIN — BACLOFEN 5 MG: 5 TABLET ORAL at 09:51

## 2023-07-07 RX ADMIN — GABAPENTIN 100 MG: 100 CAPSULE ORAL at 20:55

## 2023-07-07 RX ADMIN — ACETAMINOPHEN 975 MG: 325 SOLUTION ORAL at 12:13

## 2023-07-07 RX ADMIN — ENOXAPARIN SODIUM 40 MG: 40 INJECTION SUBCUTANEOUS at 16:56

## 2023-07-07 RX ADMIN — ANTACID TABLETS 500 MG: 500 TABLET, CHEWABLE ORAL at 20:55

## 2023-07-07 RX ADMIN — LACTULOSE 10 G: 10 SOLUTION ORAL at 09:53

## 2023-07-07 RX ADMIN — BACLOFEN 5 MG: 5 TABLET ORAL at 20:55

## 2023-07-07 ASSESSMENT — ACTIVITIES OF DAILY LIVING (ADL)
ADLS_ACUITY_SCORE: 33
ADLS_ACUITY_SCORE: 37
ADLS_ACUITY_SCORE: 33
ADLS_ACUITY_SCORE: 39
ADLS_ACUITY_SCORE: 39
ADLS_ACUITY_SCORE: 33
ADLS_ACUITY_SCORE: 33
ADLS_ACUITY_SCORE: 37
ADLS_ACUITY_SCORE: 39
ADLS_ACUITY_SCORE: 37
ADLS_ACUITY_SCORE: 33
ADLS_ACUITY_SCORE: 39

## 2023-07-07 NOTE — PROGRESS NOTES
[facility]    Medicine Progress Note - Hospitalist Service    Date of Admission:  7/5/2023    Assessment & Plan    63-year-old female with PMH of secondary progressive multiple sclerosis, dementia who was brought into [facility] for evaluation of generalized weakness, fatigue.  MRI brain and spine showed known multiple sclerosis but no active area of demyelination.  No clear evidence of infection or significant metabolic abnormalities    1.  Generalized weakness.  Likely multifactorial, due to progression of her MS; dehydration and recent initiation of baclofen might be contributing.  Continue PT/OT, patient might benefit from TCU    2.  Hypotension on admission.  Suspect due to intravascular volume depletion.  Received IV hydration, will discontinue today and will encourage oral intake    3.  Secondary progressive multiple sclerosis.  No evidence of acute exacerbation.  No indication for immunosuppression.  Continue PTA medications    4.  Possible dysphagia.  Noticed to pocket pills in her mouth.  Ordered SLP evaluation         Diet: Regular Diet Adult    DVT Prophylaxis: Enoxaparin (Lovenox) SQ  Cline Catheter: Not present  Lines: None     Cardiac Monitoring: None  Code Status: No CPR- Do NOT Intubate      Clinically Significant Risk Factors Present on Admission           # Hypercalcemia: corrected calcium is >10.1, will monitor as appropriate    # Hypoalbuminemia: Lowest albumin = 3.3 g/dL at 7/6/2023  7:09 AM, will monitor as appropriate                   Disposition Plan      Expected Discharge Date: 07/08/2023    Discharge Delays: Placement - TCU              Stephanie Car MD  Hospitalist Service  [facility]  Securely message with [employer] (more info)  Text page via [employer] Paging/Directory   ______________________________________________________________________    Interval History   Patient is just waking up  Denies having any significant pain  Not  sure if her weakness is any different today  Nurse noticed pill pocketing in her mouth    Physical Exam   Vital Signs: Temp: 97.9  F (36.6  C) Temp src: Oral BP: 127/62 Pulse: 97   Resp: 18 SpO2: 97 % O2 Device: None (Room air)    Weight: 145 lbs 0 oz    General: No acute distress, laying in bed  Lungs: Respirations unlabored  CV: Regular rate and rhythm.  Normal S1 and S2  Abdomen: Soft and nontender  Extremities: No significant edema  Neuro: Awake and alert, oriented to person only    Medical Decision Making       40 MINUTES SPENT BY ME on the date of service doing chart review, history, exam, documentation & further activities per the note.  MANAGEMENT DISCUSSED with the following over the past 24 hours: Patient, nursing staff, .  Updated Sister Tamiko over the phone       Data         Imaging results reviewed over the past 24 hrs:   No results found for this or any previous visit (from the past 24 hour(s)).

## 2023-07-07 NOTE — PROGRESS NOTES
Care Management Follow Up    Length of Stay (days): 0    Expected Discharge Date: 07/08/2023     Concerns to be Addressed: discharge planning       Patient plan of care discussed at interdisciplinary rounds: yes    Anticipated Discharge Disposition: Transitional Care     Anticipated Discharge Services: Other (see comment) (therapy services)    Anticipated Discharge DME: None    Patient/family educated on Medicare website which has current facility and service quality ratings: yes    Education Provided on the Discharge Plan: Yes    Patient/Family in Agreement with the Plan: yes    Referrals Placed by CM/SW: Post Acute Facilities    Private pay costs discussed: transportation costs     Additional Information: Per hospitalist, pt anticipated to be medically ready tomorrow.  Neurology and speech need to see pt today.      LESLI reviewed pt's chart.  Pt has two co-HCA's (one is sister Tamiko and the other is her friend Rani).  Pt from [facility] nursing [facility] and recommendation for TCU.      LESLI spoke with pt's sister Tamiko regarding discharge planning.  She is in agreement with pt going to TCU - preferably to [facility].  SW to follow up.    LESLI spoke with Giovanna at [facility].  She requested that LESLI paper fax full referral to a different fax number ([phone]).  LESLI faxed referral to Giovanna for review.     LESLI received call back later from Giovanna at [facility] - pt accepted to TCU and can admit tomorrow.  LESLI does not yet know ride time and Giovanna is headed out.  Giovanna will be available tomorrow via pager.  Please page her at [phone] tomorrow to confirm admission and ride time.    LESLI left message for pt's friend Rani regarding [facility] and can she transport and what time.  LESLI requested return call.      Pt's friend Rani called SW back.  Discussed how much assistance pt currently needs for transfers and how she is ambulating.  Rani thinks that wheelchair ride for pt will be safer.   Discussed potential cost to pt for wheelchair ride, and she is okay with SW setting up ride for tomorrow.  Discussed that SW would ask for 1 PM ride time.      SW set up ride for pt via [facility] on 7/8/23, with ride window between 12:38 PM and 1:23 PM.      Needs PAS.      **NEEDS FOLLOW UP ON 7/8: CM to meño Heredia at [facility] to confirm admission and ride time at [phone].**       ALEXUS Shore, LEMUEL 07/07/23 6:17 PM

## 2023-07-07 NOTE — PLAN OF CARE
PRIMARY DIAGNOSIS: GENERALIZED WEAKNESS    OUTPATIENT/OBSERVATION GOALS TO BE MET BEFORE DISCHARGE  1. Orthostatic performed: N/A    2. Tolerating PO medications: Yes    3. Return to near baseline physical activity: No    4. Cleared for discharge by consultants (if involved): No    Discharge Planner Nurse   Safe discharge environment identified: No  Barriers to discharge: Yes- possible TCU placement        Entered by: Ana Mccabe RN 07/07/2023 5:25 PM     Please review provider order for any additional goals.   Nurse to notify provider when observation goals have been met and patient is ready for discharge.Goal Outcome Evaluation:

## 2023-07-07 NOTE — PROGRESS NOTES
PRIMARY DIAGNOSIS: GENERALIZED WEAKNESS    OUTPATIENT/OBSERVATION GOALS TO BE MET BEFORE DISCHARGE  1. Orthostatic performed: No    2. Tolerating PO medications: Yes    3. Return to near baseline physical activity: No    4. Cleared for discharge by consultants (if involved): Yes    Discharge Planner Nurse   Safe discharge environment identified: Yes  Barriers to discharge: Yes       Entered by: Aaron Adkins RN 07/07/2023 5:34 AM     Switched over to liquid Tylenol. Pocketing pills.    Aaron Adkins RN

## 2023-07-07 NOTE — PROGRESS NOTES
PRIMARY DIAGNOSIS: GENERALIZED WEAKNESS    OUTPATIENT/OBSERVATION GOALS TO BE MET BEFORE DISCHARGE  1. Orthostatic performed: No    2. Tolerating PO medications: Yes    3. Return to near baseline physical activity: No    4. Cleared for discharge by consultants (if involved): No    Discharge Planner Nurse   Safe discharge environment identified: Yes  Barriers to discharge: Yes       Entered by: Aaron Adkins RN 07/07/2023 3:51 AM     Aaron Adkins RN

## 2023-07-07 NOTE — PLAN OF CARE
"Goal Outcome Evaluation:      Plan of Care Reviewed With: patient    Overall Patient Progress: improvingOverall Patient Progress: improving    Outcome Evaluation: Pt oriented to self and situation. Dementia at baseline, pleasant. Needs tray set up, possible feeder. [place] intact and adequate output. NS running. Pocketing pills, will need alternate form of medications. VSS. Slept between cares.     /60 (BP Location: Left arm)   Pulse 77   Temp 98  F (36.7  C) (Oral)   Resp 18   Ht 1.702 m (5' 7\")   Wt 65.8 kg (145 lb)   SpO2 98%   BMI 22.71 kg/m      Aaron Adkins RN    "

## 2023-07-07 NOTE — PROGRESS NOTES
"Speech-Language Pathology: Clinical Swallow Evaluation     07/07/23 0900   Appointment Info   Signing Clinician's Name / Credentials (SLP) Therese Crawford MA, CCC-SLP   General Information   Onset of Illness/Injury or Date of Surgery 07/05/23   Referring Physician Stephanie Car MD   Pertinent History of Current Problem Per Dr. Duarte \"Patient is a 63-year-old female with history of seizures, multiple sclerosis who lives in an acute lung facility presented with generalized weakness, fatigue, loss of appetite and decreased p.o. intake.  She has been followed at [facility] and prior to that was being followed at [facility], [facility].  She has a secondary progressive multiple sclerosis and there has been no clear relapsing remitting pattern.  Due to worsening symptoms she had MRI of the brain and complete spine that showed chronic changes due to multiple sclerosis but no acute area of demyelination.  In the past she was on mitoxantrone but has been off immunosuppressive therapy for years.\".   General Observations Patient is awake and cooperative   Type of Evaluation   Type of Evaluation Swallow Evaluation   Oral Motor   Oral Musculature generally intact   Mucosal Quality dry   Dentition (Oral Motor)   Dentition (Oral Motor) natural dentition;adequate dentition   Facial Symmetry (Oral Motor)   Facial Symmetry (Oral Motor) WNL   Lip Function (Oral Motor)   Lip Range of Motion (Oral Motor) WNL   Lip Strength (Oral Motor) WNL   Tongue Function (Oral Motor)   Tongue Strength (Oral Motor) strength decreased  (Difficulty following all instructions.)   Tongue Coordination/Speed (Oral Motor) WNL   Tongue ROM (Oral Motor) WNL   Jaw Function (Oral Motor)   Jaw Function (Oral Motor) WNL   Cough/Swallow/Gag Reflex (Oral Motor)   Soft Palate/Velum (Oral Motor) WNL   Volitional Throat Clear/Cough (Oral Motor) reduced strength   Volitional Swallow (Oral Motor) WNL   Vocal Quality/Secretion Management (Oral " Motor)   Vocal Quality (Oral Motor)   (Decreased volume)   Secretion Management (Oral Motor) WNL   General Swallowing Observations   Past History of Dysphagia None per EMR. Pt reports difficulty with oral transit of pills occasionally at home. She stated that she mostly takes medication with water but will occasionally take it crushed or with puree, however, this is not preferred.   Respiratory Support (General Swallowing Observations) none   Current Diet/Method of Nutritional Intake (General Swallowing Observations, NIS) regular diet;thin liquids (level 0)   Swallowing Evaluation Clinical swallow evaluation   Clinical Swallow Evaluation   Feeding Assistance frequent cues/help required  (Patient reports independence with eating at home. Current difficulty related to UE weakness.)   Clinical Swallow Evaluation Textures Trialed thin liquids;solid foods   Clinical Swallow Eval: Thin Liquid Texture Trial   Mode of Presentation, Thin Liquids straw   Volume of Liquid or Food Presented x8oz   Oral Phase of Swallow WFL   Pharyngeal Phase of Swallow throat clearing   Diagnostic Statement Throat clearing x1 with pt extending head back. Pillow provided and cues to hold head upright. No other s/s aspiration.   Clinical Swallow Evaluation: Solid Food Texture Trial   Mode of Presentation fed by clinician   Volume Presented x2 crackers   Oral Phase WFL   Pharyngeal Phase intact   Diagnostic Statement No pocketing, trace diffuse oral residue clears with liquid wash.   Esophageal Phase of Swallow   Patient reports or presents with symptoms of esophageal dysphagia No   Swallowing Recommendations   Diet Consistency Recommendations regular diet;thin liquids (level 0)   Swallowing Maneuver Recommendations alternate food and liquid intake   Monitoring/Assistance Required (Eating/Swallowing) monitor for cough or change in vocal quality with intake   Recommended Feeding/Eating Techniques (Swallow Eval) maintain upright sitting position for  eating;provide assist with feeding   Medication Administration Recommendations, Swallowing (SLP) One at a time whole or crushed   Instrumental Assessment Recommendations instrumental evaluation not recommended at this time   Comment, Swallowing Recommendations Patient appears to be at low aspiration risk when properly positioned.   General Therapy Interventions   Planned Therapy Interventions Dysphagia Treatment   Dysphagia treatment Instruction of safe swallow strategies   Clinical Impression   Criteria for Skilled Therapeutic Interventions Met (SLP Eval) Yes, treatment indicated   Risks & Benefits of therapy have been explained evaluation/treatment results reviewed;care plan/treatment goals reviewed;participants included;patient   Clinical Impression Comments Clinical Swallow Evaluation completed. Patient had throat clearing x1 when pt attempted to swallow liquids with head extended back. No s/s aspiration after cues to hold head upright when swallowing and additional pillow provided for support. Oral motor function was generally intact. Mastication was slow but functional. Hyolaryngeal elevation appears present upon visualization and palpation. Recommend diet of regular and thin with strategies of upright to 90 degrees and 1:1 assist with meals until overall strength improves and pt is able to self feed. SLP to follow x1-2.   SLP Total Evaluation Time   Eval: oral/pharyngeal swallow function, clinical swallow Minutes (06343) 25   SLP Goals   Therapy Frequency (SLP Eval) 3 times/wk   SLP Discharge Planning   SLP Plan f/u 1-2 regarding diet tolerance and pills   SLP Rationale for DC Rec No anticipated SLP needs at d/c   SLP Brief overview of current status  Recommend diet of regular and thin with strategies of upright to 90 degrees and 1:1 assist with meals until overall strength improves and pt is able to self feed. SLP to follow x1-2.                                                                                 [facility]      OUTPATIENT SPEECH LANGUAGE PATHOLOGY EVALUATION  PLAN OF TREATMENT FOR OUTPATIENT REHABILITATION  (COMPLETE FOR INITIAL CLAIMS ONLY)  Patient's Last Name, First Name, M.I.  YOB: 1960  Karolyn Das                        Provider's Name  [facility] Medical Record No.  [id number]                               Onset Date:  07/05/23  Start of Care Date:      Type:     ___PT   ___OT   _X_SLP Medical Diagnosis:            SLP Diagnosis:     Visits from SOC:  1   ________________________________________________________________  Plan of Treatment/Functional Goals    Planned Interventions:   Dysphagia Treatment       Instruction of safe swallow strategies        Goals: See Speech Language Pathology Goals on Care Plan in [facility] electronic health record.    Therapy Frequency:3 times/wk   Predicted Duration of Therapy Intervention: 07/14/23  ________________________________________________________________________________    I CERTIFY THE NEED FOR THESE SERVICES FURNISHED UNDER        THIS PLAN OF TREATMENT AND WHILE UNDER MY CARE     (Physician attestation of this document indicates review and certification of the therapy plan).                       Referring Physician: Stephanie Car MD           Initial Assessment        See Speech Language Pathology documentation in Epic electronic health record, evaluation dated

## 2023-07-07 NOTE — CONSULTS
"NEUROLOGY INPATIENT CONSULTATION NOTE       [facility]  [address] [place] [address]  Tel: [phone]  Fax: [phone]  [email or web address]     Karolyn Das,  1960, MRN [id number]  PCP: Vicente Elizabeth  Date: 2023     ASSESSMENT & PLAN     Diagnosis code: Multiple sclerosis    Secondary progressive multiple sclerosis  63-year-old female with history of \"seizures\", secondary progressive multiple sclerosis admitted with worsening generalized weakness  MRI brain and complete spine shows changes due to multiple sclerosis but no new area of demyelination  Patient was on mitoxantrone in the past but has been off any immunosuppressive therapy for years.  Recent studies have suggested no need for immunosuppressive therapy after age 60  Lab work includes normal folate, sed rate, ammonia, B12, TSH, CBC and magnesium  Patient likely had transient worsening of her weakness due to UTI.  Transient worsening of MS symptoms are usually seen with fever, UTI.  Patient appears to be at her baseline and no further intervention is needed from neurology standpoint.  I will sign off.    Thank you again for this referral, please feel free to contact me if you have any questions.    Sumit Duarte MD  [facility]  (Formerly, [facility]     CHIEF COMPLAINT Secondary progressive multiple sclerosis (H)     HISTORY OF PRESENT ILLNESS     We have been requested by Dr. Car to evaluate Karolyn Das who is a 63 year old  female for multiple sclerosis    Patient is a 63-year-old female with history of seizures, multiple sclerosis who lives in an acute lung facility presented with generalized weakness, fatigue, loss of appetite and decreased p.o. intake.  She has been followed at [facility] and prior to that was being followed at [facility]  She has a secondary progressive " multiple sclerosis and there has been no clear relapsing remitting pattern.  Due to worsening symptoms she had MRI of the brain and complete spine that showed chronic changes due to multiple sclerosis but no acute area of demyelination.  In the past she was on mitoxantrone but has been off immunosuppressive therapy for years.     PROBLEM LIST      Patient Active Problem List   Diagnosis Code     Advance Care Planning Z71.89     Benign neoplasm of colon D12.6     Cognitive impairment R41.89     Epileptic seizure (H) G40.909     Galactorrhea not associated with childbirth N64.3     H/O: hysterectomy Z90.710     Hypothyroidism E03.9     Incontinence of feces, unspecified fecal incontinence type R15.9     Lack of coordination R27.9     Mood disorder in conditions classified elsewhere F06.30     Multiple allergies Z88.9     Secondary progressive multiple sclerosis (H) G35     Onychomycosis B35.1     Osteoporosis M81.0     Other quadriplegia and quadriparesis G82.50     Overactive bladder N32.81     Seborrheic keratosis L82.1     Generalized weakness R53.1      Clinically Significant Risk Factors Present on Admission           # Hypercalcemia: corrected calcium is >10.1, will monitor as appropriate    # Hypoalbuminemia: Lowest albumin = 3.3 g/dL at 7/6/2023  7:09 AM, will monitor as appropriate                   PAST MEDICAL & SURGICAL HISTORY     Past Medical History: Patient  has no past medical history on file.    Past Surgical History: She  has a past surgical history that includes APPENDECTOMY; TOTAL ABDOM HYSTERECTOMY; TOTAL ABDOM HYSTERECTOMY; APPENDECTOMY; TOTAL ABDOM HYSTERECTOMY; Hysterectomy (1998); and Oophorectomy (1998).     SOCIAL HISTORY     Reviewed, and she  reports that she has never smoked. She has never used smokeless tobacco.     FAMILY HISTORY     Reviewed, and family history includes Breast Cancer in her mother and sister; Coronary Artery Disease in her mother; Diabetes Type 2  in her mother;  Hypertension in her mother.     ALLERGIES     No Known Allergies     REVIEW OF SYSTEMS     Pertinent items are noted in HPI.     HOME & HOSPITAL MEDICATIONS     Prior to Admission Medications  Medications Prior to Admission   Medication Sig Dispense Refill Last Dose     acetaminophen (TYLENOL) 500 MG tablet Take 1,000 mg by mouth every 8 hours   7/5/2023     Baclofen (LIORESAL) 5 MG tablet Take 5 mg by mouth 3 times daily   7/5/2023     calcium carbonate (TUMS) 500 MG chewable tablet Take 1 chew tab by mouth 2 times daily   7/5/2023     calcium polycarbophil (FIBER-LAX) 625 mg tablet [CALCIUM POLYCARBOPHIL (FIBER-LAX) 625 MG TABLET] Take 1 tablet (625 mg total) by mouth daily. 90 tablet 9 7/5/2023     cetirizine (ZYRTEC) 10 MG tablet Take 10 mg by mouth daily   7/5/2023     clonazePAM (KLONOPIN) 0.5 MG tablet [CLONAZEPAM (KLONOPIN) 0.5 MG TABLET] TAKE 1 TABLET BY MOUTH EVERY TWELVE HOURS 56 tablet 2 7/5/2023     divalproex (DEPAKOTE SPRINKLE) 125 mg capsule [DIVALPROEX (DEPAKOTE SPRINKLE) 125 MG CAPSULE] 1 CAP BY MOUTH TWICE DAILY 60 capsule 11 7/5/2023     fluticasone (FLONASE) 50 MCG/ACT nasal spray Spray 2 sprays into both nostrils daily   7/5/2023     gabapentin (NEURONTIN) 100 MG capsule Take 100 mg by mouth 3 times daily   7/5/2023     lactulose (GENERLAC) 10 gram/15 mL solution [LACTULOSE (GENERLAC) 10 GRAM/15 ML SOLUTION] Take 15 mL (10 g total) by mouth daily. 473 mL 10 7/5/2023     levothyroxine (SYNTHROID/LEVOTHROID) 75 MCG tablet Take 75 mcg by mouth daily   7/5/2023     lidocaine (LMX4) 4 % external cream Apply topically 2 times daily Apply to feet for neuropathy   7/5/2023     meclizine (ANTIVERT) 25 MG tablet Take 25 mg by mouth 3 times daily as needed for dizziness   Unknown     PARoxetine (PAXIL) 20 MG tablet [PAROXETINE (PAXIL) 20 MG TABLET] TAKE 3 TABS (60MG) BY MOUTH DAILY (Patient taking differently: Take 50 mg by mouth daily) 270 tablet 3 7/5/2023     Vitamin D3 (CHOLECALCIFEROL) 125 MCG (5000  UT) tablet Take 125 mcg by mouth daily   7/5/2023       Hospital Medications    acetaminophen  975 mg Oral Q8H     Baclofen  5 mg Oral TID     calcium carbonate  500 mg Oral BID     calcium polycarbophil  625 mg Oral Daily     cetirizine  10 mg Oral Daily     clonazePAM  0.5 mg Oral BID     divalproex sodium delayed-release  125 mg Oral Q12H BRUNO (08/20)     fluticasone  2 spray Both Nostrils Daily     gabapentin  100 mg Oral TID     lactulose  10 g Oral Daily     levothyroxine  75 mcg Oral QAM AC     lidocaine   Topical BID     PARoxetine  50 mg Oral Daily     sodium chloride (PF)  3 mL Intracatheter Q8H     Vitamin D3  125 mcg Oral Daily        PHYSICAL EXAM     Vital signs  Temp:  [97.7  F (36.5  C)-98  F (36.7  C)] 97.9  F (36.6  C)  Pulse:  [69-97] 97  Resp:  [16-18] 18  BP: (117-137)/(57-62) 127/62  SpO2:  [97 %-99 %] 97 %    Weight:   Wt Readings from Last 1 Encounters:   07/05/23 65.8 kg (145 lb)        General Physical Exam: Patient is alert and oriented x 2. Vital signs were reviewed and are documented in EMR. Neck was supple, no carotid bruit, thyromegaly, JVD or lymphadenopathy noted.  Neurological Exam:  Patient is alert and oriented she knows the date and month but unable to do serial 7.  She can tell me the name of the president.  Pupil equal round and reactive no internuclear ophthalmoplegia no facial asymmetry she has proximal weakness in the legs distally 5 -/5 reflexes 1+ on the left trace positive on the right toes equivocal.  She has dysmetria left greater than right.  Gait testing deferred     DIAGNOSTIC STUDIES     Pertinent Radiology   Radiology Results: Reviewed impression and images     MRI  HEAD MRI:   1.  Overall, no significant change compared with 12/14/2012.  2.  Confluent diffuse signal hyperintensity primarily in the periventricular white matter as well as central volume loss consistent with history of multiple sclerosis.  3.  No convincing areas of active demyelination.  4.  No  recent infarct, intracranial mass or evidence of intracranial hemorrhage.     CERVICAL SPINE MRI:  1.  No convincing cord signal abnormality or abnormal cord enhancement to suggest a demyelinating process involving the cervical spinal cord.  2.  Degenerative changes most pronounced at C5-C6 and C6-C7 as highlighted above.     THORACIC SPINE MRI:  1.  No convincing cord signal abnormality or abnormal cord enhancement to suggest a demyelinating process involving the thoracic spinal cord.  2.  Right paracentral disc osteophyte complex at T7-T8 results in effacement of the ventral thecal sac and mild contour distortion along the ventral surface of the spinal cord.  3.  No central canal or high-grade foraminal stenosis.  4.  There are innumerable lesions within the liver most likely representing cysts however, some of these lesions are suboptimally evaluated and if not previously characterized, would recommend initial evaluation with ultrasound of the abdomen.     LUMBAR SPINE MRI:  1.  Multilevel degenerative changes without central canal or high-grade foraminal stenosis.  2.  Moderate left L2-L3 facet arthropathy. Some surrounding edema. This may be symptomatic.  3.  Normal appearance of the distal spinal cord and nerve roots of the cauda equina.      Pertinent Labs   Lab Results: Personally Reviewed No results found for this or any previous visit (from the past 24 hour(s)).    Total time spent for face to face visit, reviewing labs/imaging studies, counseling and coordination of care was: 1 Hour 30 Minutes More than 50% of this time was spent on counseling and coordination of care.    This note was dictated using voice recognition software.  Any grammatical or context distortions are unintentional and inherent to the software.

## 2023-07-07 NOTE — PLAN OF CARE
PRIMARY DIAGNOSIS: GENERALIZED WEAKNESS    OUTPATIENT/OBSERVATION GOALS TO BE MET BEFORE DISCHARGE  1. Orthostatic performed: N/A    2. Tolerating PO medications: Yes    3. Return to near baseline physical activity: No    4. Cleared for discharge by consultants (if involved): No    Discharge Planner Nurse   Safe discharge environment identified: Yes  Barriers to discharge: Yes    Possible TCU palcement.         Entered by: Vickie Contreras RN 07/07/2023 4:22 PM     Please review provider order for any additional goals.   Nurse to notify provider when observation goals have been met and patient is ready for discharge.Goal Outcome Evaluation:

## 2023-07-07 NOTE — UTILIZATION REVIEW
Concurrent stay review; Secondary Review Determination     Under the authority of the Utilization Management Committee, the utilization review process indicated a secondary review on the above patient.  The review outcome is based on review of the medical records, discussions with staff, and applying clinical experience noted on the date of the review.          (x) Observation Status Appropriate - Concurrent stay review    RATIONALE FOR DETERMINATION     Ms. Das is a 62 yo female with a PMH of progressive MS and dementia who presents to the ED with generalized weakness.  MRI of the brain without any acute area of demyelination.  No clear evidence of acute infectious process or significant metabolic abnormalities.  She was slightly hypotensive on admission but BP has now normalized with IVF given.  Remaining in the hospital today for safe disposition and TCU placement.       Patient is clinically improving and there is no clear indication to change patient's status to inpatient. The severity of illness, intensity of service provided, expected LOS and risk for adverse outcome make the care appropriate for observation.    The information on this document is developed by the utilization review team in order for the business office to ensure compliance.  This only denotes the appropriateness of proper admission status and does not reflect the quality of care rendered.         The definitions of Inpatient Status and Observation Status used in making the determination above are those provided in the CMS Coverage Manual, Chapter 1 and Chapter 6, section 70.4.          Sincerely,       Ora Alvarenga, DO  Utilization Review  Physician Advisor  [facility].

## 2023-07-07 NOTE — PROGRESS NOTES
PRIMARY DIAGNOSIS: GENERALIZED WEAKNESS    OUTPATIENT/OBSERVATION GOALS TO BE MET BEFORE DISCHARGE  1. Orthostatic performed: No    2. Tolerating PO medications: Yes    3. Return to near baseline physical activity: No    4. Cleared for discharge by consultants (if involved): No    Discharge Planner Nurse   Safe discharge environment identified: Yes  Barriers to discharge: Yes       Entered by: Aaron Adkins RN 07/07/2023 3:49 AM     Difficulty swallowing pills. Not OOB. VSS. Sleeping between cares.    Aaron Adkins RN

## 2023-07-08 VITALS
HEIGHT: 67 IN | HEART RATE: 65 BPM | SYSTOLIC BLOOD PRESSURE: 116 MMHG | TEMPERATURE: 97.8 F | OXYGEN SATURATION: 98 % | RESPIRATION RATE: 16 BRPM | BODY MASS INDEX: 22.76 KG/M2 | WEIGHT: 145 LBS | DIASTOLIC BLOOD PRESSURE: 72 MMHG

## 2023-07-08 PROCEDURE — 250N000013 HC RX MED GY IP 250 OP 250 PS 637: Performed by: INTERNAL MEDICINE

## 2023-07-08 PROCEDURE — G0378 HOSPITAL OBSERVATION PER HR: HCPCS

## 2023-07-08 PROCEDURE — 99239 HOSP IP/OBS DSCHRG MGMT >30: CPT | Performed by: INTERNAL MEDICINE

## 2023-07-08 PROCEDURE — 250N000013 HC RX MED GY IP 250 OP 250 PS 637

## 2023-07-08 RX ORDER — CLONAZEPAM 0.5 MG/1
TABLET ORAL
Qty: 14 TABLET | Refills: 0 | Status: SHIPPED | OUTPATIENT
Start: 2023-07-08

## 2023-07-08 RX ADMIN — PAROXETINE 50 MG: 40 TABLET, FILM COATED ORAL at 09:18

## 2023-07-08 RX ADMIN — ANTACID TABLETS 500 MG: 500 TABLET, CHEWABLE ORAL at 09:17

## 2023-07-08 RX ADMIN — ACETAMINOPHEN 975 MG: 325 SOLUTION ORAL at 11:50

## 2023-07-08 RX ADMIN — ACETAMINOPHEN 975 MG: 325 SOLUTION ORAL at 04:43

## 2023-07-08 RX ADMIN — DIVALPROEX SODIUM 125 MG: 125 CAPSULE, COATED PELLETS ORAL at 09:18

## 2023-07-08 RX ADMIN — LEVOTHYROXINE SODIUM 75 MCG: 0.03 TABLET ORAL at 06:46

## 2023-07-08 RX ADMIN — LACTULOSE 10 G: 10 SOLUTION ORAL at 09:19

## 2023-07-08 RX ADMIN — BACLOFEN 5 MG: 5 TABLET ORAL at 09:18

## 2023-07-08 RX ADMIN — GABAPENTIN 100 MG: 100 CAPSULE ORAL at 09:17

## 2023-07-08 RX ADMIN — CETIRIZINE HYDROCHLORIDE 10 MG: 10 TABLET, FILM COATED ORAL at 09:17

## 2023-07-08 RX ADMIN — FLUTICASONE PROPIONATE 2 SPRAY: 50 SPRAY, METERED NASAL at 09:19

## 2023-07-08 RX ADMIN — CALCIUM POLYCARBOPHIL 625 MG: 625 TABLET, FILM COATED ORAL at 09:18

## 2023-07-08 RX ADMIN — CLONAZEPAM 0.5 MG: 0.5 TABLET ORAL at 09:17

## 2023-07-08 RX ADMIN — LIDOCAINE: 40 CREAM TOPICAL at 09:19

## 2023-07-08 RX ADMIN — Medication 125 MCG: at 09:17

## 2023-07-08 ASSESSMENT — ACTIVITIES OF DAILY LIVING (ADL)
ADLS_ACUITY_SCORE: 39

## 2023-07-08 NOTE — PROGRESS NOTES
Speech Language Therapy Discharge Summary    Reason for therapy discharge:    Met goals; discharging today to TCU.    Progress towards therapy goal(s). See goals on Care Plan in [facility] electronic health record for goal details.  Goals met    Therapy recommendation(s):    No further therapy is recommended.         07/08/23 1145   Appointment Info   Signing Clinician's Name / Credentials (SLP) Dinah Leo MS CCC-SLP   Appointment Canceled Reason (SLP) Other (see Cancel Comments row)  (check in with RN/doing well; d/c in one hour)   SLP Goals   SLP: Safely tolerate diet without signs/symptoms of aspiration Goal Met   SLP Discharge Planning   SLP Rationale for DC Rec No anticipated SLP needs at d/c   SLP Brief overview of current status  Recommend diet of regular and thin with strategies of upright to 90 degrees and 1:1 assist with meals until overall strength improves and pt is able to self feed.

## 2023-07-08 NOTE — PROGRESS NOTES
Care Management Discharge Note    Discharge Date: 07/08/2023     Discharge Disposition: Transitional Care    Discharge Services: Therapy Services    Discharge DME: None    Discharge Transportation: [facility] Wheelchair Transport    Private pay costs discussed: Not applicable    Does the patient's insurance plan have a 3 day qualifying hospital stay waiver?  No    PAS Confirmation Code: [id number]  Patient/family educated on Medicare website which has current facility and service quality ratings: yes    Education Provided on the Discharge Plan: Yes  Persons Notified of Discharge Plans: Patient - Per Team  Patient/Family in Agreement with the Plan: yes    Handoff Referral Completed: Yes    Additional Information:  SW reviewed chart notes, Pt will discharge to TCU at [facility] today. [facility] wheelchair ride scheduled for time window 12:38-1:23pm. PAS completed - code listed above.     [facility] contacted facility staff Giovanna to confirm arrival time via pager [phone]. Discharge orders faxed to [facility] at [phone].    9:53 AM  Received voicemail from friend Rani to confirm discharge plan. Returned contact and confirmed transportation time, Rani will arrive to unit around 12:30pm to assist Pt with gathering final items and belongings.    CHUCHO Reina

## 2023-07-08 NOTE — PLAN OF CARE
PRIMARY DIAGNOSIS: Generalized weakness/fatigue   OUTPATIENT/OBSERVATION GOALS TO BE MET BEFORE DISCHARGE:  1. ADLs back to baseline: No    2. Activity and level of assistance: Up with maximum assistance. Consider SW and/or PT evaluation.     3. Pain status: Improved-controlled with oral pain medications.    4. Return to near baseline physical activity: No     Discharge Planner Nurse   Safe discharge environment identified: Yes  Barriers to discharge: No       Entered by: Faith Toscano RN 07/08/2023 12:52 AM     Please review provider order for any additional goals.   Nurse to notify provider when observation goals have been met and patient is ready for discharge.

## 2023-07-08 NOTE — DISCHARGE SUMMARY
[facility]  Hospitalist Discharge Summary      Date of Admission:  7/5/2023  Date of Discharge:  7/8/2023  1:00 PM  Discharging Provider: Stephanie Car MD  Discharge Service: Hospitalist Service    Discharge Diagnoses   Generalized weakness  Hypotension due to hypovolemia  Secondary progressive multiple sclerosis  Dementia    Clinically Significant Risk Factors          Follow-ups Needed After Discharge   Follow-up Appointments     Follow Up and recommended labs and tests      Follow up with Nursing home physician.  No follow up labs or test are   needed.            Unresulted Labs Ordered in the Past 30 Days of this Admission     No orders found from 6/5/2023 to 7/6/2023.          Discharge Disposition   Discharged to short-term care facility  Condition at discharge: Stable    Hospital Course   Karolyn Das is a 63-year-old female with PMH of secondary progressive multiple sclerosis, dementia who was brought into [facility] for evaluation of generalized weakness, fatigue.  MRI brain and spine showed known multiple sclerosis but no active area of demyelination.  There was no clear evidence of infection or significant metabolic abnormalities.  Suspect generalized weakness and fatigued are multifactorial due to natural progression of MS, dehydration (hypotensive on presentation), possible recent initiation of baclofen.  Patient received IV fluids.  She will be discharged to TCU to continue rehabilitation.      Consultations This Hospital Stay   NEUROLOGY IP CONSULT  OCCUPATIONAL THERAPY ADULT IP CONSULT  PHYSICAL THERAPY ADULT IP CONSULT  CARE MANAGEMENT / SOCIAL WORK IP CONSULT  SPEECH LANGUAGE PATH ADULT IP CONSULT  PHYSICAL THERAPY ADULT IP CONSULT  OCCUPATIONAL THERAPY ADULT IP CONSULT    Code Status   No CPR- Do NOT Intubate    Time Spent on this Encounter   Stephanie MUNOZ MD, personally saw the patient today and spent greater than 30 minutes discharging this  patient.       Stephanie Car MD  [place] [id number] [facility] [address]  Phone: [phone]  Fax: [phone]  ______________________________________________________________________    Physical Exam   Vital Signs: Temp: 97.8  F (36.6  C) Temp src: Oral BP: 116/72 Pulse: 65   Resp: 16 SpO2: 98 % O2 Device: None (Room air)    Weight: 145 lbs 0 oz    General: No acute distress, laying in bed  Lungs: Respirations unlabored  CV: Regular rate and rhythm.  Normal S1 and S2  Abdomen: Soft and nontender  Extremities: No significant edema  Neuro: Awake and alert, oriented to person only       Primary Care Physician   Vicente Elizabeth    Discharge Orders      General info for SNF    Length of Stay Estimate: Short Term Care: Estimated # of Days <30  Condition at Discharge: Improving  Level of care:skilled   Rehabilitation Potential: Good  Admission H&P remains valid and up-to-date: Yes  Recent Chemotherapy: N/A  Use Nursing Home Standing Orders: Yes     Mantoux instructions    Give two-step Mantoux (PPD) Per Facility Policy Yes     Follow Up and recommended labs and tests    Follow up with Nursing home physician.  No follow up labs or test are needed.     Reason for your hospital stay    Generalized weakness, secondary progressive multiple sclerosis     Activity - Up with nursing assistance     No CPR- Do NOT Intubate     Physical Therapy Adult Consult    Evaluate and treat as clinically indicated.    Reason:  weakness     Occupational Therapy Adult Consult    Evaluate and treat as clinically indicated.    Reason:  weakness     Fall precautions     Diet    Follow this diet upon discharge: Orders Placed This Encounter      Regular Diet Adult       Significant Results and Procedures   Most Recent 3 CBC's:Recent Labs   Lab Test 07/06/23  0709 07/05/23  2126 05/03/23  1011   WBC 6.0 7.7 7.4   HGB 12.6 12.9 13.0   MCV 95 95 96   * 164 240     Most Recent 3 BMP's:Recent Labs    Lab Test 07/06/23  0709 07/05/23 2126 05/03/23  1011    144 143   POTASSIUM 4.1 3.9 3.9   CHLORIDE 111* 106 105   CO2 25 29 25   BUN 17.2 23.5* 20.5   CR 0.64 0.81 0.73   ANIONGAP 7 9 13   KRISHNA 8.7* 9.9 9.5   GLC 80 87 65*   ,   Results for orders placed or performed during the hospital encounter of 07/05/23   MR Brain w/o & w Contrast    Narrative    EXAM: MR BRAIN W/O and W CONTRAST, MR LUMBAR SPINE W/O and W CONTRAST, MR THORACIC SPINE W/O and W CONTRAST, MR CERVICAL SPINE W/O and W CONTRAST  LOCATION: [facility]  DATE: 7/6/2023    INDICATION: Possible MS flare generalized weakness.  COMPARISON: MRI brain 12/14/2012.  CONTRAST: 7ml Gadavist.   TECHNIQUE:   1) Routine multiplanar multisequence head MRI without and with intravenous contrast.  2) Routine Cervical Spine MRI without and with IV contrast.  3) Routine Thoracic Spine MRI without and with IV contrast.  4) Routine Lumbar Spine MRI without and with IV contrast.    FINDINGS:  HEAD MRI:  INTRACRANIAL CONTENTS: No acute or subacute infarct. No mass, acute hemorrhage, or extra-axial fluid collections. There is confluent T2 signal hyperintensity in the periventricular white matter with areas of superimposed focal lesions. Overall, the   appearance is not significantly changed compared with 12/14/2012. Mild to moderate generalized cerebral atrophy. No hydrocephalus. Normal position of the cerebellar tonsils. No pathologic enhancement.    SELLA: No abnormality accounting for technique.    OSSEOUS STRUCTURES/SOFT TISSUES: Normal marrow signal. The major intracranial vascular flow voids are maintained.     ORBITS: No abnormality accounting for technique.     SINUSES/MASTOIDS: No paranasal sinus mucosal disease. No middle ear or mastoid effusion.     CERVICAL SPINE:   Normal vertebral body heights, alignment and marrow signal. No abnormal cord signal or abnormal enhancement. No extraspinal abnormality.    At C5-C6, there is a small  left paracentral disc protrusion resulting in effacement of the ventral thecal sac and mild flattening along the ventral surface of the spinal cord. There is also severe right and moderate left foraminal stenosis at this level.   At C6-C7, there is a posterior disc bulge with superimposed small right paracentral disc extrusion. There is effacement of the ventral thecal sac. There is moderate bilateral foraminal stenosis. There are scattered degenerative changes elsewhere without   central canal or high-grade foraminal stenosis.     THORACIC SPINE:  Mild chronic height loss along the superior endplate of T12. Additional thoracic vertebral bodies are normal in height and alignment. No convincing abnormal cord signal or abnormal cord enhancement. There is a right paracentral disc osteophyte complex at   the T7-T8 level resulting in focal effacement of the ventral thecal sac and mild flattening along the ventral surface of the spinal cord. No central canal or high-grade foraminal stenosis. Limited evaluation surrounding soft tissues demonstrates   innumerable lesions within the liver without definite associated enhancement.     LUMBAR SPINE:   Nomenclature is based on 5 lumbar type vertebral bodies. Normal vertebral body heights, alignment and marrow signal. Normal distal spinal cord and cauda equina with conus medullaris at L1. No extraspinal abnormality. Unremarkable visualized bony pelvis.    T12-L1: Normal disc height and signal. No herniation. Mild facet arthropathy. No spinal canal or neural foraminal stenosis.     L1-L2: Normal disc height and signal. No herniation. Mild to moderate facet arthropathy. No spinal canal or neural foraminal stenosis.    L2-L3: Mild loss of disc height with normal disc signal. Mild generalized disc bulge. Moderate facet arthropathy. There is soft tissue edema surrounding the left facet. No central canal stenosis. Mild bilateral foraminal stenosis.     L3-L4: Loss of disc signal and  mild loss of disc height. Generalized disc bulge. Mild facet arthropathy. There is mild central canal stenosis. There is mild to moderate bilateral foraminal stenosis.    L4-L5: Loss of disc signal without loss of disc height. Minimal disc bulging. Mild to moderate facet arthropathy. No central canal stenosis. Mild to moderate right and mild left foraminal stenosis.    L5-S1: Loss of disc signal and mild loss of disc height. No herniation. Mild to moderate facet arthropathy. No central canal stenosis. Mild bilateral foraminal stenosis.      Impression    IMPRESSION:  HEAD MRI:   1.  Overall, no significant change compared with 12/14/2012.  2.  Confluent diffuse signal hyperintensity primarily in the periventricular white matter as well as central volume loss consistent with history of multiple sclerosis.  3.  No convincing areas of active demyelination.  4.  No recent infarct, intracranial mass or evidence of intracranial hemorrhage.    CERVICAL SPINE MRI:  1.  No convincing cord signal abnormality or abnormal cord enhancement to suggest a demyelinating process involving the cervical spinal cord.  2.  Degenerative changes most pronounced at C5-C6 and C6-C7 as highlighted above.    THORACIC SPINE MRI:  1.  No convincing cord signal abnormality or abnormal cord enhancement to suggest a demyelinating process involving the thoracic spinal cord.  2.  Right paracentral disc osteophyte complex at T7-T8 results in effacement of the ventral thecal sac and mild contour distortion along the ventral surface of the spinal cord.  3.  No central canal or high-grade foraminal stenosis.  4.  There are innumerable lesions within the liver most likely representing cysts however, some of these lesions are suboptimally evaluated and if not previously characterized, would recommend initial evaluation with ultrasound of the abdomen.    LUMBAR SPINE MRI:  1.  Multilevel degenerative changes without central canal or high-grade foraminal  stenosis.  2.  Moderate left L2-L3 facet arthropathy. Some surrounding edema. This may be symptomatic.  3.  Normal appearance of the distal spinal cord and nerve roots of the cauda equina.                 MR Cervical Spine w/o & w Contrast    Narrative    EXAM: MR BRAIN W/O and W CONTRAST, MR LUMBAR SPINE W/O and W CONTRAST, MR THORACIC SPINE W/O and W CONTRAST, MR CERVICAL SPINE W/O and W CONTRAST  LOCATION: [facility]  DATE: 7/6/2023    INDICATION: Possible MS flare generalized weakness.  COMPARISON: MRI brain 12/14/2012.  CONTRAST: 7ml Gadavist.   TECHNIQUE:   1) Routine multiplanar multisequence head MRI without and with intravenous contrast.  2) Routine Cervical Spine MRI without and with IV contrast.  3) Routine Thoracic Spine MRI without and with IV contrast.  4) Routine Lumbar Spine MRI without and with IV contrast.    FINDINGS:  HEAD MRI:  INTRACRANIAL CONTENTS: No acute or subacute infarct. No mass, acute hemorrhage, or extra-axial fluid collections. There is confluent T2 signal hyperintensity in the periventricular white matter with areas of superimposed focal lesions. Overall, the   appearance is not significantly changed compared with 12/14/2012. Mild to moderate generalized cerebral atrophy. No hydrocephalus. Normal position of the cerebellar tonsils. No pathologic enhancement.    SELLA: No abnormality accounting for technique.    OSSEOUS STRUCTURES/SOFT TISSUES: Normal marrow signal. The major intracranial vascular flow voids are maintained.     ORBITS: No abnormality accounting for technique.     SINUSES/MASTOIDS: No paranasal sinus mucosal disease. No middle ear or mastoid effusion.     CERVICAL SPINE:   Normal vertebral body heights, alignment and marrow signal. No abnormal cord signal or abnormal enhancement. No extraspinal abnormality.    At C5-C6, there is a small left paracentral disc protrusion resulting in effacement of the ventral thecal sac and mild flattening along  the ventral surface of the spinal cord. There is also severe right and moderate left foraminal stenosis at this level.   At C6-C7, there is a posterior disc bulge with superimposed small right paracentral disc extrusion. There is effacement of the ventral thecal sac. There is moderate bilateral foraminal stenosis. There are scattered degenerative changes elsewhere without   central canal or high-grade foraminal stenosis.     THORACIC SPINE:  Mild chronic height loss along the superior endplate of T12. Additional thoracic vertebral bodies are normal in height and alignment. No convincing abnormal cord signal or abnormal cord enhancement. There is a right paracentral disc osteophyte complex at   the T7-T8 level resulting in focal effacement of the ventral thecal sac and mild flattening along the ventral surface of the spinal cord. No central canal or high-grade foraminal stenosis. Limited evaluation surrounding soft tissues demonstrates   innumerable lesions within the liver without definite associated enhancement.     LUMBAR SPINE:   Nomenclature is based on 5 lumbar type vertebral bodies. Normal vertebral body heights, alignment and marrow signal. Normal distal spinal cord and cauda equina with conus medullaris at L1. No extraspinal abnormality. Unremarkable visualized bony pelvis.    T12-L1: Normal disc height and signal. No herniation. Mild facet arthropathy. No spinal canal or neural foraminal stenosis.     L1-L2: Normal disc height and signal. No herniation. Mild to moderate facet arthropathy. No spinal canal or neural foraminal stenosis.    L2-L3: Mild loss of disc height with normal disc signal. Mild generalized disc bulge. Moderate facet arthropathy. There is soft tissue edema surrounding the left facet. No central canal stenosis. Mild bilateral foraminal stenosis.     L3-L4: Loss of disc signal and mild loss of disc height. Generalized disc bulge. Mild facet arthropathy. There is mild central canal stenosis.  There is mild to moderate bilateral foraminal stenosis.    L4-L5: Loss of disc signal without loss of disc height. Minimal disc bulging. Mild to moderate facet arthropathy. No central canal stenosis. Mild to moderate right and mild left foraminal stenosis.    L5-S1: Loss of disc signal and mild loss of disc height. No herniation. Mild to moderate facet arthropathy. No central canal stenosis. Mild bilateral foraminal stenosis.      Impression    IMPRESSION:  HEAD MRI:   1.  Overall, no significant change compared with 12/14/2012.  2.  Confluent diffuse signal hyperintensity primarily in the periventricular white matter as well as central volume loss consistent with history of multiple sclerosis.  3.  No convincing areas of active demyelination.  4.  No recent infarct, intracranial mass or evidence of intracranial hemorrhage.    CERVICAL SPINE MRI:  1.  No convincing cord signal abnormality or abnormal cord enhancement to suggest a demyelinating process involving the cervical spinal cord.  2.  Degenerative changes most pronounced at C5-C6 and C6-C7 as highlighted above.    THORACIC SPINE MRI:  1.  No convincing cord signal abnormality or abnormal cord enhancement to suggest a demyelinating process involving the thoracic spinal cord.  2.  Right paracentral disc osteophyte complex at T7-T8 results in effacement of the ventral thecal sac and mild contour distortion along the ventral surface of the spinal cord.  3.  No central canal or high-grade foraminal stenosis.  4.  There are innumerable lesions within the liver most likely representing cysts however, some of these lesions are suboptimally evaluated and if not previously characterized, would recommend initial evaluation with ultrasound of the abdomen.    LUMBAR SPINE MRI:  1.  Multilevel degenerative changes without central canal or high-grade foraminal stenosis.  2.  Moderate left L2-L3 facet arthropathy. Some surrounding edema. This may be symptomatic.  3.  Normal  appearance of the distal spinal cord and nerve roots of the cauda equina.                 MR Thoracic Spine w/o & w Contrast    Narrative    EXAM: MR BRAIN W/O and W CONTRAST, MR LUMBAR SPINE W/O and W CONTRAST, MR THORACIC SPINE W/O and W CONTRAST, MR CERVICAL SPINE W/O and W CONTRAST  LOCATION: [facility]  DATE: 7/6/2023    INDICATION: Possible MS flare generalized weakness.  COMPARISON: MRI brain 12/14/2012.  CONTRAST: 7ml Gadavist.   TECHNIQUE:   1) Routine multiplanar multisequence head MRI without and with intravenous contrast.  2) Routine Cervical Spine MRI without and with IV contrast.  3) Routine Thoracic Spine MRI without and with IV contrast.  4) Routine Lumbar Spine MRI without and with IV contrast.    FINDINGS:  HEAD MRI:  INTRACRANIAL CONTENTS: No acute or subacute infarct. No mass, acute hemorrhage, or extra-axial fluid collections. There is confluent T2 signal hyperintensity in the periventricular white matter with areas of superimposed focal lesions. Overall, the   appearance is not significantly changed compared with 12/14/2012. Mild to moderate generalized cerebral atrophy. No hydrocephalus. Normal position of the cerebellar tonsils. No pathologic enhancement.    SELLA: No abnormality accounting for technique.    OSSEOUS STRUCTURES/SOFT TISSUES: Normal marrow signal. The major intracranial vascular flow voids are maintained.     ORBITS: No abnormality accounting for technique.     SINUSES/MASTOIDS: No paranasal sinus mucosal disease. No middle ear or mastoid effusion.     CERVICAL SPINE:   Normal vertebral body heights, alignment and marrow signal. No abnormal cord signal or abnormal enhancement. No extraspinal abnormality.    At C5-C6, there is a small left paracentral disc protrusion resulting in effacement of the ventral thecal sac and mild flattening along the ventral surface of the spinal cord. There is also severe right and moderate left foraminal stenosis at this  level.   At C6-C7, there is a posterior disc bulge with superimposed small right paracentral disc extrusion. There is effacement of the ventral thecal sac. There is moderate bilateral foraminal stenosis. There are scattered degenerative changes elsewhere without   central canal or high-grade foraminal stenosis.     THORACIC SPINE:  Mild chronic height loss along the superior endplate of T12. Additional thoracic vertebral bodies are normal in height and alignment. No convincing abnormal cord signal or abnormal cord enhancement. There is a right paracentral disc osteophyte complex at   the T7-T8 level resulting in focal effacement of the ventral thecal sac and mild flattening along the ventral surface of the spinal cord. No central canal or high-grade foraminal stenosis. Limited evaluation surrounding soft tissues demonstrates   innumerable lesions within the liver without definite associated enhancement.     LUMBAR SPINE:   Nomenclature is based on 5 lumbar type vertebral bodies. Normal vertebral body heights, alignment and marrow signal. Normal distal spinal cord and cauda equina with conus medullaris at L1. No extraspinal abnormality. Unremarkable visualized bony pelvis.    T12-L1: Normal disc height and signal. No herniation. Mild facet arthropathy. No spinal canal or neural foraminal stenosis.     L1-L2: Normal disc height and signal. No herniation. Mild to moderate facet arthropathy. No spinal canal or neural foraminal stenosis.    L2-L3: Mild loss of disc height with normal disc signal. Mild generalized disc bulge. Moderate facet arthropathy. There is soft tissue edema surrounding the left facet. No central canal stenosis. Mild bilateral foraminal stenosis.     L3-L4: Loss of disc signal and mild loss of disc height. Generalized disc bulge. Mild facet arthropathy. There is mild central canal stenosis. There is mild to moderate bilateral foraminal stenosis.    L4-L5: Loss of disc signal without loss of disc  height. Minimal disc bulging. Mild to moderate facet arthropathy. No central canal stenosis. Mild to moderate right and mild left foraminal stenosis.    L5-S1: Loss of disc signal and mild loss of disc height. No herniation. Mild to moderate facet arthropathy. No central canal stenosis. Mild bilateral foraminal stenosis.      Impression    IMPRESSION:  HEAD MRI:   1.  Overall, no significant change compared with 12/14/2012.  2.  Confluent diffuse signal hyperintensity primarily in the periventricular white matter as well as central volume loss consistent with history of multiple sclerosis.  3.  No convincing areas of active demyelination.  4.  No recent infarct, intracranial mass or evidence of intracranial hemorrhage.    CERVICAL SPINE MRI:  1.  No convincing cord signal abnormality or abnormal cord enhancement to suggest a demyelinating process involving the cervical spinal cord.  2.  Degenerative changes most pronounced at C5-C6 and C6-C7 as highlighted above.    THORACIC SPINE MRI:  1.  No convincing cord signal abnormality or abnormal cord enhancement to suggest a demyelinating process involving the thoracic spinal cord.  2.  Right paracentral disc osteophyte complex at T7-T8 results in effacement of the ventral thecal sac and mild contour distortion along the ventral surface of the spinal cord.  3.  No central canal or high-grade foraminal stenosis.  4.  There are innumerable lesions within the liver most likely representing cysts however, some of these lesions are suboptimally evaluated and if not previously characterized, would recommend initial evaluation with ultrasound of the abdomen.    LUMBAR SPINE MRI:  1.  Multilevel degenerative changes without central canal or high-grade foraminal stenosis.  2.  Moderate left L2-L3 facet arthropathy. Some surrounding edema. This may be symptomatic.  3.  Normal appearance of the distal spinal cord and nerve roots of the cauda equina.                 MR Lumbar Spine  w/o & w Contrast    Narrative    EXAM: MR BRAIN W/O and W CONTRAST, MR LUMBAR SPINE W/O and W CONTRAST, MR THORACIC SPINE W/O and W CONTRAST, MR CERVICAL SPINE W/O and W CONTRAST  LOCATION: [facility]  DATE: 7/6/2023    INDICATION: Possible MS flare generalized weakness.  COMPARISON: MRI brain 12/14/2012.  CONTRAST: 7ml Gadavist.   TECHNIQUE:   1) Routine multiplanar multisequence head MRI without and with intravenous contrast.  2) Routine Cervical Spine MRI without and with IV contrast.  3) Routine Thoracic Spine MRI without and with IV contrast.  4) Routine Lumbar Spine MRI without and with IV contrast.    FINDINGS:  HEAD MRI:  INTRACRANIAL CONTENTS: No acute or subacute infarct. No mass, acute hemorrhage, or extra-axial fluid collections. There is confluent T2 signal hyperintensity in the periventricular white matter with areas of superimposed focal lesions. Overall, the   appearance is not significantly changed compared with 12/14/2012. Mild to moderate generalized cerebral atrophy. No hydrocephalus. Normal position of the cerebellar tonsils. No pathologic enhancement.    SELLA: No abnormality accounting for technique.    OSSEOUS STRUCTURES/SOFT TISSUES: Normal marrow signal. The major intracranial vascular flow voids are maintained.     ORBITS: No abnormality accounting for technique.     SINUSES/MASTOIDS: No paranasal sinus mucosal disease. No middle ear or mastoid effusion.     CERVICAL SPINE:   Normal vertebral body heights, alignment and marrow signal. No abnormal cord signal or abnormal enhancement. No extraspinal abnormality.    At C5-C6, there is a small left paracentral disc protrusion resulting in effacement of the ventral thecal sac and mild flattening along the ventral surface of the spinal cord. There is also severe right and moderate left foraminal stenosis at this level.   At C6-C7, there is a posterior disc bulge with superimposed small right paracentral disc extrusion.  There is effacement of the ventral thecal sac. There is moderate bilateral foraminal stenosis. There are scattered degenerative changes elsewhere without   central canal or high-grade foraminal stenosis.     THORACIC SPINE:  Mild chronic height loss along the superior endplate of T12. Additional thoracic vertebral bodies are normal in height and alignment. No convincing abnormal cord signal or abnormal cord enhancement. There is a right paracentral disc osteophyte complex at   the T7-T8 level resulting in focal effacement of the ventral thecal sac and mild flattening along the ventral surface of the spinal cord. No central canal or high-grade foraminal stenosis. Limited evaluation surrounding soft tissues demonstrates   innumerable lesions within the liver without definite associated enhancement.     LUMBAR SPINE:   Nomenclature is based on 5 lumbar type vertebral bodies. Normal vertebral body heights, alignment and marrow signal. Normal distal spinal cord and cauda equina with conus medullaris at L1. No extraspinal abnormality. Unremarkable visualized bony pelvis.    T12-L1: Normal disc height and signal. No herniation. Mild facet arthropathy. No spinal canal or neural foraminal stenosis.     L1-L2: Normal disc height and signal. No herniation. Mild to moderate facet arthropathy. No spinal canal or neural foraminal stenosis.    L2-L3: Mild loss of disc height with normal disc signal. Mild generalized disc bulge. Moderate facet arthropathy. There is soft tissue edema surrounding the left facet. No central canal stenosis. Mild bilateral foraminal stenosis.     L3-L4: Loss of disc signal and mild loss of disc height. Generalized disc bulge. Mild facet arthropathy. There is mild central canal stenosis. There is mild to moderate bilateral foraminal stenosis.    L4-L5: Loss of disc signal without loss of disc height. Minimal disc bulging. Mild to moderate facet arthropathy. No central canal stenosis. Mild to moderate  right and mild left foraminal stenosis.    L5-S1: Loss of disc signal and mild loss of disc height. No herniation. Mild to moderate facet arthropathy. No central canal stenosis. Mild bilateral foraminal stenosis.      Impression    IMPRESSION:  HEAD MRI:   1.  Overall, no significant change compared with 12/14/2012.  2.  Confluent diffuse signal hyperintensity primarily in the periventricular white matter as well as central volume loss consistent with history of multiple sclerosis.  3.  No convincing areas of active demyelination.  4.  No recent infarct, intracranial mass or evidence of intracranial hemorrhage.    CERVICAL SPINE MRI:  1.  No convincing cord signal abnormality or abnormal cord enhancement to suggest a demyelinating process involving the cervical spinal cord.  2.  Degenerative changes most pronounced at C5-C6 and C6-C7 as highlighted above.    THORACIC SPINE MRI:  1.  No convincing cord signal abnormality or abnormal cord enhancement to suggest a demyelinating process involving the thoracic spinal cord.  2.  Right paracentral disc osteophyte complex at T7-T8 results in effacement of the ventral thecal sac and mild contour distortion along the ventral surface of the spinal cord.  3.  No central canal or high-grade foraminal stenosis.  4.  There are innumerable lesions within the liver most likely representing cysts however, some of these lesions are suboptimally evaluated and if not previously characterized, would recommend initial evaluation with ultrasound of the abdomen.    LUMBAR SPINE MRI:  1.  Multilevel degenerative changes without central canal or high-grade foraminal stenosis.  2.  Moderate left L2-L3 facet arthropathy. Some surrounding edema. This may be symptomatic.  3.  Normal appearance of the distal spinal cord and nerve roots of the cauda equina.                   *Note: Due to a large number of results and/or encounters for the requested time period, some results have not been  displayed. A complete set of results can be found in Results Review.       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  Qty: 90 tablet, Refills: 9    Associated Diagnoses: Constipation      cetirizine (ZYRTEC) 10 MG tablet Take 10 mg by mouth daily      divalproex (DEPAKOTE SPRINKLE) 125 mg capsule [DIVALPROEX (DEPAKOTE SPRINKLE) 125 MG CAPSULE] 1 CAP BY MOUTH TWICE DAILY  Qty: 60 capsule, Refills: 11    Associated Diagnoses: Recurrent seizures (H); Epilepsy (H)      fluticasone (FLONASE) 50 MCG/ACT nasal spray Spray 2 sprays into both nostrils daily      gabapentin (NEURONTIN) 100 MG capsule Take 100 mg by mouth 3 times daily      lactulose (GENERLAC) 10 gram/15 mL solution [LACTULOSE (GENERLAC) 10 GRAM/15 ML SOLUTION] Take 15 mL (10 g total) by mouth daily.  Qty: 473 mL, Refills: 10    Associated Diagnoses: Constipation      levothyroxine (SYNTHROID/LEVOTHROID) 75 MCG tablet Take 75 mcg by mouth daily      lidocaine (LMX4) 4 % external cream Apply topically 2 times daily Apply to feet for neuropathy      meclizine (ANTIVERT) 25 MG tablet Take 25 mg by mouth 3 times daily as needed for dizziness      PARoxetine (PAXIL) 20 MG tablet [PAROXETINE (PAXIL) 20 MG TABLET] TAKE 3 TABS  (60MG) BY MOUTH DAILY  Qty: 270 tablet, Refills: 3    Associated Diagnoses: Depression      Vitamin D3 (CHOLECALCIFEROL) 125 MCG (5000 UT) tablet Take 125 mcg by mouth daily           Allergies   No Known Allergies

## 2023-07-08 NOTE — PLAN OF CARE
PRIMARY DIAGNOSIS: Generalized weakness   OUTPATIENT/OBSERVATION GOALS TO BE MET BEFORE DISCHARGE:  1. ADLs back to baseline: No    2. Activity and level of assistance: Up with maximum assistance. Consider SW and/or PT evaluation.     3. Pain status: Improved-controlled with oral pain medications.    4. Return to near baseline physical activity: No     Discharge Planner Nurse   Safe discharge environment identified: Yes  Barriers to discharge: No       Entered by: Faith Toscano RN 07/08/2023 4:07 AM     Please review provider order for any additional goals.   Nurse to notify provider when observation goals have been met and patient is ready for discharge.    Denied pain or nausea overnight, very pleasant this morning, slept between haydee, plan to [facility].     Faith Toscano RN

## 2023-07-08 NOTE — PLAN OF CARE
"PRIMARY DIAGNOSIS: \"GENERIC\" NURSING  OUTPATIENT/OBSERVATION GOALS TO BE MET BEFORE DISCHARGE:  ADLs back to baseline: No    Activity and level of assistance: Up with maximum assistance. Consider SW and/or PT evaluation. (PT following)    Pain status: Improved-controlled with oral pain medications.    Return to near baseline physical activity: No     Discharge Planner Nurse   Safe discharge environment identified: Yes  Barriers to discharge: No       Entered by: Ana Mccabe RN 07/07/2023 10:36 PM     Please review provider order for any additional goals.   Nurse to notify provider when observation goals have been met and patient is ready for discharge.  Goal Outcome Evaluation:  Discharge to TCU tomorrow 7/8                         "

## 2023-07-08 NOTE — PLAN OF CARE
Physical Therapy Discharge Summary    Reason for therapy discharge:    Discharged to transitional care facility.    Progress towards therapy goal(s). See goals on Care Plan in [facility] electronic health record for goal details.  Goals not met.  Barriers to achieving goals:   limited tolerance for therapy and discharge from facility.    Therapy recommendation(s):    Continued therapy is recommended.  Rationale/Recommendations:  PT goals not fully met .

## 2023-07-09 DIAGNOSIS — Z09 HOSPITAL DISCHARGE FOLLOW-UP: ICD-10-CM

## 2023-07-10 NOTE — PROGRESS NOTES
[facility]:    Karolyndylan Das is currently in a transitional care unit after a brief hospitalization. She will be out this week.

## 2023-07-25 ENCOUNTER — HOSPITAL ENCOUNTER (OUTPATIENT)
Dept: REHABILITATION | Facility: CLINIC | Age: 63
Discharge: HOME | End: 2023-07-25
Attending: FAMILY MEDICINE
Payer: MEDICAID

## 2023-07-25 PROCEDURE — S5100 ADULT DAYCARE SERVICES 15MIN: HCPCS

## 2023-07-27 ENCOUNTER — HOSPITAL ENCOUNTER (OUTPATIENT)
Dept: REHABILITATION | Facility: CLINIC | Age: 63
Discharge: HOME | End: 2023-07-27
Attending: FAMILY MEDICINE
Payer: MEDICAID

## 2023-07-27 PROCEDURE — S5100 ADULT DAYCARE SERVICES 15MIN: HCPCS

## 2023-07-31 ENCOUNTER — TELEPHONE (OUTPATIENT)
Dept: FAMILY MEDICINE | Facility: CLINIC | Age: 63
End: 2023-07-31
Payer: COMMERCIAL

## 2023-07-31 NOTE — TELEPHONE ENCOUNTER
Medication Question or Refill    Contacts         Type Contact Phone/Fax    07/31/2023 10:43 AM CDT Phone (Incoming) Rani Bah (Emergency Contact) [phone]            What medication are you calling about (include dose and sig)?: divalproex (DEPAKOTE SPRINKLE) 125 mg capsule     Preferred Pharmacy:   [address]  [address]  [facility] [address]  Phone: [phone] Fax: [phone]      Controlled Substance Agreement on file:   CSA -- Patient Level:    CSA: None found at the patient level.       Who prescribed the medication?: Dr. Elizabeth    Do you need a refill? Yes, No    When did you use the medication last? 7/31/2023    Patient offered an appointment? No    Do you have any questions or concerns?  Yes: Rani Bah (c2c on file) was calling on behalf of the patient. They were wondering why patient was put on medication, when patient was put on medication and if it is still necessary to be on medication. Patient and Rani are going through medication list and wanting to determine if there were any adverse affects with certain medications being taken together. Please advise. Thank you.      Okay to leave a detailed message?: Yes at Other phone number:  Rani Bah [phone]*

## 2023-08-01 ENCOUNTER — HOSPITAL ENCOUNTER (OUTPATIENT)
Dept: REHABILITATION | Facility: CLINIC | Age: 63
Discharge: HOME | End: 2023-08-01
Attending: FAMILY MEDICINE
Payer: MEDICAID

## 2023-08-01 PROCEDURE — S5100 ADULT DAYCARE SERVICES 15MIN: HCPCS

## 2023-08-03 ENCOUNTER — HOSPITAL ENCOUNTER (OUTPATIENT)
Dept: REHABILITATION | Facility: CLINIC | Age: 63
Discharge: HOME | End: 2023-08-03
Attending: FAMILY MEDICINE
Payer: MEDICAID

## 2023-08-03 PROCEDURE — S5100 ADULT DAYCARE SERVICES 15MIN: HCPCS

## 2023-08-03 NOTE — TELEPHONE ENCOUNTER
Called pt., spoke to Rani (pt's advocate), verbalize understanding of why pt. has to take Depakote. Will consult with next Neurologist visit about medication. Pt advocate did mention concern about chronic headache, but state could be due to multiple reasons such as taking too many medication. Advise to make appointment to come in and talk to Pharmacist for medication management. Pt. Advocate will discuss with pt. and is considering the idea of coming in to see the pharmacist for med. management.     Orlando Card, MSN, RN   [facility]

## 2023-08-03 NOTE — TELEPHONE ENCOUNTER
That is a complicated question based on the fact that she is on a number of medications that can have some side effects and interactions.    If there are some symptoms she is concerned about?    If not, to get an answer to that question, I might have her see our pharmacist to go through medications one by one.

## 2023-08-08 ENCOUNTER — HOSPITAL ENCOUNTER (OUTPATIENT)
Dept: REHABILITATION | Facility: CLINIC | Age: 63
Discharge: HOME | End: 2023-08-08
Attending: FAMILY MEDICINE
Payer: MEDICAID

## 2023-08-08 PROCEDURE — S5100 ADULT DAYCARE SERVICES 15MIN: HCPCS

## 2023-08-09 ENCOUNTER — HOSPITAL ENCOUNTER (EMERGENCY)
Facility: HOSPITAL | Age: 63
Discharge: HOME | End: 2023-08-09
Attending: EMERGENCY MEDICINE | Admitting: EMERGENCY MEDICINE
Payer: COMMERCIAL

## 2023-08-09 VITALS
RESPIRATION RATE: 16 BRPM | TEMPERATURE: 97.8 F | OXYGEN SATURATION: 100 % | DIASTOLIC BLOOD PRESSURE: 69 MMHG | SYSTOLIC BLOOD PRESSURE: 148 MMHG | HEART RATE: 68 BPM

## 2023-08-09 DIAGNOSIS — H11.31 SUBCONJUNCTIVAL HEMORRHAGE OF RIGHT EYE: ICD-10-CM

## 2023-08-09 DIAGNOSIS — L03.115 CELLULITIS OF RIGHT FOOT: ICD-10-CM

## 2023-08-09 LAB
ANION GAP SERPL CALCULATED.3IONS-SCNC: 11 MMOL/L (ref 7–15)
BASOPHILS # BLD AUTO: 0 10E3/UL (ref 0–0.2)
BASOPHILS NFR BLD AUTO: 1 %
BUN SERPL-MCNC: 15.9 MG/DL (ref 8–23)
CALCIUM SERPL-MCNC: 9.2 MG/DL (ref 8.8–10.2)
CHLORIDE SERPL-SCNC: 104 MMOL/L (ref 98–107)
CREAT SERPL-MCNC: 0.73 MG/DL (ref 0.51–0.95)
CRP SERPL-MCNC: <3 MG/L
DEPRECATED HCO3 PLAS-SCNC: 27 MMOL/L (ref 22–29)
EOSINOPHIL # BLD AUTO: 0.1 10E3/UL (ref 0–0.7)
EOSINOPHIL NFR BLD AUTO: 1 %
ERYTHROCYTE [DISTWIDTH] IN BLOOD BY AUTOMATED COUNT: 13.3 % (ref 10–15)
GFR SERPL CREATININE-BSD FRML MDRD: >90 ML/MIN/1.73M2
GLUCOSE SERPL-MCNC: 109 MG/DL (ref 70–99)
HCT VFR BLD AUTO: 42.1 % (ref 35–47)
HGB BLD-MCNC: 13.7 G/DL (ref 11.7–15.7)
IMM GRANULOCYTES # BLD: 0 10E3/UL
IMM GRANULOCYTES NFR BLD: 0 %
INR PPP: 1.02 (ref 0.85–1.15)
LYMPHOCYTES # BLD AUTO: 1.7 10E3/UL (ref 0.8–5.3)
LYMPHOCYTES NFR BLD AUTO: 28 %
MCH RBC QN AUTO: 30.4 PG (ref 26.5–33)
MCHC RBC AUTO-ENTMCNC: 32.5 G/DL (ref 31.5–36.5)
MCV RBC AUTO: 94 FL (ref 78–100)
MONOCYTES # BLD AUTO: 0.5 10E3/UL (ref 0–1.3)
MONOCYTES NFR BLD AUTO: 8 %
NEUTROPHILS # BLD AUTO: 3.7 10E3/UL (ref 1.6–8.3)
NEUTROPHILS NFR BLD AUTO: 62 %
NRBC # BLD AUTO: 0 10E3/UL
NRBC BLD AUTO-RTO: 0 /100
PLATELET # BLD AUTO: 143 10E3/UL (ref 150–450)
POTASSIUM SERPL-SCNC: 4.2 MMOL/L (ref 3.4–5.3)
RBC # BLD AUTO: 4.5 10E6/UL (ref 3.8–5.2)
SODIUM SERPL-SCNC: 142 MMOL/L (ref 136–145)
WBC # BLD AUTO: 5.9 10E3/UL (ref 4–11)

## 2023-08-09 PROCEDURE — 80048 BASIC METABOLIC PNL TOTAL CA: CPT | Performed by: EMERGENCY MEDICINE

## 2023-08-09 PROCEDURE — 36415 COLL VENOUS BLD VENIPUNCTURE: CPT | Performed by: EMERGENCY MEDICINE

## 2023-08-09 PROCEDURE — 99283 EMERGENCY DEPT VISIT LOW MDM: CPT

## 2023-08-09 PROCEDURE — 250N000013 HC RX MED GY IP 250 OP 250 PS 637: Performed by: EMERGENCY MEDICINE

## 2023-08-09 PROCEDURE — 85610 PROTHROMBIN TIME: CPT | Performed by: EMERGENCY MEDICINE

## 2023-08-09 PROCEDURE — 86140 C-REACTIVE PROTEIN: CPT | Performed by: EMERGENCY MEDICINE

## 2023-08-09 PROCEDURE — 85025 COMPLETE CBC W/AUTO DIFF WBC: CPT | Performed by: EMERGENCY MEDICINE

## 2023-08-09 RX ORDER — CEPHALEXIN 500 MG/1
500 CAPSULE ORAL ONCE
Status: COMPLETED | OUTPATIENT
Start: 2023-08-09 | End: 2023-08-09

## 2023-08-09 RX ORDER — CEPHALEXIN 500 MG/1
500 CAPSULE ORAL 4 TIMES DAILY
Qty: 28 CAPSULE | Refills: 0 | Status: SHIPPED | OUTPATIENT
Start: 2023-08-09 | End: 2023-08-16

## 2023-08-09 RX ADMIN — CEPHALEXIN 500 MG: 500 CAPSULE ORAL at 11:57

## 2023-08-09 ASSESSMENT — ACTIVITIES OF DAILY LIVING (ADL): ADLS_ACUITY_SCORE: 35

## 2023-08-09 NOTE — ED PROVIDER NOTES
Emergency Department Encounter     Evaluation Date & Time:   No admission date for patient encounter.    CHIEF COMPLAINT:  Shingles      Triage Note:       Impression and Plan       FINAL IMPRESSION:    ICD-10-CM    1. Cellulitis of right foot  L03.115       2. Subconjunctival hemorrhage of right eye  H11.31             ED COURSE & MEDICAL DECISION MAKIN:54 AM On re-examination after feet elevated, left foot without erythema. Right foot with only mild blanching erythema, much improved compared to initial evaluation, however given persistent mild blanching erythema, will treat for probable mild cellulitis.       63 year old female, history of MS with quadriparesis, seizure disorder (patient and friend deny history of seizures) and hypothyroidism, who presents from her Assisted Living facility (memory care unit) for evaluation of bipedal erythema, first noted today. She also has redness to her eyes. She was diagnosed with shingles yesterday for which she started valacyclovir today.    Patient reports some mild pain to BL feet. She otherwise denies shortness of breath, fevers or other concerns.     On exam, both feet have blanching erythema, warmth to touch with mild swelling and mild tenderness to palpation (R > L). CMS intact. Exam concerning for cellulitis, BL feet.    Patient with subconjunctival hemorrhage right lateral eye; she is not anticoagulated.     IV access established and screening labs performed, remarkable for no leukocytosis (WBC 5.9) with normal CRP (<3).  She has no anemia, significant electrolyte derangements or renal impairment.  INR WNL.    On reassessment, patient has had both feet elevated and the erythema has significantly improved. There remains mild blanching erythema to the right foot still concerning for mild cellulitis - will treat with antibiotics. Patient given po dose of Keflex.    Patient discharged to home with follow-up with her PCP in 2 days for a recheck.  She was given a  prescription for Keflex.  Return precautions provided.  Patient stable throughout ED course.      At the conclusion of the encounter I discussed the results of all the tests and the disposition. The questions were answered. The patient acknowledged understanding and was agreeable with the care plan.    Medical Decision Making    History:    Supplemental history from: Documented in chart, if applicable RN at care facility - SEE BELOW    External Record(s) reviewed: Documented in chart, if applicable.    Work Up:    Chart documentation includes differential considered and any EKGs or imaging independently interpreted by provider, where specified.    In additional to work up documented, I considered the following work up: Documented in chart, if applicable.    External consultation:    Discussion of management with another provider: Documented in chart, if applicable    Complicating factors:    Care impacted by chronic illness: Other: MS    Care affected by social determinants of health: N/A    Disposition considerations: Discharge. I prescribed additional prescription strength medication(s) as charted. I considered admission, but ultimately discharged patient given reassuring evaluation.        MEDICATIONS GIVEN IN THE EMERGENCY DEPARTMENT:  Medications   cephALEXin (KEFLEX) capsule 500 mg (500 mg Oral $Given 8/9/23 1157)       NEW PRESCRIPTIONS STARTED AT TODAY'S ED VISIT:  Discharge Medication List as of 8/9/2023 12:14 PM      START taking these medications    Details   cephALEXin (KEFLEX) 500 MG capsule Take 1 capsule (500 mg) by mouth 4 times daily for 7 days, Disp-28 capsule, R-0, Local Print             HPI     HPI     Karolyn Das is a 63 year old female, history of MS with quadriparesis, seizure disorder (patient and friend deny history of seizures) and hypothyroidism, who presents to this ED with a friend from her Assisted Living facility (memory care unit) for evaluation of bipedal erythema.    I  spoke with GARFIELD Daily at her Assisted Living facility, who reported that patient developed shingles rash yesterday for which she was started on valacyclovir (first dose taken today). Staff noticed that both of patient's feet appeared red and warm today, which is new for her. She also has some redness to her eyes. Patient has not had any fevers, vomiting, diarrhea, cough. She has been eating normally. Her vitals have been normal.    Per patient, she denies shortness of breath, abdominal pain. She does report that her feet hurt.     REVIEW OF SYSTEMS:  ROS limited secondary to underlying dementia.      Medical History     No past medical history on file.    Past Surgical History:   Procedure Laterality Date     HYSTERECTOMY  1998     OOPHORECTOMY  1998     [facility] APPENDECTOMY      Description: Appendectomy;  Recorded: 12/21/2011;     [facility] APPENDECTOMY      Description: Appendectomy;  Recorded: 06/02/2014;     [facility] TOTAL ABDOM HYSTERECTOMY      Description: Total Abdominal Hysterectomy;  Proc Date: 01/01/1998;     [facility] TOTAL ABDOM HYSTERECTOMY      Description: Total Abdominal Hysterectomy;  Recorded: 08/29/2012;     [facility] TOTAL ABDOM HYSTERECTOMY      Description: Total Abdominal Hysterectomy;  Recorded: 06/02/2014;       Family History   Problem Relation Age of Onset     Breast Cancer Mother      Coronary Artery Disease Mother      Hypertension Mother      Diabetes Type 2  Mother      Breast Cancer Sister      Hereditary Breast and Ovarian Cancer Syndrome No family hx of      Cancer No family hx of      Colon Cancer No family hx of      Endometrial Cancer No family hx of      Ovarian Cancer No family hx of        Social History     Tobacco Use     Smoking status: Never     Smokeless tobacco: Never       cephALEXin (KEFLEX) 500 MG capsule  acetaminophen (TYLENOL) 500 MG tablet  Baclofen (LIORESAL) 5 MG tablet  calcium carbonate (TUMS) 500 MG chewable tablet  calcium polycarbophil (FIBER-LAX) 625 mg tablet  cetirizine (ZYRTEC) 10  MG tablet  clonazePAM (KLONOPIN) 0.5 MG tablet  divalproex (DEPAKOTE SPRINKLE) 125 mg capsule  fluticasone (FLONASE) 50 MCG/ACT nasal spray  gabapentin (NEURONTIN) 100 MG capsule  lactulose (GENERLAC) 10 gram/15 mL solution  levothyroxine (SYNTHROID/LEVOTHROID) 75 MCG tablet  lidocaine (LMX4) 4 % external cream  meclizine (ANTIVERT) 25 MG tablet  PARoxetine (PAXIL) 20 MG tablet  Vitamin D3 (CHOLECALCIFEROL) 125 MCG (5000 UT) tablet        Physical Exam     First Vitals:  Patient Vitals for the past 24 hrs:   BP Temp Temp src Pulse Resp SpO2   08/09/23 1200 -- -- -- 68 -- 100 %   08/09/23 1155 -- -- -- 68 -- 100 %   08/09/23 1150 -- -- -- 68 -- 100 %   08/09/23 1145 -- -- -- 69 -- 100 %   08/09/23 1135 (!) 148/69 -- -- 73 -- 99 %   08/09/23 1006 (!) 148/71 97.8  F (36.6  C) Oral 79 16 99 %       PHYSICAL EXAM:   Physical Exam    GENERAL: Awake, alert.  In no acute distress.   HEENT: Normocephalic, atraumatic. Subconjunctival hemorrhage right lateral eye. Pupils equal, round and reactive to light.   NECK: No stridor.  PULMONARY: Symmetrical breath sounds without distress.  Lungs clear to auscultation bilaterally without wheezes, rhonchi or rales.  CARDIO: Regular rate and rhythm.  No significant murmur, rub or gallop.  Pedal pulses strong and symmetrical.  ABDOMINAL: Abdomen soft, non-distended and non-tender to palpation.    EXTREMITIES: Both feet with blanching erythema, warmth to touch with mild swelling and mild tenderness to palpation (R > L). The feet are warm and well perfused with strong and symmetrical pedal pulses. Patient able to wiggle all toes.   NEURO: Alert and oriented to person, place and time.  Cranial nerves grossly intact. Quadriparesis.   PSYCH: Normal mood and affect.  SKIN: There is a rash to the right posterior back in the T4-T5 distribution consistent with shingles.     Results     LAB:  All pertinent labs reviewed and interpreted  Labs Ordered and Resulted from Time of ED Arrival to Time of  ED Departure   BASIC METABOLIC PANEL - Abnormal       Result Value    Sodium 142      Potassium 4.2      Chloride 104      Carbon Dioxide (CO2) 27      Anion Gap 11      Urea Nitrogen 15.9      Creatinine 0.73      Calcium 9.2      Glucose 109 (*)     GFR Estimate >90     CBC WITH PLATELETS AND DIFFERENTIAL - Abnormal    WBC Count 5.9      RBC Count 4.50      Hemoglobin 13.7      Hematocrit 42.1      MCV 94      MCH 30.4      MCHC 32.5      RDW 13.3      Platelet Count 143 (*)     % Neutrophils 62      % Lymphocytes 28      % Monocytes 8      % Eosinophils 1      % Basophils 1      % Immature Granulocytes 0      NRBCs per 100 WBC 0      Absolute Neutrophils 3.7      Absolute Lymphocytes 1.7      Absolute Monocytes 0.5      Absolute Eosinophils 0.1      Absolute Basophils 0.0      Absolute Immature Granulocytes 0.0      Absolute NRBCs 0.0     CRP INFLAMMATION - Normal    CRP Inflammation <3.00     INR - Normal    INR 1.02           Julissa Arango MD  Emergency Medicine  [facility] EMERGENCY DEPARTMENT           Julissa Arango MD  08/09/23 2022

## 2023-08-09 NOTE — ED NOTES
Discharge paperwork, prescriptions and follow up care discussed with pt. Pt and friend who helps care for pt verbalized understanding and have no questions. IV access removed. Pt assisted back into wheelchair. Pt to be brought home today by friend via private vehicle

## 2023-08-09 NOTE — ED TRIAGE NOTES
Pt arrived via triage. Pt lives at assisted living and started on meds for shingles this am. Found on right side dry lesions in two spots. Noted to have red, swollen feet.      Triage Assessment       Row Name 08/09/23 1007       Triage Assessment (Adult)    Airway WDL WDL       Respiratory WDL    Respiratory WDL WDL       Skin Circulation/Temperature WDL    Skin Circulation/Temperature WDL circulation    Skin Circulation --  red feet       Cardiac WDL    Cardiac WDL WDL       Peripheral/Neurovascular WDL    Peripheral Neurovascular WDL neurovascular assessment lower       LLE Neurovascular Assessment    Temperature LLE warm    Color LLE red       RLE Neurovascular Assessment    Temperature RLE warm    Color RLE red       Cognitive/Neuro/Behavioral WDL    Cognitive/Neuro/Behavioral WDL WDL

## 2023-08-09 NOTE — DISCHARGE INSTRUCTIONS
Please follow-up with your Primary Care Provider in 2 days for a recheck; call to arrange appointment.    Return to the ER for worsening symptoms, worsening redness / swelling / warmth to either foot, shortness of breath, fever or other concerns.    Complete the full course of antibiotics, even if you are doing better.  Elevate the legs when at rest to help decrease swelling.

## 2023-08-15 ENCOUNTER — HOSPITAL ENCOUNTER (OUTPATIENT)
Dept: REHABILITATION | Facility: CLINIC | Age: 63
Discharge: HOME | End: 2023-08-15
Attending: FAMILY MEDICINE
Payer: MEDICAID

## 2023-08-15 PROCEDURE — S5100 ADULT DAYCARE SERVICES 15MIN: HCPCS

## 2023-08-15 NOTE — PROGRESS NOTES
Facilitated physical maintenance of bilateral upper extremity and bilateral lower extrimity strength and endurance through NuStep therapeutic exercise.  Patient completed 22 minutes of NuStep activity.    Nu Step Settings:  Seat:  7  Arms:  14  Resistance:  7

## 2023-08-17 ENCOUNTER — HOSPITAL ENCOUNTER (OUTPATIENT)
Dept: REHABILITATION | Facility: CLINIC | Age: 63
Discharge: HOME | End: 2023-08-17
Attending: FAMILY MEDICINE
Payer: MEDICAID

## 2023-08-17 PROCEDURE — S5100 ADULT DAYCARE SERVICES 15MIN: HCPCS

## 2023-08-17 NOTE — PROGRESS NOTES
[facility] RN Note    Previous documentation since 7/18/23 reviewed.  Participant not in facility at this time.

## 2023-08-22 ENCOUNTER — HOSPITAL ENCOUNTER (OUTPATIENT)
Dept: REHABILITATION | Facility: CLINIC | Age: 63
Discharge: HOME | End: 2023-08-22
Attending: FAMILY MEDICINE
Payer: MEDICAID

## 2023-08-22 ENCOUNTER — LAB REQUISITION (OUTPATIENT)
Dept: LAB | Facility: CLINIC | Age: 63
End: 2023-08-22
Payer: COMMERCIAL

## 2023-08-22 DIAGNOSIS — R53.83 OTHER FATIGUE: ICD-10-CM

## 2023-08-22 PROCEDURE — S5100 ADULT DAYCARE SERVICES 15MIN: HCPCS

## 2023-08-22 NOTE — PROGRESS NOTES
Facilitated physical maintenance of bilateral upper extremity and bilateral lower extrimity strength and endurance through NuStep therapeutic exercise.  Patient completed 15 minutes of NuStep activity.    Nu Step Settings:  Seat:  7  Arms:  14  Resistance:  7       
no fever and no chills.

## 2023-08-23 LAB
ANION GAP SERPL CALCULATED.3IONS-SCNC: 9 MMOL/L (ref 7–15)
BASOPHILS # BLD AUTO: 0 10E3/UL (ref 0–0.2)
BASOPHILS NFR BLD AUTO: 1 %
BUN SERPL-MCNC: 26.8 MG/DL (ref 8–23)
CALCIUM SERPL-MCNC: 8.9 MG/DL (ref 8.8–10.2)
CHLORIDE SERPL-SCNC: 109 MMOL/L (ref 98–107)
CREAT SERPL-MCNC: 0.66 MG/DL (ref 0.51–0.95)
DEPRECATED HCO3 PLAS-SCNC: 27 MMOL/L (ref 22–29)
EOSINOPHIL # BLD AUTO: 0.1 10E3/UL (ref 0–0.7)
EOSINOPHIL NFR BLD AUTO: 1 %
ERYTHROCYTE [DISTWIDTH] IN BLOOD BY AUTOMATED COUNT: 14.2 % (ref 10–15)
GFR SERPL CREATININE-BSD FRML MDRD: >90 ML/MIN/1.73M2
GLUCOSE SERPL-MCNC: 78 MG/DL (ref 70–99)
HCT VFR BLD AUTO: 38.8 % (ref 35–47)
HGB BLD-MCNC: 12 G/DL (ref 11.7–15.7)
IMM GRANULOCYTES # BLD: 0 10E3/UL
IMM GRANULOCYTES NFR BLD: 1 %
LYMPHOCYTES # BLD AUTO: 1.6 10E3/UL (ref 0.8–5.3)
LYMPHOCYTES NFR BLD AUTO: 26 %
MCH RBC QN AUTO: 30.2 PG (ref 26.5–33)
MCHC RBC AUTO-ENTMCNC: 30.9 G/DL (ref 31.5–36.5)
MCV RBC AUTO: 98 FL (ref 78–100)
MONOCYTES # BLD AUTO: 0.6 10E3/UL (ref 0–1.3)
MONOCYTES NFR BLD AUTO: 10 %
NEUTROPHILS # BLD AUTO: 4 10E3/UL (ref 1.6–8.3)
NEUTROPHILS NFR BLD AUTO: 61 %
NRBC # BLD AUTO: 0 10E3/UL
NRBC BLD AUTO-RTO: 0 /100
PLATELET # BLD AUTO: 180 10E3/UL (ref 150–450)
POTASSIUM SERPL-SCNC: 4.5 MMOL/L (ref 3.4–5.3)
RBC # BLD AUTO: 3.98 10E6/UL (ref 3.8–5.2)
SODIUM SERPL-SCNC: 145 MMOL/L (ref 136–145)
WBC # BLD AUTO: 6.4 10E3/UL (ref 4–11)

## 2023-08-23 PROCEDURE — 80048 BASIC METABOLIC PNL TOTAL CA: CPT | Mod: ORL | Performed by: NURSE PRACTITIONER

## 2023-08-23 PROCEDURE — P9604 ONE-WAY ALLOW PRORATED TRIP: HCPCS | Mod: ORL | Performed by: NURSE PRACTITIONER

## 2023-08-23 PROCEDURE — 85025 COMPLETE CBC W/AUTO DIFF WBC: CPT | Mod: ORL | Performed by: NURSE PRACTITIONER

## 2023-08-23 PROCEDURE — 36415 COLL VENOUS BLD VENIPUNCTURE: CPT | Mod: ORL | Performed by: NURSE PRACTITIONER

## 2023-08-23 NOTE — PROGRESS NOTES
REQUISITION AND JUSTIFICATION FOR DURABLE MEDICAL EQUIPMENT    Patient Name:  Karolyn Das  MR #:  [id number]  :  1960  Age/Gender:  63 year old female  Visit Date:  Karolyn Das seen for seating and wheeled mobility evaluation by Daysi Cui OTR?L,ATP and ATP from [facility] on 23.    CLINICAL CRITERIA FOR MOBILITY ASSISTIVE EQUIPMENT  Coverage Criteria Per Local Coverage Determination  A) Karolyn has mobility limitations due to Multiple Sclerosis and cerebellar ataxia that significantly impairs her ability to participate in all of her mobility-related activities of daily living (MRADL). Specifically affected are toileting (being able to get there in time to prevent accidents), dressing, and bathing (getting into the bathroom of designated place). Current equipment used is [employer] 2 folding chair from  it is beyond justifiable repair due to heavy duty full-time use. This patient needs the new equipment requested to be able to continue to participate in MR ADLs and IADLs with progressing weakness. Please see additional documentation in the seating and wheeled mobility report for details.   Karolyn had a successful clinical trial here, and also a successful trial at home with the recommended equipment. Karolyn is very willing and physically / cognitively able to use the recommended equipment to assist her with mobility-related activities of daily living and general mobility.  B) Karolyn's mobility limitation cannot be sufficiently and safely resolved by the use of an appropriately fitted cane or walker because she is nonambulatory and moderate assist for stand pivot transfer. Strength of legs is 4/5 for one maximal repetition. Fatigue also impacts this patient's ability to ambulate, regardless of the gait aid.    C) Karolyn does not have sufficient upper extremity function to self-propel a L1-4 manual wheelchair and requires on optimally-configured K5 ultralight weight manual  wheelchair in her home to perform MRADLs during a typical day due to limitations in strength, endurance, range of motion, and coordination.  She has utilized a similar device for 12+ years and continues to require this.  Her strength of arms is 4/5.  D) The need for this equipment is LIFETIME.   RECOMMENDATIONS FOR MOBILITY BASE, SEATING SYSTEM AND COMPONENTS  [employer] 2 lite 17 x 20 ultra lightweight manual wheelchair - this ultra light weight manual wheelchair is appropriate and necessary for Karolyn to be able to assist and complete all of her MRADLs within her residence. She has mobility limitations impacting her ability to ambulate independently or with any ambulation aid. She has had a thorough clinical evaluation, and this manual wheelchair is the best option for this patient.  Any less costly wheelchair option would be unable to accommodate anterior-posterior axle adjustments to maximize propulsion mechanics required for shoulder preservation in full-time, active manual wheelchair propeller.      20 inch seat depth needed to fit hip to knee measurement    Transit option- Karolyn must frequently travel to medical and therapy appointments in the community and travel using paratransit/public transit/wheelchair adapted van.  Due to weakness, in-coordination, and safety concerns she is unable to independently initiate and safely complete a transfer from his/her wheelchair to the crash tested automobile, paratransit or public transportation vehicle seat.  The concept of a wheelchair transportation safety system allows for a securement system for anchoring the wheelchair to the floor of the motor vehicle, has an occupant restraint to hold the individual in their wheelchair and prevent secondary injury from hitting obstacles within the vehicle or being ejected from the vehicle and includes identified securement points to correctly secure the wheelchair to the floor, an occupant pelvic safety belt, and the strength  and design features.    Soft roll casters - for smooth ride not to jar/shake her    Heel loops-needed for rearward support of feet to be then falling off of foot rests with use angle adjustable    Arina tires - for maintenance free mobility    Rib  handrims- to allow for ergonomic  and increase propulsion techniques    Extension handle for wheel locks- allows for independence use of brakes for safety with chair use.    [employer] basic arm pad-provides a larger surface area to push-up for for transfers     2 post, flip back, height adjustable, removable, full length armrests - needed for arm support at appropriate height, not available with standard armrests     Rear anti-tip tubes - for safety to prevent w/c tipping rearward    Pelvic positioning strap - to assist maintaining pelvis position for functional activities    Bio fit seat cushion - this pressure distribution and positioning seat cushion will optimally  distribute seating pressures to prevent pressure ulcers, but also provide a stable base of support for her to use during MRADLs.    Tarta solid curved and padded back support - firm and contoured back support is needed to support her thoracolumbar area in an upright and midline position, with appropriate support pads as needed. This back support is essential to provide sufficient posterior support to maximize her postural alignment and minimize her tendency to develop scoliosis and other secondary complications.  Hardware needed to attach to wheelchair    Adjustable back -needed for increased adjustability 3 to 4 inches of open hip angle    This equipment is reasonable and necessary with reference to accepted standards of medical practice and treatment of this patient's condition and is not being recommended as a convenience item. Without this recommended equipment, she is highly likely to sustain injuries from falls, develop pressure sores or postural compensation, and/or be bed confined,  which those costs far exceed the cost of the requested equipment.    Electronically signed by:  NORM Herzog      Occupational Therapist, Assistive   [phone]      fax: [phone]      [email or web address]   [id number]  August 23, 2023    I have read and concur with the above recommendations.    Physician Printed Name __________________________________________    Physician SIgnature  _____________________________________________    Date of SIgnature ______________________________    Physician Phone  ______________________________

## 2023-08-24 ENCOUNTER — HOSPITAL ENCOUNTER (OUTPATIENT)
Dept: REHABILITATION | Facility: CLINIC | Age: 63
Discharge: HOME | End: 2023-08-24
Attending: FAMILY MEDICINE
Payer: MEDICAID

## 2023-08-24 PROCEDURE — S5100 ADULT DAYCARE SERVICES 15MIN: HCPCS

## 2023-08-29 ENCOUNTER — HOSPITAL ENCOUNTER (OUTPATIENT)
Dept: REHABILITATION | Facility: CLINIC | Age: 63
Discharge: HOME | End: 2023-08-29
Attending: FAMILY MEDICINE
Payer: MEDICAID

## 2023-08-29 PROCEDURE — S5100 ADULT DAYCARE SERVICES 15MIN: HCPCS

## 2023-08-29 NOTE — PROGRESS NOTES
"[facility] RN Note    States \"Can you check me out?\"  When asked if she has any specific concerns, she states \"I just want to get checked out.\"  Denies any complaints. Vital signs stable (/50,  P 83, T 98 degrees)  "

## 2023-08-29 NOTE — PROGRESS NOTES
Facilitated physical maintenance of bilateral upper extremity and bilateral lower extrimity strength and endurance through NuStep therapeutic exercise.  Patient completed 30 minutes of NuStep activity.    Nu Step Settings:  Seat:  7  Arms:  14  Resistance:  7

## 2023-08-30 NOTE — PROGRESS NOTES
MONTHLY PROGRESS NOTE AND PARTICIPATION REPORT   [facility]    Karolyn Das, 1960  Attendance: Please see Epic for attendance record.    Communication:   Does communicate   Hearing:   No impairment  Vision:   Difficulty reading or seeing print wears glasses to program less than half the time  Orientation/Cognition:   Minor forgetfulness  Partial disorientation  Short term memory loss  Karolyn presents with moderate cognitive impairment and requires close supervision for safety.  Behavior:   No behavioral concerns  Requires redirection  Karolyn requires redirection if she becomes upset by a late ride home or missing time in [employer].  Self-Preservation Skills:   Not capable of self-preservation  Eating:   Assist with set up  Ambulation Walking:   Non-ambulatory  Transferring:   Aide of one person/two  Karolyn presents with a strong rear lean while performing stand pivot tx's to toilet and Nu Step and requires CGA and gait belt for safe tx's and fall prevention.  Staff provides v/c's to apply manual wc brakes and to use grab bars during tx's.  Wheelchair:   Needs help to propel manual wheelchair  Toileting:   Independent  Staff provides Karolyn with room alarm after toilet tx is complete so she can notify staff that she has completed her bathroom needs so staff can assist with her tx back to manual WC.  Maintenance Program:     [facility]   Has expressed interest in using parallel bars- may not be appropriate at this time.   Living Arrangements:   Lives in assisted living facility  Spiritual Needs: Needs are being met through support group  Medication Assistance:   Medication not taken during program hours  Participation Report:   Aerobics/Exercise  Support Group  Cognitive Stimulation  Fire Drill  Creative Arts/Crafts  Games  Gardening  Speakers/Entertainment  Socialization  Current Events/News  Education/Health Topic   Community Based Outings  Level of Participation:   Active      Karolyn has been working on a ceramic squirrel and shovel in the art room. Karolyn branched out to work on an acrylic painting in art as well. Karolyn has been more positive but also more forgetful/disoriented than usual following her hospitalization last month.

## 2023-08-31 ENCOUNTER — HOSPITAL ENCOUNTER (OUTPATIENT)
Dept: REHABILITATION | Facility: CLINIC | Age: 63
Discharge: HOME | End: 2023-08-31
Attending: FAMILY MEDICINE
Payer: MEDICAID

## 2023-08-31 PROCEDURE — S5100 ADULT DAYCARE SERVICES 15MIN: HCPCS

## 2023-09-05 ENCOUNTER — HOSPITAL ENCOUNTER (OUTPATIENT)
Dept: REHABILITATION | Facility: CLINIC | Age: 63
Discharge: HOME | End: 2023-09-05
Attending: FAMILY MEDICINE
Payer: MEDICAID

## 2023-09-05 PROCEDURE — S5100 ADULT DAYCARE SERVICES 15MIN: HCPCS

## 2023-09-05 NOTE — PROGRESS NOTES
[facility] RN Note    Previous documentation since 8/17/23 reviewed.  Participant not in facility at this time.

## 2023-09-05 NOTE — PROGRESS NOTES
Facilitated physical maintenance of bilateral upper extremity and bilateral lower extrimity strength and endurance through NuStep therapeutic exercise.  Patient completed 50 minutes of NuStep activity.    Nu Step Settings:  Seat:  7  Arms:  14  Resistance:  7

## 2023-09-07 ENCOUNTER — HOSPITAL ENCOUNTER (OUTPATIENT)
Dept: REHABILITATION | Facility: CLINIC | Age: 63
Discharge: HOME | End: 2023-09-07
Attending: FAMILY MEDICINE
Payer: MEDICAID

## 2023-09-07 PROCEDURE — S5100 ADULT DAYCARE SERVICES 15MIN: HCPCS

## 2023-09-14 ENCOUNTER — HOSPITAL ENCOUNTER (OUTPATIENT)
Dept: REHABILITATION | Facility: CLINIC | Age: 63
Discharge: HOME | End: 2023-09-14
Attending: FAMILY MEDICINE
Payer: MEDICAID

## 2023-09-14 PROCEDURE — S5100 ADULT DAYCARE SERVICES 15MIN: HCPCS

## 2023-09-19 ENCOUNTER — HOSPITAL ENCOUNTER (OUTPATIENT)
Dept: REHABILITATION | Facility: CLINIC | Age: 63
Discharge: HOME | End: 2023-09-19
Attending: FAMILY MEDICINE
Payer: MEDICAID

## 2023-09-19 PROCEDURE — S5100 ADULT DAYCARE SERVICES 15MIN: HCPCS

## 2023-09-21 ENCOUNTER — HOSPITAL ENCOUNTER (OUTPATIENT)
Dept: REHABILITATION | Facility: CLINIC | Age: 63
Discharge: HOME | End: 2023-09-21
Attending: FAMILY MEDICINE
Payer: MEDICAID

## 2023-09-21 PROCEDURE — S5100 ADULT DAYCARE SERVICES 15MIN: HCPCS

## 2023-09-25 NOTE — PROGRESS NOTES
MONTHLY PROGRESS NOTE AND PARTICIPATION REPORT   [facility]    Karolyn Das, 1960  Attendance: Please see Epic for attendance record.    Communication:   Does communicate   Hearing:   No impairment  Vision:   Difficulty reading or seeing print wears glasses to program less than half the time  Orientation/Cognition:   Minor forgetfulness  Partial disorientation  Short term memory loss  Karolyn presents with moderate cognitive impairment and requires close supervision for safety.  Behavior:   No behavioral concerns  Requires redirection  Karolyn requires redirection if she becomes upset by a late ride home or missing time in [place].  Self-Preservation Skills:   Not capable of self-preservation  Eating:   Assist with set up  Ambulation Walking:   Non-ambulatory  Transferring:   Aide of one person/two  Karolyn presents with a strong rear lean while performing stand pivot tx's to toilet and Nu Step and requires CGA and gait belt for safe tx's and fall prevention.  Staff provides v/c's to apply manual wc brakes and to use grab bars during tx's.  Wheelchair:   Needs help to propel manual wheelchair  Toileting:   Independent  Staff provides Karolyn with room alarm after toilet tx is complete so she can notify staff that she has completed her bathroom needs so staff can assist with her tx back to manual WC.  Maintenance Program:     [facility]   Has expressed interest in using parallel bars- may not be appropriate at this time.   Living Arrangements:   Lives in assisted living facility  Spiritual Needs: Needs are being met through support group  Medication Assistance:   Medication not taken during program hours  Participation Report:   Aerobics/Exercise  Support Group  Cognitive Stimulation  Fire Drill  Creative Arts/Crafts  Games  Gardening  Speakers/Entertainment  Socialization  Current Events/News  Education/Health Topic   Community Based Outings  Level of Participation:   Active      Karolyn enjoys playing culture-related trivia. Karolyn puts detailed effort into her ceramic projects. Karolyn is social during lunch hour.

## 2023-09-26 ENCOUNTER — HOSPITAL ENCOUNTER (OUTPATIENT)
Dept: REHABILITATION | Facility: CLINIC | Age: 63
Discharge: HOME | End: 2023-09-26
Attending: FAMILY MEDICINE
Payer: MEDICAID

## 2023-09-26 PROCEDURE — S5100 ADULT DAYCARE SERVICES 15MIN: HCPCS

## 2023-09-28 ENCOUNTER — HOSPITAL ENCOUNTER (OUTPATIENT)
Dept: REHABILITATION | Facility: CLINIC | Age: 63
Discharge: HOME | End: 2023-09-28
Attending: FAMILY MEDICINE
Payer: MEDICAID

## 2023-09-28 PROCEDURE — S5100 ADULT DAYCARE SERVICES 15MIN: HCPCS

## 2023-10-03 ENCOUNTER — HOSPITAL ENCOUNTER (OUTPATIENT)
Dept: REHABILITATION | Facility: CLINIC | Age: 63
Discharge: HOME | End: 2023-10-03
Attending: FAMILY MEDICINE
Payer: MEDICAID

## 2023-10-03 PROCEDURE — S5100 ADULT DAYCARE SERVICES 15MIN: HCPCS

## 2023-10-05 ENCOUNTER — HOSPITAL ENCOUNTER (OUTPATIENT)
Dept: REHABILITATION | Facility: CLINIC | Age: 63
Discharge: HOME | End: 2023-10-05
Attending: FAMILY MEDICINE
Payer: MEDICAID

## 2023-10-05 PROCEDURE — S5100 ADULT DAYCARE SERVICES 15MIN: HCPCS

## 2023-10-10 ENCOUNTER — HOSPITAL ENCOUNTER (OUTPATIENT)
Dept: REHABILITATION | Facility: CLINIC | Age: 63
Discharge: HOME | End: 2023-10-10
Attending: FAMILY MEDICINE
Payer: MEDICAID

## 2023-10-10 ENCOUNTER — LAB REQUISITION (OUTPATIENT)
Dept: LAB | Facility: CLINIC | Age: 63
End: 2023-10-10
Payer: COMMERCIAL

## 2023-10-10 DIAGNOSIS — E20.9 HYPOPARATHYROIDISM, UNSPECIFIED (H): ICD-10-CM

## 2023-10-10 PROCEDURE — S5100 ADULT DAYCARE SERVICES 15MIN: HCPCS

## 2023-10-11 LAB
CHOLEST SERPL-MCNC: 178 MG/DL
HDLC SERPL-MCNC: 50 MG/DL
LDLC SERPL CALC-MCNC: 105 MG/DL
NONHDLC SERPL-MCNC: 128 MG/DL
TRIGL SERPL-MCNC: 116 MG/DL
TSH SERPL DL<=0.005 MIU/L-ACNC: 1.9 UIU/ML (ref 0.3–4.2)

## 2023-10-11 PROCEDURE — 36415 COLL VENOUS BLD VENIPUNCTURE: CPT | Mod: ORL | Performed by: NURSE PRACTITIONER

## 2023-10-11 PROCEDURE — 80061 LIPID PANEL: CPT | Mod: ORL | Performed by: NURSE PRACTITIONER

## 2023-10-11 PROCEDURE — P9604 ONE-WAY ALLOW PRORATED TRIP: HCPCS | Mod: ORL | Performed by: NURSE PRACTITIONER

## 2023-10-11 PROCEDURE — 84443 ASSAY THYROID STIM HORMONE: CPT | Mod: ORL | Performed by: NURSE PRACTITIONER

## 2023-10-12 ENCOUNTER — HOSPITAL ENCOUNTER (OUTPATIENT)
Dept: REHABILITATION | Facility: CLINIC | Age: 63
Discharge: HOME | End: 2023-10-12
Attending: FAMILY MEDICINE
Payer: MEDICAID

## 2023-10-12 PROCEDURE — S5100 ADULT DAYCARE SERVICES 15MIN: HCPCS

## 2023-10-13 NOTE — PROGRESS NOTES
Individual abuse prevention plan (IAPP)  [facility]     Assessment of Susceptibility to Abuse, Including Self Abuse, Neglect (Identification of characteristics, which make the individual susceptible to abuse, and how these characteristics cause the individual to be susceptible to abuse.)     Is the person susceptible to abuse in each area?  Sexual Abuse:   No  Referrals made when the person is susceptible to abuse outside the scope or control of this program (Identify the referral and date it occurred): No additional risk reduction means needed at this time    Physical Abuse:   No  Referrals made when the person is susceptible to abuse outside the scope or control of this program (Identify the referral and date it occurred): No additional risk reduction means needed at this time    Self-Abuse:   No  Referrals made when the person is susceptible to abuse outside the scope or control of this program (Identify the referral and date it occurred): No additional risk reduction means needed at this time    Financial  Exploitation  No  Referrals made when the person is susceptible to abuse outside the scope or control of this program (Identify the referral and date it occurred): No additional risk reduction means needed at this time    Is the program aware of this person committing a violent crime or act of physical aggression towards others?  No  Referrals made when the person is susceptible to abuse outside the scope or control of this program (Identify the referral and date it occurred): No additional risk reduction means needed at this time    INDIVIDUAL ABUSE PREVENTION PLAN-MEASURES TAKEN TO MINIMIZE RISK OF ABUSE   ADL:   Assist with clothing management  Assist with toileting  Ambulation/Transfers/Wheelchairs:  Provide transfer belt and assist with transfers and ambulation   Behavior:   Patient gets agitated when she becomes confused.  Communication:  Encourage verbalization of  needs/concerns  Cognitive Issues:   Provider reminders due to confusion, forgetfulness  Give simple step-by-step direction  Diet:   No present concerns  Exercise:   Encourage participation in maintenance program  Encourage participation in wheelchair aerobics  Hearing:   No concerns  Isolation:   Patient lives in an assisted living facility  Medical Monitoring:   Monitor physical and emotional comfort  Mental Health:   Encourage client to express feelings  Offer emotional support  Sensory:   Provide and encourage participation in activities for stimulation  Vision:   Ensure client is wearing glasses and clean prn  Other: N/A  Developed in consultation with:  Client  The Program Abuse Prevention Plan/IAPP identifies the specific actions that minimize abuse to the [facility] participant.  Yes

## 2023-10-13 NOTE — PROGRESS NOTES
INDIVIDUAL PLAN OF CARE   [facility]    Member Name: Karolyn Das; YOB: 1960  MRN: [id number]  10/13/23    Goals developed in collaboration with: member  Staff responsible for plan:  Elizabeth SPENCER/LAUREN and Rc COSTELLO    1. Long Term Goal (concrete, measurable, and time specific outcomes):  Karolyn will maintain her current level of cognitive and physical function for as long as possible and prevent the progression of MS symptoms through active participation in [facility] activities each day of program attendance.  Target Date:  October 2024  Bi-Annual Review:  Consistent attendance this quarter, there has been a decline in overall  Endurance.  Karolyn is participating in home PT.  Karolyn was recently hospitalized due to fatigue and dehydration.  This fatigue has been witnessed at program ~50% of attendance.  Continue goal.    2. Short Term Goal: (concrete, measurable, and time specific outcomes):  Karolyn will maintain her current level of physical function and strength of BL LE and BL UE through active participation in her prescribed Nu Step maintenance program for at least 30 minutes each day of program attendance.  CGAx1 w/ gait belt provided for all transfers for falls prevention  d/2 impaired balance 2/2 MS diagnosis.  Target Date:  April 2024  Bi-Annual Review:  NuStep exercise each day of attendance and morning aerobic exercise participation. Continue goal.    3. Short Term Goal: (concrete, measurable, and time specific outcomes):  Karolyn will maintain her current level of cognitive function through active participation in [facility] cognitive stimulation activities each day of program attendance.  Target Date:  April 2024  Bi-Annual Review:   Mirtha enjoys program activities, especially the educational aspects.  Continue goal

## 2023-10-13 NOTE — PROGRESS NOTES
MONTHLY PROGRESS NOTE AND PARTICIPATION REPORT   [facility]    Karolyn Das, 1960  Attendance: Please see Epic for attendance record.    Communication:   Does communicate   Hearing:   No impairment  Vision:   Difficulty reading or seeing print wears glasses to program less than half the time  Orientation/Cognition:   Minor forgetfulness  Partial disorientation  Short term memory loss  Karolyn presents with moderate cognitive impairment and requires close supervision for safety.  Behavior:   No behavioral concerns  Requires redirection  Karolyn requires redirection if she becomes upset by a late ride home or missing time in [place].  Self-Preservation Skills:   Not capable of self-preservation  Eating:   Assist with set up  Ambulation Walking:   Non-ambulatory  Transferring:   Aide of one person/two  Karolyn presents with a strong rear lean while performing stand pivot tx's to toilet and Nu Step and requires CGA and gait belt for safe tx's and fall prevention.  Staff provides v/c's to apply manual wc brakes and to use grab bars during tx's.  Wheelchair:   Needs help to propel manual wheelchair  Toileting:   Independent  Staff provides Karolyn with room alarm after toilet tx is complete so she can notify staff that she has completed her bathroom needs so staff can assist with her tx back to manual WC.  Maintenance Program:     [facility]   Has expressed interest in using parallel bars- may not be appropriate at this time.   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  She especially enjoys art and painting ceramics.  Karolyn is participating in home PT at her residence.  [facility] staff has communicated falls concerns with her PT via phone.    7/17/2023 - Karoyln was recently hospitalized with fatigue and dehydration.  She was discharged to a TCU as she needs time and more attentive care for her recovery.  She will not attend program until she is strong enough to return home.    10/13/23 - Karolyn has been back at program Tuesdays and Thursdays.  She reports that she is enjoying program.  She continues to experience significant fatigue ~50% of the time.  Her rides with [employer] have been consistent and that has helped reduce her anxiety about getting home on time for dinner.

## 2023-10-17 ENCOUNTER — HOSPITAL ENCOUNTER (OUTPATIENT)
Dept: REHABILITATION | Facility: CLINIC | Age: 63
Discharge: HOME | End: 2023-10-17
Attending: FAMILY MEDICINE
Payer: MEDICAID

## 2023-10-17 PROCEDURE — S5100 ADULT DAYCARE SERVICES 15MIN: HCPCS

## 2023-10-17 NOTE — PROGRESS NOTES
Facilitated physical maintenance of bilateral upper extremity and bilateral lower extrimity strength and endurance through NuStep therapeutic exercise.  Patient completed 28 minutes of NuStep activity.    Nu Step Settings:  Seat:  7  Arms:  14  Resistance:  7

## 2023-10-19 ENCOUNTER — HOSPITAL ENCOUNTER (OUTPATIENT)
Dept: REHABILITATION | Facility: CLINIC | Age: 63
Discharge: HOME | End: 2023-10-19
Attending: FAMILY MEDICINE
Payer: MEDICAID

## 2023-10-19 PROCEDURE — S5100 ADULT DAYCARE SERVICES 15MIN: HCPCS

## 2023-10-19 NOTE — PROGRESS NOTES
Facilitated physical maintenance of bilateral upper extremity and bilateral lower extrimity strength and endurance through NuStep therapeutic exercise.  Patient completed 34 minutes of NuStep activity.    Nu Step Settings:  Seat:  7  Arms:  14  Resistance:  7

## 2023-10-19 NOTE — PROGRESS NOTES
[facility] RN Note    Previous documentation since 9/5/23 reviewed.  Participant not in facility at this time.

## 2023-10-24 ENCOUNTER — HOSPITAL ENCOUNTER (OUTPATIENT)
Dept: REHABILITATION | Facility: CLINIC | Age: 63
Discharge: HOME | End: 2023-10-24
Attending: FAMILY MEDICINE
Payer: MEDICAID

## 2023-10-24 PROCEDURE — S5100 ADULT DAYCARE SERVICES 15MIN: HCPCS

## 2023-10-26 ENCOUNTER — HOSPITAL ENCOUNTER (OUTPATIENT)
Dept: REHABILITATION | Facility: CLINIC | Age: 63
Discharge: HOME | End: 2023-10-26
Attending: FAMILY MEDICINE
Payer: MEDICAID

## 2023-10-26 PROCEDURE — S5100 ADULT DAYCARE SERVICES 15MIN: HCPCS

## 2023-10-31 ENCOUNTER — HOSPITAL ENCOUNTER (OUTPATIENT)
Dept: REHABILITATION | Facility: CLINIC | Age: 63
Discharge: HOME | End: 2023-10-31
Attending: FAMILY MEDICINE
Payer: MEDICAID

## 2023-10-31 PROCEDURE — S5100 ADULT DAYCARE SERVICES 15MIN: HCPCS

## 2023-11-02 ENCOUNTER — HOSPITAL ENCOUNTER (OUTPATIENT)
Dept: REHABILITATION | Facility: CLINIC | Age: 63
Discharge: HOME | End: 2023-11-02
Attending: FAMILY MEDICINE
Payer: MEDICAID

## 2023-11-02 PROCEDURE — S5100 ADULT DAYCARE SERVICES 15MIN: HCPCS

## 2023-11-02 NOTE — PROGRESS NOTES
[facility] RN Note    Previous documentation since 10/19/23 reviewed. Participant not in facility at this time.

## 2023-11-07 ENCOUNTER — HOSPITAL ENCOUNTER (OUTPATIENT)
Dept: REHABILITATION | Facility: CLINIC | Age: 63
Discharge: HOME | End: 2023-11-07
Attending: FAMILY MEDICINE
Payer: MEDICAID

## 2023-11-07 PROCEDURE — S5100 ADULT DAYCARE SERVICES 15MIN: HCPCS

## 2023-11-09 ENCOUNTER — HOSPITAL ENCOUNTER (OUTPATIENT)
Dept: REHABILITATION | Facility: CLINIC | Age: 63
Discharge: HOME | End: 2023-11-09
Attending: FAMILY MEDICINE
Payer: MEDICAID

## 2023-11-09 PROCEDURE — S5100 ADULT DAYCARE SERVICES 15MIN: HCPCS

## 2023-11-14 ENCOUNTER — HOSPITAL ENCOUNTER (OUTPATIENT)
Dept: REHABILITATION | Facility: CLINIC | Age: 63
Discharge: HOME | End: 2023-11-14
Attending: FAMILY MEDICINE
Payer: MEDICAID

## 2023-11-14 PROCEDURE — S5100 ADULT DAYCARE SERVICES 15MIN: HCPCS

## 2023-11-21 ENCOUNTER — HOSPITAL ENCOUNTER (OUTPATIENT)
Dept: REHABILITATION | Facility: CLINIC | Age: 63
Discharge: HOME | End: 2023-11-21
Attending: FAMILY MEDICINE
Payer: MEDICAID

## 2023-11-21 PROCEDURE — S5100 ADULT DAYCARE SERVICES 15MIN: HCPCS

## 2023-11-27 NOTE — PROGRESS NOTES
MONTHLY PROGRESS NOTE AND PARTICIPATION REPORT   [facility]    Karolyn Das, 1960  Attendance: Please see Epic for attendance record.    Communication:   Does communicate   Hearing:   No impairment  Vision:   Difficulty reading or seeing print wears glasses to program less than half the time  Orientation/Cognition:   Minor forgetfulness  Partial disorientation  Short term memory loss  Karolyn presents with moderate cognitive impairment and requires close supervision for safety.  Behavior:   No behavioral concerns  Requires redirection  Karolyn requires redirection if she becomes upset by a late ride home or missing time in [place].  Self-Preservation Skills:   Not capable of self-preservation  Eating:   Assist with set up  Ambulation Walking:   Non-ambulatory  Transferring:   Aide of one person/two  Karolyn presents with a strong rear lean while performing stand pivot tx's to toilet and Nu Step and requires CGA and gait belt for safe tx's and fall prevention.  Staff provides v/c's to apply manual wc brakes and to use grab bars during tx's.  Wheelchair:   Needs help to propel manual wheelchair  Toileting:   Independent  Staff provides Karolyn with room alarm after toilet tx is complete so she can notify staff that she has completed her bathroom needs so staff can assist with her tx back to manual WC.  Maintenance Program:     [facility]   Has expressed interest in using parallel bars- may not be appropriate at this time.   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  She especially enjoys art and painting ceramics.  Karolyn is participating in home PT at her residence.  [facility] staff has communicated falls concerns with her PT via phone.    7/17/2023 - Karolyn was recently hospitalized with fatigue and dehydration.  She was discharged to a TCU as she needs time and more attentive care for her recovery.  She will not attend program until she is strong enough to return home.    10/13/23 - Karolyn has been back at program Tuesdays and Thursdays.  She reports that she is enjoying program.  She continues to experience significant fatigue ~50% of the time.  Her rides with [employer] have been consistent and that has helped reduce her anxiety about getting home on time for dinner.    11/27/23 - Karolyn has worked on an abstract acrylic canvas painting over the past month. Karolyn remains focused on this project even though she sometimes expresses frustration when forgetting her plans for it. Karolyn sometimes struggles to stand up  all the way through pivot transfers.

## 2023-11-28 ENCOUNTER — HOSPITAL ENCOUNTER (OUTPATIENT)
Dept: REHABILITATION | Facility: CLINIC | Age: 63
Discharge: HOME | End: 2023-11-28
Attending: FAMILY MEDICINE
Payer: MEDICAID

## 2023-11-28 PROCEDURE — S5100 ADULT DAYCARE SERVICES 15MIN: HCPCS

## 2023-11-30 ENCOUNTER — HOSPITAL ENCOUNTER (OUTPATIENT)
Dept: REHABILITATION | Facility: CLINIC | Age: 63
Discharge: HOME | End: 2023-11-30
Attending: FAMILY MEDICINE
Payer: MEDICAID

## 2023-11-30 PROCEDURE — S5100 ADULT DAYCARE SERVICES 15MIN: HCPCS

## 2023-12-05 ENCOUNTER — HOSPITAL ENCOUNTER (OUTPATIENT)
Dept: REHABILITATION | Facility: CLINIC | Age: 63
Discharge: HOME | End: 2023-12-05
Attending: FAMILY MEDICINE
Payer: MEDICAID

## 2023-12-05 PROCEDURE — S5100 ADULT DAYCARE SERVICES 15MIN: HCPCS

## 2023-12-07 ENCOUNTER — HOSPITAL ENCOUNTER (OUTPATIENT)
Dept: REHABILITATION | Facility: CLINIC | Age: 63
Discharge: HOME | End: 2023-12-07
Attending: FAMILY MEDICINE
Payer: MEDICAID

## 2023-12-07 PROCEDURE — S5100 ADULT DAYCARE SERVICES 15MIN: HCPCS

## 2023-12-07 NOTE — PROGRESS NOTES
[facility] RN Note    Previous documentation since 11/2/23 reviewed.  Participant not in facility at this time.

## 2023-12-12 ENCOUNTER — HOSPITAL ENCOUNTER (OUTPATIENT)
Dept: REHABILITATION | Facility: CLINIC | Age: 63
Discharge: HOME | End: 2023-12-12
Attending: FAMILY MEDICINE
Payer: MEDICAID

## 2023-12-12 PROCEDURE — S5100 ADULT DAYCARE SERVICES 15MIN: HCPCS

## 2023-12-14 ENCOUNTER — HOSPITAL ENCOUNTER (OUTPATIENT)
Dept: REHABILITATION | Facility: CLINIC | Age: 63
Discharge: HOME | End: 2023-12-14
Attending: FAMILY MEDICINE
Payer: MEDICAID

## 2023-12-14 PROCEDURE — S5100 ADULT DAYCARE SERVICES 15MIN: HCPCS

## 2023-12-19 ENCOUNTER — HOSPITAL ENCOUNTER (OUTPATIENT)
Dept: REHABILITATION | Facility: CLINIC | Age: 63
Discharge: HOME | End: 2023-12-19
Attending: FAMILY MEDICINE
Payer: MEDICAID

## 2023-12-19 PROCEDURE — S5100 ADULT DAYCARE SERVICES 15MIN: HCPCS

## 2023-12-21 ENCOUNTER — HOSPITAL ENCOUNTER (OUTPATIENT)
Dept: REHABILITATION | Facility: CLINIC | Age: 63
Discharge: HOME | End: 2023-12-21
Attending: FAMILY MEDICINE
Payer: MEDICAID

## 2023-12-21 PROCEDURE — S5100 ADULT DAYCARE SERVICES 15MIN: HCPCS

## 2023-12-26 ENCOUNTER — HOSPITAL ENCOUNTER (OUTPATIENT)
Dept: REHABILITATION | Facility: CLINIC | Age: 63
Discharge: HOME | End: 2023-12-26
Attending: FAMILY MEDICINE
Payer: MEDICAID

## 2023-12-26 PROCEDURE — S5100 ADULT DAYCARE SERVICES 15MIN: HCPCS

## 2023-12-26 NOTE — PROGRESS NOTES
MONTHLY PROGRESS NOTE AND PARTICIPATION REPORT   [facility]    Karolyn Das, 1960  Attendance: Please see Epic for attendance record.    Communication:   Does communicate   Hearing:   No impairment  Vision:   Difficulty reading or seeing print wears glasses to program less than half the time  Orientation/Cognition:   Minor forgetfulness  Partial disorientation  Short term memory loss  Karolyn presents with moderate cognitive impairment and requires close supervision for safety.  Behavior:   No behavioral concerns  Requires redirection  Karolyn requires redirection if she becomes upset by a late ride home or missing time in [employer].  Self-Preservation Skills:   Not capable of self-preservation  Eating:   Assist with set up  Ambulation Walking:   Non-ambulatory  Transferring:   Aide of one person/two  Karolyn presents with a strong rear lean while performing stand pivot tx's to toilet and Nu Step and requires CGA and gait belt for safe tx's and fall prevention.  Staff provides v/c's to apply manual wc brakes and to use grab bars during tx's.  Wheelchair:   Needs help to propel manual wheelchair  Toileting:   Independent  Staff provides Karolyn with room alarm after toilet tx is complete so she can notify staff that she has completed her bathroom needs so staff can assist with her tx back to manual WC.  Maintenance Program:     Sofia   Has expressed interest in using parallel bars- may not be appropriate at this time.   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  She especially enjoys art and painting ceramics.  Karolyn is participating in home PT at her residence.  [facility] staff has communicated falls concerns with her PT via phone.    7/17/2023 - Karolyn was recently hospitalized with fatigue and dehydration.  She was discharged to a TCU as she needs time and more attentive care for her recovery.  She will not attend program until she is strong enough to return home.    10/13/23 - Karolyn has been back at program Tuesdays and Thursdays.  She reports that she is enjoying program.  She continues to experience significant fatigue ~50% of the time.  Her rides with [employer] have been consistent and that has helped reduce her anxiety about getting home on time for dinner.    11/27/23 - Karolyn has worked on an abstract acrylic canvas painting over the past month. Karolyn remains focused on this project even though she sometimes expresses frustration when forgetting her plans for it. Karolyn sometimes struggles to stand up  all the way through pivot transfers.    12/26/23 - Karolyn is working on a purple bowl in the art room as a gift for a loved one. Karolyn is encouraged to use mindfulness strategies to prevent frustration regarding her rides or other conflicts. Karolyn was eager to pick unique gifts for her family and friends during the boutique week.

## 2023-12-28 ENCOUNTER — HOSPITAL ENCOUNTER (OUTPATIENT)
Dept: REHABILITATION | Facility: CLINIC | Age: 63
Discharge: HOME | End: 2023-12-28
Attending: FAMILY MEDICINE
Payer: MEDICAID

## 2023-12-28 PROCEDURE — S5100 ADULT DAYCARE SERVICES 15MIN: HCPCS

## 2024-01-02 ENCOUNTER — HOSPITAL ENCOUNTER (OUTPATIENT)
Dept: REHABILITATION | Facility: CLINIC | Age: 64
Discharge: HOME | End: 2024-01-02
Attending: FAMILY MEDICINE
Payer: MEDICAID

## 2024-01-02 PROCEDURE — S5100 ADULT DAYCARE SERVICES 15MIN: HCPCS

## 2024-01-04 ENCOUNTER — HOSPITAL ENCOUNTER (OUTPATIENT)
Dept: REHABILITATION | Facility: CLINIC | Age: 64
Discharge: HOME | End: 2024-01-04
Attending: FAMILY MEDICINE
Payer: MEDICAID

## 2024-01-04 PROCEDURE — S5100 ADULT DAYCARE SERVICES 15MIN: HCPCS

## 2024-01-09 ENCOUNTER — HOSPITAL ENCOUNTER (OUTPATIENT)
Dept: REHABILITATION | Facility: CLINIC | Age: 64
Discharge: HOME | End: 2024-01-09
Attending: FAMILY MEDICINE
Payer: MEDICAID

## 2024-01-09 PROCEDURE — S5100 ADULT DAYCARE SERVICES 15MIN: HCPCS

## 2024-01-09 NOTE — PROGRESS NOTES
[facility] RN Note    Previous documentation since 12/7/23 reviewed. Participant not in facility at this time.

## 2024-01-16 ENCOUNTER — HOSPITAL ENCOUNTER (OUTPATIENT)
Dept: REHABILITATION | Facility: CLINIC | Age: 64
Discharge: HOME | End: 2024-01-16
Attending: FAMILY MEDICINE
Payer: MEDICAID

## 2024-01-16 PROCEDURE — S5100 ADULT DAYCARE SERVICES 15MIN: HCPCS

## 2024-01-16 NOTE — PROGRESS NOTES
Individual abuse prevention plan (IAPP)  [facility]     Assessment of Susceptibility to Abuse, Including Self Abuse, Neglect (Identification of characteristics, which make the individual susceptible to abuse, and how these characteristics cause the individual to be susceptible to abuse.)     Is the person susceptible to abuse in each area?  Sexual Abuse:   No  Referrals made when the person is susceptible to abuse outside the scope or control of this program (Identify the referral and date it occurred): No additional risk reduction means needed at this time    Physical Abuse:   No  Referrals made when the person is susceptible to abuse outside the scope or control of this program (Identify the referral and date it occurred): No additional risk reduction means needed at this time    Self-Abuse:   No  Referrals made when the person is susceptible to abuse outside the scope or control of this program (Identify the referral and date it occurred): No additional risk reduction means needed at this time    Financial  Exploitation  No  Referrals made when the person is susceptible to abuse outside the scope or control of this program (Identify the referral and date it occurred): No additional risk reduction means needed at this time    Is the program aware of this person committing a violent crime or act of physical aggression towards others?  No  Referrals made when the person is susceptible to abuse outside the scope or control of this program (Identify the referral and date it occurred): No additional risk reduction means needed at this time    INDIVIDUAL ABUSE PREVENTION PLAN-MEASURES TAKEN TO MINIMIZE RISK OF ABUSE   ADL:   Assist with clothing management  Assist with toileting  Ambulation/Transfers/Wheelchairs:  Provide transfer belt and assist with transfers and ambulation   Behavior:   Patient gets agitated when she becomes confused.  Communication:  Encourage verbalization of  needs/concerns  Cognitive Issues:   Provider reminders due to confusion, forgetfulness  Give simple step-by-step direction  Diet:   No present concerns  Exercise:   Encourage participation in maintenance program  Encourage participation in wheelchair aerobics  Hearing:   No concerns  Isolation:   Patient lives in an assisted living facility  Medical Monitoring:   Monitor physical and emotional comfort  Mental Health:   Encourage client to express feelings  Offer emotional support  Sensory:   Provide and encourage participation in activities for stimulation  Vision:   Ensure client is wearing glasses and clean prn  Other: N/A  Developed in consultation with:  Client  The Program Abuse Prevention Plan/IAPP identifies the specific actions that minimize abuse to the [facility] participant.  Yes

## 2024-01-16 NOTE — PROGRESS NOTES
MONTHLY PROGRESS NOTE AND PARTICIPATION REPORT   [facility]    Karolyn Das, 1960  Attendance: Please see Epic for attendance record.    Communication:   Does communicate   Hearing:   No impairment  Vision:   Difficulty reading or seeing print wears glasses to program less than half the time  Orientation/Cognition:   Minor forgetfulness  Partial disorientation  Short term memory loss  Karolyn presents with moderate cognitive impairment and requires close supervision for safety.  Behavior:   No behavioral concerns  Requires redirection  Karolyn requires redirection if she becomes upset by a late ride home or missing time in [employer].  Self-Preservation Skills:   Not capable of self-preservation  Eating:   Assist with set up  Ambulation Walking:   Non-ambulatory  Transferring:   Aide of one person/two  Karolyn presents with a strong rear lean while performing stand pivot tx's to toilet and Nu Step and requires CGA and gait belt for safe tx's and fall prevention.  Staff provides v/c's to apply manual wc brakes and to use grab bars during tx's.  Wheelchair:   Needs help to propel manual wheelchair  Toileting:   Independent  Staff provides Karolyn with room alarm after toilet tx is complete so she can notify staff that she has completed her bathroom needs so staff can assist with her tx back to manual WC.  Maintenance Program:     Sofia   Has expressed interest in using parallel bars- may not be appropriate at this time.   Living Arrangements:   Lives in assisted living facility  Spiritual Needs: Needs are being met through support group  Medication Assistance:   Medication not taken during program hours  Participation Report:   Aerobics/Exercise  Support Group  Cognitive Stimulation  Fire Drill  Creative Arts/Crafts  Games  Gardening  Speakers/Entertainment  Socialization  Current Events/News  Education/Health Topic   Community Based Outings  Level of Participation:   Active      Karolyn shared with PRAVIN that she will be moving place of living in the near future.  Karolyn does not know when exactly, but she stated that she is very excited for the move.

## 2024-01-16 NOTE — PROGRESS NOTES
INDIVIDUAL PLAN OF CARE   [facility]    Member Name: Karolyn Das; YOB: 1960  MRN: [id number]  7/23/2023    Goals developed in collaboration with: member  Staff responsible for plan:  Elizabeth SPENCER/LAUREN and Rc COSTELLO    1. Long Term Goal (concrete, measurable, and time specific outcomes):  Karolyn will maintain her current level of cognitive and physical function for as long as possible and prevent the progression of MS symptoms through active participation in [facility] activities each day of program attendance.  Target Date:  January 2025  Bi-Annual Review:  Consistent attendance this quarter, there has been a decline in overall  Endurance.  Karolyn is participating in home PT.  Karolyn was recently hospitalized due to fatigue and dehydration.  This fatigue has been witnessed at program ~50% of attendance.  Continue goal.    2. Short Term Goal: (concrete, measurable, and time specific outcomes):  Karolyn will maintain her current level of physical function and strength of BL LE and BL UE through active participation in her prescribed Nu Step maintenance program for at least 30 minutes each day of program attendance.  CGAx1 w/ gait belt provided for all transfers for falls prevention  d/2 impaired balance 2/2 MS diagnosis.  Target Date:  July 2024  Bi-Annual Review:  NuStep exercise each day of attendance and morning aerobic exercise participation. Continue goal.    3. Short Term Goal: (concrete, measurable, and time specific outcomes):  Lissett will maintain her current level of cognitive function through active participation in Brain and Body cognitive stimulation activities each day of program attendance.  Target Date:  January 2024  Bi-Annual Review:   Mirtha enjoys program activities, especially the educational aspects.  Continue goal

## 2024-01-18 ENCOUNTER — HOSPITAL ENCOUNTER (OUTPATIENT)
Dept: REHABILITATION | Facility: CLINIC | Age: 64
Discharge: HOME | End: 2024-01-18
Attending: FAMILY MEDICINE
Payer: MEDICAID

## 2024-01-18 PROCEDURE — S5100 ADULT DAYCARE SERVICES 15MIN: HCPCS

## 2024-01-23 ENCOUNTER — HOSPITAL ENCOUNTER (OUTPATIENT)
Dept: REHABILITATION | Facility: CLINIC | Age: 64
Discharge: HOME | End: 2024-01-23
Attending: FAMILY MEDICINE
Payer: MEDICAID

## 2024-01-23 PROCEDURE — S5100 ADULT DAYCARE SERVICES 15MIN: HCPCS

## 2024-01-25 ENCOUNTER — HOSPITAL ENCOUNTER (OUTPATIENT)
Dept: REHABILITATION | Facility: CLINIC | Age: 64
Discharge: HOME | End: 2024-01-25
Attending: FAMILY MEDICINE
Payer: MEDICAID

## 2024-01-25 PROCEDURE — S5100 ADULT DAYCARE SERVICES 15MIN: HCPCS

## 2024-01-30 ENCOUNTER — HOSPITAL ENCOUNTER (OUTPATIENT)
Dept: REHABILITATION | Facility: CLINIC | Age: 64
Discharge: HOME | End: 2024-01-30
Attending: FAMILY MEDICINE
Payer: MEDICAID

## 2024-01-30 PROCEDURE — S5100 ADULT DAYCARE SERVICES 15MIN: HCPCS

## 2024-02-01 ENCOUNTER — HOSPITAL ENCOUNTER (OUTPATIENT)
Dept: REHABILITATION | Facility: CLINIC | Age: 64
Discharge: HOME | End: 2024-02-01
Attending: FAMILY MEDICINE
Payer: MEDICAID

## 2024-02-01 PROCEDURE — S5100 ADULT DAYCARE SERVICES 15MIN: HCPCS

## 2024-02-06 ENCOUNTER — HOSPITAL ENCOUNTER (OUTPATIENT)
Dept: REHABILITATION | Facility: CLINIC | Age: 64
Discharge: HOME | End: 2024-02-06
Attending: FAMILY MEDICINE
Payer: MEDICAID

## 2024-02-06 PROCEDURE — S5100 ADULT DAYCARE SERVICES 15MIN: HCPCS

## 2024-02-08 ENCOUNTER — HOSPITAL ENCOUNTER (OUTPATIENT)
Dept: REHABILITATION | Facility: CLINIC | Age: 64
Discharge: HOME | End: 2024-02-08
Attending: FAMILY MEDICINE
Payer: MEDICAID

## 2024-02-08 PROCEDURE — S5100 ADULT DAYCARE SERVICES 15MIN: HCPCS

## 2024-02-08 NOTE — PROGRESS NOTES
[facility] RN Note    Previous documentation since 1/9/24 reviewed.  No concerns reported by AC staff or member.

## 2024-02-13 ENCOUNTER — HOSPITAL ENCOUNTER (OUTPATIENT)
Dept: REHABILITATION | Facility: CLINIC | Age: 64
Discharge: HOME | End: 2024-02-13
Attending: FAMILY MEDICINE
Payer: MEDICAID

## 2024-02-13 PROCEDURE — S5100 ADULT DAYCARE SERVICES 15MIN: HCPCS

## 2024-02-15 ENCOUNTER — HOSPITAL ENCOUNTER (OUTPATIENT)
Dept: REHABILITATION | Facility: CLINIC | Age: 64
Discharge: HOME | End: 2024-02-15
Attending: FAMILY MEDICINE
Payer: MEDICAID

## 2024-02-15 PROCEDURE — S5100 ADULT DAYCARE SERVICES 15MIN: HCPCS

## 2024-02-20 ENCOUNTER — HOSPITAL ENCOUNTER (OUTPATIENT)
Dept: REHABILITATION | Facility: CLINIC | Age: 64
Discharge: HOME | End: 2024-02-20
Attending: FAMILY MEDICINE
Payer: MEDICAID

## 2024-02-20 PROCEDURE — S5100 ADULT DAYCARE SERVICES 15MIN: HCPCS

## 2024-02-22 ENCOUNTER — HOSPITAL ENCOUNTER (OUTPATIENT)
Dept: REHABILITATION | Facility: CLINIC | Age: 64
Discharge: HOME | End: 2024-02-22
Attending: FAMILY MEDICINE
Payer: MEDICAID

## 2024-02-22 PROCEDURE — S5100 ADULT DAYCARE SERVICES 15MIN: HCPCS

## 2024-02-27 ENCOUNTER — HOSPITAL ENCOUNTER (OUTPATIENT)
Dept: REHABILITATION | Facility: CLINIC | Age: 64
Discharge: HOME | End: 2024-02-27
Attending: FAMILY MEDICINE
Payer: MEDICAID

## 2024-02-27 PROCEDURE — S5100 ADULT DAYCARE SERVICES 15MIN: HCPCS

## 2024-02-27 NOTE — PROGRESS NOTES
MONTHLY PROGRESS NOTE AND PARTICIPATION REPORT   [facility]    Karolyn Das, 1960  Attendance: Please see Epic for attendance record.    Communication:   Does communicate   Hearing:   No impairment  Vision:   Difficulty reading or seeing print wears glasses to program less than half the time  Orientation/Cognition:   Minor forgetfulness  Partial disorientation  Short term memory loss  Karolyn presents with moderate cognitive impairment and requires close supervision for safety.  Behavior:   No behavioral concerns  Requires redirection  Karolyn requires redirection if she becomes upset by a late ride home or missing time in [employer].  Self-Preservation Skills:   Not capable of self-preservation  Eating:   Assist with set up  Ambulation Walking:   Non-ambulatory  Transferring:   Aide of one person/two  Karolyn presents with a strong rear lean while performing stand pivot tx's to toilet and Nu Step and requires CGA and gait belt for safe tx's and fall prevention.  Staff provides v/c's to apply manual wc brakes and to use grab bars during tx's.  Wheelchair:   Needs help to propel manual wheelchair  Toileting:   Independent  Staff provides Karolyn with room alarm after toilet tx is complete so she can notify staff that she has completed her bathroom needs so staff can assist with her tx back to manual WC.  Maintenance Program:     [facility]ia   Has expressed interest in using parallel bars- may not be appropriate at this time.   Living Arrangements:   Lives in assisted living facility  Spiritual Needs: Needs are being met through support group  Medication Assistance:   Medication not taken during program hours  Participation Report:   Aerobics/Exercise  Support Group  Cognitive Stimulation  Fire Drill  Creative Arts/Crafts  Games  Gardening  Speakers/Entertainment  Socialization  Current Events/News  Education/Health Topic   Community Based Outings  Level of Participation:   Active      Karolyn often requires redirection when frustrated with situations or other members. Karolyn has expressed concerns about ride service and would like to switch for convenience purposes. Karolyn has been working on a dolphin in the art room for the past few months.

## 2024-02-29 ENCOUNTER — HOSPITAL ENCOUNTER (OUTPATIENT)
Dept: REHABILITATION | Facility: CLINIC | Age: 64
Discharge: HOME | End: 2024-02-29
Attending: FAMILY MEDICINE
Payer: MEDICAID

## 2024-02-29 PROCEDURE — S5100 ADULT DAYCARE SERVICES 15MIN: HCPCS

## 2024-03-05 ENCOUNTER — HOSPITAL ENCOUNTER (OUTPATIENT)
Dept: REHABILITATION | Facility: CLINIC | Age: 64
Discharge: HOME | End: 2024-03-05
Attending: FAMILY MEDICINE
Payer: MEDICAID

## 2024-03-05 PROCEDURE — S5100 ADULT DAYCARE SERVICES 15MIN: HCPCS

## 2024-03-05 NOTE — PROGRESS NOTES
[facility] RN Note    Previous documentation since 2/8/24 reviewed. Participant not in facility at this time.

## 2024-03-12 ENCOUNTER — HOSPITAL ENCOUNTER (OUTPATIENT)
Dept: REHABILITATION | Facility: CLINIC | Age: 64
Discharge: HOME | End: 2024-03-12
Attending: FAMILY MEDICINE
Payer: MEDICAID

## 2024-03-12 PROCEDURE — S5100 ADULT DAYCARE SERVICES 15MIN: HCPCS

## 2024-03-14 ENCOUNTER — HOSPITAL ENCOUNTER (OUTPATIENT)
Dept: REHABILITATION | Facility: CLINIC | Age: 64
Discharge: HOME | End: 2024-03-14
Attending: FAMILY MEDICINE
Payer: MEDICAID

## 2024-03-14 PROCEDURE — S5100 ADULT DAYCARE SERVICES 15MIN: HCPCS

## 2024-03-19 ENCOUNTER — HOSPITAL ENCOUNTER (OUTPATIENT)
Dept: REHABILITATION | Facility: CLINIC | Age: 64
Discharge: HOME | End: 2024-03-19
Attending: FAMILY MEDICINE
Payer: MEDICAID

## 2024-03-19 PROCEDURE — S5100 ADULT DAYCARE SERVICES 15MIN: HCPCS

## 2024-03-25 NOTE — PROGRESS NOTES
MONTHLY PROGRESS NOTE AND PARTICIPATION REPORT   [facility]    Karolyn Das, 1960  Attendance: Please see Epic for attendance record.    Communication:   Does communicate   Hearing:   No impairment  Vision:   Difficulty reading or seeing print wears glasses to program less than half the time  Orientation/Cognition:   Minor forgetfulness  Partial disorientation  Short term memory loss  Karolyn presents with moderate cognitive impairment and requires close supervision for safety.  Behavior:   No behavioral concerns  Requires redirection  Karolyn requires redirection if she becomes upset by a late ride home or missing time in [employer].  Self-Preservation Skills:   Not capable of self-preservation  Eating:   Assist with set up  Ambulation Walking:   Non-ambulatory  Transferring:   Aide of one person/two  Karolyn presents with a strong rear lean while performing stand pivot tx's to toilet and Nu Step and requires CGA and gait belt for safe tx's and fall prevention.  Staff provides v/c's to apply manual wc brakes and to use grab bars during tx's.  Wheelchair:   Needs help to propel manual wheelchair  Toileting:   Independent  Staff provides Karolyn with room alarm after toilet tx is complete so she can notify staff that she has completed her bathroom needs so staff can assist with her tx back to manual WC.  Maintenance Program:     Sofia   Has expressed interest in using parallel bars- may not be appropriate at this time.   Living Arrangements:   Lives in assisted living facility  Spiritual Needs: Needs are being met through support group  Medication Assistance:   Medication not taken during program hours  Participation Report:   Aerobics/Exercise  Support Group  Cognitive Stimulation  Fire Drill  Creative Arts/Crafts  Games  Gardening  Speakers/Entertainment  Socialization  Current Events/News  Education/Health Topic   Community Based Outings  Level of Participation:   Active      Karolyn has shared that she loves her new living situation. Karolyn has been more positive and outgoing since her move. Karolyn continues to display anxiousness and agitation around transportation and other members' conversations.

## 2024-03-26 ENCOUNTER — HOSPITAL ENCOUNTER (OUTPATIENT)
Dept: REHABILITATION | Facility: CLINIC | Age: 64
Discharge: HOME | End: 2024-03-26
Attending: FAMILY MEDICINE
Payer: MEDICAID

## 2024-03-26 PROCEDURE — S5100 ADULT DAYCARE SERVICES 15MIN: HCPCS

## 2024-03-28 ENCOUNTER — HOSPITAL ENCOUNTER (OUTPATIENT)
Dept: REHABILITATION | Facility: CLINIC | Age: 64
Discharge: HOME | End: 2024-03-28
Attending: FAMILY MEDICINE
Payer: MEDICAID

## 2024-03-28 PROCEDURE — S5100 ADULT DAYCARE SERVICES 15MIN: HCPCS

## 2024-04-02 ENCOUNTER — HOSPITAL ENCOUNTER (OUTPATIENT)
Dept: REHABILITATION | Facility: CLINIC | Age: 64
Discharge: HOME | End: 2024-04-02
Attending: FAMILY MEDICINE
Payer: MEDICAID

## 2024-04-02 PROCEDURE — S5100 ADULT DAYCARE SERVICES 15MIN: HCPCS

## 2024-04-02 NOTE — PROGRESS NOTES
Individual abuse prevention plan (IAPP)  [facility]     Assessment of Susceptibility to Abuse, Including Self Abuse, Neglect (Identification of characteristics, which make the individual susceptible to abuse, and how these characteristics cause the individual to be susceptible to abuse.)     Is the person susceptible to abuse in each area?  Sexual Abuse:   No  Referrals made when the person is susceptible to abuse outside the scope or control of this program (Identify the referral and date it occurred): No additional risk reduction means needed at this time    Physical Abuse:   No  Referrals made when the person is susceptible to abuse outside the scope or control of this program (Identify the referral and date it occurred): No additional risk reduction means needed at this time    Self-Abuse:   No  Referrals made when the person is susceptible to abuse outside the scope or control of this program (Identify the referral and date it occurred): No additional risk reduction means needed at this time    Financial  Exploitation  No  Referrals made when the person is susceptible to abuse outside the scope or control of this program (Identify the referral and date it occurred): No additional risk reduction means needed at this time    Is the program aware of this person committing a violent crime or act of physical aggression towards others?  No  Referrals made when the person is susceptible to abuse outside the scope or control of this program (Identify the referral and date it occurred): No additional risk reduction means needed at this time    INDIVIDUAL ABUSE PREVENTION PLAN-MEASURES TAKEN TO MINIMIZE RISK OF ABUSE   ADL:   Assist with clothing management  Assist with toileting  Ambulation/Transfers/Wheelchairs:  Provide transfer belt and assist with transfers and ambulation   Behavior:   Patient gets agitated when she becomes confused.  Communication:  Encourage verbalization of  needs/concerns  Cognitive Issues:   Provider reminders due to confusion, forgetfulness  Give simple step-by-step direction  Diet:   No present concerns  Exercise:   Encourage participation in maintenance program  Encourage participation in wheelchair aerobics  Hearing:   No concerns  Isolation:   Patient lives in an assisted living facility  Medical Monitoring:   Monitor physical and emotional comfort  Mental Health:   Encourage client to express feelings  Offer emotional support  Sensory:   Provide and encourage participation in activities for stimulation  Vision:   Ensure client is wearing glasses and clean prn  Other: N/A  Developed in consultation with:  Client  The Program Abuse Prevention Plan/IAPP identifies the specific actions that minimize abuse to the [facility] participant.  Yes

## 2024-04-02 NOTE — PROGRESS NOTES
INDIVIDUAL PLAN OF CARE   [facility]    Member Name: Karolyn Das; YOB: 1960  MRN: [id number]  7/23/2023    Goals developed in collaboration with: member  Staff responsible for plan:  Elizabeth SPENCER/LAUREN and Rc COSTELLO    1. Long Term Goal (concrete, measurable, and time specific outcomes):  Karolyn will maintain her current level of cognitive and physical function for as long as possible and prevent the progression of MS symptoms through active participation in [facility] activities each day of program attendance.  Target Date:  April 2025  Bi-Annual Review:  Consistent attendance this quarter, there has been a decline in overall  Endurance.  Karolyn is participating in home PT.  Karolyn was recently hospitalized due to fatigue and dehydration.  This fatigue has been witnessed at program ~50% of attendance.  Continue goal.    2. Short Term Goal: (concrete, measurable, and time specific outcomes):  Karolyn will maintain her current level of physical function and strength of BL LE and BL UE through active participation in her prescribed Nu Step maintenance program for at least 30 minutes each day of program attendance.  CGAx1 w/ gait belt provided for all transfers for falls prevention  d/2 impaired balance 2/2 MS diagnosis.  Target Date:  October 2024  Bi-Annual Review:  NuStep exercise each day of attendance and morning aerobic exercise participation. Continue goal.    3. Short Term Goal: (concrete, measurable, and time specific outcomes):  Lissett will maintain her current level of cognitive function through active participation in Brain and Body cognitive stimulation activities each day of program attendance.  Target Date:  October 2024  Bi-Annual Review:   Mirtha enjoys program activities, especially the educational aspects.  Continue goal

## 2024-04-02 NOTE — PROGRESS NOTES
[facility] RN Note    Previous documentation since 3/5/24 reviewed.  No concerns reported by AC staff or member.

## 2024-04-02 NOTE — PROGRESS NOTES
MONTHLY PROGRESS NOTE AND PARTICIPATION REPORT   [facility]    Karolyn Das, 1960  Attendance: Please see Epic for attendance record.    Communication:   Does communicate   Hearing:   No impairment  Vision:   Difficulty reading or seeing print wears glasses to program less than half the time  Orientation/Cognition:   Minor forgetfulness  Partial disorientation  Short term memory loss  Karolyn presents with moderate cognitive impairment and requires close supervision for safety.  Behavior:   No behavioral concerns  Requires redirection  Karolyn requires redirection if she becomes upset by a late ride home or missing time in [employer].  Self-Preservation Skills:   Not capable of self-preservation  Eating:   Assist with set up  Ambulation Walking:   Non-ambulatory  Transferring:   Aide of one person/two  Karolyn presents with a strong rear lean while performing stand pivot tx's to toilet and Nu Step and requires CGA and gait belt for safe tx's and fall prevention.  Staff provides v/c's to apply manual wc brakes and to use grab bars during tx's.  Wheelchair:   Needs help to propel manual wheelchair  Toileting:   Independent  Staff provides Karolyn with room alarm after toilet tx is complete so she can notify staff that she has completed her bathroom needs so staff can assist with her tx back to manual WC.  Maintenance Program:     Sofia   Has expressed interest in using parallel bars- may not be appropriate at this time.   Living Arrangements:   Lives in assisted living facility  Spiritual Needs: Needs are being met through support group  Medication Assistance:   Medication not taken during program hours  Participation Report:   Aerobics/Exercise  Support Group  Cognitive Stimulation  Fire Drill  Creative Arts/Crafts  Games  Gardening  Speakers/Entertainment  Socialization  Current Events/News  Education/Health Topic   Community Based Outings  Level of Participation:   Active      Karolyn has been very anxious lately.  He has always worried about her ride, but she has been extra anxious lately, especially today.

## 2024-04-04 ENCOUNTER — HOSPITAL ENCOUNTER (OUTPATIENT)
Dept: REHABILITATION | Facility: CLINIC | Age: 64
Discharge: HOME | End: 2024-04-04
Attending: FAMILY MEDICINE
Payer: MEDICAID

## 2024-04-04 PROCEDURE — S5100 ADULT DAYCARE SERVICES 15MIN: HCPCS

## 2024-04-04 NOTE — PROGRESS NOTES
[facility] RN Note    Staff report that participant went home early today because she stated she was not feeling well.

## 2024-04-05 ENCOUNTER — ANCILLARY PROCEDURE (OUTPATIENT)
Dept: MAMMOGRAPHY | Facility: HOSPITAL | Age: 64
End: 2024-04-05
Attending: PHYSICIAN ASSISTANT
Payer: COMMERCIAL

## 2024-04-05 DIAGNOSIS — Z12.31 VISIT FOR SCREENING MAMMOGRAM: ICD-10-CM

## 2024-04-05 PROCEDURE — 77063 BREAST TOMOSYNTHESIS BI: CPT

## 2024-04-09 ENCOUNTER — HOSPITAL ENCOUNTER (OUTPATIENT)
Dept: REHABILITATION | Facility: CLINIC | Age: 64
Discharge: HOME | End: 2024-04-09
Attending: FAMILY MEDICINE
Payer: MEDICAID

## 2024-04-09 PROCEDURE — S5100 ADULT DAYCARE SERVICES 15MIN: HCPCS

## 2024-04-11 ENCOUNTER — HOSPITAL ENCOUNTER (OUTPATIENT)
Dept: REHABILITATION | Facility: CLINIC | Age: 64
Discharge: HOME | End: 2024-04-11
Attending: FAMILY MEDICINE
Payer: MEDICAID

## 2024-04-11 PROCEDURE — S5100 ADULT DAYCARE SERVICES 15MIN: HCPCS

## 2024-04-16 ENCOUNTER — HOSPITAL ENCOUNTER (OUTPATIENT)
Dept: REHABILITATION | Facility: CLINIC | Age: 64
Discharge: HOME | End: 2024-04-16
Attending: FAMILY MEDICINE
Payer: MEDICAID

## 2024-04-16 PROCEDURE — S5100 ADULT DAYCARE SERVICES 15MIN: HCPCS

## 2024-04-18 ENCOUNTER — HOSPITAL ENCOUNTER (OUTPATIENT)
Dept: REHABILITATION | Facility: CLINIC | Age: 64
Discharge: HOME | End: 2024-04-18
Attending: FAMILY MEDICINE
Payer: MEDICAID

## 2024-04-18 PROCEDURE — S5100 ADULT DAYCARE SERVICES 15MIN: HCPCS

## 2024-04-23 ENCOUNTER — HOSPITAL ENCOUNTER (OUTPATIENT)
Dept: REHABILITATION | Facility: CLINIC | Age: 64
Discharge: HOME | End: 2024-04-23
Attending: FAMILY MEDICINE
Payer: MEDICAID

## 2024-04-23 PROCEDURE — S5100 ADULT DAYCARE SERVICES 15MIN: HCPCS

## 2024-04-25 ENCOUNTER — HOSPITAL ENCOUNTER (OUTPATIENT)
Dept: REHABILITATION | Facility: CLINIC | Age: 64
Discharge: HOME | End: 2024-04-25
Attending: FAMILY MEDICINE
Payer: MEDICAID

## 2024-04-25 PROCEDURE — S5100 ADULT DAYCARE SERVICES 15MIN: HCPCS

## 2024-04-30 ENCOUNTER — HOSPITAL ENCOUNTER (OUTPATIENT)
Dept: REHABILITATION | Facility: CLINIC | Age: 64
Discharge: HOME | End: 2024-04-30
Attending: FAMILY MEDICINE
Payer: MEDICAID

## 2024-04-30 PROCEDURE — S5100 ADULT DAYCARE SERVICES 15MIN: HCPCS

## 2024-04-30 NOTE — PROGRESS NOTES
"Pt stated her stomach was hurting, pt was also moaning and crying through this pain.  SPENCER asked what type of pain it was, pt stated \"it just hurts all over.\"  SPENCER asked if she felt stinging when urinating or BM, pt stated she felt stinging during urination.  Pt's house was called and was informed of this incident.  "

## 2024-05-01 NOTE — PROGRESS NOTES
[facility] RN Note    Notified by staff that participant was complaining of abdominal pain while at day program yesterday.  Group home was notified. Participant not in facility at this time.  Writer will follow-up with participant at next site visit that participant and writer are both present.

## 2024-05-02 ENCOUNTER — HOSPITAL ENCOUNTER (OUTPATIENT)
Dept: REHABILITATION | Facility: CLINIC | Age: 64
Discharge: HOME | End: 2024-05-02
Attending: FAMILY MEDICINE
Payer: MEDICAID

## 2024-05-02 PROCEDURE — S5100 ADULT DAYCARE SERVICES 15MIN: HCPCS

## 2024-05-07 ENCOUNTER — HOSPITAL ENCOUNTER (OUTPATIENT)
Dept: REHABILITATION | Facility: CLINIC | Age: 64
Discharge: HOME | End: 2024-05-07
Attending: FAMILY MEDICINE
Payer: MEDICAID

## 2024-05-07 PROCEDURE — S5100 ADULT DAYCARE SERVICES 15MIN: HCPCS

## 2024-05-07 NOTE — PROGRESS NOTES
[facility] RN Note    Previous documentation since 4/2/24 reviewed.  No concerns reported by AC staff or member.       Per chart review, Karolyn has a history of chronic constipation. Being followed by [facility] at group home and has a prescribed bowel protocol.

## 2024-05-07 NOTE — PROGRESS NOTES
[facility] RN Note    Previous documentation since 4/2/24 reviewed.  No concerns reported by  staff or member.     History of chronic constipation. Being followed at the group home by [facility], has a prescribed bowel program.    Participant seen sitting in her wheelchair prior to lunch.  States she has a history of constipation with occasional stomach cramps. Denies any vomiting.  States her appetite varies.      Plan: Encourage adequate water intake. Monitor for any bowel movements while at day program. Monitor for complaints of abdominal pain, vomiting, and/or decreased appetite. Report any concerns to group home (or day program RN if on site).

## 2024-05-09 ENCOUNTER — HOSPITAL ENCOUNTER (OUTPATIENT)
Dept: REHABILITATION | Facility: CLINIC | Age: 64
Discharge: HOME | End: 2024-05-09
Attending: FAMILY MEDICINE
Payer: MEDICAID

## 2024-05-09 PROCEDURE — S5100 ADULT DAYCARE SERVICES 15MIN: HCPCS

## 2024-05-14 ENCOUNTER — HOSPITAL ENCOUNTER (OUTPATIENT)
Dept: REHABILITATION | Facility: CLINIC | Age: 64
Discharge: HOME | End: 2024-05-14
Attending: FAMILY MEDICINE
Payer: MEDICAID

## 2024-05-14 PROCEDURE — S5100 ADULT DAYCARE SERVICES 15MIN: HCPCS

## 2024-05-16 ENCOUNTER — HOSPITAL ENCOUNTER (OUTPATIENT)
Dept: REHABILITATION | Facility: CLINIC | Age: 64
Discharge: HOME | End: 2024-05-16
Attending: FAMILY MEDICINE
Payer: MEDICAID

## 2024-05-16 PROCEDURE — S5100 ADULT DAYCARE SERVICES 15MIN: HCPCS

## 2024-05-21 ENCOUNTER — HOSPITAL ENCOUNTER (OUTPATIENT)
Dept: REHABILITATION | Facility: CLINIC | Age: 64
Discharge: HOME | End: 2024-05-21
Attending: FAMILY MEDICINE
Payer: MEDICAID

## 2024-05-21 PROCEDURE — S5100 ADULT DAYCARE SERVICES 15MIN: HCPCS

## 2024-05-23 ENCOUNTER — HOSPITAL ENCOUNTER (OUTPATIENT)
Dept: REHABILITATION | Facility: CLINIC | Age: 64
Discharge: HOME | End: 2024-05-23
Attending: FAMILY MEDICINE
Payer: MEDICAID

## 2024-05-23 PROCEDURE — S5100 ADULT DAYCARE SERVICES 15MIN: HCPCS

## 2024-05-28 ENCOUNTER — HOSPITAL ENCOUNTER (OUTPATIENT)
Dept: REHABILITATION | Facility: CLINIC | Age: 64
Discharge: HOME | End: 2024-05-28
Attending: FAMILY MEDICINE
Payer: MEDICAID

## 2024-05-28 PROCEDURE — S5100 ADULT DAYCARE SERVICES 15MIN: HCPCS

## 2024-05-29 NOTE — PROGRESS NOTES
MONTHLY PROGRESS NOTE AND PARTICIPATION REPORT   [facility]    Karolyn Das, 1960  Attendance: Please see Epic for attendance record.    Communication:   Does communicate   Hearing:   No impairment  Vision:   Difficulty reading or seeing print wears glasses to program less than half the time  Orientation/Cognition:   Minor forgetfulness  Partial disorientation  Short term memory loss  Karolyn presents with moderate cognitive impairment and requires close supervision for safety.  Behavior:   No behavioral concerns  Requires redirection  Karolyn requires redirection if she becomes upset by a late ride home or missing time in [employer].  Self-Preservation Skills:   Not capable of self-preservation  Eating:   Assist with set up  Ambulation Walking:   Non-ambulatory  Transferring:   Aide of one person/two  Karolyn presents with a strong rear lean while performing stand pivot tx's to toilet and Nu Step and requires CGA and gait belt for safe tx's and fall prevention.  Staff provides v/c's to apply manual wc brakes and to use grab bars during tx's.  Wheelchair:   Needs help to propel manual wheelchair  Toileting:   Independent  Staff provides Karolyn with room alarm after toilet tx is complete so she can notify staff that she has completed her bathroom needs so staff can assist with her tx back to manual WC.  Maintenance Program:     Sofia   Has expressed interest in using parallel bars- may not be appropriate at this time.   Living Arrangements:   Lives in assisted living facility  Spiritual Needs: Needs are being met through support group  Medication Assistance:   Medication not taken during program hours  Participation Report:   Aerobics/Exercise  Support Group  Cognitive Stimulation  Fire Drill  Creative Arts/Crafts  Games  Gardening  Speakers/Entertainment  Socialization  Current Events/News  Education/Health Topic   Community Based Outings  Level of Participation:   Active      Karolyn is excited to share the good news about moving into her new group home. Karolyn celebrated her birthday this month with other members. Karolyn often becomes agitated towards pickup time, and requires one on one attention to maintain composure with the stress of her rides. Karolyn benefits from distractions when frustrated, whether by playing games or completing other cognitive tasks.

## 2024-05-30 ENCOUNTER — HOSPITAL ENCOUNTER (OUTPATIENT)
Dept: REHABILITATION | Facility: CLINIC | Age: 64
Discharge: HOME | End: 2024-05-30
Attending: FAMILY MEDICINE
Payer: MEDICAID

## 2024-05-30 PROCEDURE — S5100 ADULT DAYCARE SERVICES 15MIN: HCPCS

## 2024-06-04 ENCOUNTER — HOSPITAL ENCOUNTER (OUTPATIENT)
Dept: REHABILITATION | Facility: CLINIC | Age: 64
Discharge: HOME | End: 2024-06-04
Attending: FAMILY MEDICINE
Payer: MEDICAID

## 2024-06-04 PROCEDURE — S5100 ADULT DAYCARE SERVICES 15MIN: HCPCS

## 2024-06-06 ENCOUNTER — HOSPITAL ENCOUNTER (OUTPATIENT)
Dept: REHABILITATION | Facility: CLINIC | Age: 64
Discharge: HOME | End: 2024-06-06
Attending: FAMILY MEDICINE
Payer: MEDICAID

## 2024-06-06 PROCEDURE — S5100 ADULT DAYCARE SERVICES 15MIN: HCPCS

## 2024-06-11 ENCOUNTER — HOSPITAL ENCOUNTER (OUTPATIENT)
Dept: REHABILITATION | Facility: CLINIC | Age: 64
Discharge: HOME | End: 2024-06-11
Attending: FAMILY MEDICINE
Payer: MEDICAID

## 2024-06-11 PROCEDURE — S5102 ADULT DAY CARE PER DIEM: HCPCS

## 2024-06-11 NOTE — PROGRESS NOTES
[facility] Social History    Full Name: Karolyn Das        MRN: [id number]    YOB: 1960  Nickname:Mirtha      Sex: Female     Home Phone: Recently moved into new group home and doesn't know the new number.      Cell Phone: [phone]  Address: [address] [place] [address]/Zip: [place] [address]  County: [place]      E-mail: No         Transportation: [employer]   Language: English         needed? No        Ethnicity: American   Race: White      Country of Origin: [place]       [place]ahai: Congregation   Marital Status:                   Spouse/Significant Other: Ramone (Former )    ______________________________________________________________________________________     ? No     Branch of Service: N/A      Education Level: [employer]   Job History: Youth Counselor        Organizations/Clubs: None  Whom do you live with? 4 male roommates at the group home.   Current living arrangement: Group home   Number of Children: 2  List: Juliocesar   Number of Siblings: 3     List: Deena, Marion, Jonathan   Other Important People/Pets: Best friends Jane (lives in [place]) and Rani (lives in the [facility]).   What else should we know about you? Loves to play volleyball,  watch sports, and do art.     Primary Care Provider: Dr. Vicente Elizabeth  Neurologist:   Emergency Contacts:  Kareem Das      Relationship: Son    Phone: [phone]  Not listed       Relationship: N/A     Phone: 2nd contact not provided

## 2024-06-11 NOTE — PROGRESS NOTES
[facility] RN Note    Previous documentation since 5/7/24 reviewed.  No concerns reported by AC staff or member.

## 2024-06-13 ENCOUNTER — HOSPITAL ENCOUNTER (OUTPATIENT)
Dept: REHABILITATION | Facility: CLINIC | Age: 64
Discharge: HOME | End: 2024-06-13
Attending: FAMILY MEDICINE
Payer: MEDICAID

## 2024-06-13 PROCEDURE — S5100 ADULT DAYCARE SERVICES 15MIN: HCPCS

## 2024-06-18 ENCOUNTER — HOSPITAL ENCOUNTER (OUTPATIENT)
Dept: REHABILITATION | Facility: CLINIC | Age: 64
Discharge: HOME | End: 2024-06-18
Attending: FAMILY MEDICINE
Payer: MEDICAID

## 2024-06-18 PROCEDURE — S5100 ADULT DAYCARE SERVICES 15MIN: HCPCS

## 2024-06-20 ENCOUNTER — HOSPITAL ENCOUNTER (OUTPATIENT)
Dept: REHABILITATION | Facility: CLINIC | Age: 64
Discharge: HOME | End: 2024-06-20
Attending: FAMILY MEDICINE
Payer: MEDICAID

## 2024-06-20 PROCEDURE — S5100 ADULT DAYCARE SERVICES 15MIN: HCPCS

## 2024-06-25 ENCOUNTER — HOSPITAL ENCOUNTER (OUTPATIENT)
Dept: REHABILITATION | Facility: CLINIC | Age: 64
Discharge: HOME | End: 2024-06-25
Attending: FAMILY MEDICINE
Payer: MEDICAID

## 2024-06-25 PROCEDURE — S5100 ADULT DAYCARE SERVICES 15MIN: HCPCS

## 2024-06-25 NOTE — PROGRESS NOTES
MONTHLY PROGRESS NOTE AND PARTICIPATION REPORT   [facility]    Karolyn Das, 1960  Attendance: Please see Epic for attendance record.    Communication:   Does communicate   Hearing:   Wears hearing aid  Vision:   Difficulty reading or seeing print, wears glasses throughout the day  Orientation/Cognition:   Minor forgetfulness  Partial disorientation  Short term memory loss  Moderate cognitive impairment and requires close supervision for safety  Behavior:   No behavioral concerns  Requires redirection  She can become fixated on something that makes her upset and requires redirection when this occurs. Examples of this would be a late ride or when an art project is not going her way.    Self-Preservation Skills:   Not capable of self-preservation  Eating:   Assist with set up  Ambulation Walking:   Non-ambulatory  Transferring:   Aide of one person/two  Karolyn presents with a strong rear lean while performing a stand pivot transfer for the toilet and the NU step. A gait belt and grab bars are used for safety and fall prevention . Staff aide in applying wheelchair brakes and verbal cues for grab bars.   Wheelchair:   Needs help  Toileting:   Needs assistance  A standard pivot transfer is used in the restroom with a 1:1 ratio. Karolyn can be left alone in the bathroom and told to use call light when ready.    Maintenance Program:     NuStep  Living Arrangements:   Lives in assisted living facility  Spiritual Needs: Needs are being met through support group  Medication Assistance:   Medication not taken during program hours  Participation Report:   Aerobics/Exercise  Support Group  Cognitive Stimulation  Fire Drill  Creative Arts/Crafts  Games  Gardening  Speakers/Entertainment  Socialization  Current Events/News  Education/Health Topic  Level of Participation:   Active    Karolyn was very excited about her birthday this month! She enjoyed telling all the members and had the whole group sing  to her on her birthday. Her sister also came to visit her this month and surprised her in arts. She was so shocked and excited to see her. She also spent a lot of time in art making gifts for her sister including a mermaid and purple starfish. She can become stressed regarding pickup time and will need redirection and someone to calm her down. She is very active at [facility] when she is in a good mood. When she is upset, she tends to hyper-fixate on the issue and isolates herself from the group.

## 2024-06-26 ENCOUNTER — TELEPHONE (OUTPATIENT)
Dept: FAMILY MEDICINE | Facility: CLINIC | Age: 64
End: 2024-06-26
Payer: COMMERCIAL

## 2024-06-26 NOTE — TELEPHONE ENCOUNTER
Called and attempted to schedule AWV with Jane. Per Jane, patient's pcp is not Dr. Elizabeth anymore and is Vaishnavi Urbina PA-C of [facility]. Jane also shares that patient should have gotten AWV already with new pcp.

## 2024-06-26 NOTE — TELEPHONE ENCOUNTER
Called and informed [facility] that patient's pcp is not Dr. Elizabeth. Form needs to go to Vaishnavi Urbina PA-C of [facility]. Form shredded, completing task.

## 2024-06-26 NOTE — TELEPHONE ENCOUNTER
Forms/Letter Request    Type of form/letter: OTHER: HEALTH CARE SUMMARY      Do we have the form/letter: Yes    Who is the form from? [facility] (if other please explain)    Where did/will the form come from? form was faxed in    When is form/letter needed by: ASAP    How would you like the form/letter returned: Fax : [phone]    Patient Notified form requests are processed in 5-7 business days:No    Okay to leave a detailed message?: Yes at Cell number on file:    Telephone Information:   Mobile [phone]

## 2024-06-27 ENCOUNTER — HOSPITAL ENCOUNTER (OUTPATIENT)
Dept: REHABILITATION | Facility: CLINIC | Age: 64
Discharge: HOME | End: 2024-06-27
Attending: FAMILY MEDICINE
Payer: MEDICAID

## 2024-06-27 PROCEDURE — S5100 ADULT DAYCARE SERVICES 15MIN: HCPCS

## 2024-06-28 NOTE — PROGRESS NOTES
[facility] RN Note    Previous documentation reviewed.  No concerns reported by AC staff or member.    Non amb. History of osteoporosis.   Discharge Plan A and Plan B have been determined by review of patient's clinical status, future medical and therapeutic needs, and coverage/benefits for post-acute care in coordination with multidisciplinary team members.      06/27/24 1143   Discharge Reassessment   Assessment Type Discharge Planning Reassessment   Did the patient's condition or plan change since previous assessment? No   Discharge Plan discussed with: Patient   Communicated ALYSE with patient/caregiver Yes   Discharge Plan A Home with family   Discharge Plan B Home   DME Needed Upon Discharge  none   Transition of Care Barriers None   Why the patient remains in the hospital Requires continued medical care   Post-Acute Status   Post-Acute Authorization Other   Other Status Awaiting f/u Appts   Discharge Delays None known at this time     CM met with patient   to discuss discharge planning.  Patients plan is to  dc home with family. Patient is in agreement with dc planning.  Hematology final  recommendations pending.    ALYSE: 07/01//24      Discharge Recommendations: NA    Discharge Equipment Recommendations: NA    Barriers to discharge: None     TREATMENT PLANS:   - She will need to get back on her outpatient schedule of exchange transfusions every 8-10 weeks     - - Will arrange follow-up with hematology after discharge      Follow up:  PCP, hematology     Referrals:     Aleida Wise RN  Case Management  [facility]  [phone]

## 2024-07-02 ENCOUNTER — HOSPITAL ENCOUNTER (OUTPATIENT)
Dept: REHABILITATION | Facility: CLINIC | Age: 64
Discharge: HOME | End: 2024-07-02
Attending: FAMILY MEDICINE
Payer: MEDICAID

## 2024-07-02 PROCEDURE — S5100 ADULT DAYCARE SERVICES 15MIN: HCPCS

## 2024-07-02 NOTE — PROGRESS NOTES
[facility] RN Note    Previous documentation since 6/11/24 reviewed.  No concerns reported by AC staff or member.

## 2024-07-09 ENCOUNTER — HOSPITAL ENCOUNTER (OUTPATIENT)
Dept: REHABILITATION | Facility: CLINIC | Age: 64
Discharge: HOME | End: 2024-07-09
Attending: FAMILY MEDICINE
Payer: MEDICAID

## 2024-07-09 PROCEDURE — S5102 ADULT DAY CARE PER DIEM: HCPCS

## 2024-07-11 ENCOUNTER — HOSPITAL ENCOUNTER (OUTPATIENT)
Dept: REHABILITATION | Facility: CLINIC | Age: 64
Discharge: HOME | End: 2024-07-11
Attending: FAMILY MEDICINE
Payer: MEDICAID

## 2024-07-11 PROCEDURE — S5100 ADULT DAYCARE SERVICES 15MIN: HCPCS

## 2024-07-16 ENCOUNTER — HOSPITAL ENCOUNTER (OUTPATIENT)
Dept: REHABILITATION | Facility: CLINIC | Age: 64
Discharge: HOME | End: 2024-07-16
Attending: FAMILY MEDICINE
Payer: MEDICAID

## 2024-07-16 PROCEDURE — S5100 ADULT DAYCARE SERVICES 15MIN: HCPCS

## 2024-07-18 ENCOUNTER — HOSPITAL ENCOUNTER (OUTPATIENT)
Dept: REHABILITATION | Facility: CLINIC | Age: 64
Discharge: HOME | End: 2024-07-18
Attending: FAMILY MEDICINE
Payer: MEDICAID

## 2024-07-18 PROCEDURE — S5100 ADULT DAYCARE SERVICES 15MIN: HCPCS

## 2024-07-22 NOTE — PROGRESS NOTES
INDIVIDUAL PLAN OF CARE   [facility]    Member Name: Karolyn Das; YOB: 1960  MRN: [id number]  7/23/2023    Goals developed in collaboration with: member  Staff responsible for plan:  Elizabeth COSTELLO and Zara Khan Rehab Tech    1. Long Term Goal (concrete, measurable, and time specific outcomes):  Karolyn will maintain her current level of cognitive and physical function for as long as possible and prevent the progression of MS symptoms through active participation in [facility] activities each day of program attendance.  Target Date:  April 2025  Quarterly Review: Goal remains appropriate.    2. Short Term Goal: (concrete, measurable, and time specific outcomes):  Karolyn will maintain her current level of physical function and UE strength through active participation in UE aerobic activity for ten minutes a day as led by staff.   Target Date:  April 2025  Quarterly Review: Previous goal rewritten to reflect that member now completes prescribed NuStep activity at home, prefers to focus on UE aerobics during Day Program.    3. Short Term Goal: (concrete, measurable, and time specific outcomes):  Lissett will maintain her current level of cognitive function through active participation in Brain and Body cognitive stimulation activities each day of program attendance.  Target Date:  April 2025  Quarterly Review: Goal remains appropriate. Additional emphasis on mindfulness and relaxation activities throughout Brain and Body programming.

## 2024-07-22 NOTE — PROGRESS NOTES
Individual abuse prevention plan (IAPP)  [facility]     Assessment of Susceptibility to Abuse, Including Self Abuse, Neglect (Identification of characteristics, which make the individual susceptible to abuse, and how these characteristics cause the individual to be susceptible to abuse.)     Is the person susceptible to abuse in each area?  Sexual Abuse:   No  Referrals made when the person is susceptible to abuse outside the scope or control of this program (Identify the referral and date it occurred): No additional risk reduction means needed at this time    Physical Abuse:   No  Referrals made when the person is susceptible to abuse outside the scope or control of this program (Identify the referral and date it occurred): No additional risk reduction means needed at this time    Self-Abuse:   No  Referrals made when the person is susceptible to abuse outside the scope or control of this program (Identify the referral and date it occurred): No additional risk reduction means needed at this time    Financial  Exploitation  No  Referrals made when the person is susceptible to abuse outside the scope or control of this program (Identify the referral and date it occurred): No additional risk reduction means needed at this time    Is the program aware of this person committing a violent crime or act of physical aggression towards others?  No  Referrals made when the person is susceptible to abuse outside the scope or control of this program (Identify the referral and date it occurred): No additional risk reduction means needed at this time    INDIVIDUAL ABUSE PREVENTION PLAN-MEASURES TAKEN TO MINIMIZE RISK OF ABUSE   ADL:   Assist with clothing management  Assist with toileting  Ambulation/Transfers/Wheelchairs:  Provide transfer belt and assist with transfers and ambulation   Behavior:   Patient gets agitated when she becomes confused.  Communication:  Encourage verbalization of  needs/concerns  Cognitive Issues:   Provider reminders due to confusion, forgetfulness  Give simple step-by-step direction  Diet:   No present concerns  Exercise:   Encourage participation in maintenance program  Encourage participation in wheelchair aerobics  Hearing:   No concerns  Isolation:   Patient lives in an assisted living facility  Medical Monitoring:   Monitor physical and emotional comfort  Mental Health:   Encourage client to express feelings  Offer emotional support  Sensory:   Provide and encourage participation in activities for stimulation  Vision:   Ensure client is wearing glasses and clean prn  Other: N/A  Developed in consultation with:  Client  The Program Abuse Prevention Plan/IAPP identifies the specific actions that minimize abuse to the [facility] participant.  Yes

## 2024-07-23 ENCOUNTER — HOSPITAL ENCOUNTER (OUTPATIENT)
Dept: REHABILITATION | Facility: CLINIC | Age: 64
Discharge: HOME | End: 2024-07-23
Attending: FAMILY MEDICINE
Payer: MEDICAID

## 2024-07-23 PROCEDURE — S5100 ADULT DAYCARE SERVICES 15MIN: HCPCS

## 2024-07-25 ENCOUNTER — HOSPITAL ENCOUNTER (OUTPATIENT)
Dept: REHABILITATION | Facility: CLINIC | Age: 64
Discharge: HOME | End: 2024-07-25
Attending: FAMILY MEDICINE
Payer: MEDICAID

## 2024-07-25 PROCEDURE — S5100 ADULT DAYCARE SERVICES 15MIN: HCPCS

## 2024-07-25 NOTE — PROGRESS NOTES
MONTHLY PROGRESS NOTE AND PARTICIPATION REPORT   [facility]    Karolyn Das, 1960  Attendance: Please see Epic for attendance record.    Communication:   Does communicate   Hearing:   Wears hearing aid  Vision:   Difficulty reading or seeing print, wears glasses throughout the day  Orientation/Cognition:   Minor forgetfulness  Partial disorientation  Short term memory loss  Moderate cognitive impairment and requires close supervision for safety  Behavior:   No behavioral concerns  Requires redirection  She can become fixated on something that makes her upset and requires redirection when this occurs. Examples of this would be a late ride or when an art project is not going her way.    Self-Preservation Skills:   Not capable of self-preservation  Eating:   Assist with set up  Ambulation Walking:   Non-ambulatory  Transferring:   Aide of one person/two  Karolyn presents with a strong rear lean while performing a stand pivot transfer for the toilet and the NU step. A gait belt and grab bars are used for safety and fall prevention . Staff aide in applying wheelchair brakes and verbal cues for grab bars.   Wheelchair:   Needs help  Toileting:   Needs assistance  A standard pivot transfer is used in the restroom with a 1:1 ratio. Karolyn can be left alone in the bathroom and told to use call light when ready.    Maintenance Program:     NuStep  Living Arrangements:   Lives in assisted living facility  Spiritual Needs: Needs are being met through support group  Medication Assistance:   Medication not taken during program hours  Participation Report:   Aerobics/Exercise  Support Group  Cognitive Stimulation  Fire Drill  Creative Arts/Crafts  Games  Gardening  Speakers/Entertainment  Socialization  Current Events/News  Education/Health Topic  Level of Participation:   Active    Karolyn has worked on a mermaid ceramic painting throughout the past month. Karolyn is focused as she completes tasks in  art and listens in on group conversations. Karolyn becomes irritable when she becomes fixated on other members' tics and requires separation and distraction from the situation. Karolyn is encouraged to practice deep breathing exercises to manage anxiety throughout the day.

## 2024-07-30 ENCOUNTER — HOSPITAL ENCOUNTER (OUTPATIENT)
Dept: REHABILITATION | Facility: CLINIC | Age: 64
Discharge: HOME | End: 2024-07-30
Attending: FAMILY MEDICINE
Payer: MEDICAID

## 2024-07-30 PROCEDURE — S5100 ADULT DAYCARE SERVICES 15MIN: HCPCS

## 2024-08-01 ENCOUNTER — HOSPITAL ENCOUNTER (OUTPATIENT)
Dept: REHABILITATION | Facility: CLINIC | Age: 64
Discharge: HOME | End: 2024-08-01
Attending: FAMILY MEDICINE
Payer: MEDICAID

## 2024-08-01 PROCEDURE — S5100 ADULT DAYCARE SERVICES 15MIN: HCPCS

## 2024-08-01 NOTE — PROGRESS NOTES
[facility] RN Note    Previous documentation since 7/2/24 reviewed.  Participant not in facility at this time.

## 2024-08-06 ENCOUNTER — HOSPITAL ENCOUNTER (OUTPATIENT)
Dept: REHABILITATION | Facility: CLINIC | Age: 64
Discharge: HOME | End: 2024-08-06
Attending: FAMILY MEDICINE
Payer: MEDICAID

## 2024-08-06 PROCEDURE — S5100 ADULT DAYCARE SERVICES 15MIN: HCPCS

## 2024-08-08 ENCOUNTER — HOSPITAL ENCOUNTER (OUTPATIENT)
Dept: REHABILITATION | Facility: CLINIC | Age: 64
Discharge: HOME | End: 2024-08-08
Attending: FAMILY MEDICINE
Payer: MEDICAID

## 2024-08-08 PROCEDURE — S5100 ADULT DAYCARE SERVICES 15MIN: HCPCS

## 2024-08-13 ENCOUNTER — HOSPITAL ENCOUNTER (OUTPATIENT)
Dept: REHABILITATION | Facility: CLINIC | Age: 64
Discharge: HOME | End: 2024-08-13
Attending: FAMILY MEDICINE
Payer: MEDICAID

## 2024-08-13 PROCEDURE — S5100 ADULT DAYCARE SERVICES 15MIN: HCPCS

## 2024-08-15 ENCOUNTER — HOSPITAL ENCOUNTER (OUTPATIENT)
Dept: REHABILITATION | Facility: CLINIC | Age: 64
Discharge: HOME | End: 2024-08-15
Attending: FAMILY MEDICINE
Payer: MEDICAID

## 2024-08-15 PROCEDURE — S5100 ADULT DAYCARE SERVICES 15MIN: HCPCS

## 2024-08-20 ENCOUNTER — HOSPITAL ENCOUNTER (OUTPATIENT)
Dept: REHABILITATION | Facility: CLINIC | Age: 64
Discharge: HOME | End: 2024-08-20
Attending: FAMILY MEDICINE
Payer: MEDICAID

## 2024-08-20 PROCEDURE — S5100 ADULT DAYCARE SERVICES 15MIN: HCPCS

## 2024-08-20 NOTE — PROGRESS NOTES
MONTHLY PROGRESS NOTE AND PARTICIPATION REPORT   [facility]    Karolyn Das, 1960  Attendance: Please see Epic for attendance record.    Communication:   Does communicate   Hearing:   Wears hearing aid  Vision:   Difficulty reading or seeing print, wears glasses throughout the day  Orientation/Cognition:   Minor forgetfulness  Partial disorientation  Short term memory loss  Moderate cognitive impairment and requires close supervision for safety  Behavior:   No behavioral concerns  Requires redirection  She can become fixated on something that makes her upset and requires redirection when this occurs. Examples of this would be a late ride or when an art project is not going her way.    Self-Preservation Skills:   Not capable of self-preservation  Eating:   Assist with set up  Ambulation Walking:   Non-ambulatory  Transferring:   Aide of one person/two  Karolyn presents with a strong rear lean while performing a stand pivot transfer for the toilet and the NU step. A gait belt and grab bars are used for safety and fall prevention . Staff aide in applying wheelchair brakes and verbal cues for grab bars.   Wheelchair:   Needs help  Toileting:   Needs assistance  A standard pivot transfer is used in the restroom with a 1:1 ratio. Karolyn can be left alone in the bathroom and told to use call light when ready.    Maintenance Program:     NuStep  Living Arrangements:   Lives in assisted living facility  Spiritual Needs: Needs are being met through support group  Medication Assistance:   Medication not taken during program hours  Participation Report:   Aerobics/Exercise  Support Group  Cognitive Stimulation  Fire Drill  Creative Arts/Crafts  Games  Gardening  Speakers/Entertainment  Socialization  Current Events/News  Education/Health Topic  Level of Participation:   Active    Karolyn has been doing well in art, and has completed her mermaid, which she was thrilled about, and she plans to give  it to her sister. Mirtha has been expressing frustration towards her rides with [employer] at points, and at points she can become anxious if her ride isn't right on time.

## 2024-08-22 ENCOUNTER — HOSPITAL ENCOUNTER (OUTPATIENT)
Dept: REHABILITATION | Facility: CLINIC | Age: 64
Discharge: HOME | End: 2024-08-22
Attending: FAMILY MEDICINE
Payer: MEDICAID

## 2024-08-22 PROCEDURE — S5100 ADULT DAYCARE SERVICES 15MIN: HCPCS

## 2024-08-29 ENCOUNTER — HOSPITAL ENCOUNTER (OUTPATIENT)
Dept: REHABILITATION | Facility: CLINIC | Age: 64
Discharge: HOME | End: 2024-08-29
Attending: FAMILY MEDICINE
Payer: MEDICAID

## 2024-08-29 PROCEDURE — S5100 ADULT DAYCARE SERVICES 15MIN: HCPCS | Performed by: OCCUPATIONAL THERAPIST

## 2024-09-03 ENCOUNTER — HOSPITAL ENCOUNTER (OUTPATIENT)
Dept: REHABILITATION | Facility: CLINIC | Age: 64
Discharge: HOME | End: 2024-09-03
Attending: FAMILY MEDICINE
Payer: MEDICAID

## 2024-09-03 PROCEDURE — S5100 ADULT DAYCARE SERVICES 15MIN: HCPCS

## 2024-09-05 ENCOUNTER — HOSPITAL ENCOUNTER (OUTPATIENT)
Dept: REHABILITATION | Facility: CLINIC | Age: 64
Discharge: HOME | End: 2024-09-05
Attending: FAMILY MEDICINE
Payer: MEDICAID

## 2024-09-05 PROCEDURE — S5100 ADULT DAYCARE SERVICES 15MIN: HCPCS

## 2024-09-05 NOTE — PROGRESS NOTES
[facility] RN Note    No new progress notes noted since 8/1/24. No concerns reported by AC staff or member.

## 2024-09-10 ENCOUNTER — HOSPITAL ENCOUNTER (OUTPATIENT)
Dept: REHABILITATION | Facility: CLINIC | Age: 64
Discharge: HOME | End: 2024-09-10
Attending: FAMILY MEDICINE
Payer: MEDICAID

## 2024-09-10 PROCEDURE — S5100 ADULT DAYCARE SERVICES 15MIN: HCPCS

## 2024-09-12 ENCOUNTER — HOSPITAL ENCOUNTER (OUTPATIENT)
Dept: REHABILITATION | Facility: CLINIC | Age: 64
Discharge: HOME | End: 2024-09-12
Attending: FAMILY MEDICINE
Payer: MEDICAID

## 2024-09-12 PROCEDURE — S5100 ADULT DAYCARE SERVICES 15MIN: HCPCS

## 2024-09-17 ENCOUNTER — HOSPITAL ENCOUNTER (OUTPATIENT)
Dept: REHABILITATION | Facility: CLINIC | Age: 64
Discharge: HOME | End: 2024-09-17
Attending: FAMILY MEDICINE
Payer: MEDICAID

## 2024-09-17 PROCEDURE — S5100 ADULT DAYCARE SERVICES 15MIN: HCPCS

## 2024-09-19 ENCOUNTER — HOSPITAL ENCOUNTER (OUTPATIENT)
Dept: REHABILITATION | Facility: CLINIC | Age: 64
Discharge: HOME | End: 2024-09-19
Attending: FAMILY MEDICINE
Payer: MEDICAID

## 2024-09-19 PROCEDURE — S5100 ADULT DAYCARE SERVICES 15MIN: HCPCS

## 2024-09-24 ENCOUNTER — HOSPITAL ENCOUNTER (OUTPATIENT)
Dept: REHABILITATION | Facility: CLINIC | Age: 64
Discharge: HOME | End: 2024-09-24
Attending: FAMILY MEDICINE
Payer: MEDICAID

## 2024-09-24 PROCEDURE — S5100 ADULT DAYCARE SERVICES 15MIN: HCPCS

## 2024-09-26 ENCOUNTER — HOSPITAL ENCOUNTER (OUTPATIENT)
Dept: REHABILITATION | Facility: CLINIC | Age: 64
Discharge: HOME | End: 2024-09-26
Attending: FAMILY MEDICINE
Payer: MEDICAID

## 2024-09-26 PROCEDURE — S5100 ADULT DAYCARE SERVICES 15MIN: HCPCS

## 2024-10-01 ENCOUNTER — HOSPITAL ENCOUNTER (OUTPATIENT)
Dept: REHABILITATION | Facility: CLINIC | Age: 64
Discharge: HOME | End: 2024-10-01
Attending: FAMILY MEDICINE
Payer: MEDICAID

## 2024-10-01 PROCEDURE — S5100 ADULT DAYCARE SERVICES 15MIN: HCPCS

## 2024-10-01 NOTE — PROGRESS NOTES
[facility] RN Note    No new progress notes noted since 9/5/24.  No concerns reported by AC staff or member.

## 2024-10-03 ENCOUNTER — HOSPITAL ENCOUNTER (OUTPATIENT)
Dept: REHABILITATION | Facility: CLINIC | Age: 64
Discharge: HOME | End: 2024-10-03
Attending: FAMILY MEDICINE
Payer: MEDICAID

## 2024-10-03 PROCEDURE — S5100 ADULT DAYCARE SERVICES 15MIN: HCPCS

## 2024-10-03 NOTE — PROGRESS NOTES
MONTHLY PROGRESS NOTE AND PARTICIPATION REPORT   [facility]    Karolyn Das, 1960  Attendance: Please see Epic for attendance record.    Communication:   Does communicate   Hearing:   Wears hearing aid  Vision:   Difficulty reading or seeing print, wears glasses throughout the day  Orientation/Cognition:   Minor forgetfulness  Partial disorientation  Short term memory loss  Moderate cognitive impairment and requires close supervision for safety  Behavior:   No behavioral concerns  Requires redirection  She can become fixated on something that makes her upset and requires redirection when this occurs. Examples of this would be a late ride or when an art project is not going her way.    Self-Preservation Skills:   Not capable of self-preservation  Eating:   Assist with set up  Ambulation Walking:   Non-ambulatory  Transferring:   Aide of one person/two  Karolyn presents with a strong rear lean while performing a stand pivot transfer for the toilet and the NU step. A gait belt and grab bars are used for safety and fall prevention . Staff aide in applying wheelchair brakes and verbal cues for grab bars.   Wheelchair:   Needs help  Toileting:   Needs assistance  A standard pivot transfer is used in the restroom with a 1:1 ratio. Karolyn can be left alone in the bathroom and told to use call light when ready.    Maintenance Program:     NuStep  Living Arrangements:   Lives in assisted living facility  Spiritual Needs: Needs are being met through support group  Medication Assistance:   Medication not taken during program hours  Participation Report:   Aerobics/Exercise  Support Group  Cognitive Stimulation  Fire Drill  Creative Arts/Crafts  Games  Gardening  Speakers/Entertainment  Socialization  Current Events/News  Education/Health Topic  Level of Participation:   Active    Karolyn has been expressing frustration with her ride situation, and she wishes for a more consistent and guaranteed  pick-ups. However outside of this, Karolyn expresses great interest in creative arts, and makes the most of her time in the room, by using fine motor functions to help do detailed projects.       explained & declined/yes

## 2024-10-08 ENCOUNTER — HOSPITAL ENCOUNTER (OUTPATIENT)
Dept: REHABILITATION | Facility: CLINIC | Age: 64
Discharge: HOME | End: 2024-10-08
Attending: FAMILY MEDICINE
Payer: MEDICAID

## 2024-10-08 PROCEDURE — S5100 ADULT DAYCARE SERVICES 15MIN: HCPCS

## 2024-10-10 ENCOUNTER — HOSPITAL ENCOUNTER (OUTPATIENT)
Dept: REHABILITATION | Facility: CLINIC | Age: 64
Discharge: HOME | End: 2024-10-10
Attending: FAMILY MEDICINE
Payer: MEDICAID

## 2024-10-10 PROCEDURE — S5100 ADULT DAYCARE SERVICES 15MIN: HCPCS

## 2024-10-10 NOTE — PROGRESS NOTES
Individual abuse prevention plan (IAPP)  [facility]     Assessment of Susceptibility to Abuse, Including Self Abuse, Neglect (Identification of characteristics, which make the individual susceptible to abuse, and how these characteristics cause the individual to be susceptible to abuse.)     Is the person susceptible to abuse in each area?  Sexual Abuse:   No  Referrals made when the person is susceptible to abuse outside the scope or control of this program (Identify the referral and date it occurred): No additional risk reduction means needed at this time    Physical Abuse:   No  Referrals made when the person is susceptible to abuse outside the scope or control of this program (Identify the referral and date it occurred): No additional risk reduction means needed at this time    Self-Abuse:   No  Referrals made when the person is susceptible to abuse outside the scope or control of this program (Identify the referral and date it occurred): No additional risk reduction means needed at this time    Financial  Exploitation  No  Referrals made when the person is susceptible to abuse outside the scope or control of this program (Identify the referral and date it occurred): No additional risk reduction means needed at this time    Is the program aware of this person committing a violent crime or act of physical aggression towards others?  No  Referrals made when the person is susceptible to abuse outside the scope or control of this program (Identify the referral and date it occurred): No additional risk reduction means needed at this time    INDIVIDUAL ABUSE PREVENTION PLAN-MEASURES TAKEN TO MINIMIZE RISK OF ABUSE   ADL:   Assist with clothing management  Assist with toileting  Ambulation/Transfers/Wheelchairs:  Provide transfer belt and assist with transfers and ambulation   Behavior:   Patient gets agitated when she becomes confused.  Communication:  Encourage verbalization of  needs/concerns  Cognitive Issues:   Provider reminders due to confusion, forgetfulness  Give simple step-by-step direction  Diet:   No present concerns  Exercise:   Encourage participation in maintenance program  Encourage participation in wheelchair aerobics  Hearing:   No concerns  Isolation:   Patient lives in an assisted living facility  Medical Monitoring:   Monitor physical and emotional comfort  Mental Health:   Encourage client to express feelings  Offer emotional support  Sensory:   Provide and encourage participation in activities for stimulation  Vision:   Ensure client is wearing glasses and clean prn  Other: N/A  Developed in consultation with:  Client  The Program Abuse Prevention Plan/IAPP identifies the specific actions that minimize abuse to the [facility] participant.  Yes

## 2024-10-10 NOTE — PROGRESS NOTES
INDIVIDUAL PLAN OF CARE   [facility]    Member Name: Karolyn Das; YOB: 1960  MRN: [id number]  10/10/24    Goals developed in collaboration with: member  Staff responsible for plan:  Elizabeth COSTELLO, Ben Monk (Rehab Tech), Fabian Manzano  1. Long Term Goal (concrete, measurable, and time specific outcomes):  Karolyn will maintain her current level of cognitive and physical function for as long as possible and prevent the progression of MS symptoms through active participation in [facility] activities each day of program attendance.  Target Date:  October 2025  Quarterly Review: Goal remains appropriate.    2. Short Term Goal: (concrete, measurable, and time specific outcomes):  Karolyn will maintain her current level of physical function and UE strength through active participation in UE aerobic activity for ten minutes a day as led by staff.   Target Date:  April 2025  Quarterly Review: Previous goal rewritten to reflect that member now completes prescribed NuStep activity at home, prefers to focus on UE aerobics during Day Program.    3. Short Term Goal: (concrete, measurable, and time specific outcomes):  Lissett will maintain her current level of cognitive function through active participation in Brain and Body cognitive stimulation activities each day of program attendance.  Target Date:  April 2025  Quarterly Review: Goal remains appropriate. Additional emphasis on mindfulness and relaxation activities throughout Brain and Body programming.

## 2024-10-10 NOTE — PROGRESS NOTES
MONTHLY PROGRESS NOTE AND PARTICIPATION REPORT   [facility]    Karolyn Das, 1960  Attendance: Please see Epic for attendance record.    Communication:   Does communicate   Hearing:   Wears hearing aid  Vision:   Difficulty reading or seeing print, wears glasses throughout the day  Orientation/Cognition:   Minor forgetfulness  Partial disorientation  Short term memory loss  Moderate cognitive impairment and requires close supervision for safety  Behavior:   No behavioral concerns  Requires redirection  She can become fixated on something that makes her upset and requires redirection when this occurs. Examples of this would be a late ride or when an art project is not going her way.    Self-Preservation Skills:   Not capable of self-preservation  Eating:   Assist with set up  Ambulation Walking:   Non-ambulatory  Transferring:   Aide of one person/two  Karolyn presents with a strong rear lean while performing a stand pivot transfer for the toilet and the NU step. A gait belt and grab bars are used for safety and fall prevention . Staff aide in applying wheelchair brakes and verbal cues for grab bars.   Wheelchair:   Needs help  Toileting:   Needs assistance  A standard pivot transfer is used in the restroom with a 1:1 ratio. Karolyn can be left alone in the bathroom and told to use call light when ready.    Maintenance Program:     NuStep  Living Arrangements:   Lives in assisted living facility  Spiritual Needs: Needs are being met through support group  Medication Assistance:   Medication not taken during program hours  Participation Report:   Aerobics/Exercise  Support Group  Cognitive Stimulation  Fire Drill  Creative Arts/Crafts  Games  Gardening  Speakers/Entertainment  Socialization  Current Events/News  Education/Health Topic  Level of Participation:   Karla Parr enjoys her time in art and is very detailed with her work.  She likes to take her time and make sure each item  she creates is perfect to her.

## 2024-10-15 ENCOUNTER — HOSPITAL ENCOUNTER (OUTPATIENT)
Dept: REHABILITATION | Facility: CLINIC | Age: 64
Discharge: HOME | End: 2024-10-15
Attending: FAMILY MEDICINE
Payer: MEDICAID

## 2024-10-15 PROCEDURE — S5100 ADULT DAYCARE SERVICES 15MIN: HCPCS

## 2024-10-17 ENCOUNTER — HOSPITAL ENCOUNTER (OUTPATIENT)
Dept: REHABILITATION | Facility: CLINIC | Age: 64
Discharge: HOME | End: 2024-10-17
Attending: FAMILY MEDICINE
Payer: MEDICAID

## 2024-10-17 PROCEDURE — S5100 ADULT DAYCARE SERVICES 15MIN: HCPCS

## 2024-10-22 ENCOUNTER — HOSPITAL ENCOUNTER (OUTPATIENT)
Dept: REHABILITATION | Facility: CLINIC | Age: 64
Discharge: HOME | End: 2024-10-22
Attending: FAMILY MEDICINE
Payer: MEDICAID

## 2024-10-22 PROCEDURE — S5100 ADULT DAYCARE SERVICES 15MIN: HCPCS

## 2024-10-24 ENCOUNTER — HOSPITAL ENCOUNTER (OUTPATIENT)
Dept: REHABILITATION | Facility: CLINIC | Age: 64
Discharge: HOME | End: 2024-10-24
Attending: FAMILY MEDICINE
Payer: MEDICAID

## 2024-10-24 PROCEDURE — S5100 ADULT DAYCARE SERVICES 15MIN: HCPCS | Performed by: OCCUPATIONAL THERAPIST

## 2024-10-31 ENCOUNTER — HOSPITAL ENCOUNTER (OUTPATIENT)
Dept: REHABILITATION | Facility: CLINIC | Age: 64
Discharge: HOME | End: 2024-10-31
Attending: FAMILY MEDICINE
Payer: MEDICAID

## 2024-10-31 PROCEDURE — S5100 ADULT DAYCARE SERVICES 15MIN: HCPCS

## 2024-11-05 ENCOUNTER — HOSPITAL ENCOUNTER (OUTPATIENT)
Dept: REHABILITATION | Facility: CLINIC | Age: 64
Discharge: HOME | End: 2024-11-05
Attending: FAMILY MEDICINE
Payer: MEDICAID

## 2024-11-05 PROCEDURE — S5100 ADULT DAYCARE SERVICES 15MIN: HCPCS

## 2024-11-07 ENCOUNTER — HOSPITAL ENCOUNTER (OUTPATIENT)
Dept: REHABILITATION | Facility: CLINIC | Age: 64
Discharge: HOME | End: 2024-11-07
Attending: FAMILY MEDICINE
Payer: MEDICAID

## 2024-11-07 PROCEDURE — S5100 ADULT DAYCARE SERVICES 15MIN: HCPCS

## 2024-11-12 ENCOUNTER — HOSPITAL ENCOUNTER (OUTPATIENT)
Dept: REHABILITATION | Facility: CLINIC | Age: 64
Discharge: HOME | End: 2024-11-12
Attending: FAMILY MEDICINE
Payer: MEDICAID

## 2024-11-12 PROCEDURE — S5100 ADULT DAYCARE SERVICES 15MIN: HCPCS

## 2024-11-14 ENCOUNTER — HOSPITAL ENCOUNTER (OUTPATIENT)
Dept: REHABILITATION | Facility: CLINIC | Age: 64
Discharge: HOME | End: 2024-11-14
Attending: FAMILY MEDICINE
Payer: MEDICAID

## 2024-11-14 PROCEDURE — S5100 ADULT DAYCARE SERVICES 15MIN: HCPCS

## 2024-11-14 NOTE — PROGRESS NOTES
[facility] RN Note    Previous documentation since 10/1/24 reviewed.  No concerns reported by  staff.    Karolyn states that she has incontinence of stool and urine.  She states this is a new problem.  Denies any pain. When asked if she had seen her doctor to discuss, she said that she needed to make an appointment.  She stated her doctor comes to her house (lives in assisted living, [facility] listed as a provider). Phone call to group home. No answer, recording states that mailbox is full so unable to leave a message. Plan: Recommend MD appointment to discuss concerns of incontinence.

## 2024-11-19 ENCOUNTER — HOSPITAL ENCOUNTER (OUTPATIENT)
Dept: REHABILITATION | Facility: CLINIC | Age: 64
Discharge: HOME | End: 2024-11-19
Attending: FAMILY MEDICINE
Payer: MEDICAID

## 2024-11-19 PROCEDURE — S5100 ADULT DAYCARE SERVICES 15MIN: HCPCS

## 2024-11-19 NOTE — PROGRESS NOTES
MONTHLY PROGRESS NOTE AND PARTICIPATION REPORT   [facility]    Karolyn Das, 1960  Attendance: Please see Epic for attendance record.    Communication:   Does communicate   Hearing:   Wears hearing aid  Vision:   Difficulty reading or seeing print, wears glasses throughout the day  Orientation/Cognition:   Minor forgetfulness  Partial disorientation  Short term memory loss  Moderate cognitive impairment and requires close supervision for safety  Behavior:   No behavioral concerns  Requires redirection  She can become fixated on something that makes her upset and requires redirection when this occurs. Examples of this would be a late ride or when an art project is not going her way.    Self-Preservation Skills:   Not capable of self-preservation  Eating:   Assist with set up  Ambulation Walking:   Non-ambulatory  Transferring:   Aide of one person/two  Karolyn presents with a strong rear lean while performing a stand pivot transfer for the toilet and the NU step. A gait belt and grab bars are used for safety and fall prevention . Staff aide in applying wheelchair brakes and verbal cues for grab bars.   Wheelchair:   Needs help  Toileting:   Needs assistance  A standard pivot transfer is used in the restroom with a 1:1 ratio. Karolyn can be left alone in the bathroom and told to use call light when ready.    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 Karolyn has  continued to express her frustration with her ride situation, however, they have been more timely recently.

## 2024-11-21 ENCOUNTER — HOSPITAL ENCOUNTER (OUTPATIENT)
Dept: REHABILITATION | Facility: CLINIC | Age: 64
Discharge: HOME | End: 2024-11-21
Attending: FAMILY MEDICINE
Payer: MEDICAID

## 2024-11-21 PROCEDURE — S5100 ADULT DAYCARE SERVICES 15MIN: HCPCS

## 2024-11-26 ENCOUNTER — HOSPITAL ENCOUNTER (OUTPATIENT)
Dept: REHABILITATION | Facility: CLINIC | Age: 64
Discharge: HOME | End: 2024-11-26
Attending: FAMILY MEDICINE
Payer: MEDICAID

## 2024-11-26 PROCEDURE — S5100 ADULT DAYCARE SERVICES 15MIN: HCPCS

## 2024-12-03 ENCOUNTER — HOSPITAL ENCOUNTER (OUTPATIENT)
Dept: REHABILITATION | Facility: CLINIC | Age: 64
Discharge: HOME | End: 2024-12-03
Attending: FAMILY MEDICINE
Payer: MEDICAID

## 2024-12-03 PROCEDURE — S5100 ADULT DAYCARE SERVICES 15MIN: HCPCS

## 2024-12-05 ENCOUNTER — HOSPITAL ENCOUNTER (OUTPATIENT)
Dept: REHABILITATION | Facility: CLINIC | Age: 64
Discharge: HOME | End: 2024-12-05
Attending: FAMILY MEDICINE
Payer: MEDICAID

## 2024-12-05 PROCEDURE — S5100 ADULT DAYCARE SERVICES 15MIN: HCPCS

## 2024-12-10 ENCOUNTER — HOSPITAL ENCOUNTER (OUTPATIENT)
Dept: REHABILITATION | Facility: CLINIC | Age: 64
Discharge: HOME | End: 2024-12-10
Attending: FAMILY MEDICINE
Payer: MEDICAID

## 2024-12-10 PROCEDURE — S5100 ADULT DAYCARE SERVICES 15MIN: HCPCS

## 2024-12-17 ENCOUNTER — HOSPITAL ENCOUNTER (OUTPATIENT)
Dept: REHABILITATION | Facility: CLINIC | Age: 64
Discharge: HOME | End: 2024-12-17
Attending: FAMILY MEDICINE
Payer: MEDICAID

## 2024-12-17 PROCEDURE — S5100 ADULT DAYCARE SERVICES 15MIN: HCPCS

## 2024-12-19 ENCOUNTER — HOSPITAL ENCOUNTER (OUTPATIENT)
Dept: REHABILITATION | Facility: CLINIC | Age: 64
Discharge: HOME | End: 2024-12-19
Attending: FAMILY MEDICINE
Payer: MEDICAID

## 2024-12-19 PROCEDURE — S5100 ADULT DAYCARE SERVICES 15MIN: HCPCS

## 2024-12-31 ENCOUNTER — HOSPITAL ENCOUNTER (OUTPATIENT)
Dept: REHABILITATION | Facility: CLINIC | Age: 64
Discharge: HOME | End: 2024-12-31
Attending: FAMILY MEDICINE
Payer: MEDICAID

## 2024-12-31 PROCEDURE — S5102 ADULT DAY CARE PER DIEM: HCPCS | Performed by: OCCUPATIONAL THERAPIST

## 2025-01-07 ENCOUNTER — HOSPITAL ENCOUNTER (OUTPATIENT)
Dept: REHABILITATION | Facility: CLINIC | Age: 65
Discharge: HOME | End: 2025-01-07
Attending: FAMILY MEDICINE
Payer: MEDICAID

## 2025-01-07 PROCEDURE — S5100 ADULT DAYCARE SERVICES 15MIN: HCPCS

## 2025-01-14 ENCOUNTER — HOSPITAL ENCOUNTER (OUTPATIENT)
Dept: REHABILITATION | Facility: CLINIC | Age: 65
Discharge: HOME | End: 2025-01-14
Attending: FAMILY MEDICINE
Payer: MEDICAID

## 2025-01-14 PROCEDURE — S5100 ADULT DAYCARE SERVICES 15MIN: HCPCS

## 2025-01-16 NOTE — PROGRESS NOTES
[facility] RN Note    Previous documentation since 12/3/24 reviewed.  Participant not in facility at this time.

## 2025-01-28 NOTE — PROGRESS NOTES
08/21/18 1000   Quick Adds   Quick Adds Certification   Type of Visit Initial Outpatient Occupational Therapy Evaluation   General Information   Start Of Care Date 08/21/18   Referring Physician Florencia Sibley   Orders Evaluate and treat as indicated   Other Orders Gross and Fine motor coordination   Orders Date 07/31/18   Medical Diagnosis MS   Onset of Illness/Injury or Date of Surgery 07/31/18  (order)   Precautions/Limitations Fall precautions   Special Instructions gross and c   Surgical/Medical History Reviewed Yes   Additional Occupational Profile Info/Pertinent History of Current Problem 58 year old female with MS with increasing difficulties with gross and fine motor coordination.  Recently moved out of memory care to more independent suite at [facility] in order to allow for more active social engagment   Comments/Observations Nathanael nogueira is here today- Rani- [phone]   Role/Living Environment   Current Community Support Family/friend caregiver;Personal care attendant;Emergency call system;Home health aid;Transportation service;Meals on wheels   Patient role/Employment history Disabled   Community/Avocational Activities Monday, tuesday, [place] [facility] [employer] [facility] [place] with friend Rani once a week   Current Living Environment Assisted living  (shares a studio suite area)   Home/Community Accessibility Comments Long hallways to dining room   Current Assistive Devices - Mobility Manual wheelchair;Front wheeled walker   Patient/family Goals Statement to write better, to use a camera, and to get around more independently   Fall Risk Screen   Fall screen completed by OT   Have you fallen 2 or more times in the past year? No   Fall screen comments One fall last year in the bathroom   Cognitive Status Examination   Cognitive Comment MOCA: 12/30 at day program   Visual Perception   Visual Perception Wears glasses   Hand Strength   Hand Dominance Right   Left Hand  (pounds) 40 pounds   Right Hand   (pounds) 62 pounds   Left Lateral Pinch (pounds) 12 pounds   Right Lateral Pinch (pounds) 14 pounds   Left Three Point Pinch (pounds) 12 pounds   Right Three Point Pinch (pounds) 13 pounds   Hand Strength Comments WNL although -2 SD from mean   Coordination   Upper Extremity Coordination Left UE impaired;Right UE impaired   Gross Motor Coordination Some tremors with gross movements   Left Hand, Nine Hole Peg Test (seconds) 39.25   Right Hand, Nine Hole Peg Test (seconds) 65.15   Coordination Comments BNL   Functional Mobility   Ambulation Short distances only with walker   Transfer Skill   Level of Winston: Transfers moderate assist (50% patients effort)   Bathing   Level of Winston - Bathing moderate assist (50% patients effort)   Physical Assist/Nonphysical Assist - Bathing 1 person assist   Assistive Device shower chair;grab bars   Upper Body Dressing   Level of Winston: Dress Upper Body minimum assist (75% patients effort)   Upper Body Dressing Comments buttons and zippers are challenging and would like assistance   Lower Body Dressing   Level of Winston: Dress Lower Body minimum assist (75% patients effort)   Lower Body Dressing Comments troubles with with buttons and zippers   Toileting   Level of Winston: Toilet moderate assist (50% patients effort)   Physical Assist/Nonphysical Assist: Toilet 1 person assist   Assistive Device grab bars   Grooming   Level of Winston: Grooming minimum assist (75% patients effort)   Physical Assist/Nonphysical Assist: Grooming 1 person assist   Eating/Self-Feeding   Level of Winston: Eating independent   Eating/Self Feeding Comments touble with spilling   Instrumental Activities of Daily Living Assessment   IADL Assessment/Observations Wants to be able to use new digital camera   IADL Quick Adds Meal Planning/Preparation;Home/Financial/Management;Communication/Computer Use;Community Mobility;Care of Others   Meal Planning/Preparation DAily  meals in dining room   Home/Financial Management Has POA in [place] and adovcate here that assistst   Communication/Computer Use Patient reports challenges with writing and wanting to be better- signature and phone numbers   Community Mobility Manual wheelchair that requires assistance at time   Planned Therapy Interventions   Planned Therapy Interventions ADL training;IADL training;Cognitive skills;Cognitive performance testing;ROM;Self care/Home management;Strengthening;Therapeutic activities;Wheelchair management   Adult OT Eval Goals   OT Eval Goals (Adult) 1;2;3;4   OT Goal 1   Goal Identifier Wheelchair   Goal Description Patient will participate in W/c assesment and trials of power mobility in order to best determine most appropriate piece of equipment for independce within new living environment.   Target Date 11/19/18   OT Goal 2   Goal Identifier Camera use   Goal Description Patient will demo independent use of dignital camera features in order to take pictures of friends and family as deisred.   Target Date 11/19/18   OT Goal 3   Goal Identifier Writing   Goal Description Patient will legibly write name and phone numbers with Ae as needed.   Target Date 11/19/18   OT Goal 4   Goal Identifier Dressing   Goal Description Patient will demo independence with eating and dressing with use of ae as needed.   Target Date 11/09/18   Clinical Impression   Criteria for Skilled Therapeutic Interventions Met Yes, treatment indicated   OT Diagnosis impaired participation in IADLs, adls and MRADLS   Influenced by the following impairments weakness, cognitive impairment   Assessment of Occupational Performance 3-5 Performance Deficits   Identified Performance Deficits Patient requires assistance with bathing, dressing, finances, cooking, cleaning, laundry    Clinical Decision Making (Complexity) Moderate complexity   Therapy Frequency up to 10 sessions   Predicted Duration of Therapy Intervention (days/wks) in 90 days    Risks and Benefits of Treatment have been explained. Yes   Patient, Family & other staff in agreement with plan of care Yes   Clinical Impression Comments Skilled OT services neede din order to train in Coordination exercises and AE needs in order to train in camera use, increase writing legibility, determine mobility AE and assist with independent dressing   Education Assessment   Barriers To Learning Cognitive   Preferred Learning Style Listening;Demonstration   Therapy Certification   Certification date from 08/22/18   Certification date to 11/20/18   Total Evaluation Time   Total Evaluation Time 60      .

## 2025-02-04 ENCOUNTER — HOSPITAL ENCOUNTER (OUTPATIENT)
Dept: REHABILITATION | Facility: CLINIC | Age: 65
Discharge: HOME | End: 2025-02-04
Attending: FAMILY MEDICINE
Payer: MEDICAID

## 2025-02-04 PROCEDURE — S5100 ADULT DAYCARE SERVICES 15MIN: HCPCS

## 2025-02-06 ENCOUNTER — HOSPITAL ENCOUNTER (OUTPATIENT)
Dept: REHABILITATION | Facility: CLINIC | Age: 65
Discharge: HOME | End: 2025-02-06
Attending: FAMILY MEDICINE
Payer: MEDICAID

## 2025-02-06 PROCEDURE — S5100 ADULT DAYCARE SERVICES 15MIN: HCPCS

## 2025-02-11 ENCOUNTER — HOSPITAL ENCOUNTER (OUTPATIENT)
Dept: REHABILITATION | Facility: CLINIC | Age: 65
Discharge: HOME | End: 2025-02-11
Attending: FAMILY MEDICINE
Payer: MEDICAID

## 2025-02-11 PROCEDURE — S5100 ADULT DAYCARE SERVICES 15MIN: HCPCS

## 2025-02-14 ENCOUNTER — ANCILLARY PROCEDURE (OUTPATIENT)
Dept: BONE DENSITY | Facility: CLINIC | Age: 65
End: 2025-02-14
Attending: PHYSICIAN ASSISTANT
Payer: COMMERCIAL

## 2025-02-14 DIAGNOSIS — Z78.0 POST-MENOPAUSE: ICD-10-CM

## 2025-02-14 PROCEDURE — 77080 DXA BONE DENSITY AXIAL: CPT

## 2025-02-24 NOTE — PROGRESS NOTES
MONTHLY PROGRESS NOTE AND PARTICIPATION REPORT   [facility]    Karolyn Das, 1960  Attendance: Please see Epic for attendance record.    Communication:   Does communicate   Hearing:   Wears hearing aid  Vision:   Difficulty reading or seeing print, wears glasses throughout the day  Orientation/Cognition:   Minor forgetfulness  Partial disorientation  Short term memory loss  Moderate cognitive impairment and requires close supervision for safety  Behavior:   No behavioral concerns  Requires redirection  She can become fixated on something that makes her upset and requires redirection when this occurs. Examples of this would be a late ride or when an art project is not going her way.    Self-Preservation Skills:   Not capable of self-preservation  Eating:   Assist with set up  Ambulation Walking:   Non-ambulatory  Transferring:   Aide of one person/two  Karolny presents with a strong rear lean while performing a stand pivot transfer for the toilet and the NU step. A gait belt and grab bars are used for safety and fall prevention . Staff aide in applying wheelchair brakes and verbal cues for grab bars.   Wheelchair:   Needs help  Toileting:   Needs assistance  A standard pivot transfer is used in the restroom with a 1:1 ratio. Karolyn can be left alone in the bathroom and told to use call light when ready.    Maintenance Program:     NuStep  Living Arrangements:   Lives in assisted living facility  Spiritual Needs: Needs are being met through support group  Medication Assistance:   Medication not taken during program hours  Participation Report:   Aerobics/Exercise  Support Group  Cognitive Stimulation  Fire Drill  Creative Arts/Crafts  Games  Gardening  Speakers/Entertainment  Socialization  Current Events/News  Education/Health Topic  Level of Participation:   Karla Parr works hard in day program, and she has appreciated the program even more now that she has had a consistent ride,  which was previously a concern. Karolyn has been working on a variety of projects in art, and she has found that this helps to keep things fresh, which includes her ceramic tree, word finds, and art wall pictures, which was valentines theme!

## 2025-02-25 ENCOUNTER — HOSPITAL ENCOUNTER (OUTPATIENT)
Dept: REHABILITATION | Facility: CLINIC | Age: 65
Discharge: HOME | End: 2025-02-25
Attending: FAMILY MEDICINE
Payer: MEDICAID

## 2025-02-25 PROCEDURE — S5100 ADULT DAYCARE SERVICES 15MIN: HCPCS

## 2025-03-06 ENCOUNTER — HOSPITAL ENCOUNTER (OUTPATIENT)
Dept: REHABILITATION | Facility: CLINIC | Age: 65
Discharge: HOME | End: 2025-03-06
Attending: FAMILY MEDICINE
Payer: MEDICAID

## 2025-03-06 PROCEDURE — S5100 ADULT DAYCARE SERVICES 15MIN: HCPCS

## 2025-03-06 NOTE — PROGRESS NOTES
[facility] RN Note    No new progress noted since 2/18/25. No concerns reported by AC staff or member.

## 2025-03-11 ENCOUNTER — HOSPITAL ENCOUNTER (OUTPATIENT)
Dept: REHABILITATION | Facility: CLINIC | Age: 65
Discharge: HOME | End: 2025-03-11
Attending: FAMILY MEDICINE
Payer: MEDICAID

## 2025-03-11 PROCEDURE — S5100 ADULT DAYCARE SERVICES 15MIN: HCPCS

## 2025-03-13 ENCOUNTER — HOSPITAL ENCOUNTER (OUTPATIENT)
Dept: REHABILITATION | Facility: CLINIC | Age: 65
Discharge: HOME | End: 2025-03-13
Attending: FAMILY MEDICINE
Payer: MEDICAID

## 2025-03-13 PROCEDURE — S5100 ADULT DAYCARE SERVICES 15MIN: HCPCS

## 2025-03-18 ENCOUNTER — HOSPITAL ENCOUNTER (OUTPATIENT)
Dept: REHABILITATION | Facility: CLINIC | Age: 65
Discharge: HOME | End: 2025-03-18
Attending: FAMILY MEDICINE
Payer: MEDICAID

## 2025-03-18 PROCEDURE — S5100 ADULT DAYCARE SERVICES 15MIN: HCPCS

## 2025-03-18 NOTE — PROGRESS NOTES
MONTHLY PROGRESS NOTE AND PARTICIPATION REPORT   [facility]    Karolyn Das, 1960  Attendance: Please see Epic for attendance record.    Communication:   Does communicate   Hearing:   Wears hearing aid  Vision:   Difficulty reading or seeing print, wears glasses throughout the day  Orientation/Cognition:   Minor forgetfulness  Partial disorientation  Short term memory loss  Moderate cognitive impairment and requires close supervision for safety  Behavior:   No behavioral concerns  Requires redirection  She can become fixated on something that makes her upset and requires redirection when this occurs. Examples of this would be a late ride or when an art project is not going her way.    Self-Preservation Skills:   Not capable of self-preservation  Eating:   Assist with set up  Ambulation Walking:   Non-ambulatory  Transferring:   Aide of one person/two  Karolyn presents with a strong rear lean while performing a stand pivot transfer for the toilet and the NU step. A gait belt and grab bars are used for safety and fall prevention . Staff aide in applying wheelchair brakes and verbal cues for grab bars.   Wheelchair:   Needs help  Toileting:   Needs assistance  A standard pivot transfer is used in the restroom with a 1:1 ratio. Karolyn can be left alone in the bathroom and told to use call light when ready.    Maintenance Program:     NuStep  Living Arrangements:   Lives in assisted living facility  Spiritual Needs: Needs are being met through support group  Medication Assistance:   Medication not taken during program hours  Participation Report:   Aerobics/Exercise  Support Group  Cognitive Stimulation  Fire Drill  Creative Arts/Crafts  Games  Gardening  Speakers/Entertainment  Socialization  Current Events/News  Education/Health Topic  Level of Participation:   Active    Karolyn has had to leave day program early a few times this month due to stomach discomfort, which led her to spending  significant time in the bathroom. However, when Karolyn has been here, she has demonstrated excitement in art, especially with beginning her new [employer] project. Karolyn has seemed to have some lapses in memory recently, where she thinks she has lost something here, even though it was not here/was left at home. Staff is working with her group home to determine future steps.

## 2025-03-20 ENCOUNTER — HOSPITAL ENCOUNTER (OUTPATIENT)
Dept: REHABILITATION | Facility: CLINIC | Age: 65
Discharge: HOME | End: 2025-03-20
Attending: FAMILY MEDICINE
Payer: MEDICAID

## 2025-03-20 PROCEDURE — S5100 ADULT DAYCARE SERVICES 15MIN: HCPCS

## 2025-03-27 ENCOUNTER — HOSPITAL ENCOUNTER (OUTPATIENT)
Dept: REHABILITATION | Facility: CLINIC | Age: 65
Discharge: HOME | End: 2025-03-27
Attending: FAMILY MEDICINE
Payer: MEDICAID

## 2025-03-27 PROCEDURE — S5100 ADULT DAYCARE SERVICES 15MIN: HCPCS

## 2025-03-27 NOTE — PROGRESS NOTES
Facilitated physical maintenance of bilateral upper extremity strength and endurance through upper body exercise bike.   Patient completed 15 minutes of UBE activity.

## 2025-04-01 ENCOUNTER — HOSPITAL ENCOUNTER (OUTPATIENT)
Dept: REHABILITATION | Facility: CLINIC | Age: 65
Discharge: HOME | End: 2025-04-01
Attending: FAMILY MEDICINE
Payer: MEDICAID

## 2025-04-01 PROCEDURE — S5100 ADULT DAYCARE SERVICES 15MIN: HCPCS

## 2025-04-01 NOTE — PROGRESS NOTES
INDIVIDUAL PLAN OF CARE   [facility]    Member Name: Karolyn Das; YOB: 1960  MRN: [id number]  4/1/25    Goals developed in collaboration with: member  Staff responsible for plan:  Elizabeth COSTELLO, Ben Monk (Rehab Tech), Fabian Manzano  1. Long Term Goal (concrete, measurable, and time specific outcomes):  Karolyn will maintain her current level of cognitive and physical function for as long as possible and prevent the progression of MS symptoms through active participation in [facility] activities each day of program attendance.  Target Date:  April 2026  Quarterly Review: Goal remains appropriate.  Staff encourages member to participate in staff lead activities.    2. Short Term Goal: (concrete, measurable, and time specific outcomes):  Karolyn will maintain her current level of physical function and UE strength through active participation in UE aerobic activity for ten minutes a day as led by staff.   Target Date:  October 2025  Quarterly Review: Previous goal rewritten to reflect that member now completes prescribed NuStep activity at home, prefers to focus on UE aerobics during Day Program.    3. Short Term Goal: (concrete, measurable, and time specific outcomes):  Lissett will maintain her current level of cognitive function through active participation in Brain and Body cognitive stimulation activities each day of program attendance.  Target Date:  October 2025  Quarterly Review: Goal remains appropriate. Additional emphasis on mindfulness and relaxation activities throughout Brain and Body programming.

## 2025-04-01 NOTE — PROGRESS NOTES
Individual abuse prevention plan (IAPP)  [facility]     Assessment of Susceptibility to Abuse, Including Self Abuse, Neglect (Identification of characteristics, which make the individual susceptible to abuse, and how these characteristics cause the individual to be susceptible to abuse.)     Is the person susceptible to abuse in each area?  Sexual Abuse:   No  Referrals made when the person is susceptible to abuse outside the scope or control of this program (Identify the referral and date it occurred): No additional risk reduction means needed at this time    Physical Abuse:   No  Referrals made when the person is susceptible to abuse outside the scope or control of this program (Identify the referral and date it occurred): No additional risk reduction means needed at this time    Self-Abuse:   No  Referrals made when the person is susceptible to abuse outside the scope or control of this program (Identify the referral and date it occurred): No additional risk reduction means needed at this time    Financial  Exploitation  No  Referrals made when the person is susceptible to abuse outside the scope or control of this program (Identify the referral and date it occurred): No additional risk reduction means needed at this time    Is the program aware of this person committing a violent crime or act of physical aggression towards others?  No  Referrals made when the person is susceptible to abuse outside the scope or control of this program (Identify the referral and date it occurred): No additional risk reduction means needed at this time    INDIVIDUAL ABUSE PREVENTION PLAN-MEASURES TAKEN TO MINIMIZE RISK OF ABUSE   ADL:   Assist with clothing management  Assist with toileting  Ambulation/Transfers/Wheelchairs:  Provide transfer belt and assist with transfers and ambulation   Behavior:   Patient gets agitated when she becomes confused.  Communication:  Encourage verbalization of  needs/concerns  Cognitive Issues:   Provider reminders due to confusion, forgetfulness  Give simple step-by-step direction  Diet:   No present concerns  Exercise:   Encourage participation in maintenance program  Encourage participation in wheelchair aerobics  Hearing:   No concerns  Isolation:   Patient lives in an assisted living facility  Medical Monitoring:   Monitor physical and emotional comfort  Mental Health:   Encourage client to express feelings  Offer emotional support  Sensory:   Provide and encourage participation in activities for stimulation  Vision:   Ensure client is wearing glasses and clean prn  Other: N/A  Developed in consultation with:  Client  The Program Abuse Prevention Plan/IAPP identifies the specific actions that minimize abuse to the [facility] participant.  Yes

## 2025-04-01 NOTE — PROGRESS NOTES
MONTHLY PROGRESS NOTE AND PARTICIPATION REPORT   [facility]    Karolyn Das, 1960  Attendance: Please see Epic for attendance record.    Communication:   Does communicate   Hearing:   Wears hearing aid  Vision:   Difficulty reading or seeing print, wears glasses throughout the day  Orientation/Cognition:   Minor forgetfulness  Partial disorientation  Short term memory loss  Moderate cognitive impairment and requires close supervision for safety  Behavior:   No behavioral concerns  Requires redirection  She can become fixated on something that makes her upset and requires redirection when this occurs. Examples of this would be a late ride or when an art project is not going her way.    Self-Preservation Skills:   Not capable of self-preservation  Eating:   Assist with set up  Ambulation Walking:   Non-ambulatory  Transferring:   Aide of one person/two  Karolyn presents with a strong rear lean while performing a stand pivot transfer for the toilet and the NU step. A gait belt and grab bars are used for safety and fall prevention . Staff aide in applying wheelchair brakes and verbal cues for grab bars.   Wheelchair:   Needs help  Toileting:   Needs assistance  A standard pivot transfer is used in the restroom with a 1:1 ratio. Karolyn can be left alone in the bathroom and told to use call light when ready.    Maintenance Program:     NuStep  Living Arrangements:   Lives in assisted living facility  Spiritual Needs: Needs are being met through support group  Medication Assistance:   Medication not taken during program hours  Participation Report:   Aerobics/Exercise  Support Group  Cognitive Stimulation  Fire Drill  Creative Arts/Crafts  Games  Gardening  Speakers/Entertainment  Socialization  Current Events/News  Education/Health Topic  Level of Participation:   Active    Karolyn has had a few instances where she has forgotten certain art projects and has told staff stories that didn't  happen.  Her house was contacted and they said they have noticed the same things.  Karolyn looks forward ti LCR, art wall week, and   [place] day this month.

## 2025-04-03 ENCOUNTER — HOSPITAL ENCOUNTER (OUTPATIENT)
Dept: REHABILITATION | Facility: CLINIC | Age: 65
Discharge: HOME | End: 2025-04-03
Attending: FAMILY MEDICINE
Payer: MEDICAID

## 2025-04-03 PROCEDURE — S5100 ADULT DAYCARE SERVICES 15MIN: HCPCS

## 2025-04-08 ENCOUNTER — HOSPITAL ENCOUNTER (OUTPATIENT)
Dept: REHABILITATION | Facility: CLINIC | Age: 65
Discharge: HOME | End: 2025-04-08
Attending: FAMILY MEDICINE
Payer: MEDICAID

## 2025-04-08 PROCEDURE — S5100 ADULT DAYCARE SERVICES 15MIN: HCPCS

## 2025-04-10 ENCOUNTER — HOSPITAL ENCOUNTER (OUTPATIENT)
Dept: REHABILITATION | Facility: CLINIC | Age: 65
Discharge: HOME | End: 2025-04-10
Attending: FAMILY MEDICINE
Payer: MEDICAID

## 2025-04-10 PROCEDURE — S5100 ADULT DAYCARE SERVICES 15MIN: HCPCS

## 2025-04-15 ENCOUNTER — HOSPITAL ENCOUNTER (OUTPATIENT)
Dept: REHABILITATION | Facility: CLINIC | Age: 65
Discharge: HOME | End: 2025-04-15
Attending: FAMILY MEDICINE
Payer: MEDICAID

## 2025-04-15 PROCEDURE — S5100 ADULT DAYCARE SERVICES 15MIN: HCPCS

## 2025-04-15 NOTE — PROGRESS NOTES
[facility] RN Note    Previous documentation since 3/6/25 reviewed.  No concerns reported by AC staff. Karolyn reports issues with her bowels but is not able to specify what her concern is.  History of chronic constipation.     Phone call to [facility] ([phone]) to report concern verbalized by Karolyn so they can follow-up as needed.

## 2025-04-22 ENCOUNTER — HOSPITAL ENCOUNTER (OUTPATIENT)
Dept: REHABILITATION | Facility: CLINIC | Age: 65
Discharge: HOME | End: 2025-04-22
Attending: FAMILY MEDICINE
Payer: MEDICAID

## 2025-04-22 PROCEDURE — S5100 ADULT DAYCARE SERVICES 15MIN: HCPCS

## 2025-04-24 ENCOUNTER — HOSPITAL ENCOUNTER (OUTPATIENT)
Dept: REHABILITATION | Facility: CLINIC | Age: 65
Discharge: HOME | End: 2025-04-24
Attending: FAMILY MEDICINE
Payer: MEDICAID

## 2025-04-24 PROCEDURE — S5100 ADULT DAYCARE SERVICES 15MIN: HCPCS

## 2025-04-29 ENCOUNTER — HOSPITAL ENCOUNTER (OUTPATIENT)
Dept: REHABILITATION | Facility: CLINIC | Age: 65
Discharge: HOME | End: 2025-04-29
Attending: FAMILY MEDICINE
Payer: MEDICAID

## 2025-04-29 PROCEDURE — S5100 ADULT DAYCARE SERVICES 15MIN: HCPCS

## 2025-05-06 ENCOUNTER — HOSPITAL ENCOUNTER (OUTPATIENT)
Dept: REHABILITATION | Facility: CLINIC | Age: 65
Discharge: HOME | End: 2025-05-06
Attending: FAMILY MEDICINE
Payer: MEDICAID

## 2025-05-06 PROCEDURE — S5100 ADULT DAYCARE SERVICES 15MIN: HCPCS

## 2025-05-08 ENCOUNTER — HOSPITAL ENCOUNTER (OUTPATIENT)
Dept: REHABILITATION | Facility: CLINIC | Age: 65
Discharge: HOME | End: 2025-05-08
Attending: FAMILY MEDICINE
Payer: MEDICAID

## 2025-05-08 PROCEDURE — S5100 ADULT DAYCARE SERVICES 15MIN: HCPCS

## 2025-05-13 ENCOUNTER — HOSPITAL ENCOUNTER (OUTPATIENT)
Dept: REHABILITATION | Facility: CLINIC | Age: 65
Discharge: HOME | End: 2025-05-13
Attending: FAMILY MEDICINE
Payer: MEDICAID

## 2025-05-13 PROCEDURE — S5100 ADULT DAYCARE SERVICES 15MIN: HCPCS

## 2025-05-20 ENCOUNTER — HOSPITAL ENCOUNTER (OUTPATIENT)
Dept: REHABILITATION | Facility: CLINIC | Age: 65
Discharge: HOME | End: 2025-05-20
Attending: FAMILY MEDICINE
Payer: MEDICAID

## 2025-05-20 PROCEDURE — S5100 ADULT DAYCARE SERVICES 15MIN: HCPCS

## 2025-05-27 ENCOUNTER — HOSPITAL ENCOUNTER (OUTPATIENT)
Dept: REHABILITATION | Facility: CLINIC | Age: 65
Discharge: HOME | End: 2025-05-27
Attending: FAMILY MEDICINE
Payer: MEDICAID

## 2025-05-27 PROCEDURE — S5100 ADULT DAYCARE SERVICES 15MIN: HCPCS

## 2025-06-02 NOTE — PROGRESS NOTES
MONTHLY PROGRESS NOTE AND PARTICIPATION REPORT   [facility]    Karolyn Das, 1960  Attendance: Please see Epic for attendance record.    Communication:   Does communicate   Hearing:   No impairment  Vision:   Glasses  Orientation/Cognition:   Partial disorientation  Short term memory loss  Behavior:   No behavioral concerns  Self-Preservation Skills:   Not capable of self-preservation  Eating:   Assist with set up  Ambulation Walking:   Non-ambulatory  Transferring:   Aide of one person/two  Wheelchair:   Independent  occationally needs help  Toileting:   Needs assistance  Maintenance Program:     [facility]  Living Arrangements:   Lives in assisted living facility  Spiritual Needs: Needs are being met through support group  Medication Assistance:   Medication not taken during program hours  Participation Report:   Aerobics/Exercise  Support Group  Cognitive Stimulation  Fire Drill  Creative Arts/Crafts  Games  Socialization  Current Events/News  Education/Health Topic  poems, flower art, book club, [place] Activities  Level of Participation:   To the best of their ability       Refilled medicine per Dr Ashley Gallegos.

## 2025-06-10 ENCOUNTER — HOSPITAL ENCOUNTER (OUTPATIENT)
Dept: REHABILITATION | Facility: CLINIC | Age: 65
Discharge: HOME | End: 2025-06-10
Attending: FAMILY MEDICINE
Payer: MEDICAID

## 2025-06-10 PROCEDURE — S5100 ADULT DAYCARE SERVICES 15MIN: HCPCS

## 2025-06-12 NOTE — PROGRESS NOTES
[facility] Social History    Full Name: Karolyn Dsa        MRN: [id number]    YOB: 1960  Nickname:Mirtha      Sex: Female     Home Phone: Recently moved into new group home and doesn't know the new number.      Cell Phone: [phone]  Address: [address] [place] [address]/Zip: [place] [address]  County: [place]      E-mail: No         Transportation: [employer]   Language: English         needed? No        Ethnicity: American   Race: White      Country of Origin: [place]       Sikh: Moravian   Marital Status:                   Spouse/Significant Other: Ramone (Former )    ______________________________________________________________________________________     ? No     Branch of Service: N/A      Education Level: [employer]   Job History: Youth Counselor        Organizations/Clubs: None  Whom do you live with? 4 male roommates at the group home.   Current living arrangement: Group home   Number of Children: 2  List: Juliocesar   Number of Siblings: 3     List: Deena, Marion, Jonathan   Other Important People/Pets: Best friends Jane (lives in [place]) and Rani (lives in the [facility]).   What else should we know about you? Loves to play volleyball,  watch sports, and do art.     Primary Care Provider: Dr. Vicente Elizabeth  Neurologist:   Emergency Contacts:  Kareem Das      Relationship: Son    Phone: [phone]  Not listed       Relationship: N/A     Phone: 2nd contact not provided

## 2025-06-17 ENCOUNTER — HOSPITAL ENCOUNTER (OUTPATIENT)
Dept: REHABILITATION | Facility: CLINIC | Age: 65
Discharge: HOME | End: 2025-06-17
Attending: FAMILY MEDICINE
Payer: MEDICAID

## 2025-06-17 PROCEDURE — S5100 ADULT DAYCARE SERVICES 15MIN: HCPCS

## 2025-06-24 ENCOUNTER — HOSPITAL ENCOUNTER (OUTPATIENT)
Dept: REHABILITATION | Facility: CLINIC | Age: 65
Discharge: HOME | End: 2025-06-24
Attending: FAMILY MEDICINE
Payer: MEDICAID

## 2025-06-24 PROCEDURE — S5100 ADULT DAYCARE SERVICES 15MIN: HCPCS

## 2025-06-24 NOTE — PROGRESS NOTES
MONTHLY PROGRESS NOTE AND PARTICIPATION REPORT   [facility]    Karolyn Das, 1960  Attendance: Please see Epic for attendance record.    Communication:   Does communicate   Hearing:   Wears hearing aid  Vision:   Difficulty reading or seeing print, wears glasses throughout the day  Orientation/Cognition:   Minor forgetfulness  Partial disorientation  Short term memory loss  Moderate cognitive impairment and requires close supervision for safety  Behavior:   No behavioral concerns  Requires redirection  She can become fixated on something that makes her upset and requires redirection when this occurs. Examples of this would be a late ride or when an art project is not going her way.    Self-Preservation Skills:   Not capable of self-preservation  Eating:   Assist with set up  Ambulation Walking:   Non-ambulatory  Transferring:   Aide of one person/two  Karolyn presents with a strong rear lean while performing a stand pivot transfer for the toilet and the NU step. A gait belt and grab bars are used for safety and fall prevention. Staff aide in applying wheelchair brakes and verbal cues for grab bars.   Wheelchair:   Needs help  Toileting:   Needs assistance  A standard pivot transfer is used in the restroom with a 1:1 ratio. Karolyn can be left alone in the bathroom and told to use call light when ready.    Maintenance Program:     NuStep  Living Arrangements:   Lives in assisted living facility  Spiritual Needs: Needs are being met through support group  Medication Assistance:   Medication not taken during program hours  Participation Report:   Aerobics/Exercise  Support Group  Cognitive Stimulation  Fire Drill  Creative Arts/Crafts  Games  Gardening  Speakers/Entertainment  Socialization  Current Events/News  Education/Health Topic  Level of Participation:   Active    Karolyn has expressed some discomfort in the restroom and some days has spent up to 45 minutes in the restroom. When  checked on she reports feeling okay and needing more time. Karolyn has also started a new project in creative arts and expresses enjoying to work in the art room.

## 2025-07-01 ENCOUNTER — HOSPITAL ENCOUNTER (OUTPATIENT)
Dept: REHABILITATION | Facility: CLINIC | Age: 65
Discharge: HOME | End: 2025-07-01
Attending: FAMILY MEDICINE
Payer: MEDICAID

## 2025-07-01 PROCEDURE — S5100 ADULT DAYCARE SERVICES 15MIN: HCPCS

## 2025-07-01 NOTE — PROGRESS NOTES
"[facility] Note:  Home and Community Based Services Outing     Destination:  [facility]     Description of Outing:  Socialization and games.  Member Response:  \"Love to enjoy the day.\"  "

## 2025-07-08 ENCOUNTER — HOSPITAL ENCOUNTER (OUTPATIENT)
Dept: REHABILITATION | Facility: CLINIC | Age: 65
Discharge: HOME | End: 2025-07-08
Attending: FAMILY MEDICINE
Payer: MEDICAID

## 2025-07-08 PROCEDURE — S5100 ADULT DAYCARE SERVICES 15MIN: HCPCS

## 2025-07-08 NOTE — PROGRESS NOTES
"     [facility] Note:  Home and Community Based Services Outing     Destination:  [facility]     Description of Outing: Brainstormed questions to ask the bikers on Thursday, socialization, played Taboo     Member Response:  \"Beautiful day to be outside.\"        "

## 2025-07-15 ENCOUNTER — HOSPITAL ENCOUNTER (OUTPATIENT)
Dept: REHABILITATION | Facility: CLINIC | Age: 65
Discharge: HOME | End: 2025-07-15
Attending: FAMILY MEDICINE
Payer: MEDICAID

## 2025-07-15 PROCEDURE — S5100 ADULT DAYCARE SERVICES 15MIN: HCPCS

## 2025-07-22 ENCOUNTER — TRANSFERRED RECORDS (OUTPATIENT)
Dept: HEALTH INFORMATION MANAGEMENT | Facility: CLINIC | Age: 65
End: 2025-07-22
Payer: COMMERCIAL

## 2025-07-24 ENCOUNTER — HOSPITAL ENCOUNTER (OUTPATIENT)
Dept: REHABILITATION | Facility: CLINIC | Age: 65
Discharge: HOME | End: 2025-07-24
Attending: FAMILY MEDICINE
Payer: MEDICAID

## 2025-07-24 PROCEDURE — S5100 ADULT DAYCARE SERVICES 15MIN: HCPCS

## 2025-07-24 NOTE — PROGRESS NOTES
Individual abuse prevention plan (IAPP)  [facility]     Assessment of Susceptibility to Abuse, Including Self Abuse, Neglect (Identification of characteristics, which make the individual susceptible to abuse, and how these characteristics cause the individual to be susceptible to abuse.)     Is the person susceptible to abuse in each area?  Sexual Abuse:   No  Referrals made when the person is susceptible to abuse outside the scope or control of this program (Identify the referral and date it occurred): No additional risk reduction means needed at this time    Physical Abuse:   No  Referrals made when the person is susceptible to abuse outside the scope or control of this program (Identify the referral and date it occurred): No additional risk reduction means needed at this time    Self-Abuse:   No  Referrals made when the person is susceptible to abuse outside the scope or control of this program (Identify the referral and date it occurred): No additional risk reduction means needed at this time    Financial  Exploitation  No  Referrals made when the person is susceptible to abuse outside the scope or control of this program (Identify the referral and date it occurred): No additional risk reduction means needed at this time    Is the program aware of this person committing a violent crime or act of physical aggression towards others?  No  Referrals made when the person is susceptible to abuse outside the scope or control of this program (Identify the referral and date it occurred): No additional risk reduction means needed at this time    INDIVIDUAL ABUSE PREVENTION PLAN-MEASURES TAKEN TO MINIMIZE RISK OF ABUSE   ADL:   Assist with clothing management  Assist with toileting  Ambulation/Transfers/Wheelchairs:  Provide transfer belt and assist with transfers and ambulation   Behavior:   Patient gets agitated when she becomes confused.  Communication:  Encourage verbalization of  needs/concerns  Cognitive Issues:   Provider reminders due to confusion, forgetfulness  Give simple step-by-step direction  Diet:   No present concerns  Exercise:   Encourage participation in maintenance program  Encourage participation in wheelchair aerobics  Hearing:   No concerns  Isolation:   Patient lives in an assisted living facility  Medical Monitoring:   Monitor physical and emotional comfort  Mental Health:   Encourage client to express feelings  Offer emotional support  Sensory:   Provide and encourage participation in activities for stimulation  Vision:   Ensure client is wearing glasses and clean prn  Other: N/A  Developed in consultation with:  Client  The Program Abuse Prevention Plan/IAPP identifies the specific actions that minimize abuse to the [facility] participant.  Yes

## 2025-07-24 NOTE — PROGRESS NOTES
MONTHLY PROGRESS NOTE AND PARTICIPATION REPORT   [facility]    Karolyn Das, 1960  Attendance: Please see Epic for attendance record.    Communication:   Does communicate   Hearing:   Wears hearing aid  Vision:   Difficulty reading or seeing print, wears glasses throughout the day  Orientation/Cognition:   Minor forgetfulness  Partial disorientation  Short term memory loss  Moderate cognitive impairment and requires close supervision for safety  Behavior:   No behavioral concerns  Requires redirection  She can become fixated on something that makes her upset and requires redirection when this occurs. Examples of this would be a late ride or when an art project is not going her way.    Self-Preservation Skills:   Not capable of self-preservation  Eating:   Assist with set up  Ambulation Walking:   Non-ambulatory  Transferring:   Aide of one person/two  Karolyn presents with a strong rear lean while performing a stand pivot transfer for the toilet and the NU step. A gait belt and grab bars are used for safety and fall prevention. Staff aide in applying wheelchair brakes and verbal cues for grab bars.   Wheelchair:   Needs help  Toileting:   Needs assistance  A standard pivot transfer is used in the restroom with a 1:1 ratio. Karolyn can be left alone in the bathroom and told to use call light when ready.    Maintenance Program:     NuStep  Living Arrangements:   Lives in assisted living facility  Spiritual Needs: Needs are being met through support group  Medication Assistance:   Medication not taken during program hours  Participation Report:   Aerobics/Exercise  Support Group  Cognitive Stimulation  Fire Drill  Creative Arts/Crafts  Games  Gardening  Speakers/Entertainment  Socialization  Current Events/News  Education/Health Topic  Level of Participation:   Active    Karolyn has been working on a flower ceramic project in art and is starting on a cookie jar to paint next.  Karolyn has  participated in brain and body activities this month including: country music day, [employer] hockey day, and heroes day.

## 2025-07-24 NOTE — PROGRESS NOTES
INDIVIDUAL PLAN OF CARE   [facility]    Member Name: Karolyn Das; YOB: 1960  MRN: [id number]  7/24/25    Goals developed in collaboration with: member  Staff responsible for plan:  Elizabeth COSTELLO, Ben Monk (Rehab Tech), Fabian Manzano  1. Long Term Goal (concrete, measurable, and time specific outcomes):  Karolyn will maintain her current level of cognitive and physical function for as long as possible and prevent the progression of MS symptoms through active participation in [facility] activities each day of program attendance.  Target Date:  July 2026  Quarterly Review: Goal remains appropriate.  Staff encourages member to participate in staff lead activities.    2. Short Term Goal: (concrete, measurable, and time specific outcomes):  Karolyn will maintain her current level of physical function and UE strength through active participation in UE aerobic activity for ten minutes a day as led by staff.   Target Date:  January 2026  Quarterly Review: Previous goal rewritten to reflect that member now completes prescribed NuStep activity at home, prefers to focus on UE aerobics during Day Program.    3. Short Term Goal: (concrete, measurable, and time specific outcomes):  Lissett will maintain her current level of cognitive function through active participation in Brain and Body cognitive stimulation activities each day of program attendance.  Target Date:  January 2026  Quarterly Review: Goal remains appropriate. Additional emphasis on mindfulness and relaxation activities throughout Brain and Body programming.

## 2025-07-29 ENCOUNTER — HOSPITAL ENCOUNTER (OUTPATIENT)
Dept: REHABILITATION | Facility: CLINIC | Age: 65
Discharge: HOME | End: 2025-07-29
Attending: FAMILY MEDICINE
Payer: MEDICAID

## 2025-07-29 PROCEDURE — S5100 ADULT DAYCARE SERVICES 15MIN: HCPCS | Performed by: OCCUPATIONAL THERAPIST

## 2025-08-05 ENCOUNTER — HOSPITAL ENCOUNTER (OUTPATIENT)
Dept: REHABILITATION | Facility: CLINIC | Age: 65
Discharge: HOME | End: 2025-08-05
Attending: FAMILY MEDICINE
Payer: MEDICAID

## 2025-08-05 PROCEDURE — S5100 ADULT DAYCARE SERVICES 15MIN: HCPCS

## 2025-08-12 ENCOUNTER — HOSPITAL ENCOUNTER (OUTPATIENT)
Dept: REHABILITATION | Facility: CLINIC | Age: 65
Discharge: HOME | End: 2025-08-12
Attending: FAMILY MEDICINE
Payer: MEDICAID

## 2025-08-12 PROCEDURE — S5100 ADULT DAYCARE SERVICES 15MIN: HCPCS

## 2025-08-19 ENCOUNTER — HOSPITAL ENCOUNTER (OUTPATIENT)
Dept: REHABILITATION | Facility: CLINIC | Age: 65
Discharge: HOME | End: 2025-08-19
Attending: FAMILY MEDICINE
Payer: MEDICAID

## 2025-08-19 PROCEDURE — S5100 ADULT DAYCARE SERVICES 15MIN: HCPCS

## 2025-08-26 ENCOUNTER — HOSPITAL ENCOUNTER (OUTPATIENT)
Dept: REHABILITATION | Facility: CLINIC | Age: 65
Discharge: HOME | End: 2025-08-26
Attending: FAMILY MEDICINE
Payer: MEDICAID

## 2025-08-26 PROCEDURE — S5100 ADULT DAYCARE SERVICES 15MIN: HCPCS

## 2025-09-02 ENCOUNTER — HOSPITAL ENCOUNTER (OUTPATIENT)
Dept: REHABILITATION | Facility: CLINIC | Age: 65
Discharge: HOME | End: 2025-09-02
Attending: FAMILY MEDICINE
Payer: MEDICAID

## 2025-09-02 PROCEDURE — S5100 ADULT DAYCARE SERVICES 15MIN: HCPCS
